# Patient Record
Sex: MALE | Race: WHITE | NOT HISPANIC OR LATINO | Employment: FULL TIME | URBAN - NONMETROPOLITAN AREA
[De-identification: names, ages, dates, MRNs, and addresses within clinical notes are randomized per-mention and may not be internally consistent; named-entity substitution may affect disease eponyms.]

---

## 2017-01-25 ENCOUNTER — TRANSCRIBE ORDERS (OUTPATIENT)
Dept: ADMINISTRATIVE | Facility: HOSPITAL | Age: 52
End: 2017-01-25

## 2017-01-25 DIAGNOSIS — K42.9 UMBILICAL HERNIA WITHOUT OBSTRUCTION OR GANGRENE: Primary | ICD-10-CM

## 2017-02-07 ENCOUNTER — APPOINTMENT (OUTPATIENT)
Dept: CT IMAGING | Facility: HOSPITAL | Age: 52
End: 2017-02-07
Attending: INTERNAL MEDICINE

## 2017-02-09 ENCOUNTER — HOSPITAL ENCOUNTER (OUTPATIENT)
Dept: CT IMAGING | Facility: HOSPITAL | Age: 52
Discharge: HOME | End: 2017-02-09
Attending: INTERNAL MEDICINE | Admitting: INTERNAL MEDICINE

## 2017-02-09 DIAGNOSIS — K42.9 UMBILICAL HERNIA WITHOUT OBSTRUCTION OR GANGRENE: ICD-10-CM

## 2017-02-09 PROCEDURE — 74150 CT ABDOMEN W/O CONTRAST: CPT | Performed by: RADIOLOGY

## 2017-02-09 PROCEDURE — 74150 CT ABDOMEN W/O CONTRAST: CPT

## 2019-07-09 ENCOUNTER — APPOINTMENT (OUTPATIENT)
Dept: GENERAL RADIOLOGY | Facility: HOSPITAL | Age: 54
End: 2019-07-09

## 2019-07-09 ENCOUNTER — APPOINTMENT (OUTPATIENT)
Dept: CT IMAGING | Facility: HOSPITAL | Age: 54
End: 2019-07-09

## 2019-07-09 ENCOUNTER — HOSPITAL ENCOUNTER (INPATIENT)
Facility: HOSPITAL | Age: 54
LOS: 2 days | Discharge: HOME | End: 2019-07-11
Attending: FAMILY MEDICINE | Admitting: INTERNAL MEDICINE

## 2019-07-09 ENCOUNTER — APPOINTMENT (OUTPATIENT)
Dept: ULTRASOUND IMAGING | Facility: HOSPITAL | Age: 54
End: 2019-07-09

## 2019-07-09 DIAGNOSIS — R50.9 FEVER AND CHILLS: ICD-10-CM

## 2019-07-09 DIAGNOSIS — R09.02 HYPOXIA: ICD-10-CM

## 2019-07-09 DIAGNOSIS — R10.9 ABDOMINAL PAIN, UNSPECIFIED ABDOMINAL LOCATION: ICD-10-CM

## 2019-07-09 DIAGNOSIS — K85.90 ACUTE PANCREATITIS, UNSPECIFIED COMPLICATION STATUS, UNSPECIFIED PANCREATITIS TYPE: Primary | ICD-10-CM

## 2019-07-09 LAB
6-ACETYL MORPHINE: NEGATIVE
A-A DO2: 46.4 MMHG (ref 0–300)
ALBUMIN SERPL-MCNC: 3.8 G/DL (ref 3.5–5.2)
ALBUMIN/GLOB SERPL: 1 G/DL
ALP SERPL-CCNC: 82 U/L (ref 39–117)
ALT SERPL W P-5'-P-CCNC: 19 U/L (ref 1–41)
AMPHET+METHAMPHET UR QL: NEGATIVE
AMYLASE SERPL-CCNC: 143 U/L (ref 28–100)
ANION GAP SERPL CALCULATED.3IONS-SCNC: 14.1 MMOL/L (ref 5–15)
APTT PPP: 31.4 SECONDS (ref 23.8–36.1)
ARTERIAL PATENCY WRIST A: ABNORMAL
AST SERPL-CCNC: 15 U/L (ref 1–40)
ATMOSPHERIC PRESS: 728 MMHG
BACTERIA UR QL AUTO: ABNORMAL /HPF
BARBITURATES UR QL SCN: NEGATIVE
BASE EXCESS BLDA CALC-SCNC: -4.4 MMOL/L
BASOPHILS # BLD AUTO: 0.03 10*3/MM3 (ref 0–0.2)
BASOPHILS NFR BLD AUTO: 0.2 % (ref 0–1.5)
BDY SITE: ABNORMAL
BENZODIAZ UR QL SCN: NEGATIVE
BILIRUB SERPL-MCNC: 0.4 MG/DL (ref 0.2–1.2)
BILIRUB UR QL STRIP: NEGATIVE
BODY TEMPERATURE: 98.6 C
BUN BLD-MCNC: 15 MG/DL (ref 6–20)
BUN/CREAT SERPL: 14.9 (ref 7–25)
BUPRENORPHINE SERPL-MCNC: NEGATIVE NG/ML
CALCIUM SPEC-SCNC: 9.4 MG/DL (ref 8.6–10.5)
CANNABINOIDS SERPL QL: NEGATIVE
CHLORIDE SERPL-SCNC: 101 MMOL/L (ref 98–107)
CLARITY UR: CLEAR
CO2 SERPL-SCNC: 22.9 MMOL/L (ref 22–29)
COCAINE UR QL: NEGATIVE
COHGB MFR BLD: 1.8 % (ref 0–5)
COLOR UR: YELLOW
CREAT BLD-MCNC: 1.01 MG/DL (ref 0.76–1.27)
CRP SERPL-MCNC: 10.95 MG/DL (ref 0–0.5)
D-LACTATE SERPL-SCNC: 1 MMOL/L (ref 0.5–2)
DEPRECATED RDW RBC AUTO: 45.4 FL (ref 37–54)
EOSINOPHIL # BLD AUTO: 0.08 10*3/MM3 (ref 0–0.4)
EOSINOPHIL NFR BLD AUTO: 0.6 % (ref 0.3–6.2)
ERYTHROCYTE [DISTWIDTH] IN BLOOD BY AUTOMATED COUNT: 14.6 % (ref 12.3–15.4)
GFR SERPL CREATININE-BSD FRML MDRD: 76 ML/MIN/1.73
GLOBULIN UR ELPH-MCNC: 3.7 GM/DL
GLUCOSE BLD-MCNC: 144 MG/DL (ref 65–99)
GLUCOSE BLDC GLUCOMTR-MCNC: 102 MG/DL (ref 70–130)
GLUCOSE BLDC GLUCOMTR-MCNC: 109 MG/DL (ref 70–130)
GLUCOSE UR STRIP-MCNC: ABNORMAL MG/DL
HCO3 BLDA-SCNC: 19.8 MMOL/L (ref 22–26)
HCT VFR BLD AUTO: 43.1 % (ref 37.5–51)
HCT VFR BLD CALC: 40 % (ref 42–52)
HGB BLD-MCNC: 13.7 G/DL (ref 13–17.7)
HGB BLDA-MCNC: 13.7 G/DL (ref 12–16)
HGB UR QL STRIP.AUTO: ABNORMAL
HOLD SPECIMEN: NORMAL
HOLD SPECIMEN: NORMAL
HOROWITZ INDEX BLD+IHG-RTO: 21 %
HYALINE CASTS UR QL AUTO: ABNORMAL /LPF
IMM GRANULOCYTES # BLD AUTO: 0.03 10*3/MM3 (ref 0–0.05)
IMM GRANULOCYTES NFR BLD AUTO: 0.2 % (ref 0–0.5)
INR PPP: 0.97 (ref 0.9–1.1)
KETONES UR QL STRIP: ABNORMAL
LEUKOCYTE ESTERASE UR QL STRIP.AUTO: NEGATIVE
LIPASE SERPL-CCNC: 279 U/L (ref 13–60)
LYMPHOCYTES # BLD AUTO: 1.48 10*3/MM3 (ref 0.7–3.1)
LYMPHOCYTES NFR BLD AUTO: 10.5 % (ref 19.6–45.3)
MAGNESIUM SERPL-MCNC: 1.9 MG/DL (ref 1.6–2.6)
MCH RBC QN AUTO: 27.2 PG (ref 26.6–33)
MCHC RBC AUTO-ENTMCNC: 31.8 G/DL (ref 31.5–35.7)
MCV RBC AUTO: 85.7 FL (ref 79–97)
METHADONE UR QL SCN: NEGATIVE
METHGB BLD QL: 0 % (ref 0–3)
MODALITY: ABNORMAL
MONOCYTES # BLD AUTO: 1.23 10*3/MM3 (ref 0.1–0.9)
MONOCYTES NFR BLD AUTO: 8.7 % (ref 5–12)
NEUTROPHILS # BLD AUTO: 11.29 10*3/MM3 (ref 1.7–7)
NEUTROPHILS NFR BLD AUTO: 79.8 % (ref 42.7–76)
NITRITE UR QL STRIP: NEGATIVE
NOTE: ABNORMAL
OPIATES UR QL: POSITIVE
OXYCODONE UR QL SCN: NEGATIVE
OXYHGB MFR BLDV: 87.9 % (ref 85–100)
PCO2 BLDA: 34 MM HG (ref 35–45)
PCO2 TEMP ADJ BLD: ABNORMAL MM HG (ref 35–48)
PCP UR QL SCN: NEGATIVE
PH BLDA: 7.38 PH UNITS (ref 7.35–7.45)
PH UR STRIP.AUTO: 7 [PH] (ref 5–8)
PH, TEMP CORRECTED: ABNORMAL PH UNITS (ref 7.35–7.45)
PLATELET # BLD AUTO: 249 10*3/MM3 (ref 140–450)
PMV BLD AUTO: 10 FL (ref 6–12)
PO2 BLDA: 55.9 MM HG (ref 80–100)
PO2 TEMP ADJ BLD: ABNORMAL MM HG (ref 83–108)
POTASSIUM BLD-SCNC: 4.1 MMOL/L (ref 3.5–5.2)
PROT SERPL-MCNC: 7.5 G/DL (ref 6–8.5)
PROT UR QL STRIP: NEGATIVE
PROTHROMBIN TIME: 13.4 SECONDS (ref 11–15.4)
RBC # BLD AUTO: 5.03 10*6/MM3 (ref 4.14–5.8)
RBC # UR: ABNORMAL /HPF
REF LAB TEST METHOD: ABNORMAL
SAO2 % BLDCOA: 89.5 % (ref 90–100)
SODIUM BLD-SCNC: 138 MMOL/L (ref 136–145)
SP GR UR STRIP: >1.03 (ref 1–1.03)
SQUAMOUS #/AREA URNS HPF: ABNORMAL /HPF
TROPONIN T SERPL-MCNC: <0.01 NG/ML (ref 0–0.03)
UROBILINOGEN UR QL STRIP: ABNORMAL
WBC NRBC COR # BLD: 14.14 10*3/MM3 (ref 3.4–10.8)
WBC UR QL AUTO: ABNORMAL /HPF
WHOLE BLOOD HOLD SPECIMEN: NORMAL
WHOLE BLOOD HOLD SPECIMEN: NORMAL

## 2019-07-09 PROCEDURE — 84484 ASSAY OF TROPONIN QUANT: CPT | Performed by: PHYSICIAN ASSISTANT

## 2019-07-09 PROCEDURE — 74176 CT ABD & PELVIS W/O CONTRAST: CPT

## 2019-07-09 PROCEDURE — 93005 ELECTROCARDIOGRAM TRACING: CPT | Performed by: PHYSICIAN ASSISTANT

## 2019-07-09 PROCEDURE — 93005 ELECTROCARDIOGRAM TRACING: CPT | Performed by: FAMILY MEDICINE

## 2019-07-09 PROCEDURE — 80307 DRUG TEST PRSMV CHEM ANLYZR: CPT | Performed by: FAMILY MEDICINE

## 2019-07-09 PROCEDURE — 71045 X-RAY EXAM CHEST 1 VIEW: CPT

## 2019-07-09 PROCEDURE — 82150 ASSAY OF AMYLASE: CPT | Performed by: PHYSICIAN ASSISTANT

## 2019-07-09 PROCEDURE — 83050 HGB METHEMOGLOBIN QUAN: CPT | Performed by: PHYSICIAN ASSISTANT

## 2019-07-09 PROCEDURE — 83036 HEMOGLOBIN GLYCOSYLATED A1C: CPT | Performed by: HOSPITALIST

## 2019-07-09 PROCEDURE — 36600 WITHDRAWAL OF ARTERIAL BLOOD: CPT | Performed by: PHYSICIAN ASSISTANT

## 2019-07-09 PROCEDURE — 71045 X-RAY EXAM CHEST 1 VIEW: CPT | Performed by: RADIOLOGY

## 2019-07-09 PROCEDURE — 71275 CT ANGIOGRAPHY CHEST: CPT

## 2019-07-09 PROCEDURE — 99285 EMERGENCY DEPT VISIT HI MDM: CPT

## 2019-07-09 PROCEDURE — 85610 PROTHROMBIN TIME: CPT | Performed by: PHYSICIAN ASSISTANT

## 2019-07-09 PROCEDURE — 82805 BLOOD GASES W/O2 SATURATION: CPT | Performed by: PHYSICIAN ASSISTANT

## 2019-07-09 PROCEDURE — 93010 ELECTROCARDIOGRAM REPORT: CPT | Performed by: INTERNAL MEDICINE

## 2019-07-09 PROCEDURE — 85025 COMPLETE CBC W/AUTO DIFF WBC: CPT | Performed by: FAMILY MEDICINE

## 2019-07-09 PROCEDURE — 86140 C-REACTIVE PROTEIN: CPT | Performed by: PHYSICIAN ASSISTANT

## 2019-07-09 PROCEDURE — 80053 COMPREHEN METABOLIC PANEL: CPT | Performed by: FAMILY MEDICINE

## 2019-07-09 PROCEDURE — 87040 BLOOD CULTURE FOR BACTERIA: CPT | Performed by: FAMILY MEDICINE

## 2019-07-09 PROCEDURE — 83605 ASSAY OF LACTIC ACID: CPT | Performed by: FAMILY MEDICINE

## 2019-07-09 PROCEDURE — 83735 ASSAY OF MAGNESIUM: CPT | Performed by: PHYSICIAN ASSISTANT

## 2019-07-09 PROCEDURE — 76705 ECHO EXAM OF ABDOMEN: CPT | Performed by: RADIOLOGY

## 2019-07-09 PROCEDURE — 81001 URINALYSIS AUTO W/SCOPE: CPT | Performed by: FAMILY MEDICINE

## 2019-07-09 PROCEDURE — 85730 THROMBOPLASTIN TIME PARTIAL: CPT | Performed by: PHYSICIAN ASSISTANT

## 2019-07-09 PROCEDURE — 0 IOVERSOL 74 % SOLUTION: Performed by: FAMILY MEDICINE

## 2019-07-09 PROCEDURE — 82962 GLUCOSE BLOOD TEST: CPT

## 2019-07-09 PROCEDURE — 25010000002 PIPERACILLIN-TAZOBACTAM: Performed by: PHYSICIAN ASSISTANT

## 2019-07-09 PROCEDURE — 25010000002 CEFTRIAXONE: Performed by: HOSPITALIST

## 2019-07-09 PROCEDURE — 74176 CT ABD & PELVIS W/O CONTRAST: CPT | Performed by: RADIOLOGY

## 2019-07-09 PROCEDURE — 71275 CT ANGIOGRAPHY CHEST: CPT | Performed by: RADIOLOGY

## 2019-07-09 PROCEDURE — 83690 ASSAY OF LIPASE: CPT | Performed by: PHYSICIAN ASSISTANT

## 2019-07-09 PROCEDURE — 76705 ECHO EXAM OF ABDOMEN: CPT

## 2019-07-09 PROCEDURE — 82375 ASSAY CARBOXYHB QUANT: CPT | Performed by: PHYSICIAN ASSISTANT

## 2019-07-09 RX ORDER — AMITRIPTYLINE HYDROCHLORIDE 25 MG/1
25 TABLET, FILM COATED ORAL NIGHTLY PRN
COMMUNITY
End: 2019-07-11 | Stop reason: HOSPADM

## 2019-07-09 RX ORDER — NAPROXEN 500 MG/1
500 TABLET ORAL 2 TIMES DAILY WITH MEALS
COMMUNITY
End: 2019-07-11 | Stop reason: HOSPADM

## 2019-07-09 RX ORDER — SODIUM CHLORIDE 9 MG/ML
100 INJECTION, SOLUTION INTRAVENOUS CONTINUOUS
Status: DISCONTINUED | OUTPATIENT
Start: 2019-07-09 | End: 2019-07-11 | Stop reason: HOSPADM

## 2019-07-09 RX ORDER — SODIUM CHLORIDE 0.9 % (FLUSH) 0.9 %
10 SYRINGE (ML) INJECTION AS NEEDED
Status: DISCONTINUED | OUTPATIENT
Start: 2019-07-09 | End: 2019-07-11 | Stop reason: HOSPADM

## 2019-07-09 RX ORDER — CYCLOBENZAPRINE HCL 5 MG
5 TABLET ORAL 2 TIMES DAILY PRN
COMMUNITY
End: 2019-07-11 | Stop reason: HOSPADM

## 2019-07-09 RX ORDER — ACETAMINOPHEN 325 MG/1
650 TABLET ORAL ONCE
Status: COMPLETED | OUTPATIENT
Start: 2019-07-09 | End: 2019-07-09

## 2019-07-09 RX ORDER — SODIUM CHLORIDE 0.9 % (FLUSH) 0.9 %
3-10 SYRINGE (ML) INJECTION AS NEEDED
Status: DISCONTINUED | OUTPATIENT
Start: 2019-07-09 | End: 2019-07-11 | Stop reason: HOSPADM

## 2019-07-09 RX ORDER — HYDROCODONE BITARTRATE AND ACETAMINOPHEN 7.5; 325 MG/1; MG/1
1 TABLET ORAL 2 TIMES DAILY PRN
Status: ON HOLD | COMMUNITY
End: 2019-07-11 | Stop reason: SDUPTHER

## 2019-07-09 RX ORDER — GABAPENTIN 300 MG/1
300 CAPSULE ORAL 3 TIMES DAILY PRN
COMMUNITY
End: 2019-07-11 | Stop reason: HOSPADM

## 2019-07-09 RX ORDER — NITROGLYCERIN 0.4 MG/1
0.4 TABLET SUBLINGUAL
Status: DISCONTINUED | OUTPATIENT
Start: 2019-07-09 | End: 2019-07-11 | Stop reason: HOSPADM

## 2019-07-09 RX ORDER — SODIUM CHLORIDE 0.9 % (FLUSH) 0.9 %
3 SYRINGE (ML) INJECTION EVERY 12 HOURS SCHEDULED
Status: DISCONTINUED | OUTPATIENT
Start: 2019-07-10 | End: 2019-07-11 | Stop reason: HOSPADM

## 2019-07-09 RX ADMIN — IOVERSOL 100 ML: 741 INJECTION INTRA-ARTERIAL; INTRAVENOUS at 20:08

## 2019-07-09 RX ADMIN — PIPERACILLIN SODIUM,TAZOBACTAM SODIUM 3.38 G: 3; .375 INJECTION, POWDER, FOR SOLUTION INTRAVENOUS at 18:10

## 2019-07-09 RX ADMIN — SODIUM CHLORIDE 1983 ML: 9 INJECTION, SOLUTION INTRAVENOUS at 17:21

## 2019-07-09 RX ADMIN — CEFTRIAXONE 1 G: 1 INJECTION, POWDER, FOR SOLUTION INTRAMUSCULAR; INTRAVENOUS at 22:47

## 2019-07-09 RX ADMIN — ACETAMINOPHEN 650 MG: 325 TABLET ORAL at 18:08

## 2019-07-09 RX ADMIN — SODIUM CHLORIDE 125 ML/HR: 9 INJECTION, SOLUTION INTRAVENOUS at 22:35

## 2019-07-09 NOTE — ED NOTES
Patient presents to the ER with complaints of abdominal pain. Patient states that the pain is generalized to the whole abdominal area. Patient states that he has not had any vomiting or diarrhea. Patient states that the pain has been present for about a month now. Patient states that the pain radiates to his back. Patient states that he has not had any relief from pain since it started.      Nicho Vásquez, CARRIE  07/09/19 7215

## 2019-07-09 NOTE — ED PROVIDER NOTES
Subjective   54-year-old male presents the ED today for generalized abdominal pain for the last 2 weeks.  He states the pain has been constant and progressively getting worse.  He states nothing seems to make it worse or better.  He denies any nausea, vomiting or diarrhea.  He states he had a normal bowel movement yesterday.  He denies any blood in his stool or black tarry stool.  He states his appetite has been normal.  He denies any urinary symptoms.  [facility] and [facility] recommended that he come here for an evaluation.  The patient was noted to have a fever of 101 here in the ER but he denies any fever at home.  His wife states that he she gave him a Norco 10 mg about 90 minutes ago with no relief of his symptoms.  He does have a history of diabetes, hypertension, hyperlipidemia.  He denies any drug or alcohol use.        History provided by:  Patient  Abdominal Pain   Pain location:  Generalized  Pain quality: sharp    Pain radiates to:  Does not radiate  Pain severity:  Severe  Onset quality:  Gradual  Duration:  2 weeks  Timing:  Constant  Progression:  Worsening  Chronicity:  New  Context: not alcohol use, not recent travel, not sick contacts, not suspicious food intake and not trauma    Relieved by:  Nothing  Worsened by:  Nothing  Associated symptoms: fever    Associated symptoms: no anorexia, no chest pain, no chills, no constipation, no cough, no diarrhea, no dysuria, no fatigue, no hematemesis, no hematochezia, no hematuria, no melena, no nausea, no shortness of breath and no vomiting    Risk factors: obesity    Risk factors: no alcohol abuse        Review of Systems   Constitutional: Positive for fever. Negative for appetite change, chills and fatigue.   HENT: Negative.    Eyes: Negative.    Respiratory: Negative for cough and shortness of breath.    Cardiovascular: Negative for chest pain.   Gastrointestinal: Positive for abdominal pain. Negative for anorexia,  blood in stool, constipation, diarrhea, hematemesis, hematochezia, melena, nausea and vomiting.   Genitourinary: Negative for dysuria and hematuria.   Musculoskeletal: Negative.    Skin: Negative.    Neurological: Negative.    Psychiatric/Behavioral: Negative.    All other systems reviewed and are negative.      Past Medical History:   Diagnosis Date   • Diabetes mellitus (CMS/HCC)    • GERD (gastroesophageal reflux disease)    • Hyperlipidemia    • Hypertension    • Injury of back    • Kidney stone    • Pancreatitis        No Known Allergies    Past Surgical History:   Procedure Laterality Date   • ADENOIDECTOMY     • KIDNEY STONE SURGERY     • TONSILLECTOMY         History reviewed. No pertinent family history.    Social History     Socioeconomic History   • Marital status:      Spouse name: Not on file   • Number of children: Not on file   • Years of education: Not on file   • Highest education level: Not on file   Tobacco Use   • Smoking status: Never Smoker   • Smokeless tobacco: Current User   Substance and Sexual Activity   • Alcohol use: No   • Drug use: No           Objective   Physical Exam   Constitutional: He is oriented to person, place, and time. He appears well-developed and well-nourished. No distress.   HENT:   Head: Normocephalic and atraumatic.   Mouth/Throat: Oropharynx is clear and moist.   Eyes: EOM are normal. Pupils are equal, round, and reactive to light.   Cardiovascular: Regular rhythm, normal heart sounds and intact distal pulses. Tachycardia present.   Pulmonary/Chest: Effort normal and breath sounds normal.   Abdominal: Soft. Normal appearance and bowel sounds are normal. There is generalized tenderness. There is no rigidity, no rebound, no guarding and no CVA tenderness.   Neurological: He is alert and oriented to person, place, and time.   Skin: Skin is warm and dry. Capillary refill takes less than 2 seconds.   Psychiatric: He has a normal mood and affect. His behavior is  normal.   Nursing note and vitals reviewed.      Procedures           ED Course  ED Course as of Jul 10 1537   Tue Jul 09, 2019   1648 Patient examined.  Agree with Debbie Rivera's workup.  Will continue to follow  [EG]   1747 17:22 - sinus tachycardia, rate of 124, incomplete RBBB, no acute ischemia, reviewed by Dr. Arias ECG 12 Lead []   1831 No acute findings on CXR per Dr. Damon  []   1845 Patient has begun to have episodes where his oxygen levels have dropped to 87-88% on room air.  The patient denies any shortness of breath or chest pain.  States he has not been coughing.  Denies any recent travel.  States he mostly just works and comes home.  He works at [employer].  []   1847 Patient continues to be tachycardic in the 130's.  []   1857 ABG reviewed and patient started on 2L O2.  CT PE protocol ordered as well.  []   1940 CT abd/pelvis shows cholelithiasis appears to be present with tiny density in nondependent gallbladder and along the neck.  Questionable very slight hazy density surrounding pancreas suspicious for infectious or inflammatory process, possible pancreatitis.  No definite ductal dilatation.  Diverticulosis coli without pericolonic fat stranding.  Thickened appearance of the distal colon may be attributed to under distention or inflammation.  No dilated bowel obstruction.  No free intraperitoneal air or free fluid collections.  Bilateral renal cysts with nonobstructing intrarenal calculi.  No hydronephrosis.  []   2003 Endorsed to Jaime Bass  [AH]   2140 CT PE protocol does show no pulmonary embolus.  No aortic dissection or aneurysm.  Stranding dependent interstitial prominence, subsegmental atelectasis or infiltrates in both lungs.  6 mm nodule in right lung base and left hilar calcifications may be old granulomatous changes.  Ultrasound shows probable small nonshadowing stones or sludge in gallbladder.   []   2142 Call placed to Dr. Childs.   []      ED Course User Index  []  Debbie Jha PA  [] Car Bass PA-C  [EG] Nieves Arias, DO                  MDM  Number of Diagnoses or Management Options  Abdominal pain, unspecified abdominal location:   Acute pancreatitis, unspecified complication status, unspecified pancreatitis type:   Fever and chills:   Hypoxia:      Amount and/or Complexity of Data Reviewed  Clinical lab tests: reviewed  Tests in the radiology section of CPT®: reviewed  Tests in the medicine section of CPT®: reviewed  Discuss the patient with other providers: yes    Patient Progress  Patient progress: stable        Final diagnoses:   Acute pancreatitis, unspecified complication status, unspecified pancreatitis type   Abdominal pain, unspecified abdominal location   Fever and chills   Hypoxia            Debbie Jha PA  07/10/19 1539

## 2019-07-10 LAB
ALBUMIN SERPL-MCNC: 3.28 G/DL (ref 3.5–5.2)
ALBUMIN/GLOB SERPL: 1 G/DL
ALP SERPL-CCNC: 73 U/L (ref 39–117)
ALT SERPL W P-5'-P-CCNC: 16 U/L (ref 1–41)
AMYLASE SERPL-CCNC: 85 U/L (ref 28–100)
ANION GAP SERPL CALCULATED.3IONS-SCNC: 12.9 MMOL/L (ref 5–15)
AST SERPL-CCNC: 13 U/L (ref 1–40)
BASOPHILS # BLD AUTO: 0.04 10*3/MM3 (ref 0–0.2)
BASOPHILS NFR BLD AUTO: 0.3 % (ref 0–1.5)
BILIRUB SERPL-MCNC: 0.4 MG/DL (ref 0.2–1.2)
BUN BLD-MCNC: 13 MG/DL (ref 6–20)
BUN/CREAT SERPL: 13.5 (ref 7–25)
CALCIUM SPEC-SCNC: 8.7 MG/DL (ref 8.6–10.5)
CHLORIDE SERPL-SCNC: 106 MMOL/L (ref 98–107)
CHOLEST SERPL-MCNC: 168 MG/DL (ref 0–200)
CO2 SERPL-SCNC: 20.1 MMOL/L (ref 22–29)
CREAT BLD-MCNC: 0.96 MG/DL (ref 0.76–1.27)
DEPRECATED RDW RBC AUTO: 47.3 FL (ref 37–54)
EOSINOPHIL # BLD AUTO: 0.2 10*3/MM3 (ref 0–0.4)
EOSINOPHIL NFR BLD AUTO: 1.3 % (ref 0.3–6.2)
ERYTHROCYTE [DISTWIDTH] IN BLOOD BY AUTOMATED COUNT: 14.8 % (ref 12.3–15.4)
GFR SERPL CREATININE-BSD FRML MDRD: 81 ML/MIN/1.73
GLOBULIN UR ELPH-MCNC: 3.4 GM/DL
GLUCOSE BLD-MCNC: 157 MG/DL (ref 65–99)
GLUCOSE BLDC GLUCOMTR-MCNC: 108 MG/DL (ref 70–130)
GLUCOSE BLDC GLUCOMTR-MCNC: 117 MG/DL (ref 70–130)
GLUCOSE BLDC GLUCOMTR-MCNC: 135 MG/DL (ref 70–130)
GLUCOSE BLDC GLUCOMTR-MCNC: 205 MG/DL (ref 70–130)
HBA1C MFR BLD: 9.4 % (ref 4.8–5.6)
HCT VFR BLD AUTO: 42.5 % (ref 37.5–51)
HDLC SERPL-MCNC: 36 MG/DL (ref 40–60)
HGB BLD-MCNC: 13.4 G/DL (ref 13–17.7)
IMM GRANULOCYTES # BLD AUTO: 0.04 10*3/MM3 (ref 0–0.05)
IMM GRANULOCYTES NFR BLD AUTO: 0.3 % (ref 0–0.5)
LDLC SERPL CALC-MCNC: 104 MG/DL (ref 0–100)
LDLC/HDLC SERPL: 2.88 {RATIO}
LIPASE SERPL-CCNC: 121 U/L (ref 13–60)
LYMPHOCYTES # BLD AUTO: 1.7 10*3/MM3 (ref 0.7–3.1)
LYMPHOCYTES NFR BLD AUTO: 10.8 % (ref 19.6–45.3)
MCH RBC QN AUTO: 27.8 PG (ref 26.6–33)
MCHC RBC AUTO-ENTMCNC: 31.5 G/DL (ref 31.5–35.7)
MCV RBC AUTO: 88.2 FL (ref 79–97)
MONOCYTES # BLD AUTO: 1.39 10*3/MM3 (ref 0.1–0.9)
MONOCYTES NFR BLD AUTO: 8.8 % (ref 5–12)
NEUTROPHILS # BLD AUTO: 12.35 10*3/MM3 (ref 1.7–7)
NEUTROPHILS NFR BLD AUTO: 78.5 % (ref 42.7–76)
PLATELET # BLD AUTO: 227 10*3/MM3 (ref 140–450)
PMV BLD AUTO: 10.4 FL (ref 6–12)
POTASSIUM BLD-SCNC: 4.4 MMOL/L (ref 3.5–5.2)
PROT SERPL-MCNC: 6.7 G/DL (ref 6–8.5)
RBC # BLD AUTO: 4.82 10*6/MM3 (ref 4.14–5.8)
SODIUM BLD-SCNC: 139 MMOL/L (ref 136–145)
TRIGL SERPL-MCNC: 142 MG/DL (ref 0–150)
VLDLC SERPL-MCNC: 28.4 MG/DL
WBC NRBC COR # BLD: 15.72 10*3/MM3 (ref 3.4–10.8)

## 2019-07-10 PROCEDURE — 82962 GLUCOSE BLOOD TEST: CPT

## 2019-07-10 PROCEDURE — 99223 1ST HOSP IP/OBS HIGH 75: CPT | Performed by: HOSPITALIST

## 2019-07-10 PROCEDURE — 80061 LIPID PANEL: CPT | Performed by: HOSPITALIST

## 2019-07-10 PROCEDURE — 80053 COMPREHEN METABOLIC PANEL: CPT | Performed by: HOSPITALIST

## 2019-07-10 PROCEDURE — 94799 UNLISTED PULMONARY SVC/PX: CPT

## 2019-07-10 PROCEDURE — 25010000002 MORPHINE PER 10 MG: Performed by: HOSPITALIST

## 2019-07-10 PROCEDURE — 83690 ASSAY OF LIPASE: CPT | Performed by: HOSPITALIST

## 2019-07-10 PROCEDURE — 85025 COMPLETE CBC W/AUTO DIFF WBC: CPT | Performed by: HOSPITALIST

## 2019-07-10 PROCEDURE — 82150 ASSAY OF AMYLASE: CPT | Performed by: HOSPITALIST

## 2019-07-10 PROCEDURE — 63710000001 INSULIN ASPART PER 5 UNITS: Performed by: HOSPITALIST

## 2019-07-10 RX ORDER — NICOTINE POLACRILEX 4 MG
15 LOZENGE BUCCAL
Status: DISCONTINUED | OUTPATIENT
Start: 2019-07-10 | End: 2019-07-11 | Stop reason: HOSPADM

## 2019-07-10 RX ORDER — INSULIN ASPART 100 [IU]/ML
110 INJECTION, SUSPENSION SUBCUTANEOUS EVERY MORNING
Status: CANCELLED | OUTPATIENT
Start: 2019-07-10

## 2019-07-10 RX ORDER — AMITRIPTYLINE HYDROCHLORIDE 25 MG/1
25 TABLET, FILM COATED ORAL NIGHTLY PRN
Status: CANCELLED | OUTPATIENT
Start: 2019-07-10

## 2019-07-10 RX ORDER — FOLIC ACID 1 MG/1
1 TABLET ORAL DAILY
Status: DISCONTINUED | OUTPATIENT
Start: 2019-07-10 | End: 2019-07-11 | Stop reason: HOSPADM

## 2019-07-10 RX ORDER — LISINOPRIL 10 MG/1
10 TABLET ORAL DAILY
Status: CANCELLED | OUTPATIENT
Start: 2019-07-10

## 2019-07-10 RX ORDER — INSULIN ASPART 100 [IU]/ML
55 INJECTION, SUSPENSION SUBCUTANEOUS EVERY EVENING
Status: CANCELLED | OUTPATIENT
Start: 2019-07-10

## 2019-07-10 RX ORDER — NAPROXEN 250 MG/1
500 TABLET ORAL 2 TIMES DAILY WITH MEALS
Status: CANCELLED | OUTPATIENT
Start: 2019-07-10

## 2019-07-10 RX ORDER — ROPINIROLE 2 MG/1
2 TABLET, FILM COATED ORAL NIGHTLY
COMMUNITY
End: 2019-07-11 | Stop reason: HOSPADM

## 2019-07-10 RX ORDER — GABAPENTIN 300 MG/1
300 CAPSULE ORAL 3 TIMES DAILY PRN
Status: CANCELLED | OUTPATIENT
Start: 2019-07-10

## 2019-07-10 RX ORDER — ROPINIROLE 1 MG/1
2 TABLET, FILM COATED ORAL NIGHTLY
Status: CANCELLED | OUTPATIENT
Start: 2019-07-10

## 2019-07-10 RX ORDER — HYDROCODONE BITARTRATE AND ACETAMINOPHEN 7.5; 325 MG/1; MG/1
1 TABLET ORAL 2 TIMES DAILY PRN
Status: CANCELLED | OUTPATIENT
Start: 2019-07-10

## 2019-07-10 RX ORDER — CYCLOBENZAPRINE HCL 10 MG
5 TABLET ORAL 2 TIMES DAILY PRN
Status: CANCELLED | OUTPATIENT
Start: 2019-07-10

## 2019-07-10 RX ORDER — LISINOPRIL 10 MG/1
10 TABLET ORAL DAILY
COMMUNITY
End: 2019-07-11 | Stop reason: HOSPADM

## 2019-07-10 RX ORDER — MORPHINE SULFATE 2 MG/ML
1 INJECTION, SOLUTION INTRAMUSCULAR; INTRAVENOUS EVERY 4 HOURS PRN
Status: DISCONTINUED | OUTPATIENT
Start: 2019-07-10 | End: 2019-07-11 | Stop reason: HOSPADM

## 2019-07-10 RX ORDER — PANTOPRAZOLE SODIUM 40 MG/1
40 TABLET, DELAYED RELEASE ORAL EVERY MORNING
Status: CANCELLED | OUTPATIENT
Start: 2019-07-10

## 2019-07-10 RX ORDER — THIAMINE MONONITRATE (VIT B1) 100 MG
100 TABLET ORAL DAILY
Status: DISCONTINUED | OUTPATIENT
Start: 2019-07-10 | End: 2019-07-11 | Stop reason: HOSPADM

## 2019-07-10 RX ORDER — DEXTROSE MONOHYDRATE 25 G/50ML
25 INJECTION, SOLUTION INTRAVENOUS
Status: DISCONTINUED | OUTPATIENT
Start: 2019-07-10 | End: 2019-07-11 | Stop reason: HOSPADM

## 2019-07-10 RX ADMIN — METRONIDAZOLE 500 MG: 500 INJECTION, SOLUTION INTRAVENOUS at 09:14

## 2019-07-10 RX ADMIN — INSULIN ASPART 3 UNITS: 100 INJECTION, SOLUTION INTRAVENOUS; SUBCUTANEOUS at 17:40

## 2019-07-10 RX ADMIN — SODIUM CHLORIDE 100 ML/HR: 9 INJECTION, SOLUTION INTRAVENOUS at 18:14

## 2019-07-10 RX ADMIN — MORPHINE SULFATE 1 MG: 2 INJECTION, SOLUTION INTRAMUSCULAR; INTRAVENOUS at 12:59

## 2019-07-10 RX ADMIN — MORPHINE SULFATE 1 MG: 2 INJECTION, SOLUTION INTRAMUSCULAR; INTRAVENOUS at 19:35

## 2019-07-10 RX ADMIN — METOPROLOL TARTRATE 25 MG: 25 TABLET, FILM COATED ORAL at 14:31

## 2019-07-10 RX ADMIN — METOPROLOL TARTRATE 25 MG: 25 TABLET, FILM COATED ORAL at 20:57

## 2019-07-10 RX ADMIN — SODIUM CHLORIDE, PRESERVATIVE FREE 3 ML: 5 INJECTION INTRAVENOUS at 20:57

## 2019-07-10 RX ADMIN — Medication 100 MG: at 14:31

## 2019-07-10 RX ADMIN — FOLIC ACID 1 MG: 1 TABLET ORAL at 14:31

## 2019-07-10 RX ADMIN — MORPHINE SULFATE 1 MG: 2 INJECTION, SOLUTION INTRAMUSCULAR; INTRAVENOUS at 03:33

## 2019-07-10 RX ADMIN — SODIUM CHLORIDE 125 ML/HR: 9 INJECTION, SOLUTION INTRAVENOUS at 09:15

## 2019-07-10 RX ADMIN — METRONIDAZOLE 500 MG: 500 INJECTION, SOLUTION INTRAVENOUS at 02:06

## 2019-07-10 NOTE — ED PROVIDER NOTES
Subjective     History provided by:  Patient   used: No    Abdominal Pain   Pain location:  Epigastric  Pain quality: aching and gnawing    Pain radiates to:  Does not radiate  Pain severity:  Moderate  Onset quality:  Sudden  Duration:  2 weeks  Timing:  Intermittent  Progression:  Worsening  Chronicity:  New  Context: not alcohol use, not awakening from sleep, not diet changes, not medication withdrawal, not previous surgeries, not recent illness, not retching and not sick contacts    Relieved by:  Nothing  Worsened by:  Nothing  Ineffective treatments:  None tried  Associated symptoms: nausea    Associated symptoms: no anorexia and no belching    Risk factors: no alcohol abuse, no aspirin use, has not had multiple surgeries and no NSAID use        Review of Systems   Constitutional: Negative.    Eyes: Negative.    Gastrointestinal: Positive for abdominal distention, abdominal pain and nausea. Negative for anorexia.   All other systems reviewed and are negative.      Past Medical History:   Diagnosis Date   • Diabetes mellitus (CMS/HCC)    • GERD (gastroesophageal reflux disease)    • Hyperlipidemia    • Hypertension    • Injury of back    • Kidney stone    • Pancreatitis        No Known Allergies    Past Surgical History:   Procedure Laterality Date   • ADENOIDECTOMY     • KIDNEY STONE SURGERY     • TONSILLECTOMY         History reviewed. No pertinent family history.    Social History     Socioeconomic History   • Marital status:      Spouse name: Not on file   • Number of children: Not on file   • Years of education: Not on file   • Highest education level: Not on file   Tobacco Use   • Smoking status: Never Smoker   • Smokeless tobacco: Current User   Substance and Sexual Activity   • Alcohol use: No   • Drug use: No           Objective   Physical Exam   Constitutional: He is oriented to person, place, and time. He appears well-developed and well-nourished.  Non-toxic appearance. He  does not appear ill. No distress.   HENT:   Head: Normocephalic and atraumatic.   Nose: Nose normal.   Mouth/Throat: Oropharynx is clear and moist. No oropharyngeal exudate.   Eyes: Conjunctivae and EOM are normal. Pupils are equal, round, and reactive to light. No scleral icterus.   Neck: Normal range of motion. Neck supple.   Cardiovascular: Normal rate, regular rhythm, normal heart sounds and intact distal pulses. Exam reveals no gallop and no friction rub.   No murmur heard.  Pulmonary/Chest: Effort normal and breath sounds normal. No stridor. No respiratory distress. He has no wheezes. He has no rales.   Abdominal: Soft. Normal appearance and bowel sounds are normal. There is no hepatosplenomegaly. There is tenderness in the epigastric area. There is no rigidity, no rebound, no guarding, no CVA tenderness, no tenderness at McBurney's point and negative Moreland's sign. No hernia.   Genitourinary: Prostate normal. Prostate is not enlarged and not tender.   Musculoskeletal: Normal range of motion.   Neurological: He is alert and oriented to person, place, and time.   Skin: Skin is warm and dry. Capillary refill takes less than 2 seconds. No rash noted. He is not diaphoretic. No cyanosis or erythema. No pallor.   Psychiatric: He has a normal mood and affect. His behavior is normal. Judgment and thought content normal.   Nursing note and vitals reviewed.      Procedures           ED Course  ED Course as of Jul 11 0721   Tue Jul 09, 2019   1648 Patient examined.  Agree with Debbie Rivera's workup.  Will continue to follow  [EG]   1747 17:22 - sinus tachycardia, rate of 124, incomplete RBBB, no acute ischemia, reviewed by Dr. Arias ECG 12 Lead []   1831 No acute findings on CXR per Dr. Damon  []   1845 Patient has begun to have episodes where his oxygen levels have dropped to 87-88% on room air.  The patient denies any shortness of breath or chest pain.  States he has not been coughing.  Denies any recent travel.   States he mostly just works and comes home.  He works at [employer].  []   1847 Patient continues to be tachycardic in the 130's.  []   1857 ABG reviewed and patient started on 2L O2.  CT PE protocol ordered as well.  []   1940 CT abd/pelvis shows cholelithiasis appears to be present with tiny density in nondependent gallbladder and along the neck.  Questionable very slight hazy density surrounding pancreas suspicious for infectious or inflammatory process, possible pancreatitis.  No definite ductal dilatation.  Diverticulosis coli without pericolonic fat stranding.  Thickened appearance of the distal colon may be attributed to under distention or inflammation.  No dilated bowel obstruction.  No free intraperitoneal air or free fluid collections.  Bilateral renal cysts with nonobstructing intrarenal calculi.  No hydronephrosis.  []   2003 Endorsed to Jaime Bass  []   2140 CT PE protocol does show no pulmonary embolus.  No aortic dissection or aneurysm.  Stranding dependent interstitial prominence, subsegmental atelectasis or infiltrates in both lungs.  6 mm nodule in right lung base and left hilar calcifications may be old granulomatous changes.  Ultrasound shows probable small nonshadowing stones or sludge in gallbladder.   []   2142 Call placed to Dr. Childs.   []      ED Course User Index  [AH] Debbie Jha, PA  [] Car Bass PA-C  [] Nieves Arias, DO [facility]      Final diagnoses:   Acute pancreatitis, unspecified complication status, unspecified pancreatitis type   Abdominal pain, unspecified abdominal location   Fever and chills   Hypoxia            Car Bass PA-C  07/11/19 0721       Car Bass PA-C  07/11/19 0721

## 2019-07-10 NOTE — PLAN OF CARE
Problem: Pancreatitis, Acute/Chronic (Adult)  Goal: Signs and Symptoms of Listed Potential Problems Will be Absent, Minimized or Managed (Pancreatitis, Acute/Chronic)  Outcome: Ongoing (interventions implemented as appropriate)   07/10/19 0948   Goal/Outcome Evaluation   Problems Assessed (Pancreatitis) all   Problems Present (Pancreatitis) pain

## 2019-07-10 NOTE — PLAN OF CARE
Problem: Patient Care Overview  Goal: Plan of Care Review  Outcome: Ongoing (interventions implemented as appropriate)   07/10/19 0917 07/10/19 0922   Plan of Care Review   Progress --  no change   Coping/Psychosocial   Plan of Care Reviewed With spouse;patient --      Goal: Individualization and Mutuality  Outcome: Ongoing (interventions implemented as appropriate)      Problem: Pain, Acute (Adult)  Goal: Acceptable Pain Control/Comfort Level  Outcome: Ongoing (interventions implemented as appropriate)   07/10/19 0922   Pain, Acute (Adult)   Acceptable Pain Control/Comfort Level making progress toward outcome       Problem: Fall Risk (Adult)  Goal: Absence of Fall  Outcome: Ongoing (interventions implemented as appropriate)   07/10/19 0922   Fall Risk (Adult)   Absence of Fall making progress toward outcome

## 2019-07-10 NOTE — PLAN OF CARE
Problem: Pain, Acute (Adult)  Goal: Acceptable Pain Control/Comfort Level  Outcome: Ongoing (interventions implemented as appropriate)   07/09/19 2339   Pain, Acute (Adult)   Acceptable Pain Control/Comfort Level making progress toward outcome       Problem: Fall Risk (Adult)  Goal: Absence of Fall  Outcome: Ongoing (interventions implemented as appropriate)   07/09/19 2339   Fall Risk (Adult)   Absence of Fall making progress toward outcome

## 2019-07-10 NOTE — PROGRESS NOTES
*pt seen and examined, abd pain is better, non N/V/D  *pt drinks 4 days a week  *exam showed mild abd distension  *labs showed AP, CT    --reduce  cc/hr  --Clear liquid  --thiamine / FA / MV  --dc ABx

## 2019-07-10 NOTE — PROGRESS NOTES
Discharge Planning Assessment   Michele     Patient Name: Valentin Vásquez  MRN: [id number]  Today's Date: 7/10/2019    Admit Date: 7/9/2019    Discharge Needs Assessment     Row Name 07/10/19 4767       Living Environment    Lives With  spouse    Name(s) of Who Lives With Patient  Sharri Vásquez     Current Living Arrangements  home/apartment/condo    Primary Care Provided by  self    Provides Primary Care For  no one    Family Caregiver if Needed  none    Quality of Family Relationships  helpful;involved;supportive    Able to Return to Prior Arrangements  yes       Resource/Environmental Concerns    Resource/Environmental Concerns  none    Transportation Concerns  car, none       Transition Planning    Patient/Family Anticipates Transition to  home with family    Patient/Family Anticipated Services at Transition  none    Transportation Anticipated  car, drives self       Discharge Needs Assessment    Readmission Within the Last 30 Days  no previous admission in last 30 days    Concerns to be Addressed  no discharge needs identified    Equipment Currently Used at Home  none    Anticipated Changes Related to Illness  none    Equipment Needed After Discharge  none        Discharge Plan     Row Name 07/10/19 2191       Plan    Plan  Patient is an independent employed worker at [employer] who lives at home with his wife, where he plans to return at discharge.  Patient's PCP is Dr. Grossman and he uses [facility] for his prescription needs.  Patient denies any insurance or financial issues at this time.  Patient does not have any DME or utilize Home Health at this time and denies the need.  Patient's wife will provide transportation at discharge via private vehicle.  No other issues or concerns are noted at this time.  CM will continue to follow and assist with any discharge needs.      Patient/Family in Agreement with Plan  yes        Destination      No service coordination in this encounter.      Durable Medical Equipment       No service coordination in this encounter.      Dialysis/Infusion      No service coordination in this encounter.      Home Medical Care      No service coordination in this encounter.      Therapy      No service coordination in this encounter.      Community Resources      No service coordination in this encounter.          Demographic Summary     Row Name 07/10/19 1317       General Information    Admission Type  inpatient    Arrived From  home;other (see comments);emergency department [address] [place] [address] Clinic    Referral Source  admission list    Reason for Consult  discharge planning    Preferred Language  English     Used During This Interaction  no        Functional Status     Row Name 07/10/19 1330       Functional Status    Usual Activity Tolerance  good    Current Activity Tolerance  moderate       Functional Status, IADL    IADL Comments  Independent        Mental Status    General Appearance WDL  WDL       Mental Status Summary    Recent Changes in Mental Status/Cognitive Functioning  no changes       Employment/    Employment Status  employed full time    Shift Worked  first shift    Current or Previous Occupation  other (see comments)    Employment/ Comments  [employer]         Psychosocial    No documentation.       Abuse/Neglect    No documentation.       Legal    No documentation.       Substance Abuse    No documentation.       Patient Forms    No documentation.           Maine Redd RN

## 2019-07-10 NOTE — H&P
Hospitalist History and Physical        Patient Identification  Name: Valentin Vásquez  Age/Sex: 54 y.o. male  :  1965        MRN: [id number]  Visit Number: [id number]  PCP: Leon Grossman MD        Chief complaint abdominal pain    History of Present Illness:  Patient is a 54 y.o. male who presents with complaints of worsening abdominal pain over the last 3-4 days. He reports pain is diffuse but most pronounced in epigastric region. Pain is not particularly worsened by eating or drinking. He reports associated subjective fever and chills. He denies cough or dyspnea. He denies nausea. His only other complaint is headache. He denies history of pancreatitis. He reports consuming a couple beers per week. He reports history of HTN, HLD and insulin dependent diabetes.     Review of Systems  Review of Systems   Constitutional: Positive for appetite change, chills and fever. Negative for activity change, fatigue and unexpected weight change.   HENT: Negative for congestion, postnasal drip, rhinorrhea, sinus pressure, sinus pain and sore throat.    Eyes: Negative for photophobia, pain, discharge, redness, itching and visual disturbance.   Respiratory: Negative for cough, shortness of breath and wheezing.    Cardiovascular: Negative for chest pain, palpitations and leg swelling.   Gastrointestinal: Positive for abdominal pain. Negative for abdominal distention, constipation, diarrhea, nausea and vomiting.   Endocrine: Negative for cold intolerance, heat intolerance, polydipsia, polyphagia and polyuria.   Genitourinary: Negative for difficulty urinating, dysuria, flank pain and hematuria.   Musculoskeletal: Negative for arthralgias, back pain, joint swelling, myalgias, neck pain and neck stiffness.   Skin: Negative for color change, pallor, rash and wound.   Allergic/Immunologic: Negative for environmental allergies, food allergies and immunocompromised state.   Neurological: Positive for headaches. Negative for  dizziness, tremors, seizures, syncope, weakness and numbness.   Hematological: Negative for adenopathy. Does not bruise/bleed easily.   Psychiatric/Behavioral: Negative for agitation, behavioral problems and confusion.       History  Past Medical History:   Diagnosis Date   • Diabetes mellitus (CMS/HCC)    • GERD (gastroesophageal reflux disease)    • Hyperlipidemia    • Hypertension    • Injury of back    • Kidney stone    • Pancreatitis      Past Surgical History:   Procedure Laterality Date   • ADENOIDECTOMY     • KIDNEY STONE SURGERY     • TONSILLECTOMY       History reviewed. No pertinent family history.  Social History     Tobacco Use   • Smoking status: Never Smoker   • Smokeless tobacco: Current User   Substance Use Topics   • Alcohol use: No   • Drug use: No     Medications Prior to Admission   Medication Sig Dispense Refill Last Dose   • amitriptyline (ELAVIL) 25 MG tablet Take 25 mg by mouth At Night As Needed for Sleep.   7/8/2019 at Unknown time   • cyclobenzaprine (FLEXERIL) 5 MG tablet Take 5 mg by mouth 2 (Two) Times a Day As Needed for Muscle Spasms.   7/9/2019 at 0900   • Empagliflozin (JARDIANCE) 10 MG tablet Take 10 mg by mouth Daily.   7/9/2019 at 0900   • gabapentin (NEURONTIN) 300 MG capsule Take 300 mg by mouth 3 (Three) Times a Day As Needed (nerve pain).   7/9/2019 at 0900   • HYDROcodone-acetaminophen (NORCO) 7.5-325 MG per tablet Take 1 tablet by mouth 2 (Two) Times a Day As Needed for Moderate Pain .   7/9/2019 at 0900   • insulin lispro protamine-insulin lispro (humaLOG 75-25) (75-25) 100 UNIT/ML suspension injection Inject 110 Units under the skin into the appropriate area as directed Every Morning.   7/9/2019 at 0900   • insulin lispro protamine-insulin lispro (humaLOG 75-25) (75-25) 100 UNIT/ML suspension injection Inject 55 Units under the skin into the appropriate area as directed Every Evening.   7/8/2019 at Unknown time   • Liraglutide (VICTOZA SC) Inject 1.8 Units under the skin  Daily.   7/9/2019 at 0900   • lisinopril (PRINIVIL,ZESTRIL) 10 MG tablet Take 10 mg by mouth Daily.   7/9/2019 at 0900   • metFORMIN (GLUCOPHAGE) 1000 MG tablet Take 500 mg by mouth 2 (Two) Times a Day With Meals.   7/9/2019 at 0900   • naproxen (NAPROSYN) 500 MG tablet Take 500 mg by mouth 2 (Two) Times a Day With Meals.   7/9/2019 at 0900   • omeprazole (priLOSEC) 40 MG capsule Take 40 mg by mouth Daily.   7/9/2019 at 0900   • rOPINIRole (REQUIP) 2 MG tablet Take 2 mg by mouth Every Night.   7/8/2019 at Unknown time   • sertraline (ZOLOFT) 50 MG tablet Take 50 mg by mouth Daily.   7/9/2019 at 0900     Allergies:  Patient has no known allergies.    Objective     Vital Signs  Temp:  [98.5 °F (36.9 °C)-101 °F (38.3 °C)] 98.5 °F (36.9 °C)  Heart Rate:  [120-137] 132  Resp:  [20] 20  BP: (112-140)/(68-95) 125/86  Body mass index is 30.17 kg/m².    Physical Exam:  Physical Exam   Constitutional: He is oriented to person, place, and time. He appears well-developed and well-nourished. No distress.   Appears uncomfortable due to abdominal pain   HENT:   Head: Normocephalic and atraumatic.   Mouth/Throat: Oropharynx is clear and moist.   Eyes: Conjunctivae and EOM are normal. Pupils are equal, round, and reactive to light.   Neck: Normal range of motion. Neck supple.   Cardiovascular: Regular rhythm, normal heart sounds and intact distal pulses.   No murmur heard.  Tachycardia   Pulmonary/Chest: Effort normal and breath sounds normal. No respiratory distress. He has no wheezes.   Abdominal: Soft. Bowel sounds are normal. He exhibits no distension and no mass. There is tenderness (epigastric region). There is no rebound and no guarding.   Musculoskeletal: Normal range of motion. He exhibits no edema, tenderness or deformity.   Neurological: He is alert and oriented to person, place, and time.   No gross focal deficits appreciated   Skin: Skin is warm and dry. Capillary refill takes less than 2 seconds. He is not diaphoretic.    Psychiatric: He has a normal mood and affect. His behavior is normal. Judgment and thought content normal.         Results Review:       Lab Results:  Results from last 7 days   Lab Units 07/09/19  1714   WBC 10*3/mm3 14.14*   HEMOGLOBIN g/dL 13.7   PLATELETS 10*3/mm3 249     Results from last 7 days   Lab Units 07/09/19  1714   CRP mg/dL 10.95*     Results from last 7 days   Lab Units 07/09/19  1714   SODIUM mmol/L 138   POTASSIUM mmol/L 4.1   CHLORIDE mmol/L 101   CO2 mmol/L 22.9   BUN mg/dL 15   CREATININE mg/dL 1.01   CALCIUM mg/dL 9.4   GLUCOSE mg/dL 144*     Results from last 7 days   Lab Units 07/09/19  1714   MAGNESIUM mg/dL 1.9     Hemoglobin A1C   Date Value Ref Range Status   07/09/2019 9.40 (H) 4.80 - 5.60 % Final     Results from last 7 days   Lab Units 07/09/19  1714   BILIRUBIN mg/dL 0.4   ALK PHOS U/L 82   AST (SGOT) U/L 15   ALT (SGPT) U/L 19     Results from last 7 days   Lab Units 07/09/19  1714   TROPONIN T ng/mL <0.010         Results from last 7 days   Lab Units 07/09/19  1714   INR  0.97     Results from last 7 days   Lab Units 07/09/19  1846   PH, ARTERIAL pH units 7.383   PO2 ART mm Hg 55.9*   PCO2, ARTERIAL mm Hg 34.0*   HCO3 ART mmol/L 19.8*       I have reviewed the patient's laboratory results.    Imaging:  Imaging Results (last 72 hours)     Procedure Component Value Units Date/Time    CT Chest Pulmonary Embolism With Contrast [[id number]] Updated:  07/09/19 2006    US Abdomen Limited [[id number]] Updated:  07/09/19 1951    XR Chest 1 View [[id number]] Updated:  07/09/19 1818    CT Abdomen Pelvis Without Contrast [[id number]] Updated:  07/09/19 1756      CT chest PE protocol: negative for PE. Stranding dependent interstitial prominence, subsegmental atelectasis or infiltrates in both lungs. 6mm nodule right lung base, left hilar calcifications.    CT abdomen/pelvis: cholelithiasis appears to be present with tiny density in nondependent gallbladder and along the neck. Questionable very  slight hazy density surrounding pancreas suspicious for infectious or inflammatory process, possible pancreatitis.  No definite ductal dilatation.  Diverticulosis coli without pericolonic fat stranding.  Thickened appearance of the distal colon may be attributed to under distention or inflammation.  No dilated bowel obstruction.  No free intraperitoneal air or free fluid collections.  Bilateral renal cysts with nonobstructing intrarenal calculi.  No hydronephrosis.    US abdomen: Ultrasound shows probable small nonshadowing stones or sludge in gallbladder.   [BH]    I have personally reviewed the patient's radiologic imaging.        EKG: Sinus tachycardia, , QTc 468 (marginally prolonged), no overt ST changes appreciated.    I have personally reviewed the patient's EKG.        Assessment/Plan     - Acute pancreatitis: bowel rest/NPO status. Hydrate with IV fluids. IV analgesics and antiemetics available PRN. Patient denies history of significant alcohol use. Check lipid panel in the morning to rule out hypertriglyceridemia. Potential sources of medication-induced pancreatitis include victoza, lisinopril, and omeprazole. These will be held for now. Cholelithiasis on CT and US abdomen raises possibility of gallstone pancreatitis. No gallbladder wall thickening or CBD dilatation noted. Continue to trend lipase, amylase.  - SIRS with tachycardia, fever, CRP elevation, leukocytosis: most likely secondary to acute pancreatitis above. However, intermittent hypoxia and CT chest findings of possible bibasilar infiltrates raises concern for underlying pneumonia. No respiratory complaints at this time and hypoxia could be more related to pain medication. Furthermore, CT findings could alternatively represent simply atelectasis. For now, treat empirically with IV rocephin and flagyl, monitor on pulse oximetry and follow up on in-house radiology report.   - Elevated anion gap, low bicarb on ABG, elevated glucose and  ketones on UA but serum glucose normal: suspect jardiance could be contributing to these findings. Holding all diabetic meds other than insulin at this time. PH compensated on ABG.   - Type II DM, insulin dependent: A1c 9.4% indicating sub-optimal control. Glucose has been relatively normal range since arrival. Monitor accuchecks, cover with SSI, adjust as needed.    DVT Prophylaxis: SCDs    Estimated Length of Stay >2 midnights    I discussed the patient's findings, assessment and plan with the patient, his wife at bedside, and his RN Camille who was present during my interview and exam on [facility].    * Patient is high risk due to acute pancreatitis, SIRS/possible sepsis secondary to possible bibasilar pneumonia    Dale Childs MD  07/10/19  3:37 AM

## 2019-07-10 NOTE — PAYOR COMM NOTE
"Contact: Mallory Baetty RN @ [facility]  Phone: [phone]  Fax: [phone]      Ref# [id number]  Inpatient status  Clinical    Valentin Vásquez (54 y.o. Male)     Date of Birth Social Security Number Address Home Phone MRN    1965  [address]  [place] [address] [phone] [id number]    Yarsanism Marital Status          None        Admission Date Admission Type Admitting Provider Attending Provider Department, Room/Bed    19 Emergency Dale Childs MD Baibars, Mhd Motaz, MD [address], [address]327/    Discharge Date Discharge Disposition Discharge Destination                       Attending Provider:  Luis Bhat MD    Allergies:  No Known Allergies    Isolation:  None   Infection:  None   Code Status:  CPR    Ht:  175.3 cm (69\")   Wt:  92.7 kg (204 lb 4.8 oz)    Admission Cmt:  None   Principal Problem:  None                Active Insurance as of 2019     Primary Coverage     Payor Plan Insurance Group Employer/Plan Group    [employer] PPO [id number]     Payor Plan Address Payor Plan Phone Number Payor Plan Fax Number Effective Dates    [id number] [phone]  2016 - None Entered    [place] [address]       Subscriber Name Subscriber Birth Date Member ID       VALENTIN VÁSQUEZ 1965 [id number]                 Emergency Contacts      (Rel.) Home Phone Work Phone Mobile Phone    Sharri Vásquez (Spouse) [phone] -- --               History & Physical      Dale Childs MD at 7/10/2019  3:37 AM          Hospitalist History and Physical        Patient Identification  Name: Valentin Vásquez  Age/Sex: 54 y.o. male  :  1965        MRN: [id number]  Visit Number: [id number]  PCP: Leon Grossman MD        Chief complaint abdominal pain    History of Present Illness:  Patient is a 54 y.o. male who presents with complaints of worsening abdominal pain over the last 3-4 days. He " reports pain is diffuse but most pronounced in epigastric region. Pain is not particularly worsened by eating or drinking. He reports associated subjective fever and chills. He denies cough or dyspnea. He denies nausea. His only other complaint is headache. He denies history of pancreatitis. He reports consuming a couple beers per week. He reports history of HTN, HLD and insulin dependent diabetes.     Review of Systems  Review of Systems   Constitutional: Positive for appetite change, chills and fever. Negative for activity change, fatigue and unexpected weight change.   HENT: Negative for congestion, postnasal drip, rhinorrhea, sinus pressure, sinus pain and sore throat.    Eyes: Negative for photophobia, pain, discharge, redness, itching and visual disturbance.   Respiratory: Negative for cough, shortness of breath and wheezing.    Cardiovascular: Negative for chest pain, palpitations and leg swelling.   Gastrointestinal: Positive for abdominal pain. Negative for abdominal distention, constipation, diarrhea, nausea and vomiting.   Endocrine: Negative for cold intolerance, heat intolerance, polydipsia, polyphagia and polyuria.   Genitourinary: Negative for difficulty urinating, dysuria, flank pain and hematuria.   Musculoskeletal: Negative for arthralgias, back pain, joint swelling, myalgias, neck pain and neck stiffness.   Skin: Negative for color change, pallor, rash and wound.   Allergic/Immunologic: Negative for environmental allergies, food allergies and immunocompromised state.   Neurological: Positive for headaches. Negative for dizziness, tremors, seizures, syncope, weakness and numbness.   Hematological: Negative for adenopathy. Does not bruise/bleed easily.   Psychiatric/Behavioral: Negative for agitation, behavioral problems and confusion.       History  Past Medical History:   Diagnosis Date   • Diabetes mellitus (CMS/[facility])    • GERD (gastroesophageal reflux disease)    • Hyperlipidemia    •  Hypertension    • Injury of back    • Kidney stone    • Pancreatitis      Past Surgical History:   Procedure Laterality Date   • ADENOIDECTOMY     • KIDNEY STONE SURGERY     • TONSILLECTOMY       History reviewed. No pertinent family history.  Social History     Tobacco Use   • Smoking status: Never Smoker   • Smokeless tobacco: Current User   Substance Use Topics   • Alcohol use: No   • Drug use: No     Medications Prior to Admission   Medication Sig Dispense Refill Last Dose   • amitriptyline (ELAVIL) 25 MG tablet Take 25 mg by mouth At Night As Needed for Sleep.   7/8/2019 at Unknown time   • cyclobenzaprine (FLEXERIL) 5 MG tablet Take 5 mg by mouth 2 (Two) Times a Day As Needed for Muscle Spasms.   7/9/2019 at 0900   • Empagliflozin (JARDIANCE) 10 MG tablet Take 10 mg by mouth Daily.   7/9/2019 at 0900   • gabapentin (NEURONTIN) 300 MG capsule Take 300 mg by mouth 3 (Three) Times a Day As Needed (nerve pain).   7/9/2019 at 0900   • HYDROcodone-acetaminophen (NORCO) 7.5-325 MG per tablet Take 1 tablet by mouth 2 (Two) Times a Day As Needed for Moderate Pain .   7/9/2019 at 0900   • insulin lispro protamine-insulin lispro (humaLOG 75-25) (75-25) 100 UNIT/ML suspension injection Inject 110 Units under the skin into the appropriate area as directed Every Morning.   7/9/2019 at 0900   • insulin lispro protamine-insulin lispro (humaLOG 75-25) (75-25) 100 UNIT/ML suspension injection Inject 55 Units under the skin into the appropriate area as directed Every Evening.   7/8/2019 at Unknown time   • Liraglutide (VICTOZA SC) Inject 1.8 Units under the skin Daily.   7/9/2019 at 0900   • lisinopril (PRINIVIL,ZESTRIL) 10 MG tablet Take 10 mg by mouth Daily.   7/9/2019 at 0900   • metFORMIN (GLUCOPHAGE) 1000 MG tablet Take 500 mg by mouth 2 (Two) Times a Day With Meals.   7/9/2019 at 0900   • naproxen (NAPROSYN) 500 MG tablet Take 500 mg by mouth 2 (Two) Times a Day With Meals.   7/9/2019 at 0900   • omeprazole (priLOSEC) 40  MG capsule Take 40 mg by mouth Daily.   7/9/2019 at 0900   • rOPINIRole (REQUIP) 2 MG tablet Take 2 mg by mouth Every Night.   7/8/2019 at Unknown time   • sertraline (ZOLOFT) 50 MG tablet Take 50 mg by mouth Daily.   7/9/2019 at 0900     Allergies:  Patient has no known allergies.    Objective     Vital Signs  Temp:  [98.5 °F (36.9 °C)-101 °F (38.3 °C)] 98.5 °F (36.9 °C)  Heart Rate:  [120-137] 132  Resp:  [20] 20  BP: (112-140)/(68-95) 125/86  Body mass index is 30.17 kg/m².    Physical Exam:  Physical Exam   Constitutional: He is oriented to person, place, and time. He appears well-developed and well-nourished. No distress.   Appears uncomfortable due to abdominal pain   HENT:   Head: Normocephalic and atraumatic.   Mouth/Throat: Oropharynx is clear and moist.   Eyes: Conjunctivae and EOM are normal. Pupils are equal, round, and reactive to light.   Neck: Normal range of motion. Neck supple.   Cardiovascular: Regular rhythm, normal heart sounds and intact distal pulses.   No murmur heard.  Tachycardia   Pulmonary/Chest: Effort normal and breath sounds normal. No respiratory distress. He has no wheezes.   Abdominal: Soft. Bowel sounds are normal. He exhibits no distension and no mass. There is tenderness (epigastric region). There is no rebound and no guarding.   Musculoskeletal: Normal range of motion. He exhibits no edema, tenderness or deformity.   Neurological: He is alert and oriented to person, place, and time.   No gross focal deficits appreciated   Skin: Skin is warm and dry. Capillary refill takes less than 2 seconds. He is not diaphoretic.   Psychiatric: He has a normal mood and affect. His behavior is normal. Judgment and thought content normal.         Results Review:       Lab Results:  Results from last 7 days   Lab Units 07/09/19  1714   WBC 10*3/mm3 14.14*   HEMOGLOBIN g/dL 13.7   PLATELETS 10*3/mm3 249     Results from last 7 days   Lab Units 07/09/19  1714   CRP mg/dL 10.95*     Results from last  7 days   Lab Units 07/09/19  1714   SODIUM mmol/L 138   POTASSIUM mmol/L 4.1   CHLORIDE mmol/L 101   CO2 mmol/L 22.9   BUN mg/dL 15   CREATININE mg/dL 1.01   CALCIUM mg/dL 9.4   GLUCOSE mg/dL 144*     Results from last 7 days   Lab Units 07/09/19  1714   MAGNESIUM mg/dL 1.9     Hemoglobin A1C   Date Value Ref Range Status   07/09/2019 9.40 (H) 4.80 - 5.60 % Final     Results from last 7 days   Lab Units 07/09/19  1714   BILIRUBIN mg/dL 0.4   ALK PHOS U/L 82   AST (SGOT) U/L 15   ALT (SGPT) U/L 19     Results from last 7 days   Lab Units 07/09/19  1714   TROPONIN T ng/mL <0.010         Results from last 7 days   Lab Units 07/09/19  1714   INR  0.97     Results from last 7 days   Lab Units 07/09/19  1846   PH, ARTERIAL pH units 7.383   PO2 ART mm Hg 55.9*   PCO2, ARTERIAL mm Hg 34.0*   HCO3 ART mmol/L 19.8*       I have reviewed the patient's laboratory results.    Imaging:  Imaging Results (last 72 hours)     Procedure Component Value Units Date/Time    CT Chest Pulmonary Embolism With Contrast [[id number]] Updated:  07/09/19 2006    US Abdomen Limited [[id number]] Updated:  07/09/19 1951    XR Chest 1 View [[id number]] Updated:  07/09/19 1818    CT Abdomen Pelvis Without Contrast [[id number]] Updated:  07/09/19 1756      CT chest PE protocol: negative for PE. Stranding dependent interstitial prominence, subsegmental atelectasis or infiltrates in both lungs. 6mm nodule right lung base, left hilar calcifications.    CT abdomen/pelvis: cholelithiasis appears to be present with tiny density in nondependent gallbladder and along the neck. Questionable very slight hazy density surrounding pancreas suspicious for infectious or inflammatory process, possible pancreatitis.  No definite ductal dilatation.  Diverticulosis coli without pericolonic fat stranding.  Thickened appearance of the distal colon may be attributed to under distention or inflammation.  No dilated bowel obstruction.  No free intraperitoneal air or free fluid  collections.  Bilateral renal cysts with nonobstructing intrarenal calculi.  No hydronephrosis.    US abdomen: Ultrasound shows probable small nonshadowing stones or sludge in gallbladder.   [BH]    I have personally reviewed the patient's radiologic imaging.        EKG: Sinus tachycardia, , QTc 468 (marginally prolonged), no overt ST changes appreciated.    I have personally reviewed the patient's EKG.        Assessment/Plan     - Acute pancreatitis: bowel rest/NPO status. Hydrate with IV fluids. IV analgesics and antiemetics available PRN. Patient denies history of significant alcohol use. Check lipid panel in the morning to rule out hypertriglyceridemia. Potential sources of medication-induced pancreatitis include victoza, lisinopril, and omeprazole. These will be held for now. Cholelithiasis on CT and US abdomen raises possibility of gallstone pancreatitis. No gallbladder wall thickening or CBD dilatation noted. Continue to trend lipase, amylase.  - SIRS with tachycardia, fever, CRP elevation, leukocytosis: most likely secondary to acute pancreatitis above. However, intermittent hypoxia and CT chest findings of possible bibasilar infiltrates raises concern for underlying pneumonia. No respiratory complaints at this time and hypoxia could be more related to pain medication. Furthermore, CT findings could alternatively represent simply atelectasis. For now, treat empirically with IV rocephin and flagyl, monitor on pulse oximetry and follow up on in-house radiology report.   - Elevated anion gap, low bicarb on ABG, elevated glucose and ketones on UA but serum glucose normal: suspect jardiance could be contributing to these findings. Holding all diabetic meds other than insulin at this time. PH compensated on ABG.   - Type II DM, insulin dependent: A1c 9.4% indicating sub-optimal control. Glucose has been relatively normal range since arrival. Monitor accuchecks, cover with SSI, adjust as needed.    DVT  Prophylaxis: SCDs    Estimated Length of Stay >2 midnights    I discussed the patient's findings, assessment and plan with the patient, his wife at bedside, and his RN Camille who was present during my interview and exam on [facility].    * Patient is high risk due to acute pancreatitis, SIRS/possible sepsis secondary to possible bibasilar pneumonia    Dale Childs MD  07/10/19  3:37 AM      Electronically signed by Dale Childs MD at 7/10/2019  3:58 AM          Emergency Department Notes      Nicho Vásquez RN at 7/9/2019  5:15 PM        Patient presents to the ER with complaints of abdominal pain. Patient states that the pain is generalized to the whole abdominal area. Patient states that he has not had any vomiting or diarrhea. Patient states that the pain has been present for about a month now. Patient states that the pain radiates to his back. Patient states that he has not had any relief from pain since it started.      Nicho Vásquez RN  07/09/19 1724      Electronically signed by Nicho Vásquez RN at 7/9/2019  5:24 PM          Physician Progress Notes (last 48 hours) (Notes from 7/8/2019  1:51 PM through 7/10/2019  1:51 PM)      Luis Bhat MD at 7/10/2019  1:29 PM        *pt seen and examined, abd pain is better, non N/V/D  *pt drinks 4 days a week  *exam showed mild abd distension  *labs showed AP, CT    --reduce  cc/hr  --Clear liquid  --thiamine / FA / MV  --dc ABx    Electronically signed by Luis Bhat MD at 7/10/2019  1:34 PM       Consult Notes (last 48 hours) (Notes from 7/8/2019  1:51 PM through 7/10/2019  1:51 PM)     No notes of this type exist for this encounter.      Vital Signs (last 2 days)     Date/Time  Temp  Pulse  Resp  BP  Device (Oxygen Therapy)  SpO2   07/10/19 1100  98.5 (36.9)  123 Abnormal   --  134/63  --  --   07/10/19 0917  --  --  --  --  room air   --   Device (Oxygen Therapy): 92% at 07/10/19 0917   07/10/19 0600  98.3  (36.8)  124 Abnormal   18  137/80  --  94   07/10/19 0341  --  130 Abnormal   --  --  --  95   07/10/19 0340  98.9 (37.2)  150 Abnormal    --  --  --  90   Pulse: pt up walking, at 07/10/19 0340   07/10/19 0255  --  132 Abnormal    20  125/86  --  --   Pulse: emeka Obrien aware at 07/10/19 0255   07/10/19 0206  --  --  --  --  nasal cannula  --   07/09/19 2337  98.5 (36.9)  123 Abnormal    20  124/79  --  --   Pulse: emeka Obrien aware at 07/09/19 2337 07/09/19 2327  --  --  --  --  nasal cannula  --   07/09/19 2249  99 (37.2)  122 Abnormal   20  128/88  --  96   07/09/19 2231  --  --  --  130/90  --  95 07/09/19 2217  --  --  --  131/89  --  95 07/09/19 22:03:48  99.2 (37.3)  --  --  --  --  --   07/09/19 2201  --  --  --  127/95  --  95 07/09/19 21:59:50  --  120  --  --  --  --   07/09/19 2146  --  --  --  125/90  --  95 07/09/19 2131  --  --  --  116/90  --  95   07/09/19 2116  --  --  --  114/86  --  95   07/09/19 2101  --  --  --  126/94  --  97   07/09/19 2046  --  --  --  122/87  --  96   07/09/19 2031  --  --  --  120/90  --  94   07/09/19 2025  --  --  --  119/83  --  95   07/09/19 20:24:37  --  125 Abnormal   --  --  --  --   07/09/19 2022  --  --  --  --  --  95   07/09/19 19:20:25  --  130 Abnormal   --  --  --  --   07/09/19 1916  --  --  --  122/86  --  95   07/09/19 1740  --  137 Abnormal   20  112/68  --  94   07/09/19 1621  101 (38.3) Abnormal   --  --  --  --  --   07/09/19 1618  --  137 Abnormal   20  140/92  room air  94     All medication doses during the admission are shown, including meds that are no longer on order.   Scheduled Meds Sorted by Name   for Valentin Vásquez as of 07/10/19 1352     1 Day 3 Days 7 Days 10 Days < Today >    Legend:                           Inactive     Active     Other Encounter    Linked               Medications 07/01 07/02 07/03 07/04 07/05 07/06 07/07 07/08 07/09 07/10   acetaminophen (TYLENOL) tablet 650 mg   Dose: 650 mg  Freq: Once Route: PO  Start:  07/09/19 1749 End: 07/09/19 1808    Admin Instructions:   Do not exceed 4 grams of acetaminophen in a 24 hr period.    If given for pain, use the following pain scale:   Mild Pain = Pain Score of 1-3, CPOT 1-2  Moderate Pain = Pain Score of 4-6, CPOT 3-4  Severe Pain = Pain Score of 7-10, CPOT 5-8            1808         cefTRIAXone (ROCEPHIN) 1 g/100 mL 0.9% NS (MBP)   Dose: 1 g  Freq: Every 24 Hours Route: IV  Indications of Use: PNEUMONIA,SEPSIS  Start: 07/09/19 2300 End: 07/10/19 1327    Admin Instructions:   Caution: Look alike/sound alike drug alert. Activate vial before using.            2241     2240      1327-D/C'd      folic acid (FOLVITE) tablet 1 mg   Dose: 1 mg  Freq: Daily Route: PO  Start: 07/10/19 1430             1430        insulin aspart (novoLOG) injection 0-7 Units   Dose: 0-7 Units  Freq: Every 6 Hours - RT Route: SC  Start: 07/10/19 0430    Admin Instructions:   Correction - Low Dose.  Less than 40 units/day total insulin dose or lean, elderly, renal patients    Blood glucose 150-199 mg/dL - 2 units  Blood glucose 200-249 mg/dL - 3 units  Blood glucose 250-299 mg/dL - 4 units  Blood glucose 300-349 mg/dL - 5 units  Blood glucose 350-400 mg/dL - 6 units  Blood glucose greater than 400 mg/dL - 7 units and call provider             (2213) [C]     ([phone]) 9914        Ioversol (OPTIRAY 350) injection 100 mL   Dose: 100 mL  Freq: Once in Imaging Route: IV  Start: 07/09/19 2008 End: 07/09/19 2008 2008         metoprolol tartrate (LOPRESSOR) tablet 25 mg   Dose: 25 mg  Freq: Every 12 Hours Scheduled Route: PO  Start: 07/10/19 1415             1415     2100        metroNIDAZOLE (FLAGYL) 500 mg/100mL IVPB   Dose: 500 mg  Freq: Every 8 Hours Route: IV  Indications of Use: PNEUMONIA,SEPSIS  Start: 07/09/19 2211 End: 07/10/19 1327    Admin Instructions:   Do not refrigerate.            2249 [C]      0202     0962     1327-D/C'd      piperacillin-tazobactam (ZOSYN) 3.375 g/100 mL 0.9% NS IVPB  (mbp)   Dose: 3.375 g  Freq: Once Route: IV  Indications of Use: SEPSIS  Last Dose: Stopped (07/09/19 1853)  Start: 07/09/19 1750 End: 07/09/19 1853            1810 1853         sodium chloride 0.9 % flush 3 mL   Dose: 3 mL  Freq: Every 12 Hours Scheduled Route: IV  Start: 07/10/19 5858 (4114) 0172 [C]     2100        sodium chloride 0.9% - IBW for BMI > 30 bolus 1,983 mL   Dose: 30 mL/kg  Weight Dosing Info: 66.1 kg (Ideal)  Freq: Once Route: IV  Last Dose: Stopped (07/09/19 2039)  Start: 07/09/19 1650 End: 07/09/19 2039    Admin Instructions:   You may need to adjust the volume of this order to account for fluids already given. The total of the bolus(es) given should be as close to 30 mL/kg without going under.            1721 2039         thiamine (VITAMIN B-1) tablet 100 mg   Dose: 100 mg  Freq: Daily Route: PO  Start: 07/10/19 1430             1430        Medications 07/01 07/02 07/03 07/04 07/05 07/06 07/07 07/08 07/09 07/10       Continuous Meds Sorted by Name   for Valentin Vásquez as of 07/10/19 1352    Legend:                           Inactive     Active     Other Encounter    Linked               Medications 07/01 07/02 07/03 07/04 07/05 07/06 07/07 07/08 07/09 07/10   sodium chloride 0.9 % infusion   Rate: 100 mL/hr Dose: 100 mL/hr  Freq: Continuous Route: IV  Last Dose: 100 mL/hr (07/10/19 1343)  Start: 07/09/19 2211            2236     2249      0915     1343            PRN Meds Sorted by Name   for Valentin Vásquez as of 07/10/19 1352    Legend:                           Inactive     Active     Other Encounter    Linked               Medications 07/01 07/02 07/03 07/04 07/05 07/06 07/07 07/08 07/09 07/10   dextrose (D50W) 25 g/ 50mL Intravenous Solution 25 g   Dose: 25 g  Freq: Every 15 Minutes PRN Route: IV  PRN Reason: Low Blood Sugar  PRN Comment: Blood Sugar Less Than 70  Start: 07/10/19 0339    Admin Instructions:   Blood sugar less than 70; patient has IV access -  Unresponsive, NPO or Unable To Safely Swallow                dextrose (GLUTOSE) oral gel 15 g   Dose: 15 g  Freq: Every 15 Minutes PRN Route: PO  PRN Reason: Low Blood Sugar  PRN Comment: Blood sugar less than 70  Start: 07/10/19 0339    Admin Instructions:   BS<70, Patient Alert, Is not NPO, Can safely swallow.                glucagon (human recombinant) (GLUCAGEN DIAGNOSTIC) injection 1 mg   Dose: 1 mg  Freq: As Needed Route: SC  PRN Reason: Low Blood Sugar  PRN Comment: Blood Glucose Less Than 70  Start: 07/10/19 0339    Admin Instructions:   Blood Glucose Less Than 70 - Patient Without IV Access - Unresponsive, NPO or Unable To Safely Swallow                morphine injection 1 mg   Dose: 1 mg  Freq: Every 4 Hours PRN Route: IV  PRN Reason: Severe Pain   PRN Comment: hold for SBP<100, oversedation  Start: 07/10/19 0315 End: 07/20/19 0314    Admin Instructions:   If given for pain, use the following pain scale:  Mild Pain = Pain Score of 1-3, CPOT 1-2  Moderate Pain = Pain Score of 4-6, CPOT 3-4  Severe Pain = Pain Score of 7-10, CPOT 5-8             0333     1259        nitroglycerin (NITROSTAT) SL tablet 0.4 mg   Dose: 0.4 mg  Freq: Every 5 Minutes PRN Route: SL  PRN Reason: Chest Pain  PRN Comment: Chest Pain With Systolic Blood Pressure Greater Than 100  Start: 07/09/19 7166    Admin Instructions:   If Pain Unrelieved After 3 Doses, Notify Provider                promethazine (PHENERGAN) 6.25 mg in sodium chloride 0.9 % 50 mL   Dose: 6.25 mg  Freq: Every 6 Hours PRN Route: IV  PRN Reasons: Nausea,Vomiting  PRN Comment: hold for oversedation  Start: 07/10/19 0316    Admin Instructions:   If both ondansetron (ZOFRAN) and promethazine (PHENERGAN) are ordered use ondansetron first and THEN promethazine if ondansetron is ineffective.                sodium chloride 0.9 % flush 10 mL   Dose: 10 mL  Freq: As Needed Route: IV  PRN Reason: Line Care  Start: 07/09/19 1620                sodium chloride 0.9 % flush 3-10  mL   Dose: 3-10 mL  Freq: As Needed Route: IV  PRN Reason: Line Care  Start: 07/09/19 2326                Medications 07/01 07/02 07/03 07/04 07/05 07/06 07/07 07/08 07/09 07/10

## 2019-07-11 ENCOUNTER — DOCUMENTATION (OUTPATIENT)
Dept: DIABETES SERVICES | Facility: HOSPITAL | Age: 54
End: 2019-07-11

## 2019-07-11 VITALS
BODY MASS INDEX: 30.26 KG/M2 | DIASTOLIC BLOOD PRESSURE: 80 MMHG | RESPIRATION RATE: 18 BRPM | HEART RATE: 100 BPM | OXYGEN SATURATION: 97 % | WEIGHT: 204.3 LBS | TEMPERATURE: 97.8 F | SYSTOLIC BLOOD PRESSURE: 126 MMHG | HEIGHT: 69 IN

## 2019-07-11 LAB
DEPRECATED RDW RBC AUTO: 46.9 FL (ref 37–54)
ERYTHROCYTE [DISTWIDTH] IN BLOOD BY AUTOMATED COUNT: 14.7 % (ref 12.3–15.4)
GLUCOSE BLDC GLUCOMTR-MCNC: 140 MG/DL (ref 70–130)
GLUCOSE BLDC GLUCOMTR-MCNC: 228 MG/DL (ref 70–130)
GLUCOSE BLDC GLUCOMTR-MCNC: 233 MG/DL (ref 70–130)
HCT VFR BLD AUTO: 45.9 % (ref 37.5–51)
HGB BLD-MCNC: 14.1 G/DL (ref 13–17.7)
MCH RBC QN AUTO: 26.7 PG (ref 26.6–33)
MCHC RBC AUTO-ENTMCNC: 30.7 G/DL (ref 31.5–35.7)
MCV RBC AUTO: 86.9 FL (ref 79–97)
PLATELET # BLD AUTO: 269 10*3/MM3 (ref 140–450)
PMV BLD AUTO: 10.4 FL (ref 6–12)
RBC # BLD AUTO: 5.28 10*6/MM3 (ref 4.14–5.8)
WBC NRBC COR # BLD: 10.57 10*3/MM3 (ref 3.4–10.8)

## 2019-07-11 PROCEDURE — 63710000001 INSULIN ASPART PER 5 UNITS: Performed by: HOSPITALIST

## 2019-07-11 PROCEDURE — 82962 GLUCOSE BLOOD TEST: CPT

## 2019-07-11 PROCEDURE — 99239 HOSP IP/OBS DSCHRG MGMT >30: CPT | Performed by: INTERNAL MEDICINE

## 2019-07-11 PROCEDURE — 25010000002 MORPHINE PER 10 MG: Performed by: HOSPITALIST

## 2019-07-11 PROCEDURE — 85027 COMPLETE CBC AUTOMATED: CPT | Performed by: INTERNAL MEDICINE

## 2019-07-11 PROCEDURE — G0108 DIAB MANAGE TRN  PER INDIV: HCPCS

## 2019-07-11 RX ORDER — METOPROLOL SUCCINATE 25 MG/1
25 TABLET, EXTENDED RELEASE ORAL DAILY
Qty: 30 TABLET | Refills: 0 | Status: SHIPPED | OUTPATIENT
Start: 2019-07-11

## 2019-07-11 RX ORDER — HYDROCODONE BITARTRATE AND ACETAMINOPHEN 7.5; 325 MG/1; MG/1
1 TABLET ORAL EVERY 8 HOURS PRN
Qty: 10 TABLET | Refills: 0 | Status: ON HOLD | OUTPATIENT
Start: 2019-07-11 | End: 2022-07-19

## 2019-07-11 RX ADMIN — FOLIC ACID 1 MG: 1 TABLET ORAL at 09:17

## 2019-07-11 RX ADMIN — SODIUM CHLORIDE, PRESERVATIVE FREE 3 ML: 5 INJECTION INTRAVENOUS at 09:18

## 2019-07-11 RX ADMIN — METOPROLOL TARTRATE 25 MG: 25 TABLET, FILM COATED ORAL at 09:17

## 2019-07-11 RX ADMIN — Medication 100 MG: at 09:17

## 2019-07-11 RX ADMIN — INSULIN ASPART 3 UNITS: 100 INJECTION, SOLUTION INTRAVENOUS; SUBCUTANEOUS at 12:11

## 2019-07-11 RX ADMIN — MORPHINE SULFATE 1 MG: 2 INJECTION, SOLUTION INTRAMUSCULAR; INTRAVENOUS at 00:25

## 2019-07-11 RX ADMIN — INSULIN ASPART 3 UNITS: 100 INJECTION, SOLUTION INTRAVENOUS; SUBCUTANEOUS at 00:26

## 2019-07-11 RX ADMIN — SODIUM CHLORIDE 100 ML/HR: 9 INJECTION, SOLUTION INTRAVENOUS at 05:36

## 2019-07-11 NOTE — CONSULTS
Patient resting comfortably in chair.  States he has been a diabetic for 8 years, wife assisting with answering questions.  Made patient aware of current HgB A1C of 9.4.  Counseled patient on effects of alcohol on blood glucose, especially when drinking without eating food.  Counseled patient on hazards of not taking diabetes medications. Counseled patient on diabetes basic handout which discusses exactly what diabetes is and how it affects the body.  Counseled patient on complications of hyperglycemia and ways to prevent it.  Counseled patient on hypoglycemia and treatment by using the rule of 15.  Counseled patient on the importance of checking blood glucose levels frequently and keeping a log to take to all MD appointments.  Counseled patient on how to care for themselves during sick day.  Stressed the importance of healthy eating with a limit of carbohydrates to 180 per day.  Counseled on importance of complying with medications as ordered by provider.  Counseled patient to seek medical attention if blood glucose above 300.  Counseled on importance of daily exercise. Patient verbalizes understanding of all information given. Encouraged patient to attend an outpatient diabetes smart class or attend diabetes support  group.  Left time and dates of classes with patient along with my name and contact information, will continue to monitor patient as needed.

## 2019-07-11 NOTE — PLAN OF CARE
Problem: Patient Care Overview  Goal: Plan of Care Review   07/11/19 0105   Plan of Care Review   Progress no change   Coping/Psychosocial   Plan of Care Reviewed With patient       Problem: Pain, Acute (Adult)  Goal: Identify Related Risk Factors and Signs and Symptoms  Outcome: Outcome(s) achieved Date Met: 07/11/19 07/11/19 0105   Pain, Acute (Adult)   Related Risk Factors (Acute Pain) patient perception   Signs and Symptoms (Acute Pain) facial mask of pain/grimace;questions meaning of pain       Problem: Fall Risk (Adult)  Goal: Identify Related Risk Factors and Signs and Symptoms  Outcome: Outcome(s) achieved Date Met: 07/11/19 07/11/19 0105   Fall Risk (Adult)   Related Risk Factors (Fall Risk) slippery/uneven surfaces;environment unfamiliar   Signs and Symptoms (Fall Risk) presence of risk factors     Goal: Absence of Fall   07/11/19 0105   Fall Risk (Adult)   Absence of Fall making progress toward outcome       Problem: Pancreatitis, Acute/Chronic (Adult)  Goal: Signs and Symptoms of Listed Potential Problems Will be Absent, Minimized or Managed (Pancreatitis, Acute/Chronic)   07/11/19 0105   Goal/Outcome Evaluation   Problems Assessed (Pancreatitis) all   Problems Present (Pancreatitis) pain

## 2019-07-11 NOTE — PLAN OF CARE
Problem: Patient Care Overview  Goal: Plan of Care Review  Outcome: Ongoing (interventions implemented as appropriate)   07/11/19 1101   Plan of Care Review   Progress improving   Coping/Psychosocial   Plan of Care Reviewed With patient;spouse       Problem: Pain, Acute (Adult)  Goal: Acceptable Pain Control/Comfort Level  Outcome: Ongoing (interventions implemented as appropriate)      Problem: Fall Risk (Adult)  Goal: Absence of Fall  Outcome: Ongoing (interventions implemented as appropriate)      Problem: Pancreatitis, Acute/Chronic (Adult)  Goal: Signs and Symptoms of Listed Potential Problems Will be Absent, Minimized or Managed (Pancreatitis, Acute/Chronic)  Outcome: Ongoing (interventions implemented as appropriate)

## 2019-07-11 NOTE — PROGRESS NOTES
Discharge Planning Assessment   Michele     Patient Name: Valentin Vásquez  MRN: [id number]  Today's Date: 7/11/2019    Admit Date: 7/9/2019    Discharge Needs Assessment    No documentation.       Discharge Plan     Row Name 07/11/19 1546       Plan    Final Discharge Disposition Code  01 - home or self-care    Final Note  Patient discharged home 7-11-19.  No needs identified.          Destination      No service coordination in this encounter.      Durable Medical Equipment      No service coordination in this encounter.      Dialysis/Infusion      No service coordination in this encounter.      Home Medical Care      No service coordination in this encounter.      Therapy      No service coordination in this encounter.      Community Resources      No service coordination in this encounter.        Expected Discharge Date and Time     Expected Discharge Date Expected Discharge Time    Jul 11, 2019         Demographic Summary    No documentation.       Functional Status    No documentation.       Psychosocial    No documentation.       Abuse/Neglect    No documentation.       Legal    No documentation.       Substance Abuse    No documentation.       Patient Forms    No documentation.           Maine Redd RN

## 2019-07-11 NOTE — DISCHARGE SUMMARY
[facility] DISCHARGE SUMMARY    Patient Identification:  Name:  Valentin Vásquez  Age:  54 y.o.  Sex:  male  :  1965  MRN:  [id number]  Visit Number:  [id number]    Date of Admission: 2019  Date of Discharge:  2019     PCP: Leon Grossman MD    DISCHARGE DIAGNOSIS  -Acute pancreatitis, predisposed by alcohol, patient is not alcoholic however has a high sensitivity to it, drinks moderately 4 times a week.  Be off few of diabetic medications that put him at high risk of having flareups  -Diabetes mellitus type 2  -Essential hypertension  -Cholelithiasis, symptomatic  -SIRS due to above resolved        HOSPITAL COURSE  Patient is a 54 y.o. male presented to [facility] complaining of abdominal pain.  Please see the admitting history and physical for further details.  The patient was treated with IV fluids kept n.p.o. and PRN pain medication due to acute pancreatitis, suspected to be a combined due to alcohol as well as medication induced specifically diabetic medicines please see the attachment directs for adjusted therapies.  Patient was counseled to stay away from alcohol although he does not drink heavily however with a chronic run as well as the medications he is on his risk of having a flareup is higher.  Patient seen and examined today and he will be advanced on the diet to regular if he is tolerating it he will be discharged home today.  Patient was found to have cholelithiasis however there is no any obstruction signs, normal LFTs including alkaline phosphatase and bilirubin.        VITAL SIGNS:  Temp:  [97.8 °F (36.6 °C)-99.9 °F (37.7 °C)] 97.8 °F (36.6 °C)  Heart Rate:  [100-126] 100  Resp:  [18] 18  BP: (122-148)/(67-90) 126/80  SpO2:  [94 %-98 %] 97 %  on   ;   Device (Oxygen Therapy): room air    Body mass index is 30.17 kg/m².  Wt Readings from Last 3 Encounters:   19 92.7 kg (204 lb 4.8 oz)   16 86.2 kg (190 lb)       PHYSICAL  EXAM:  Constitutional:  Well-developed and well-nourished.  No respiratory distress.      HENT:  Head: Normocephalic and atraumatic.  Mouth:  Moist mucous membranes.    Eyes:  Conjunctivae and EOM are normal.  Pupils are equal, round, and reactive to light.  No scleral icterus.  Neck:  Neck supple.  No JVD present.    Cardiovascular:  Normal rate, regular rhythm and normal heart sounds with no murmur.  Pulmonary/Chest:  No respiratory distress, no wheezes, no crackles, with normal breath sounds and good air movement.  Abdominal:  Soft.  Bowel sounds are normal.  No distension and no tenderness.   Musculoskeletal:  No edema, no tenderness, and no deformity.  No red or swollen joints anywhere.    Neurological:  Alert and oriented to person, place, and time.  No cranial nerve deficit.  No tongue deviation.  No facial droop.  No slurred speech.   Skin:  Skin is warm and dry.  No rash noted.  No pallor.   Peripheral vascular:  No edema and strong pulses on all 4 extremities.      DISCHARGE DISPOSITION   Stable    DISCHARGE MEDICATIONS:     Discharge Medications      New Medications      Instructions Start Date   metoprolol succinate XL 25 MG 24 hr tablet  Commonly known as:  TOPROL XL   25 mg, Oral, Daily         Changes to Medications      Instructions Start Date   HYDROcodone-acetaminophen 7.5-325 MG per tablet  Commonly known as:  NORCO  What changed:    · when to take this  · reasons to take this   1 tablet, Oral, Every 8 Hours PRN      insulin lispro protamine-insulin lispro (75-25) 100 UNIT/ML suspension injection  Commonly known as:  humaLOG 75-25  What changed:    · when to take this  · Another medication with the same name was removed. Continue taking this medication, and follow the directions you see here.   55 Units, Subcutaneous, 2 Times Daily With Meals         Continue These Medications      Instructions Start Date   metFORMIN 1000 MG tablet  Commonly known as:  GLUCOPHAGE   500 mg, Oral, 2 Times Daily With  Meals      omeprazole 40 MG capsule  Commonly known as:  priLOSEC   40 mg, Oral, Daily      sertraline 50 MG tablet  Commonly known as:  ZOLOFT   50 mg, Oral, Daily         Stop These Medications    amitriptyline 25 MG tablet  Commonly known as:  ELAVIL     cyclobenzaprine 5 MG tablet  Commonly known as:  FLEXERIL     gabapentin 300 MG capsule  Commonly known as:  NEURONTIN     JARDIANCE 10 MG tablet  Generic drug:  Empagliflozin     lisinopril 10 MG tablet  Commonly known as:  PRINIVIL,ZESTRIL     naproxen 500 MG tablet  Commonly known as:  NAPROSYN     rOPINIRole 2 MG tablet  Commonly known as:  REQUIP     VICTOZA SC            Diet Instructions     Diet: Consistent Carbohydrate      Discharge Diet:  Consistent Carbohydrate    2gm Na        Activity Instructions     Activity as Tolerated          No future appointments.    Additional Instructions for the Follow-ups that You Need to Schedule     Discharge Follow-up with PCP   As directed       Currently Documented PCP:    Leon Grossman MD    PCP Phone Number:    [phone]     Follow Up Details:  PCP in  [place]           Follow-up Information     Leon Grossman MD .    Specialty:  Internal Medicine  Why:  PCP in  aweek  Contact information:  [address] [place] [address]  [phone]                    TEST  RESULTS PENDING AT DISCHARGE   Order Current Status    Blood Culture - Blood, Arm, Right Preliminary result    Blood Culture - Blood, Arm, Right Preliminary result           CODE STATUS  Code Status and Medical Interventions:   Ordered at: 07/09/19 2058     Level Of Support Discussed With:    Patient     Code Status:    CPR     Medical Interventions (Level of Support Prior to Arrest):    Full       Mhd Ana Paula Bhat MD  07/11/19  1:25 PM    Please note that this discharge summary required more than 30 minutes to complete.    Please send a copy of this dictation to the following providers:  Leon Grossman MD

## 2019-07-12 ENCOUNTER — TELEPHONE (OUTPATIENT)
Dept: MEDSURG UNIT | Facility: HOSPITAL | Age: 54
End: 2019-07-12

## 2019-07-12 NOTE — PAYOR COMM NOTE
"[facility]  NPI:[id number]    Utilization Review  Contact: Nickie Byrd RN  Phone: [phone]  Fax:[phone]    DISCHARGE NOTIFICATION  DISCHARGE TO HOME ON 2019          STILL AWAITING APPROVAL          PENDING REF# [id number]      Valentin Vásquez (54 y.o. Male)     Date of Birth Social Security Number Address Home Phone MRN    1965   [place] [address] [phone] [id number]    [employer] Marital Status          None        Admission Date Admission Type Admitting Provider Attending Provider Department, Room/Bed    19 Emergency Dale Childs MD  [address]    Discharge Date Discharge Disposition Discharge Destination        2019 Home or Self Care              Attending Provider:  (none)   Allergies:  No Known Allergies    Isolation:  None   Infection:  None   Code Status:  Prior    Ht:  175.3 cm (69\")   Wt:  92.7 kg (204 lb 4.8 oz)    Admission Cmt:  None   Principal Problem:  None                Active Insurance as of 2019     Primary Coverage     Payor Plan Insurance Group Employer/Plan Group    [employer]HEM BLUE CROSS AN[employer]M BLUE CROSS BLUE SHIELD PPO [id number]     Payor Plan Address Payor Plan Phone Number Payor Plan Fax Number Effective Dates    [address] [phone]  2016 - None Entered    [place] [address]       Subscriber Name Subscriber Birth Date Member ID       VALENTIN VÁSQUEZ 1965 [id number]                 Emergency Contacts      (Rel.) Home Phone Work Phone Mobile Phone    Sharri Vásquez (Spouse) [phone] -- [phone]               Discharge Summary      Luis Bhat MD at 2019  1:25 PM              [facility]ITAL[facility] DISCHARGE SUMMARY    Patient Identification:  Name:  Valentin Vásquez  Age:  54 y.o.  Sex:  male  :  1965  MRN:  [id number]  Visit Number:  [id number]    Date of Admission: 2019  Date of Discharge:  2019 "     PCP: Leon Grossman MD    DISCHARGE DIAGNOSIS  -Acute pancreatitis, predisposed by alcohol, patient is not alcoholic however has a high sensitivity to it, drinks moderately 4 times a week.  Be off few of diabetic medications that put him at high risk of having flareups  -Diabetes mellitus type 2  -Essential hypertension  -Cholelithiasis, symptomatic  -SIRS due to above resolved        HOSPITAL COURSE  Patient is a 54 y.o. male presented to [facility] complaining of abdominal pain.  Please see the admitting history and physical for further details.  The patient was treated with IV fluids kept n.p.o. and PRN pain medication due to acute pancreatitis, suspected to be a combined due to alcohol as well as medication induced specifically diabetic medicines please see the attachment directs for adjusted therapies.  Patient was counseled to stay away from alcohol although he does not drink heavily however with a chronic run as well as the medications he is on his risk of having a flareup is higher.  Patient seen and examined today and he will be advanced on the diet to regular if he is tolerating it he will be discharged home today.  Patient was found to have cholelithiasis however there is no any obstruction signs, normal LFTs including alkaline phosphatase and bilirubin.        VITAL SIGNS:  Temp:  [97.8 °F (36.6 °C)-99.9 °F (37.7 °C)] 97.8 °F (36.6 °C)  Heart Rate:  [100-126] 100  Resp:  [18] 18  BP: (122-148)/(67-90) 126/80  SpO2:  [94 %-98 %] 97 %  on   ;   Device (Oxygen Therapy): room air    Body mass index is 30.17 kg/m².  Wt Readings from Last 3 Encounters:   07/09/19 92.7 kg (204 lb 4.8 oz)   11/12/16 86.2 kg (190 lb)       PHYSICAL EXAM:  Constitutional:  Well-developed and well-nourished.  No respiratory distress.      HENT:  Head: Normocephalic and atraumatic.  Mouth:  Moist mucous membranes.    Eyes:  Conjunctivae and EOM are normal.  Pupils are equal, round, and reactive to light.  No scleral  icterus.  Neck:  Neck supple.  No JVD present.    Cardiovascular:  Normal rate, regular rhythm and normal heart sounds with no murmur.  Pulmonary/Chest:  No respiratory distress, no wheezes, no crackles, with normal breath sounds and good air movement.  Abdominal:  Soft.  Bowel sounds are normal.  No distension and no tenderness.   Musculoskeletal:  No edema, no tenderness, and no deformity.  No red or swollen joints anywhere.    Neurological:  Alert and oriented to person, place, and time.  No cranial nerve deficit.  No tongue deviation.  No facial droop.  No slurred speech.   Skin:  Skin is warm and dry.  No rash noted.  No pallor.   Peripheral vascular:  No edema and strong pulses on all 4 extremities.      DISCHARGE DISPOSITION   Stable    DISCHARGE MEDICATIONS:     Discharge Medications      New Medications      Instructions Start Date   metoprolol succinate XL 25 MG 24 hr tablet  Commonly known as:  TOPROL XL   25 mg, Oral, Daily         Changes to Medications      Instructions Start Date   HYDROcodone-acetaminophen 7.5-325 MG per tablet  Commonly known as:  NORCO  What changed:    · when to take this  · reasons to take this   1 tablet, Oral, Every 8 Hours PRN      insulin lispro protamine-insulin lispro (75-25) 100 UNIT/ML suspension injection  Commonly known as:  humaLOG 75-25  What changed:    · when to take this  · Another medication with the same name was removed. Continue taking this medication, and follow the directions you see here.   55 Units, Subcutaneous, 2 Times Daily With Meals         Continue These Medications      Instructions Start Date   metFORMIN 1000 MG tablet  Commonly known as:  GLUCOPHAGE   500 mg, Oral, 2 Times Daily With Meals      omeprazole 40 MG capsule  Commonly known as:  priLOSEC   40 mg, Oral, Daily      sertraline 50 MG tablet  Commonly known as:  ZOLOFT   50 mg, Oral, Daily         Stop These Medications    amitriptyline 25 MG tablet  Commonly known as:  ELAVIL      cyclobenzaprine 5 MG tablet  Commonly known as:  FLEXERIL     gabapentin 300 MG capsule  Commonly known as:  NEURONTIN     JARDIANCE 10 MG tablet  Generic drug:  Empagliflozin     lisinopril 10 MG tablet  Commonly known as:  PRINIVIL,ZESTRIL     naproxen 500 MG tablet  Commonly known as:  NAPROSYN     rOPINIRole 2 MG tablet  Commonly known as:  REQUIP     VICTOZA SC            Diet Instructions     Diet: Consistent Carbohydrate      Discharge Diet:  Consistent Carbohydrate    2gm Na        Activity Instructions     Activity as Tolerated          No future appointments.    Additional Instructions for the Follow-ups that You Need to Schedule     Discharge Follow-up with PCP   As directed       Currently Documented PCP:    Leon Grossman MD    PCP Phone Number:    [phone]     Follow Up Details:  PCP in  aweek           Follow-up Information     Leon Grossman MD .    Specialty:  Internal Medicine  Why:  PCP in  aweek  Contact information:  [address] [place], [place] [address]ROS[place] [address]  [phone]                    TEST  RESULTS PENDING AT DISCHARGE   Order Current Status    Blood Culture - Blood, Arm, Right Preliminary result    Blood Culture - Blood, Arm, Right Preliminary result           CODE STATUS  Code Status and Medical Interventions:   Ordered at: 07/09/19 2213     Level Of Support Discussed With:    Patient     Code Status:    CPR     Medical Interventions (Level of Support Prior to Arrest):    Full       Luis Bhat MD  07/11/19  1:25 PM    Please note that this discharge summary required more than 30 minutes to complete.    Please send a copy of this dictation to the following providers:  Leon Grossman MD      Electronically signed by Luis Bhat MD at 7/11/2019  1:28 PM

## 2019-07-14 LAB
BACTERIA SPEC AEROBE CULT: NORMAL
BACTERIA SPEC AEROBE CULT: NORMAL

## 2021-11-17 ENCOUNTER — TRANSCRIBE ORDERS (OUTPATIENT)
Dept: ADMINISTRATIVE | Facility: HOSPITAL | Age: 56
End: 2021-11-17

## 2021-11-17 DIAGNOSIS — M54.50 LOW BACK PAIN, UNSPECIFIED BACK PAIN LATERALITY, UNSPECIFIED CHRONICITY, UNSPECIFIED WHETHER SCIATICA PRESENT: Primary | ICD-10-CM

## 2021-12-15 ENCOUNTER — TRANSCRIBE ORDERS (OUTPATIENT)
Dept: ADMINISTRATIVE | Facility: HOSPITAL | Age: 56
End: 2021-12-15

## 2021-12-15 DIAGNOSIS — R91.8 LUNG MASS: Primary | ICD-10-CM

## 2021-12-16 ENCOUNTER — TRANSCRIBE ORDERS (OUTPATIENT)
Dept: ADMINISTRATIVE | Facility: HOSPITAL | Age: 56
End: 2021-12-16

## 2021-12-16 DIAGNOSIS — M25.511 CHRONIC RIGHT SHOULDER PAIN: Primary | ICD-10-CM

## 2021-12-16 DIAGNOSIS — G89.29 CHRONIC RIGHT SHOULDER PAIN: Primary | ICD-10-CM

## 2021-12-17 ENCOUNTER — HOSPITAL ENCOUNTER (OUTPATIENT)
Dept: MRI IMAGING | Facility: HOSPITAL | Age: 56
Discharge: HOME | End: 2021-12-17
Admitting: FAMILY MEDICINE

## 2021-12-17 DIAGNOSIS — M54.50 LOW BACK PAIN, UNSPECIFIED BACK PAIN LATERALITY, UNSPECIFIED CHRONICITY, UNSPECIFIED WHETHER SCIATICA PRESENT: ICD-10-CM

## 2021-12-17 PROCEDURE — 72148 MRI LUMBAR SPINE W/O DYE: CPT

## 2021-12-17 PROCEDURE — 72148 MRI LUMBAR SPINE W/O DYE: CPT | Performed by: RADIOLOGY

## 2021-12-21 ENCOUNTER — APPOINTMENT (OUTPATIENT)
Dept: MRI IMAGING | Facility: HOSPITAL | Age: 56
End: 2021-12-21

## 2022-01-19 ENCOUNTER — TRANSCRIBE ORDERS (OUTPATIENT)
Dept: ADMINISTRATIVE | Facility: HOSPITAL | Age: 57
End: 2022-01-19

## 2022-02-04 ENCOUNTER — HOSPITAL ENCOUNTER (OUTPATIENT)
Dept: CT IMAGING | Facility: HOSPITAL | Age: 57
Discharge: HOME | End: 2022-02-04

## 2022-02-04 DIAGNOSIS — R91.8 LUNG MASS: ICD-10-CM

## 2022-02-07 ENCOUNTER — HOSPITAL ENCOUNTER (OUTPATIENT)
Dept: CT IMAGING | Facility: HOSPITAL | Age: 57
Discharge: HOME | End: 2022-02-07
Admitting: FAMILY MEDICINE

## 2022-02-07 PROCEDURE — 71270 CT THORAX DX C-/C+: CPT

## 2022-02-07 PROCEDURE — 25010000002 IOPAMIDOL 61 % SOLUTION: Performed by: FAMILY MEDICINE

## 2022-02-07 PROCEDURE — 71270 CT THORAX DX C-/C+: CPT | Performed by: RADIOLOGY

## 2022-02-07 RX ADMIN — IOPAMIDOL 80 ML: 612 INJECTION, SOLUTION INTRAVENOUS at 09:02

## 2022-03-25 ENCOUNTER — TRANSCRIBE ORDERS (OUTPATIENT)
Dept: ADMINISTRATIVE | Facility: HOSPITAL | Age: 57
End: 2022-03-25

## 2022-03-25 DIAGNOSIS — G89.29 CHRONIC RIGHT SHOULDER PAIN: Primary | ICD-10-CM

## 2022-03-25 DIAGNOSIS — M25.511 CHRONIC RIGHT SHOULDER PAIN: Primary | ICD-10-CM

## 2022-06-09 DIAGNOSIS — M25.511 RIGHT SHOULDER PAIN, UNSPECIFIED CHRONICITY: Primary | ICD-10-CM

## 2022-06-15 ENCOUNTER — HOSPITAL ENCOUNTER (OUTPATIENT)
Dept: GENERAL RADIOLOGY | Facility: HOSPITAL | Age: 57
Discharge: HOME | End: 2022-06-15
Admitting: ORTHOPAEDIC SURGERY

## 2022-06-15 ENCOUNTER — OFFICE VISIT (OUTPATIENT)
Dept: ORTHOPEDIC SURGERY | Facility: CLINIC | Age: 57
End: 2022-06-15

## 2022-06-15 VITALS
SYSTOLIC BLOOD PRESSURE: 130 MMHG | BODY MASS INDEX: 29.07 KG/M2 | DIASTOLIC BLOOD PRESSURE: 88 MMHG | HEIGHT: 68 IN | HEART RATE: 105 BPM | WEIGHT: 191.8 LBS

## 2022-06-15 DIAGNOSIS — M19.011 ARTHRITIS OF RIGHT ACROMIOCLAVICULAR JOINT: ICD-10-CM

## 2022-06-15 DIAGNOSIS — M25.511 RIGHT SHOULDER PAIN, UNSPECIFIED CHRONICITY: ICD-10-CM

## 2022-06-15 DIAGNOSIS — M75.41 IMPINGEMENT SYNDROME OF RIGHT SHOULDER: Primary | ICD-10-CM

## 2022-06-15 PROCEDURE — 73030 X-RAY EXAM OF SHOULDER: CPT | Performed by: RADIOLOGY

## 2022-06-15 PROCEDURE — 73030 X-RAY EXAM OF SHOULDER: CPT

## 2022-06-15 PROCEDURE — 99203 OFFICE O/P NEW LOW 30 MIN: CPT | Performed by: ORTHOPAEDIC SURGERY

## 2022-06-15 RX ORDER — INSULIN DEGLUDEC 200 U/ML
80 INJECTION, SOLUTION SUBCUTANEOUS DAILY
COMMUNITY
Start: 2022-06-09

## 2022-06-15 RX ORDER — ROSUVASTATIN CALCIUM 10 MG/1
10 TABLET, COATED ORAL NIGHTLY
COMMUNITY
Start: 2022-06-12

## 2022-06-15 RX ORDER — EMPAGLIFLOZIN 25 MG/1
25 TABLET, FILM COATED ORAL DAILY
COMMUNITY
Start: 2022-06-12

## 2022-06-15 RX ORDER — ASPIRIN 81 MG/1
81 TABLET, COATED ORAL DAILY
COMMUNITY
Start: 2022-06-09

## 2022-06-15 RX ORDER — LISINOPRIL 10 MG/1
10 TABLET ORAL DAILY
COMMUNITY
Start: 2022-06-12

## 2022-06-15 RX ORDER — GLIMEPIRIDE 4 MG/1
4 TABLET ORAL DAILY
COMMUNITY
Start: 2022-06-01

## 2022-06-15 RX ORDER — FENOFIBRATE 145 MG/1
145 TABLET, COATED ORAL DAILY
COMMUNITY
Start: 2022-06-09

## 2022-06-15 NOTE — PROGRESS NOTES
New Patient Visit      Patient: Valentin Vásquez  YOB: 1965  Date of Encounter: 06/15/2022        Chief Complaint:   Chief Complaint   Patient presents with   • Right Shoulder - Initial Evaluation, Pain           HPI:   Valentin Vásquez, 57 y.o. male, referred by Tristen Stuart DO presents for evaluation of right shoulder pain of about 8 months duration with no injury reported.  He localizes pain to the lateral aspect of his right arm.  At times he has posterior neck pain on the right with pain radiating distally to his elbow.  He does not experience numbness to his right arm.  He is employed as a .  Has continued working.  Has no contralateral shoulder pain.  His medical history is remarkable for gout in the past he has had no recent flareups and does not take medication for gout.  He does have a history of diabetes.  Has also had pancreatitis in the past.        Active Problem List:  Patient Active Problem List   Diagnosis   • Acute pancreatitis           Past Medical History:  Past Medical History:   Diagnosis Date   • Diabetes mellitus (HCC)    • GERD (gastroesophageal reflux disease)    • Gout    • Hyperlipidemia    • Hypertension    • Injury of back    • Kidney stone    • Pancreatitis            Past Surgical History:  Past Surgical History:   Procedure Laterality Date   • ADENOIDECTOMY     • KIDNEY STONE SURGERY     • TONSILLECTOMY             Family History:  Family History   Problem Relation Age of Onset   • Diabetes Father    • Diabetes Sister          Social History:  Social History     Socioeconomic History   • Marital status:    Tobacco Use   • Smoking status: Never Smoker   • Smokeless tobacco: Current User     Types: Snuff   Vaping Use   • Vaping Use: Never used   Substance and Sexual Activity   • Alcohol use: No   • Drug use: No   • Sexual activity: Defer     Body mass index is 29.16 kg/m².      Medications:  Current Outpatient Medications   Medication  "Sig Dispense Refill   • Aspirin Low Dose 81 MG EC tablet      • fenofibrate (TRICOR) 145 MG tablet      • glimepiride (AMARYL) 4 MG tablet Take 4 mg by mouth Daily.     • HYDROcodone-acetaminophen (NORCO) 7.5-325 MG per tablet Take 1 tablet by mouth Every 8 (Eight) Hours As Needed for Severe Pain . 10 tablet 0   • Jardiance 25 MG tablet tablet      • lisinopril (PRINIVIL,ZESTRIL) 10 MG tablet      • metFORMIN (GLUCOPHAGE) 1000 MG tablet Take 500 mg by mouth 2 (Two) Times a Day With Meals.     • metoprolol succinate XL (TOPROL XL) 25 MG 24 hr tablet Take 1 tablet by mouth Daily. 30 tablet 0   • omeprazole (priLOSEC) 40 MG capsule Take 40 mg by mouth Daily.     • rosuvastatin (CRESTOR) 10 MG tablet      • Tresiba FlexTouch 200 UNIT/ML solution pen-injector pen injection        No current facility-administered medications for this visit.         Allergies:  No Known Allergies      Review of Systems:   Review of Systems   Constitutional: Negative.    HENT: Negative.    Eyes: Negative.    Respiratory: Negative.    Cardiovascular: Negative.    Gastrointestinal: Negative.    Endocrine: Negative.    Genitourinary: Negative.    Musculoskeletal: Positive for arthralgias.   Skin: Negative.    Allergic/Immunologic: Negative.    Neurological: Negative.    Hematological: Negative.    Psychiatric/Behavioral: Negative.          Physical Exam:     GENERAL: 57 y.o. male, alert and oriented X 3 in no acute distress.   Visit Vitals  /88   Pulse 105   Ht 172.7 cm (68\")   Wt 87 kg (191 lb 12.8 oz)   BMI 29.16 kg/m²         Musculoskeletal:   Examination right shoulder compared to the left there is full motion with mild discomfort at full flexion with no findings of impingement.  He has mild to moderate pain with crossarm adduction no localized tenderness to the AC joint or bicipital groove speeds test is negative.  He has slightly limited internal rotation right compared to the left.  Neurovascular examination is grossly " intact.        Radiology/Labs:     XR Shoulder 2+ View Right    Result Date: 6/15/2022    No acute findings in the right shoulder.  This report was finalized on 6/15/2022 8:27 AM by Dr. Yohan Mendenhall MD.        Radiographs right shoulder by my review show mild osteoarthritis of the right AC joint with minimal inferior mass-effect.      Assessment & Plan:   57 y.o. male presents with 8-month history of right shoulder pain findings of osteoarthritis on radiographs right AC joint mild impingement.  Based on clinical findings suspect rotator cuff allergy.  Before providing steroid injections of the right AC joint or considering other treatment we will request MRI as he is a  we will see him back once MRI is completed.        ICD-10-CM ICD-9-CM   1. Impingement syndrome of right shoulder  M75.41 726.2   2. Arthritis of right acromioclavicular joint  M19.011 716.91         Cc:   Tristen Stuart DO                This document has been electronically signed by Edmundo Huitron MD   Neris 15, 2022 09:47 EDT

## 2022-06-29 ENCOUNTER — HOSPITAL ENCOUNTER (OUTPATIENT)
Dept: MRI IMAGING | Facility: HOSPITAL | Age: 57
Discharge: HOME | End: 2022-06-29
Admitting: ORTHOPAEDIC SURGERY

## 2022-06-29 DIAGNOSIS — M19.011 ARTHRITIS OF RIGHT ACROMIOCLAVICULAR JOINT: ICD-10-CM

## 2022-06-29 DIAGNOSIS — M75.41 IMPINGEMENT SYNDROME OF RIGHT SHOULDER: ICD-10-CM

## 2022-06-29 PROCEDURE — 73221 MRI JOINT UPR EXTREM W/O DYE: CPT

## 2022-06-29 PROCEDURE — 73221 MRI JOINT UPR EXTREM W/O DYE: CPT | Performed by: RADIOLOGY

## 2022-07-13 ENCOUNTER — OFFICE VISIT (OUTPATIENT)
Dept: ORTHOPEDIC SURGERY | Facility: CLINIC | Age: 57
End: 2022-07-13

## 2022-07-13 VITALS
WEIGHT: 191.8 LBS | DIASTOLIC BLOOD PRESSURE: 78 MMHG | HEIGHT: 68 IN | SYSTOLIC BLOOD PRESSURE: 119 MMHG | BODY MASS INDEX: 29.07 KG/M2 | HEART RATE: 86 BPM

## 2022-07-13 DIAGNOSIS — Z01.818 PREOPERATIVE TESTING: ICD-10-CM

## 2022-07-13 DIAGNOSIS — M75.112 NONTRAUMATIC INCOMPLETE TEAR OF LEFT ROTATOR CUFF: Primary | ICD-10-CM

## 2022-07-13 PROCEDURE — 99214 OFFICE O/P EST MOD 30 MIN: CPT | Performed by: ORTHOPAEDIC SURGERY

## 2022-07-13 NOTE — PROGRESS NOTES
History and Physical      Patient: Valentin Vásquez  YOB: 1965  Date of Encounter: 07/13/2022      Chief Complaint:   Chief Complaint   Patient presents with   • Right Shoulder - Pain, Follow-up     MRI review right shoulder           HPI:   Valentin Vásquez, 57 y.o. male, presents in follow-up right shoulder complaints.  He has now experienced pain right shoulder about 9 months localizes pain right shoulder the lateral aspect of his right arm.  He has not experienced weakness or numbness to his right arm.  He is employed as a  heavy equipment.  He continues to work.  He has now had MRI completed right shoulder.  Symptoms remain unchanged.  Medical history includes diabetes, pancreatitis and gout.  He also has chronic low back pain and requires narcotics for pain control.        Active Problem List:  Patient Active Problem List   Diagnosis   • Acute pancreatitis   • Nontraumatic incomplete tear of left rotator cuff           Past Medical History:  Past Medical History:   Diagnosis Date   • Diabetes mellitus (HCC)    • GERD (gastroesophageal reflux disease)    • Gout    • Hyperlipidemia    • Hypertension    • Injury of back    • Kidney stone    • Pancreatitis            Past Surgical History:  Past Surgical History:   Procedure Laterality Date   • ADENOIDECTOMY     • KIDNEY STONE SURGERY     • TONSILLECTOMY             Family History:  Family History   Problem Relation Age of Onset   • Diabetes Father    • Diabetes Sister            Social History:  Social History     Socioeconomic History   • Marital status:    Tobacco Use   • Smoking status: Never Smoker   • Smokeless tobacco: Current User     Types: Snuff   Vaping Use   • Vaping Use: Never used   Substance and Sexual Activity   • Alcohol use: No   • Drug use: No   • Sexual activity: Defer     Body mass index is 29.17 kg/m².    Medications:  Current Outpatient Medications   Medication Sig Dispense Refill   • Aspirin Low Dose 81 MG EC tablet       • fenofibrate (TRICOR) 145 MG tablet      • glimepiride (AMARYL) 4 MG tablet Take 4 mg by mouth Daily.     • HYDROcodone-acetaminophen (NORCO) 7.5-325 MG per tablet Take 1 tablet by mouth Every 8 (Eight) Hours As Needed for Severe Pain . 10 tablet 0   • Jardiance 25 MG tablet tablet      • lisinopril (PRINIVIL,ZESTRIL) 10 MG tablet      • metFORMIN (GLUCOPHAGE) 1000 MG tablet Take 500 mg by mouth 2 (Two) Times a Day With Meals.     • metoprolol succinate XL (TOPROL XL) 25 MG 24 hr tablet Take 1 tablet by mouth Daily. 30 tablet 0   • omeprazole (priLOSEC) 40 MG capsule Take 40 mg by mouth Daily.     • rosuvastatin (CRESTOR) 10 MG tablet      • Tresiba FlexTouch 200 UNIT/ML solution pen-injector pen injection        No current facility-administered medications for this visit.       Allergies:  No Known Allergies      Review of Systems:   Review of Systems   Constitutional: Negative.    HENT: Negative.    Eyes: Negative.    Respiratory: Negative.    Cardiovascular: Negative.    Gastrointestinal: Negative.    Endocrine: Negative.    Genitourinary: Negative.    Musculoskeletal: Positive for arthralgias.   Skin: Negative.    Allergic/Immunologic: Negative.    Neurological: Negative.    Hematological: Negative.    Psychiatric/Behavioral: Negative.            Physical Exam:   Physical Exam  Constitutional:       General: He is not in acute distress.     Appearance: Normal appearance.   HENT:      Head: Normocephalic.      Right Ear: External ear normal.      Left Ear: External ear normal.      Nose: Nose normal.      Mouth/Throat:      Mouth: Mucous membranes are moist.      Pharynx: Oropharynx is clear.   Eyes:      Pupils: Pupils are equal, round, and reactive to light.   Cardiovascular:      Rate and Rhythm: Normal rate and regular rhythm.      Heart sounds: Normal heart sounds.   Pulmonary:      Effort: No respiratory distress.      Breath sounds: Normal breath sounds. No wheezing or rhonchi.   Abdominal:       "General: There is no distension.      Palpations: Abdomen is soft.   Skin:     General: Skin is warm and dry.   Neurological:      Mental Status: He is alert and oriented to person, place, and time.   Psychiatric:         Mood and Affect: Mood normal.         Behavior: Behavior normal.       GENERAL: 57 y.o. male, alert and oriented X 3 in no acute distress.   Visit Vitals  /78   Pulse 86   Ht 172.7 cm (67.99\")   Wt 87 kg (191 lb 12.8 oz)   BMI 29.17 kg/m²         Musculoskeletal:   Examination right shoulder reveals normal contour.  He demonstrates full flexion with mild signs of impingement with Jobes maneuver he has good strength with moderate pain.  He has mild tenderness crossarm adduction no localized tenderness to the AC joint, bicipital groove is non-tender, speeds test is negative, pushoff test is negative.  Neurovascular exam grossly intact.        Radiology/Labs:    XR Shoulder 2+ View Right    Result Date: 6/15/2022    No acute findings in the right shoulder.  This report was finalized on 6/15/2022 8:27 AM by Dr. Yohan eMndenhall MD.      MRI Shoulder Right Without Contrast    Result Date: 6/29/2022  Small bone island in the humeral head. There is a fairly large tear in the rotator cuff tendon supraspinatus portion with fluid in the subacromial and subdeltoid bursa.  This report was finalized on 6/29/2022 1:30 PM by Dr. Anton Ellsworth II, MD.          Radiographs:   MRI right shoulder by my review shows full-thickness rotator cuff tear spinatus portion with mild retraction.  Moderate impingement noted.  Moderate arthropathy of the right AC joint.      Assessment & Plan:   57 y.o. male presents with right shoulder complaints of 9 months duration with clinical findings supported by MRI's finding full-thickness rotator cuff tear with mild retraction, no other significant pathology noted other than mild arthritis of the right AC joint.  We discussed his options he is eager to proceed with proposed rotator " cuff repair right shoulder.  Will include acromioplasty and possibly excision of the lateral clavicle right shoulder depending on intraoperative findings.      ICD-10-CM ICD-9-CM   1. Nontraumatic incomplete tear of left rotator cuff  M75.112 726.13   2. Preoperative testing  Z01.818 V72.84           Cc:   Tristen Stuart DO              This document has been electronically signed by Edmundo Huitron MD   July 14, 2022 13:35 EDT

## 2022-07-14 PROBLEM — M75.112 NONTRAUMATIC INCOMPLETE TEAR OF LEFT ROTATOR CUFF: Status: ACTIVE | Noted: 2022-07-14

## 2022-07-15 ENCOUNTER — LAB (OUTPATIENT)
Dept: LAB | Facility: HOSPITAL | Age: 57
End: 2022-07-15

## 2022-07-15 ENCOUNTER — PRE-ADMISSION TESTING (OUTPATIENT)
Dept: PREADMISSION TESTING | Facility: HOSPITAL | Age: 57
End: 2022-07-15

## 2022-07-15 DIAGNOSIS — M75.112 NONTRAUMATIC INCOMPLETE TEAR OF LEFT ROTATOR CUFF: ICD-10-CM

## 2022-07-15 DIAGNOSIS — Z01.818 PREOPERATIVE TESTING: ICD-10-CM

## 2022-07-15 LAB
ANION GAP SERPL CALCULATED.3IONS-SCNC: 13.6 MMOL/L (ref 5–15)
BUN SERPL-MCNC: 19 MG/DL (ref 6–20)
BUN/CREAT SERPL: 31.7 (ref 7–25)
CALCIUM SPEC-SCNC: 9.6 MG/DL (ref 8.6–10.5)
CHLORIDE SERPL-SCNC: 103 MMOL/L (ref 98–107)
CO2 SERPL-SCNC: 20.4 MMOL/L (ref 22–29)
CREAT SERPL-MCNC: 0.6 MG/DL (ref 0.76–1.27)
DEPRECATED RDW RBC AUTO: 44 FL (ref 37–54)
EGFRCR SERPLBLD CKD-EPI 2021: 111.2 ML/MIN/1.73
ERYTHROCYTE [DISTWIDTH] IN BLOOD BY AUTOMATED COUNT: 13.8 % (ref 12.3–15.4)
GLUCOSE SERPL-MCNC: 275 MG/DL (ref 65–99)
HCT VFR BLD AUTO: 44.3 % (ref 37.5–51)
HGB BLD-MCNC: 14.4 G/DL (ref 13–17.7)
MCH RBC QN AUTO: 28.4 PG (ref 26.6–33)
MCHC RBC AUTO-ENTMCNC: 32.5 G/DL (ref 31.5–35.7)
MCV RBC AUTO: 87.4 FL (ref 79–97)
PLATELET # BLD AUTO: 275 10*3/MM3 (ref 140–450)
PMV BLD AUTO: 10.2 FL (ref 6–12)
POTASSIUM SERPL-SCNC: 3.9 MMOL/L (ref 3.5–5.2)
RBC # BLD AUTO: 5.07 10*6/MM3 (ref 4.14–5.8)
SARS-COV-2 RNA PNL SPEC NAA+PROBE: NOT DETECTED
SODIUM SERPL-SCNC: 137 MMOL/L (ref 136–145)
WBC NRBC COR # BLD: 8.8 10*3/MM3 (ref 3.4–10.8)

## 2022-07-15 PROCEDURE — 85027 COMPLETE CBC AUTOMATED: CPT

## 2022-07-15 PROCEDURE — 36415 COLL VENOUS BLD VENIPUNCTURE: CPT

## 2022-07-15 PROCEDURE — U0004 COV-19 TEST NON-CDC HGH THRU: HCPCS | Performed by: ORTHOPAEDIC SURGERY

## 2022-07-15 PROCEDURE — 80048 BASIC METABOLIC PNL TOTAL CA: CPT

## 2022-07-15 NOTE — DISCHARGE INSTRUCTIONS

## 2022-07-19 ENCOUNTER — HOSPITAL ENCOUNTER (OUTPATIENT)
Facility: HOSPITAL | Age: 57
Discharge: HOME | End: 2022-07-20
Attending: ORTHOPAEDIC SURGERY | Admitting: ORTHOPAEDIC SURGERY

## 2022-07-19 ENCOUNTER — APPOINTMENT (OUTPATIENT)
Dept: GENERAL RADIOLOGY | Facility: HOSPITAL | Age: 57
End: 2022-07-19

## 2022-07-19 ENCOUNTER — ANESTHESIA (OUTPATIENT)
Dept: PERIOP | Facility: HOSPITAL | Age: 57
End: 2022-07-19

## 2022-07-19 ENCOUNTER — ANESTHESIA EVENT (OUTPATIENT)
Dept: PERIOP | Facility: HOSPITAL | Age: 57
End: 2022-07-19

## 2022-07-19 DIAGNOSIS — M75.112 NONTRAUMATIC INCOMPLETE TEAR OF LEFT ROTATOR CUFF: ICD-10-CM

## 2022-07-19 LAB — GLUCOSE BLDC GLUCOMTR-MCNC: 102 MG/DL (ref 70–130)

## 2022-07-19 PROCEDURE — G0378 HOSPITAL OBSERVATION PER HR: HCPCS

## 2022-07-19 PROCEDURE — C1713 ANCHOR/SCREW BN/BN,TIS/BN: HCPCS | Performed by: ORTHOPAEDIC SURGERY

## 2022-07-19 PROCEDURE — 23120 CLAVICULECTOMY PARTIAL: CPT | Performed by: ORTHOPAEDIC SURGERY

## 2022-07-19 PROCEDURE — 25010000002 NEOSTIGMINE 10 MG/10ML SOLUTION: Performed by: NURSE ANESTHETIST, CERTIFIED REGISTERED

## 2022-07-19 PROCEDURE — 25010000002 ONDANSETRON PER 1 MG: Performed by: NURSE ANESTHETIST, CERTIFIED REGISTERED

## 2022-07-19 PROCEDURE — 25010000002 PROPOFOL 10 MG/ML EMULSION: Performed by: NURSE ANESTHETIST, CERTIFIED REGISTERED

## 2022-07-19 PROCEDURE — 63710000001 ONDANSETRON PER 8 MG: Performed by: ORTHOPAEDIC SURGERY

## 2022-07-19 PROCEDURE — 25010000002 CEFAZOLIN PER 500 MG: Performed by: ORTHOPAEDIC SURGERY

## 2022-07-19 PROCEDURE — 82962 GLUCOSE BLOOD TEST: CPT

## 2022-07-19 PROCEDURE — 25010000002 ROPIVACAINE PER 1 MG: Performed by: ANESTHESIOLOGY

## 2022-07-19 PROCEDURE — 25010000002 MIDAZOLAM PER 1 MG: Performed by: ANESTHESIOLOGY

## 2022-07-19 PROCEDURE — 23412 REPAIR ROTATOR CUFF CHRONIC: CPT | Performed by: ORTHOPAEDIC SURGERY

## 2022-07-19 PROCEDURE — 25010000002 FENTANYL CITRATE (PF) 50 MCG/ML SOLUTION: Performed by: ANESTHESIOLOGY

## 2022-07-19 DEVICE — SPEEDBRG IMPL SYS W/BIO-COMPR SWVLK SP
Type: IMPLANTABLE DEVICE | Site: SHOULDER | Status: FUNCTIONAL
Brand: ARTHREX®

## 2022-07-19 DEVICE — KNOTLESS TISSUE CONTROL DEVICE, UNDYED UNIDIRECTIONAL (ANTIBACTERIAL) SYNTHETIC ABSORBABLE DEVICE
Type: IMPLANTABLE DEVICE | Site: SHOULDER | Status: FUNCTIONAL
Brand: STRATAFIX

## 2022-07-19 RX ORDER — IPRATROPIUM BROMIDE AND ALBUTEROL SULFATE 2.5; .5 MG/3ML; MG/3ML
3 SOLUTION RESPIRATORY (INHALATION) ONCE AS NEEDED
Status: DISCONTINUED | OUTPATIENT
Start: 2022-07-19 | End: 2022-07-19 | Stop reason: HOSPADM

## 2022-07-19 RX ORDER — SODIUM CHLORIDE 0.9 % (FLUSH) 0.9 %
10 SYRINGE (ML) INJECTION AS NEEDED
Status: DISCONTINUED | OUTPATIENT
Start: 2022-07-19 | End: 2022-07-19 | Stop reason: HOSPADM

## 2022-07-19 RX ORDER — ONDANSETRON 2 MG/ML
4 INJECTION INTRAMUSCULAR; INTRAVENOUS EVERY 6 HOURS PRN
Status: DISCONTINUED | OUTPATIENT
Start: 2022-07-19 | End: 2022-07-20 | Stop reason: HOSPADM

## 2022-07-19 RX ORDER — OXYCODONE HYDROCHLORIDE AND ACETAMINOPHEN 5; 325 MG/1; MG/1
1 TABLET ORAL EVERY 4 HOURS PRN
Status: DISCONTINUED | OUTPATIENT
Start: 2022-07-19 | End: 2022-07-20 | Stop reason: HOSPADM

## 2022-07-19 RX ORDER — SODIUM CHLORIDE 0.9 % (FLUSH) 0.9 %
10 SYRINGE (ML) INJECTION AS NEEDED
Status: DISCONTINUED | OUTPATIENT
Start: 2022-07-19 | End: 2022-07-20 | Stop reason: HOSPADM

## 2022-07-19 RX ORDER — PROPOFOL 10 MG/ML
VIAL (ML) INTRAVENOUS AS NEEDED
Status: DISCONTINUED | OUTPATIENT
Start: 2022-07-19 | End: 2022-07-19 | Stop reason: SURG

## 2022-07-19 RX ORDER — SODIUM CHLORIDE, SODIUM LACTATE, POTASSIUM CHLORIDE, CALCIUM CHLORIDE 600; 310; 30; 20 MG/100ML; MG/100ML; MG/100ML; MG/100ML
100 INJECTION, SOLUTION INTRAVENOUS ONCE AS NEEDED
Status: DISCONTINUED | OUTPATIENT
Start: 2022-07-19 | End: 2022-07-19 | Stop reason: HOSPADM

## 2022-07-19 RX ORDER — OXYCODONE HYDROCHLORIDE AND ACETAMINOPHEN 5; 325 MG/1; MG/1
1 TABLET ORAL ONCE AS NEEDED
Status: COMPLETED | OUTPATIENT
Start: 2022-07-19 | End: 2022-07-19

## 2022-07-19 RX ORDER — HYDROCODONE BITARTRATE AND ACETAMINOPHEN 7.5; 325 MG/1; MG/1
1 TABLET ORAL EVERY 8 HOURS PRN
Status: CANCELLED | OUTPATIENT
Start: 2022-07-19

## 2022-07-19 RX ORDER — PANTOPRAZOLE SODIUM 40 MG/1
40 TABLET, DELAYED RELEASE ORAL EVERY MORNING
Status: DISCONTINUED | OUTPATIENT
Start: 2022-07-20 | End: 2022-07-20 | Stop reason: HOSPADM

## 2022-07-19 RX ORDER — ONDANSETRON 4 MG/1
4 TABLET, FILM COATED ORAL EVERY 6 HOURS PRN
Status: DISCONTINUED | OUTPATIENT
Start: 2022-07-19 | End: 2022-07-20 | Stop reason: HOSPADM

## 2022-07-19 RX ORDER — ROSUVASTATIN CALCIUM 10 MG/1
10 TABLET, COATED ORAL NIGHTLY
Status: DISCONTINUED | OUTPATIENT
Start: 2022-07-19 | End: 2022-07-20 | Stop reason: HOSPADM

## 2022-07-19 RX ORDER — MEPERIDINE HYDROCHLORIDE 25 MG/ML
12.5 INJECTION INTRAMUSCULAR; INTRAVENOUS; SUBCUTANEOUS
Status: DISCONTINUED | OUTPATIENT
Start: 2022-07-19 | End: 2022-07-19 | Stop reason: HOSPADM

## 2022-07-19 RX ORDER — FENTANYL CITRATE 50 UG/ML
50 INJECTION, SOLUTION INTRAMUSCULAR; INTRAVENOUS
Status: DISCONTINUED | OUTPATIENT
Start: 2022-07-19 | End: 2022-07-19 | Stop reason: HOSPADM

## 2022-07-19 RX ORDER — SODIUM CHLORIDE, SODIUM LACTATE, POTASSIUM CHLORIDE, CALCIUM CHLORIDE 600; 310; 30; 20 MG/100ML; MG/100ML; MG/100ML; MG/100ML
125 INJECTION, SOLUTION INTRAVENOUS ONCE
Status: COMPLETED | OUTPATIENT
Start: 2022-07-19 | End: 2022-07-19

## 2022-07-19 RX ORDER — FENTANYL CITRATE 50 UG/ML
INJECTION, SOLUTION INTRAMUSCULAR; INTRAVENOUS AS NEEDED
Status: DISCONTINUED | OUTPATIENT
Start: 2022-07-19 | End: 2022-07-19 | Stop reason: SURG

## 2022-07-19 RX ORDER — NALOXONE HCL 0.4 MG/ML
0.4 VIAL (ML) INJECTION
Status: DISCONTINUED | OUTPATIENT
Start: 2022-07-19 | End: 2022-07-20 | Stop reason: HOSPADM

## 2022-07-19 RX ORDER — FAMOTIDINE 10 MG/ML
INJECTION, SOLUTION INTRAVENOUS AS NEEDED
Status: DISCONTINUED | OUTPATIENT
Start: 2022-07-19 | End: 2022-07-19 | Stop reason: SURG

## 2022-07-19 RX ORDER — GLYCOPYRROLATE 0.2 MG/ML
INJECTION INTRAMUSCULAR; INTRAVENOUS AS NEEDED
Status: DISCONTINUED | OUTPATIENT
Start: 2022-07-19 | End: 2022-07-19 | Stop reason: SURG

## 2022-07-19 RX ORDER — SODIUM CHLORIDE 0.9 % (FLUSH) 0.9 %
10 SYRINGE (ML) INJECTION EVERY 12 HOURS SCHEDULED
Status: DISCONTINUED | OUTPATIENT
Start: 2022-07-19 | End: 2022-07-19 | Stop reason: HOSPADM

## 2022-07-19 RX ORDER — HYDROCODONE BITARTRATE AND ACETAMINOPHEN 7.5; 325 MG/1; MG/1
1 TABLET ORAL EVERY 12 HOURS PRN
COMMUNITY
End: 2022-07-20 | Stop reason: HOSPADM

## 2022-07-19 RX ORDER — MAGNESIUM HYDROXIDE 1200 MG/15ML
LIQUID ORAL AS NEEDED
Status: DISCONTINUED | OUTPATIENT
Start: 2022-07-19 | End: 2022-07-19 | Stop reason: HOSPADM

## 2022-07-19 RX ORDER — GLIPIZIDE 5 MG/1
10 TABLET ORAL
Status: DISCONTINUED | OUTPATIENT
Start: 2022-07-20 | End: 2022-07-20 | Stop reason: HOSPADM

## 2022-07-19 RX ORDER — ONDANSETRON 2 MG/ML
4 INJECTION INTRAMUSCULAR; INTRAVENOUS AS NEEDED
Status: DISCONTINUED | OUTPATIENT
Start: 2022-07-19 | End: 2022-07-19 | Stop reason: HOSPADM

## 2022-07-19 RX ORDER — GLIPIZIDE 5 MG/1
10 TABLET ORAL
Status: CANCELLED | OUTPATIENT
Start: 2022-07-20

## 2022-07-19 RX ORDER — MIDAZOLAM HYDROCHLORIDE 1 MG/ML
1 INJECTION INTRAMUSCULAR; INTRAVENOUS
Status: DISCONTINUED | OUTPATIENT
Start: 2022-07-19 | End: 2022-07-19 | Stop reason: HOSPADM

## 2022-07-19 RX ORDER — ONDANSETRON 2 MG/ML
INJECTION INTRAMUSCULAR; INTRAVENOUS AS NEEDED
Status: DISCONTINUED | OUTPATIENT
Start: 2022-07-19 | End: 2022-07-19 | Stop reason: SURG

## 2022-07-19 RX ORDER — ASPIRIN 81 MG/1
81 TABLET ORAL DAILY
Status: CANCELLED | OUTPATIENT
Start: 2022-07-19

## 2022-07-19 RX ORDER — ROCURONIUM BROMIDE 10 MG/ML
INJECTION, SOLUTION INTRAVENOUS AS NEEDED
Status: DISCONTINUED | OUTPATIENT
Start: 2022-07-19 | End: 2022-07-19 | Stop reason: SURG

## 2022-07-19 RX ORDER — SODIUM CHLORIDE, SODIUM LACTATE, POTASSIUM CHLORIDE, CALCIUM CHLORIDE 600; 310; 30; 20 MG/100ML; MG/100ML; MG/100ML; MG/100ML
100 INJECTION, SOLUTION INTRAVENOUS CONTINUOUS
Status: DISCONTINUED | OUTPATIENT
Start: 2022-07-19 | End: 2022-07-20 | Stop reason: HOSPADM

## 2022-07-19 RX ORDER — LISINOPRIL 10 MG/1
10 TABLET ORAL DAILY
Status: DISCONTINUED | OUTPATIENT
Start: 2022-07-19 | End: 2022-07-20 | Stop reason: HOSPADM

## 2022-07-19 RX ORDER — ROPIVACAINE HYDROCHLORIDE 5 MG/ML
INJECTION, SOLUTION EPIDURAL; INFILTRATION; PERINEURAL
Status: COMPLETED | OUTPATIENT
Start: 2022-07-19 | End: 2022-07-19

## 2022-07-19 RX ORDER — METOPROLOL SUCCINATE 25 MG/1
25 TABLET, EXTENDED RELEASE ORAL DAILY
Status: DISCONTINUED | OUTPATIENT
Start: 2022-07-20 | End: 2022-07-20 | Stop reason: HOSPADM

## 2022-07-19 RX ORDER — SODIUM CHLORIDE 0.9 % (FLUSH) 0.9 %
10 SYRINGE (ML) INJECTION EVERY 12 HOURS SCHEDULED
Status: DISCONTINUED | OUTPATIENT
Start: 2022-07-19 | End: 2022-07-20 | Stop reason: HOSPADM

## 2022-07-19 RX ORDER — NEOSTIGMINE METHYLSULFATE 1 MG/ML
INJECTION, SOLUTION INTRAVENOUS AS NEEDED
Status: DISCONTINUED | OUTPATIENT
Start: 2022-07-19 | End: 2022-07-19 | Stop reason: SURG

## 2022-07-19 RX ADMIN — ONDANSETRON 4 MG: 2 INJECTION INTRAMUSCULAR; INTRAVENOUS at 11:02

## 2022-07-19 RX ADMIN — EMPAGLIFLOZIN 25 MG: 25 TABLET, FILM COATED ORAL at 20:39

## 2022-07-19 RX ADMIN — Medication 10 ML: at 20:38

## 2022-07-19 RX ADMIN — ROPIVACAINE HYDROCHLORIDE 20 MG: 5 INJECTION, SOLUTION EPIDURAL; INFILTRATION; PERINEURAL at 10:53

## 2022-07-19 RX ADMIN — MIDAZOLAM 2 MG: 1 INJECTION INTRAMUSCULAR; INTRAVENOUS at 09:57

## 2022-07-19 RX ADMIN — FAMOTIDINE 20 MG: 10 INJECTION INTRAVENOUS at 11:02

## 2022-07-19 RX ADMIN — FENTANYL CITRATE 100 MCG: 50 INJECTION INTRAMUSCULAR; INTRAVENOUS at 09:58

## 2022-07-19 RX ADMIN — PROPOFOL 180 MG: 10 INJECTION, EMULSION INTRAVENOUS at 11:07

## 2022-07-19 RX ADMIN — SODIUM CHLORIDE, POTASSIUM CHLORIDE, SODIUM LACTATE AND CALCIUM CHLORIDE 100 ML/HR: 600; 310; 30; 20 INJECTION, SOLUTION INTRAVENOUS at 17:21

## 2022-07-19 RX ADMIN — CEFAZOLIN 2 G: 2 INJECTION, POWDER, FOR SOLUTION INTRAMUSCULAR; INTRAVENOUS at 11:02

## 2022-07-19 RX ADMIN — GLYCOPYRROLATE 0.4 MG: 0.2 INJECTION, SOLUTION INTRAMUSCULAR; INTRAVENOUS at 13:20

## 2022-07-19 RX ADMIN — ROSUVASTATIN CALCIUM 10 MG: 10 TABLET, FILM COATED ORAL at 20:39

## 2022-07-19 RX ADMIN — OXYCODONE HYDROCHLORIDE AND ACETAMINOPHEN 1 TABLET: 5; 325 TABLET ORAL at 23:05

## 2022-07-19 RX ADMIN — OXYCODONE HYDROCHLORIDE AND ACETAMINOPHEN 1 TABLET: 5; 325 TABLET ORAL at 14:06

## 2022-07-19 RX ADMIN — ONDANSETRON 4 MG: 4 TABLET ORAL at 17:38

## 2022-07-19 RX ADMIN — SODIUM CHLORIDE, POTASSIUM CHLORIDE, SODIUM LACTATE AND CALCIUM CHLORIDE: 600; 310; 30; 20 INJECTION, SOLUTION INTRAVENOUS at 12:33

## 2022-07-19 RX ADMIN — LISINOPRIL 10 MG: 10 TABLET ORAL at 20:38

## 2022-07-19 RX ADMIN — OXYCODONE HYDROCHLORIDE AND ACETAMINOPHEN 1 TABLET: 5; 325 TABLET ORAL at 17:24

## 2022-07-19 RX ADMIN — NEOSTIGMINE 3 MG: 1 INJECTION INTRAVENOUS at 13:20

## 2022-07-19 RX ADMIN — SODIUM CHLORIDE, POTASSIUM CHLORIDE, SODIUM LACTATE AND CALCIUM CHLORIDE: 600; 310; 30; 20 INJECTION, SOLUTION INTRAVENOUS at 10:42

## 2022-07-19 RX ADMIN — ROCURONIUM BROMIDE 30 MG: 10 SOLUTION INTRAVENOUS at 11:07

## 2022-07-19 NOTE — ANESTHESIA PROCEDURE NOTES
Airway  Urgency: elective    Date/Time: 7/19/2022 11:08 AM  End Time:7/19/2022 11:08 AM  Airway not difficult    General Information and Staff    Patient location during procedure: OR  CRNA/CAA: Mo Burciaga CRNA    Indications and Patient Condition  Indications for airway management: airway protection    Preoxygenated: yes  MILS maintained throughout  Mask difficulty assessment: 0 - not attempted    Final Airway Details  Final airway type: endotracheal airway      Successful airway: ETT  Cuffed: yes   Successful intubation technique: direct laryngoscopy  Endotracheal tube insertion site: oral  Blade: Judy  Blade size: 4  ETT size (mm): 7.5  Cormack-Lehane Classification: grade IIa - partial view of glottis  Placement verified by: chest auscultation, capnometry and palpation of cuff   Cuff volume (mL): 8  Measured from: lips  ETT/EBT  to lips (cm): 23  Number of attempts at approach: 1  Assessment: lips, teeth, and gum same as pre-op and atraumatic intubation

## 2022-07-19 NOTE — ANESTHESIA POSTPROCEDURE EVALUATION
Patient: Valentin Vásquez    Procedure Summary     Date: 07/19/22 Room / Location: [facility] OR  /  [facility] OR    Anesthesia Start: 1102 Anesthesia Stop: 1335    Procedure: RIGHT SHOULDER ARTHROSCOPY WITH OPEN ACROMIOPLASTY,  ROTATOR CUFF REPAIR, EXCISION LATERAL CLAVICLE (Right Shoulder) Diagnosis:       Nontraumatic incomplete tear of left rotator cuff      (Nontraumatic incomplete tear of left rotator cuff [M75.112])    Surgeons: Edmundo Huitron MD Provider: Hill Fowler MD    Anesthesia Type: general with block ASA Status: 2          Anesthesia Type: general with block    Vitals  Vitals Value Taken Time   /88 07/19/22 1406   Temp 97.4 °F (36.3 °C) 07/19/22 1406   Pulse 87 07/19/22 1406   Resp 16 07/19/22 1406   SpO2 96 % 07/19/22 1406           Post Anesthesia Care and Evaluation    Patient location during evaluation: PHASE II  Patient participation: complete - patient participated  Level of consciousness: awake and alert  Pain score: 1  Pain management: adequate    Airway patency: patent  Anesthetic complications: No anesthetic complications  PONV Status: controlled  Cardiovascular status: acceptable  Respiratory status: acceptable  Hydration status: acceptable

## 2022-07-19 NOTE — ANESTHESIA PROCEDURE NOTES
Peripheral Block    Pre-sedation assessment completed: 7/19/2022 9:50 AM    Patient reassessed immediately prior to procedure    Start time: 7/19/2022 9:50 AM  Stop time: 7/19/2022 9:55 AM  Reason for block: at surgeon's request and post-op pain management  Performed by  Anesthesiologist: Hill Fowler MD  Preanesthetic Checklist  Completed: patient identified, IV checked, site marked, risks and benefits discussed, surgical consent, monitors and equipment checked, pre-op evaluation and timeout performed  Prep:  Pt Position: supine  Sterile barriers:cap, gloves and sterile barriers  Prep: ChloraPrep  Patient monitoring: blood pressure monitoring, continuous pulse oximetry and EKG  Procedure    Sedation: yes  Performed under: MAC  Guidance:nerve stimulator and landmark technique  Images:still images not obtained  Loss of twitch: 0.5 mA  Laterality:right  Block Type:supraclavicular  Injection Technique:single-shotNeedle Gauge:20 G  Resistance on Injection: less than 15 psi  Catheter Size:20 G    Medications Used: ropivacaine (NAROPIN) injection 0.5 %, 20 mg      Post Assessment  Injection Assessment: negative aspiration for heme, no paresthesia on injection and incremental injection  Patient Tolerance:comfortable throughout block  Complications:no

## 2022-07-19 NOTE — ANESTHESIA PREPROCEDURE EVALUATION
Anesthesia Evaluation     no history of anesthetic complications:  NPO Solid Status: > 8 hours  NPO Liquid Status: > 8 hours           Airway   Mallampati: II  TM distance: >3 FB  Neck ROM: full  No difficulty expected  Dental    (+) poor dentition        Pulmonary - normal exam   Cardiovascular - normal exam    (+) hypertension, hyperlipidemia,       Neuro/Psych  GI/Hepatic/Renal/Endo    (+)  GERD,  renal disease stones, diabetes mellitus,     Musculoskeletal     Abdominal  - normal exam    Bowel sounds: normal.   Substance History      OB/GYN          Other                      Anesthesia Plan    ASA 2     general with block     intravenous induction     Anesthetic plan, risks, benefits, and alternatives have been provided, discussed and informed consent has been obtained with: patient.        CODE STATUS:

## 2022-07-20 VITALS
OXYGEN SATURATION: 94 % | HEIGHT: 67 IN | SYSTOLIC BLOOD PRESSURE: 115 MMHG | DIASTOLIC BLOOD PRESSURE: 70 MMHG | TEMPERATURE: 98.5 F | WEIGHT: 184.2 LBS | HEART RATE: 104 BPM | BODY MASS INDEX: 28.91 KG/M2 | RESPIRATION RATE: 20 BRPM

## 2022-07-20 PROBLEM — M75.112 NONTRAUMATIC INCOMPLETE TEAR OF LEFT ROTATOR CUFF: Status: RESOLVED | Noted: 2022-07-14 | Resolved: 2022-07-20

## 2022-07-20 LAB
GLUCOSE BLDC GLUCOMTR-MCNC: 149 MG/DL (ref 70–130)
GLUCOSE BLDC GLUCOMTR-MCNC: 77 MG/DL (ref 70–130)

## 2022-07-20 PROCEDURE — 99024 POSTOP FOLLOW-UP VISIT: CPT | Performed by: ORTHOPAEDIC SURGERY

## 2022-07-20 PROCEDURE — G0378 HOSPITAL OBSERVATION PER HR: HCPCS

## 2022-07-20 PROCEDURE — 82962 GLUCOSE BLOOD TEST: CPT

## 2022-07-20 PROCEDURE — 25010000002 HYDROMORPHONE 1 MG/ML SOLUTION: Performed by: ORTHOPAEDIC SURGERY

## 2022-07-20 RX ORDER — ASPIRIN 81 MG/1
81 TABLET ORAL DAILY
Status: DISCONTINUED | OUTPATIENT
Start: 2022-07-20 | End: 2022-07-20 | Stop reason: HOSPADM

## 2022-07-20 RX ORDER — NICOTINE POLACRILEX 4 MG
15 LOZENGE BUCCAL
Status: DISCONTINUED | OUTPATIENT
Start: 2022-07-20 | End: 2022-07-20 | Stop reason: HOSPADM

## 2022-07-20 RX ORDER — OXYCODONE HYDROCHLORIDE AND ACETAMINOPHEN 5; 325 MG/1; MG/1
1 TABLET ORAL EVERY 4 HOURS PRN
Qty: 24 TABLET | Refills: 0 | Status: SHIPPED | OUTPATIENT
Start: 2022-07-20 | End: 2022-07-26

## 2022-07-20 RX ORDER — DEXTROSE MONOHYDRATE 25 G/50ML
25 INJECTION, SOLUTION INTRAVENOUS
Status: DISCONTINUED | OUTPATIENT
Start: 2022-07-20 | End: 2022-07-20 | Stop reason: HOSPADM

## 2022-07-20 RX ADMIN — METFORMIN HYDROCHLORIDE 500 MG: 500 TABLET ORAL at 08:05

## 2022-07-20 RX ADMIN — METOPROLOL SUCCINATE 25 MG: 25 TABLET, EXTENDED RELEASE ORAL at 08:05

## 2022-07-20 RX ADMIN — HYDROMORPHONE HYDROCHLORIDE 1 MG: 1 INJECTION, SOLUTION INTRAMUSCULAR; INTRAVENOUS; SUBCUTANEOUS at 10:22

## 2022-07-20 RX ADMIN — HYDROMORPHONE HYDROCHLORIDE 1 MG: 1 INJECTION, SOLUTION INTRAMUSCULAR; INTRAVENOUS; SUBCUTANEOUS at 04:58

## 2022-07-20 RX ADMIN — EMPAGLIFLOZIN 25 MG: 25 TABLET, FILM COATED ORAL at 08:05

## 2022-07-20 RX ADMIN — Medication 10 ML: at 08:05

## 2022-07-20 RX ADMIN — GLIPIZIDE 10 MG: 5 TABLET ORAL at 08:05

## 2022-07-20 RX ADMIN — SODIUM CHLORIDE, POTASSIUM CHLORIDE, SODIUM LACTATE AND CALCIUM CHLORIDE 100 ML/HR: 600; 310; 30; 20 INJECTION, SOLUTION INTRAVENOUS at 03:37

## 2022-07-20 RX ADMIN — OXYCODONE HYDROCHLORIDE AND ACETAMINOPHEN 1 TABLET: 5; 325 TABLET ORAL at 08:11

## 2022-07-20 RX ADMIN — LISINOPRIL 10 MG: 10 TABLET ORAL at 08:05

## 2022-07-20 RX ADMIN — PANTOPRAZOLE SODIUM 40 MG: 40 TABLET, DELAYED RELEASE ORAL at 06:12

## 2022-07-20 RX ADMIN — OXYCODONE HYDROCHLORIDE AND ACETAMINOPHEN 1 TABLET: 5; 325 TABLET ORAL at 03:37

## 2022-07-20 RX ADMIN — OXYCODONE HYDROCHLORIDE AND ACETAMINOPHEN 1 TABLET: 5; 325 TABLET ORAL at 13:54

## 2022-07-25 ENCOUNTER — OFFICE VISIT (OUTPATIENT)
Dept: ORTHOPEDIC SURGERY | Facility: CLINIC | Age: 57
End: 2022-07-25

## 2022-07-25 VITALS — HEIGHT: 67 IN | WEIGHT: 184.3 LBS | BODY MASS INDEX: 28.93 KG/M2

## 2022-07-25 DIAGNOSIS — Z98.890 S/P RIGHT ROTATOR CUFF REPAIR: ICD-10-CM

## 2022-07-25 DIAGNOSIS — Z09 POSTOP CHECK: Primary | ICD-10-CM

## 2022-07-25 DIAGNOSIS — M75.112 NONTRAUMATIC INCOMPLETE TEAR OF LEFT ROTATOR CUFF: ICD-10-CM

## 2022-07-25 PROCEDURE — 99024 POSTOP FOLLOW-UP VISIT: CPT | Performed by: ORTHOPAEDIC SURGERY

## 2022-07-25 RX ORDER — HYDROCODONE BITARTRATE AND ACETAMINOPHEN 7.5; 325 MG/1; MG/1
1 TABLET ORAL EVERY 6 HOURS PRN
Qty: 30 TABLET | Refills: 0 | Status: SHIPPED | OUTPATIENT
Start: 2022-07-25

## 2022-07-25 RX ORDER — HYDROCODONE BITARTRATE AND ACETAMINOPHEN 7.5; 325 MG/1; MG/1
1 TABLET ORAL EVERY 6 HOURS PRN
Qty: 30 TABLET | Refills: 0 | Status: SHIPPED | OUTPATIENT
Start: 2022-07-25 | End: 2022-07-25

## 2022-07-25 NOTE — PROGRESS NOTES
Patient: Valentin Vásquez  YOB: 1965  Date of Encounter: 07/25/2022      Chief Complaint:   Chief Complaint   Patient presents with   • Right Shoulder - Post-op     07/19/22 (6d) Edmundo Huitron MD  Right Shoulder Arthroscopy With Open Acromioplasty,  Rotator Cuff Repair, Excision Lateral Clavicle - Right                HPI:  Valentin Vásquez, 57 y.o. male returns in postoperative follow-up right shoulder arthroscopy with open Tatar cuff pair acromioplasty excision lateral clavicle he is now 6 days postop.  Pain is moderate but improving.      Medical History:  Patient Active Problem List   Diagnosis   • Acute pancreatitis     Past Medical History:   Diagnosis Date   • Arthritis    • Diabetes mellitus (HCC)    • Elevated cholesterol    • GERD (gastroesophageal reflux disease)    • Gout    • Hyperlipidemia    • Hypertension    • Injury of back    • Kidney stone    • Pancreatitis    • Right shoulder pain    • Tear of right rotator cuff          Surgical History:  Past Surgical History:   Procedure Laterality Date   • ADENOIDECTOMY     • KIDNEY STONE SURGERY     • SHOULDER ARTHROSCOPY W/ ROTATOR CUFF REPAIR Right 7/19/2022    Procedure: RIGHT SHOULDER ARTHROSCOPY WITH OPEN ACROMIOPLASTY,  ROTATOR CUFF REPAIR, EXCISION LATERAL CLAVICLE;  Surgeon: Edmundo Huitron MD;  Location: [facility];  Service: Orthopedics;  Laterality: Right;   • TONSILLECTOMY           Examination:  Examination right shoulder reveals intact incision anteriorly and arthroscopy portal posteriorly intact.  No surrounding erythema.        Assessment & Plan:  57 y.o. male presents in follow-up open rotator cuff repair acromioplasty excision lateral clavicle right shoulder.  He is referred to physical therapy per protocol with scheduled follow-up in 8 weeks.  He is provided Norco 7.5 mg #30.       Diagnosis Plan   1. Postop check  Ambulatory Referral to Physical Therapy Evaluate and treat (Right shoulder rotator cuff repain,  excision lateral clavicle 07/19/2022), POST OP; ROM (with active), Strengthening; 2; 2; 4   2. Nontraumatic incomplete tear of left rotator cuff  Ambulatory Referral to Physical Therapy Evaluate and treat (Right shoulder rotator cuff repain, excision lateral clavicle 07/19/2022), POST OP; ROM (with active), Strengthening; 2; 2; 4   3. S/P right rotator cuff repair  Ambulatory Referral to Physical Therapy Evaluate and treat (Right shoulder rotator cuff repain, excision lateral clavicle 07/19/2022), POST OP; ROM (with active), Strengthening; 2; 2; 4    HYDROcodone-acetaminophen (NORCO) 7.5-325 MG per tablet       Procedures      Cc:  Tristen Stuart, DO              This document has been electronically signed by Edmundo Huitron MD   July 29, 2022 10:11 EDT

## 2022-08-01 ENCOUNTER — TREATMENT (OUTPATIENT)
Dept: PHYSICAL THERAPY | Facility: CLINIC | Age: 57
End: 2022-08-01

## 2022-08-01 DIAGNOSIS — M25.619 LIMITED RANGE OF MOTION (ROM) OF SHOULDER: ICD-10-CM

## 2022-08-01 DIAGNOSIS — M75.111 NONTRAUMATIC INCOMPLETE TEAR OF ROTATOR CUFF, RIGHT: ICD-10-CM

## 2022-08-01 DIAGNOSIS — Z98.890 S/P RIGHT ROTATOR CUFF REPAIR: Primary | ICD-10-CM

## 2022-08-01 DIAGNOSIS — R53.1 DECREASED STRENGTH: ICD-10-CM

## 2022-08-01 PROCEDURE — 97014 ELECTRIC STIMULATION THERAPY: CPT | Performed by: PHYSICAL THERAPIST

## 2022-08-01 PROCEDURE — 97110 THERAPEUTIC EXERCISES: CPT | Performed by: PHYSICAL THERAPIST

## 2022-08-01 PROCEDURE — 97162 PT EVAL MOD COMPLEX 30 MIN: CPT | Performed by: PHYSICAL THERAPIST

## 2022-08-01 NOTE — PROGRESS NOTES
Physical Therapy Initial Evaluation and Plan of Care    Patient: Valentin Vásquez   : 1965  Diagnosis/ICD-10 Code:  S/P right rotator cuff repair [Z98.890]  Referring practitioner: Edmundo Huitron, *  Date of Initial Visit: 2022  Today's Date: 2022  Patient seen for 1 session         Visit Diagnoses:    ICD-10-CM ICD-9-CM   1. S/P right rotator cuff repair  Z98.890 V45.89   2. Nontraumatic incomplete tear of rotator cuff, right  M75.111 726.13   3. Limited range of motion (ROM) of shoulder  M25.619 719.51   4. Decreased strength  R53.1 780.79         Subjective Evaluation    History of Present Illness  Date of surgery: 2022  Mechanism of injury: Patient arrives to therapy with his wife.  Patient reports that he underwent a right rotator cuff repair on 2022.  Patient continues to wear a sling on the right shoulder.  He was instructed to not move his arm currently and do PROM only at this time.  Patient states that his arm has been hurting some and he finds it difficult to get comfortable at night.      Patient Occupation: Component Rebuilder (currently off work) Pain  Current pain rating: 3  At best pain rating: 3  At worst pain ratin  Location: R) shoulder  Quality: dull ache  Relieving factors: change in position, ice, medications and rest  Aggravating factors: movement and sleeping    Hand dominance: right    Patient Goals  Patient goals for therapy: decreased pain, increased motion, increased strength and independence with ADLs/IADLs             Objective          Palpation     Right   Muscle spasm in the levator scapulae and upper trapezius. Tenderness of the biceps and supraspinatus.     Tenderness     Right Shoulder  Tenderness in the AC joint.     Active Range of Motion   Left Shoulder   Flexion: 165 degrees   Abduction: 160 degrees     Passive Range of Motion     Right Shoulder   Flexion: 40 degrees with pain  Abduction: 36 degrees with pain    Strength/Myotome Testing      Left Shoulder     Planes of Motion   Flexion: 4+   Abduction: 4+   External rotation at 0°: 4+   Internal rotation at 0°: 4+     Left Elbow   Flexion: 4+  Extension: 4+    Left Wrist/Hand      (2nd hand position)     Trial 1: 68 lbs    Trial 2: 70 lbs    Trial 3: 68 lbs    Average: 68.67 lbs    Right Wrist/Hand      (2nd hand position)     Trial 1: 40 lbs    Trial 2: 45 lbs    Trial 3: 43 lbs    Average: 42.67 lbs    Additional Strength Details  R) UE strength not assessed due to recent surgery/diagnosis.          Assessment & Plan     Assessment  Impairments: abnormal or restricted ROM, activity intolerance, impaired physical strength, lacks appropriate home exercise program and pain with function  Functional Limitations: lifting, sleeping, pulling, pushing, uncomfortable because of pain, moving in bed, reaching behind back, reaching overhead and unable to perform repetitive tasks  Assessment details: Patient is a 57 year old male who comes to physical therapy for rehabilitation s/p a right rotator cuff repair. The patient presents with increased pain, decreased right shoulder ROM, and decreased right UE strength. Patient will benefit from skilled PT, so that patient can achieve maximum level of function.     Prognosis: good    Goals  Plan Goals: SHORT TERM GOALS:     4 weeks  1. Patient will be independent/compliant with HEP.  2. Patient to demonstrate PROM of the right shoulder to at least 150 degrees flexion and 150 degrees abduction to improve ability to perform ADL's.  3. Patient will report pain no greater than 5/10 when performing self-care activities with minimal modifications.  4. Right  strength will improve to at least 60# to allow for greater ease with opening jars.    LONG TERM GOALS:   12 weeks  1. Patient to demonstrate AROM of the right shoulder to at least 150 degrees flexion/abduction and 60 degrees ER/IR to allow ability to perform all necessary functional activities.  2. Patient  to report no more than 50% impairment on the Quick Dash for improved functional independence.  3. right UE strength will improve to at least 4/5 to prevent reinjury.  4. Patient will report worst pain no greater than 3/10 for improved quality of life.      Plan  Therapy options: will be seen for skilled therapy services  Planned modality interventions: cryotherapy, electrical stimulation/Russian stimulation, TENS, thermotherapy (hydrocollator packs) and ultrasound  Planned therapy interventions: manual therapy, ADL retraining, neuromuscular re-education, postural training, body mechanics training, soft tissue mobilization, flexibility, fine motor coordination training, functional ROM exercises, strengthening, spinal/joint mobilization, stretching, home exercise program, therapeutic activities, transfer training, IADL retraining and joint mobilization  Frequency: 3x week  Duration in weeks: 12  Treatment plan discussed with: patient and family  Plan details: Moderate Evaluation  95847  Re-evaluation   78652    Therapeutic exercise  04080  Therapeutic activity    94509  Neuromuscular re-education   96925  Manual therapy   78219    Unattended e-stim (Private)  49856  Moist heat/cryotherapy 21325   Ultrasound   97610            History # of Personal Factors and/or Comorbidities: MODERATE (1-2)  Examination of Body System(s): # of elements: MODERATE (3)  Clinical Presentation: STABLE   Clinical Decision Making: MODERATE      Timed:         Manual Therapy:         mins  24308;     Therapeutic Exercise:    12     mins  81088;     Neuromuscular Amanda:        mins  67084;    Therapeutic Activity:          mins  64074;     Gait Training:           mins  21486;     Ultrasound:          mins  94680;    Ionto                                   mins   80386  Self Care                            mins   19588  Canalith Repos         mins 04527      Un-Timed:  Electrical Stimulation:   10      mins  94607 ( );  Dry Needling           mins self-pay  Traction          mins 23722  Low Eval          Mins  93046  Mod Eval     35     Mins  71225  High Eval                            Mins  61478        Timed Treatment:   12   mins   Total Treatment:     57   mins          PT: Mariya Blum, PT     License Number: [id number]  Electronically signed by Mariya Blum, PT, 08/01/22, 1:05 PM EDT    Certification Period: 8/1/2022 thru 10/29/2022  I certify that the therapy services are furnished while this patient is under my care.  The services outlined above are required by this patient, and will be reviewed every 90 days.         Physician Signature:__________________________________________________    PHYSICIAN: Edmundo Huitron MD  NPI: [id number]                                      DATE:      Please sign and return via fax to [email or web address] . Thank you, [facility].

## 2022-08-04 ENCOUNTER — TREATMENT (OUTPATIENT)
Dept: PHYSICAL THERAPY | Facility: CLINIC | Age: 57
End: 2022-08-04

## 2022-08-04 DIAGNOSIS — R53.1 DECREASED STRENGTH: ICD-10-CM

## 2022-08-04 DIAGNOSIS — M75.111 NONTRAUMATIC INCOMPLETE TEAR OF ROTATOR CUFF, RIGHT: ICD-10-CM

## 2022-08-04 DIAGNOSIS — M25.619 LIMITED RANGE OF MOTION (ROM) OF SHOULDER: ICD-10-CM

## 2022-08-04 DIAGNOSIS — Z98.890 S/P RIGHT ROTATOR CUFF REPAIR: Primary | ICD-10-CM

## 2022-08-04 PROCEDURE — 97014 ELECTRIC STIMULATION THERAPY: CPT | Performed by: PHYSICAL THERAPIST

## 2022-08-04 PROCEDURE — 97110 THERAPEUTIC EXERCISES: CPT | Performed by: PHYSICAL THERAPIST

## 2022-08-04 NOTE — PROGRESS NOTES
Physical Therapy Daily Treatment Note      Patient: Valentin Vásquez   : 1965  Referring practitioner: Edmundo Huitron, *  Date of Initial Visit: Type: THERAPY  Noted: 2022  Today's Date: 2022  Patient seen for 2 sessions       Visit Diagnoses:    ICD-10-CM ICD-9-CM   1. S/P right rotator cuff repair  Z98.890 V45.89   2. Nontraumatic incomplete tear of rotator cuff, right  M75.111 726.13   3. Limited range of motion (ROM) of shoulder  M25.619 719.51   4. Decreased strength  R53.1 780.79       Subjective Evaluation    History of Present Illness    Subjective comment: Pt reports having 2/10 pain today.       Objective   See Exercise, Manual, and Modality Logs for complete treatment.       Assessment & Plan     Assessment    Assessment details: Tx today consisted of conservative exercises for improved postural and elbow function per protocol; followed by prom to shoulder and ended with ice and estim.  Pt responded well to stretching with improved shld ROM.  Pt reported 2/10 post pain today.    Plan  Plan details: Will follow progressing per protocol.          Timed:         Manual Therapy:         mins  18922;     Therapeutic Exercise:    27     mins  28006;     Neuromuscular Amanda:        mins  92400;    Therapeutic Activity:          mins  36708;     Gait Training:           mins  15380;     Ultrasound:          mins  29260;    Ionto                                   mins   75370  Self Care                            mins   49142  Canalith Repos         mins 70680      Un-Timed:  Electrical Stimulation:    10     mins  98739 ( );  Dry Needling          mins self-pay  Traction          mins 54966      Timed Treatment:   27   mins   Total Treatment:     37   mins    Gurmeet Landeros PT  [place] License: [id number]      Electronically signed by Gurmeet Landeros PT, 22, 2:00 PM EDT

## 2022-08-05 ENCOUNTER — TREATMENT (OUTPATIENT)
Dept: PHYSICAL THERAPY | Facility: CLINIC | Age: 57
End: 2022-08-05

## 2022-08-05 DIAGNOSIS — M75.111 NONTRAUMATIC INCOMPLETE TEAR OF ROTATOR CUFF, RIGHT: ICD-10-CM

## 2022-08-05 DIAGNOSIS — R53.1 DECREASED STRENGTH: ICD-10-CM

## 2022-08-05 DIAGNOSIS — M25.619 LIMITED RANGE OF MOTION (ROM) OF SHOULDER: ICD-10-CM

## 2022-08-05 DIAGNOSIS — Z98.890 S/P RIGHT ROTATOR CUFF REPAIR: Primary | ICD-10-CM

## 2022-08-05 PROCEDURE — 97014 ELECTRIC STIMULATION THERAPY: CPT | Performed by: PHYSICAL THERAPIST

## 2022-08-05 PROCEDURE — 97110 THERAPEUTIC EXERCISES: CPT | Performed by: PHYSICAL THERAPIST

## 2022-08-05 NOTE — PROGRESS NOTES
Physical Therapy Daily Treatment Note      Patient: Valentin Vásquez   : 1965  Referring practitioner: Edmundo Huitron, *  Date of Initial Visit: Type: THERAPY  Noted: 2022  Today's Date: 2022  Patient seen for 3 sessions       Visit Diagnoses:    ICD-10-CM ICD-9-CM   1. S/P right rotator cuff repair  Z98.890 V45.89   2. Nontraumatic incomplete tear of rotator cuff, right  M75.111 726.13   3. Limited range of motion (ROM) of shoulder  M25.619 719.51   4. Decreased strength  R53.1 780.79       Subjective:  Patient arrives to therapy today w/ spouse.  Pt reports 3/10 right shoulder pain prior to tx.  Pt states he tolerated last session well w/ good tolerance.      Objective   See Exercise, Manual, and Modality Logs for complete treatment.       Assessment/Plan:  Patient completed today's session w/ no complaints of pain increase noted following, 2-3/10.  Treatment initiated w/ therex as listed for improved right wrist/ elbow range of motion, improved postural awareness, and  per protocol f/b shoulder PROM and modalities following.  Pt provided w/ cues and demonstration during activities to maintain form, and for max benefit.  Pt required frequent cues during shoulder PROM for improved relaxation.  Cryotherapy w/ Estim applied at conclusion.  Pt educated on home program w/ written materials issued for shoulder pendulums.  Pt demonstrated good mechanics, and verbalized understanding.  No adverse reactions observed during, and/ or following tx.  Pt continues to  benefit from therapy services, and will be progressed as tolerated to address goals, reduce pain, and improve mobility. Continue w/ PT's POC.       Timed:         Manual Therapy:         mins  82374;     Therapeutic Exercise:    33     mins  30607;     Neuromuscular Amanda:        mins  47366;    Therapeutic Activity:          mins  24273;     Gait Training:           mins  32103;     Ultrasound:          mins  55727;    Ionto                                    mins   97455  Self Care                            mins   69844  Canalith Repos         mins 93086      Un-Timed:  Electrical Stimulation:    10     mins  47245 ( );  Dry Needling          mins self-pay  Traction          mins 55947      Timed Treatment:  33    mins   Total Treatment:    43    mins    Elvira Sewell. DELMY Julio  [place] License: [id number]

## 2022-08-08 ENCOUNTER — TREATMENT (OUTPATIENT)
Dept: PHYSICAL THERAPY | Facility: CLINIC | Age: 57
End: 2022-08-08

## 2022-08-08 DIAGNOSIS — R53.1 DECREASED STRENGTH: ICD-10-CM

## 2022-08-08 DIAGNOSIS — Z98.890 S/P RIGHT ROTATOR CUFF REPAIR: Primary | ICD-10-CM

## 2022-08-08 DIAGNOSIS — M25.619 LIMITED RANGE OF MOTION (ROM) OF SHOULDER: ICD-10-CM

## 2022-08-08 DIAGNOSIS — M75.111 NONTRAUMATIC INCOMPLETE TEAR OF ROTATOR CUFF, RIGHT: ICD-10-CM

## 2022-08-08 PROCEDURE — 97014 ELECTRIC STIMULATION THERAPY: CPT | Performed by: PHYSICAL THERAPIST

## 2022-08-08 PROCEDURE — 97110 THERAPEUTIC EXERCISES: CPT | Performed by: PHYSICAL THERAPIST

## 2022-08-08 NOTE — PROGRESS NOTES
Physical Therapy Daily Treatment Note      Patient: Valentin Vásquez   : 1965  Referring practitioner: Edmundo Huitron, *  Date of Initial Visit: Type: THERAPY  Noted: 2022  Today's Date: 2022  Patient seen for 4 sessions       Visit Diagnoses:    ICD-10-CM ICD-9-CM   1. S/P right rotator cuff repair  Z98.890 V45.89   2. Nontraumatic incomplete tear of rotator cuff, right  M75.111 726.13   3. Limited range of motion (ROM) of shoulder  M25.619 719.51   4. Decreased strength  R53.1 780.79       Subjective Evaluation    History of Present Illness    Subjective comment: Pt has 5/10 pain today.       Objective   See Exercise, Manual, and Modality Logs for complete treatment.       Assessment & Plan     Assessment    Assessment details: Tx today consisted of exercises for improved postural stability and conservative mobility per protocol; followed by PROM stretching and ended with ice and estim for decreased pain.  Pt demonstrated good effort today with noted cont tightness with shld flexion and scaption stretching. Pt reported 3/10 post pain.    Plan  Plan details: Will follow progressing per protocol.          Timed:         Manual Therapy:         mins  71982;     Therapeutic Exercise:    26     mins  11007;     Neuromuscular Amanda:        mins  29019;    Therapeutic Activity:          mins  47056;     Gait Training:           mins  02770;     Ultrasound:          mins  31338;    Ionto                                   mins   43000  Self Care                            mins   85651  Canalith Repos         mins 35626      Un-Timed:  Electrical Stimulation:    10     mins  56205 (MC );  Dry Needling          mins self-pay  Traction          mins 49795      Timed Treatment:   26   mins   Total Treatment:     36   mins    Gurmeet Landeros PT  [place] License: [id number]      Electronically signed by Gurmeet Landeros PT, 22, 10:11 AM EDT

## 2022-08-10 ENCOUNTER — TREATMENT (OUTPATIENT)
Dept: PHYSICAL THERAPY | Facility: CLINIC | Age: 57
End: 2022-08-10

## 2022-08-10 DIAGNOSIS — M75.111 NONTRAUMATIC INCOMPLETE TEAR OF ROTATOR CUFF, RIGHT: ICD-10-CM

## 2022-08-10 DIAGNOSIS — R53.1 DECREASED STRENGTH: ICD-10-CM

## 2022-08-10 DIAGNOSIS — M25.619 LIMITED RANGE OF MOTION (ROM) OF SHOULDER: ICD-10-CM

## 2022-08-10 DIAGNOSIS — Z98.890 S/P RIGHT ROTATOR CUFF REPAIR: Primary | ICD-10-CM

## 2022-08-10 PROCEDURE — 97110 THERAPEUTIC EXERCISES: CPT | Performed by: PHYSICAL THERAPIST

## 2022-08-10 PROCEDURE — 97014 ELECTRIC STIMULATION THERAPY: CPT | Performed by: PHYSICAL THERAPIST

## 2022-08-10 NOTE — PROGRESS NOTES
Physical Therapy Daily Treatment Note      Patient: Valentin Vásquez   : 1965  Referring practitioner: Edmundo Huitron, *  Date of Initial Visit: Type: THERAPY  Noted: 2022  Today's Date: 8/10/2022  Patient seen for 5 sessions       Visit Diagnoses:    ICD-10-CM ICD-9-CM   1. S/P right rotator cuff repair  Z98.890 V45.89   2. Nontraumatic incomplete tear of rotator cuff, right  M75.111 726.13   3. Limited range of motion (ROM) of shoulder  M25.619 719.51   4. Decreased strength  R53.1 780.79       Subjective:  Patient arrives to therapy w/ reports of 2/10 right shoulder pain.  Pt states he has been trying to do some of the exercises at home.  Pt reports he has been icing more, and it seems to be helping.     Objective   See Exercise, Manual, and Modality Logs for complete treatment.       Assessment/Plan:  Patient completed today's session w/ no changes in pain noted following, 2/10.  Treatment initiated w/ therex as listed for improved elbow/ wrist ROM, improved scapular awareness, and  f/b shoulder PROM and modalities following.  Pt was provided w/ cues and demonstration during exercise for form, and for max benefit.  Pt was able to relax more, and noted with improved tolerance to shoulder PROM today.  No signs of distress or adverse reactions observed during, and/ or following tx.  Pt continues to benefit from therapy services, and will be progressed as tolerated to address goals, reduce pain, and improve shoulder mobility; per protocol.  Continue w/ PT's POC.        Timed:         Manual Therapy:         mins  47914;     Therapeutic Exercise:   36      mins  56921;     Neuromuscular Amanda:        mins  67533;    Therapeutic Activity:          mins  04492;     Gait Training:           mins  89303;     Ultrasound:          mins  83313;    Ionto                                   mins   07834  Self Care                            mins   45073  Canalith Repos         mins  52540      Un-Timed:  Electrical Stimulation:    10     mins  38305 ( );  Dry Needling          mins self-pay  Traction          mins 90040      Timed Treatment:  36    mins   Total Treatment:   46    mins    Elvira Sewell. DELMY Julio  [place] License: [id number]

## 2022-08-12 ENCOUNTER — TREATMENT (OUTPATIENT)
Dept: PHYSICAL THERAPY | Facility: CLINIC | Age: 57
End: 2022-08-12

## 2022-08-12 DIAGNOSIS — M75.111 NONTRAUMATIC INCOMPLETE TEAR OF ROTATOR CUFF, RIGHT: ICD-10-CM

## 2022-08-12 DIAGNOSIS — M25.619 LIMITED RANGE OF MOTION (ROM) OF SHOULDER: ICD-10-CM

## 2022-08-12 DIAGNOSIS — R53.1 DECREASED STRENGTH: ICD-10-CM

## 2022-08-12 DIAGNOSIS — Z98.890 S/P RIGHT ROTATOR CUFF REPAIR: Primary | ICD-10-CM

## 2022-08-12 PROCEDURE — 97110 THERAPEUTIC EXERCISES: CPT | Performed by: PHYSICAL THERAPIST

## 2022-08-12 PROCEDURE — 97014 ELECTRIC STIMULATION THERAPY: CPT | Performed by: PHYSICAL THERAPIST

## 2022-08-12 NOTE — PROGRESS NOTES
Physical Therapy Daily Treatment Note      Patient: Valentin Vásquez   : 1965  Referring practitioner: Edmundo Huitron, *  Date of Initial Visit: Type: THERAPY  Noted: 2022  Today's Date: 2022  Patient seen for 6 sessions       Visit Diagnoses:    ICD-10-CM ICD-9-CM   1. S/P right rotator cuff repair  Z98.890 V45.89   2. Nontraumatic incomplete tear of rotator cuff, right  M75.111 726.13   3. Limited range of motion (ROM) of shoulder  M25.619 719.51   4. Decreased strength  R53.1 780.79       Subjective Evaluation    History of Present Illness    Subjective comment: Patient notes 3/10 pain today.  He reports no new complaints.Pain  Current pain rating: 3           Objective   See Exercise, Manual, and Modality Logs for complete treatment.       Assessment & Plan     Assessment    Assessment details: Patient tolerated today's session well, with patient displaying increased right shoulder PROM and reports of decreased pain post-tx.  Patient reported 3/10 pain pre-tx and 2/10 pain post-tx.  There ex performed per flow sheet, with exercises focusing on elbow/wrist ROM, scapular stabilization, stretching, and  strengthening.  PROM was performed in flexion and scaption, per patient's tolerance and MD protocol.  Patient displayed 87 degrees PROM flexion and 74 degrees PROM scaption.  He continues to display increased guarding during PROM, despite frequent cues.  Session was concluded with ice/ESTIM, with no adverse reactions following.  He will continue to be progressed per his tolerance and POC.          Timed:         Manual Therapy:         mins  12892;     Therapeutic Exercise:    32     mins  53065;     Neuromuscular Amanda:        mins  90060;    Therapeutic Activity:          mins  45138;     Gait Training:           mins  15185;     Ultrasound:          mins  13193;    Ionto                                   mins   14988  Self Care                            mins   80830  [facility]          mins 90613      Un-Timed:  Electrical Stimulation:    10     mins  11650 ( );  Dry Needling          mins self-pay  Traction          mins 72903      Timed Treatment:   32   mins   Total Treatment:     42   mins    Mariya Blum, JOSE  [place] License: [id number]  Electronically signed by Mariya Blum, JOSE, 08/12/22, 1:55 PM EDT.

## 2022-08-15 ENCOUNTER — TREATMENT (OUTPATIENT)
Dept: PHYSICAL THERAPY | Facility: CLINIC | Age: 57
End: 2022-08-15

## 2022-08-15 DIAGNOSIS — Z98.890 S/P RIGHT ROTATOR CUFF REPAIR: Primary | ICD-10-CM

## 2022-08-15 DIAGNOSIS — M75.111 NONTRAUMATIC INCOMPLETE TEAR OF ROTATOR CUFF, RIGHT: ICD-10-CM

## 2022-08-15 DIAGNOSIS — M25.619 LIMITED RANGE OF MOTION (ROM) OF SHOULDER: ICD-10-CM

## 2022-08-15 DIAGNOSIS — R53.1 DECREASED STRENGTH: ICD-10-CM

## 2022-08-15 PROCEDURE — 97110 THERAPEUTIC EXERCISES: CPT | Performed by: PHYSICAL THERAPIST

## 2022-08-15 PROCEDURE — 97014 ELECTRIC STIMULATION THERAPY: CPT | Performed by: PHYSICAL THERAPIST

## 2022-08-15 NOTE — PROGRESS NOTES
"Physical Therapy Daily Treatment Note      Patient: Valentin Vásquez   : 1965  Referring practitioner: Edmundo Huitron, *  Date of Initial Visit: Type: THERAPY  Noted: 2022  Today's Date: 8/15/2022  Patient seen for 7 sessions       Visit Diagnoses:    ICD-10-CM ICD-9-CM   1. S/P right rotator cuff repair  Z98.890 V45.89   2. Nontraumatic incomplete tear of rotator cuff, right  M75.111 726.13   3. Limited range of motion (ROM) of shoulder  M25.619 719.51   4. Decreased strength  R53.1 780.79       Subjective Evaluation    History of Present Illness    Subjective comment: Pt reports 5/10 right shoulder pain prior to today's session. He states it has been sore since he woke up two days ago. The patient reports he has been sleeping in a recliner, stating he \"isn't sure if he jerked in his sleep or what\".Pain  Current pain ratin           Objective   See Exercise, Manual, and Modality Logs for complete treatment.       Assessment & Plan     Assessment    Assessment details: Therapeutic exercises were performed for improved right shoulder ROM as well as scapular stability, wrist ROM, and elbow ROM. Tactile and verbal cues were provided with pendulums to ensure passive mobility. PROM of the right shoulder was performed with patient in the supine position. Muscle guarding occurred when PROM reached approximately 80 degrees of flexion. With cues, patient was able to relax the right shoulder and approximately 95 degrees of passive right shoulder flexion was achieved. Rest breaks were provided as needed during passive range of motion. The session concluded with cryotherapy combined with IFC to the right shoulder to address pain and inflammation. No skin irritation was observed following modalities. The patient reported 2.5/10 right shoulder pain following today's session, stating it \"feels a lot better\".    Plan  Plan details: Progress per protocol.          Timed:         Manual Therapy:         mins  " 14620;     Therapeutic Exercise:    30     mins  26091;     Neuromuscular Amanda:        mins  32976;    Therapeutic Activity:          mins  52623;     Gait Training:           mins  43121;     Ultrasound:          mins  14499;    Ionto                                   mins   21724  Self Care                            mins   88448  Canalith Repos         mins 82335      Un-Timed:  Electrical Stimulation:    10     mins  60150 ( );  Dry Needling          mins self-pay  Traction          mins 63253      Timed Treatment:   30   mins   Total Treatment:     40   mins    Ashley Claudene Dalton, PT  [place] License: [id number]

## 2022-08-16 ENCOUNTER — TELEPHONE (OUTPATIENT)
Dept: ORTHOPEDIC SURGERY | Facility: CLINIC | Age: 57
End: 2022-08-16

## 2022-08-16 NOTE — TELEPHONE ENCOUNTER
Notified patient [employer] form has been faxed.     Fax confirmation received.

## 2022-08-17 ENCOUNTER — TREATMENT (OUTPATIENT)
Dept: PHYSICAL THERAPY | Facility: CLINIC | Age: 57
End: 2022-08-17

## 2022-08-17 DIAGNOSIS — R53.1 DECREASED STRENGTH: ICD-10-CM

## 2022-08-17 DIAGNOSIS — M75.111 NONTRAUMATIC INCOMPLETE TEAR OF ROTATOR CUFF, RIGHT: ICD-10-CM

## 2022-08-17 DIAGNOSIS — Z98.890 S/P RIGHT ROTATOR CUFF REPAIR: Primary | ICD-10-CM

## 2022-08-17 DIAGNOSIS — M25.619 LIMITED RANGE OF MOTION (ROM) OF SHOULDER: ICD-10-CM

## 2022-08-17 PROCEDURE — 97110 THERAPEUTIC EXERCISES: CPT | Performed by: PHYSICAL THERAPIST

## 2022-08-17 PROCEDURE — 97014 ELECTRIC STIMULATION THERAPY: CPT | Performed by: PHYSICAL THERAPIST

## 2022-08-17 NOTE — PROGRESS NOTES
Physical Therapy Daily Treatment Note      Patient: Valentin Vásquez   : 1965  Referring practitioner: Edmundo Huitron, *  Date of Initial Visit: Type: THERAPY  Noted: 2022  Today's Date: 2022  Patient seen for 8 sessions       Visit Diagnoses:    ICD-10-CM ICD-9-CM   1. S/P right rotator cuff repair  Z98.890 V45.89   2. Nontraumatic incomplete tear of rotator cuff, right  M75.111 726.13   3. Limited range of motion (ROM) of shoulder  M25.619 719.51   4. Decreased strength  R53.1 780.79       Subjective   Patient reports that he is having 3/10 pain in his right shoulder. Patient states that he done good following last treatment session. Patient reports that he has a follow up appt on 2022.    Objective   See Exercise, Manual, and Modality Logs for complete treatment.       Assessment/Plan  Patient tolerated treatment session well with rest breaks taken as needed by the patient. Educated patient to perform therex per his tolerance, patient verbalized understanding. No adverse reactions with modalities or treatment session. Treatment session consisted of exercises to improve strength and ROM to the right shoulder. New therex added per the patient's tolerance and per protocol, patient demonstrated and understood new therex with no increase in pain noted. Decrease in pain noted following treatment session. Continue per PT's POC, progress exercises per the patient's tolerance.     Timed:         Manual Therapy:         mins  05388;     Therapeutic Exercise:    35     mins  34231;     Neuromuscular Amanda:        mins  50882;    Therapeutic Activity:          mins  01891;     Gait Training:           mins  73808;     Ultrasound:          mins  63732;    Ionto                                   mins   35178  Self Care                            mins   63893  Canalith Repos         mins 97238      Un-Timed:  Electrical Stimulation:    10     mins  55597 ( );  Dry Needling          mins  self-pay  Traction          mins 16670      Timed Treatment:   35   mins   Total Treatment:     45   mins    Rebecca Smith, PTA  [place] License: [id number]

## 2022-08-19 ENCOUNTER — TREATMENT (OUTPATIENT)
Dept: PHYSICAL THERAPY | Facility: CLINIC | Age: 57
End: 2022-08-19

## 2022-08-19 DIAGNOSIS — R53.1 DECREASED STRENGTH: ICD-10-CM

## 2022-08-19 DIAGNOSIS — M25.619 LIMITED RANGE OF MOTION (ROM) OF SHOULDER: ICD-10-CM

## 2022-08-19 DIAGNOSIS — Z98.890 S/P RIGHT ROTATOR CUFF REPAIR: Primary | ICD-10-CM

## 2022-08-19 DIAGNOSIS — M75.111 NONTRAUMATIC INCOMPLETE TEAR OF ROTATOR CUFF, RIGHT: ICD-10-CM

## 2022-08-19 PROCEDURE — 97014 ELECTRIC STIMULATION THERAPY: CPT | Performed by: PHYSICAL THERAPIST

## 2022-08-19 PROCEDURE — 97110 THERAPEUTIC EXERCISES: CPT | Performed by: PHYSICAL THERAPIST

## 2022-08-19 NOTE — PROGRESS NOTES
Physical Therapy Daily Treatment Note      Patient: Valentin Vásquez   : 1965  Referring practitioner: Edmundo Huitron, *  Date of Initial Visit: Type: THERAPY  Noted: 2022  Today's Date: 2022  Patient seen for 9 sessions       Visit Diagnoses:    ICD-10-CM ICD-9-CM   1. S/P right rotator cuff repair  Z98.890 V45.89   2. Nontraumatic incomplete tear of rotator cuff, right  M75.111 726.13   3. Limited range of motion (ROM) of shoulder  M25.619 719.51   4. Decreased strength  R53.1 780.79       Subjective:  Patient arrives to therapy w/ reports of 2-3/10 right shoulder pain.  Pt states he was sore following last session, however he notes tolerable.  Pt verbalizes compliance of continuation of home program.      Objective   See Exercise, Manual, and Modality Logs for complete treatment.       Assessment/Plan:  Patient tolerated treatment well today, w/ reports of slight decrease in pain noted following, 1/10.  Session initiated w/ therex as listed w/ continued focus on improved right shoulder ROM, improved postural awareness, and  f/b shoulder PROM and modalities following.  Pt provided w/ cues and demonstration while performing exercise to maintain form, and to decrease shoulder hiking.  Pt observed w/ good tolerance.  Pt educated on importance of progression of closed chain activities to home program, w/ explanation and demonstration provided for form.  Pt verbalized understanding.  Pt noted w/ continued improved tolerance to shoulder PROM w/ cues given for relaxation as needed .  Cryotherapy w/ Estim applied to right shoulder at conclusion.  Pt continues to benefit from therapy services, and will be progressed as tolerated to address goals, and reduce pain, per protocol.  No adverse reactions observed during, and/ or following tx.  Continue w/ PT's POC.       Timed:         Manual Therapy:         mins  89605;     Therapeutic Exercise:    36     mins  54722;     Neuromuscular Amanda:         mins  15953;    Therapeutic Activity:          mins  34551;     Gait Training:           mins  91657;     Ultrasound:          mins  11812;    Ionto                                   mins   86689  Self Care                            mins   33504  Canalith Repos         mins 03979      Un-Timed:  Electrical Stimulation:     10    mins  14458 ( );  Dry Needling          mins self-pay  Traction          mins 00311      Timed Treatment:   36   mins   Total Treatment:    46    mins    Elvira Sewell. DELMY Julio  [place] License: [id number]

## 2022-08-22 ENCOUNTER — TREATMENT (OUTPATIENT)
Dept: PHYSICAL THERAPY | Facility: CLINIC | Age: 57
End: 2022-08-22

## 2022-08-22 DIAGNOSIS — M75.111 NONTRAUMATIC INCOMPLETE TEAR OF ROTATOR CUFF, RIGHT: ICD-10-CM

## 2022-08-22 DIAGNOSIS — Z98.890 S/P RIGHT ROTATOR CUFF REPAIR: Primary | ICD-10-CM

## 2022-08-22 DIAGNOSIS — R53.1 DECREASED STRENGTH: ICD-10-CM

## 2022-08-22 DIAGNOSIS — M25.619 LIMITED RANGE OF MOTION (ROM) OF SHOULDER: ICD-10-CM

## 2022-08-22 PROCEDURE — 97530 THERAPEUTIC ACTIVITIES: CPT | Performed by: PHYSICAL THERAPIST

## 2022-08-22 PROCEDURE — 97110 THERAPEUTIC EXERCISES: CPT | Performed by: PHYSICAL THERAPIST

## 2022-08-22 PROCEDURE — 97014 ELECTRIC STIMULATION THERAPY: CPT | Performed by: PHYSICAL THERAPIST

## 2022-08-22 NOTE — PROGRESS NOTES
Physical Therapy Daily Treatment Note      Patient: Valentin Vásquez   : 1965  Referring practitioner: Edmundo Huitron, *  Date of Initial Visit: Type: THERAPY  Noted: 2022  Today's Date: 2022  Patient seen for 10 sessions       Visit Diagnoses:    ICD-10-CM ICD-9-CM   1. S/P right rotator cuff repair  Z98.890 V45.89   2. Nontraumatic incomplete tear of rotator cuff, right  M75.111 726.13   3. Limited range of motion (ROM) of shoulder  M25.619 719.51   4. Decreased strength  R53.1 780.79       Subjective:  Patient reports doing well today w/ no complaints of shoulder pain prior to tx.  Pt states he has been doing his exercises at home.  Pt is scheduled to f/u with referring ortho on 2022.    Objective   See Exercise, Manual, and Modality Logs for complete treatment.       Assessment/Plan:  Patient completed today's session w/ no reports of pain noted following, 0/10.  Treatment initiated w/ therex as listed w/ continued focus on improved right shoulder ROM, improved scapular stability and postural awareness f/b shoulder PROM and modalities at conclusion.  Exercise progressed w/ closed chain active assistive range of motion repetitions increased from 20 to 25, and additional scapular strengthening activities added including lawnmowers, and sternal lifts.  Pt observed w/ good tolerance, and was provided w/ cues and demonstration to maintain form, and for max benefit.  Pt required cues during exercise to decrease shoulder hiking and for improved relaxation.  Cryotherapy w/ Estim applied at conclusion.  Pt continues to benefit from therapy services, and will be progressed as tolerated to address goals, restore ROM, and decrease pain.  Continue w/ PT's POC.       Timed:         Manual Therapy:         mins  71096;     Therapeutic Exercise:    34    mins  95933;     Neuromuscular Amadna:        mins  79606;    Therapeutic Activity:     10     mins  74370;     Gait Training:           mins  46615;      Ultrasound:          mins  55014;    Ionto                                   mins   70912  Self Care                            mins   06746  Canalith Repos         mins 49196      Un-Timed:  Electrical Stimulation:   10      mins  90281 ( );  Dry Needling          mins self-pay  Traction          mins 66415      Timed Treatment:  44    mins   Total Treatment:   54    mins    Elvira Sewell. DELMY Julio  [place] License:  [id number]

## 2022-08-24 ENCOUNTER — TREATMENT (OUTPATIENT)
Dept: PHYSICAL THERAPY | Facility: CLINIC | Age: 57
End: 2022-08-24

## 2022-08-24 DIAGNOSIS — R53.1 DECREASED STRENGTH: ICD-10-CM

## 2022-08-24 DIAGNOSIS — M25.619 LIMITED RANGE OF MOTION (ROM) OF SHOULDER: ICD-10-CM

## 2022-08-24 DIAGNOSIS — M75.111 NONTRAUMATIC INCOMPLETE TEAR OF ROTATOR CUFF, RIGHT: ICD-10-CM

## 2022-08-24 DIAGNOSIS — Z98.890 S/P RIGHT ROTATOR CUFF REPAIR: Primary | ICD-10-CM

## 2022-08-24 PROCEDURE — 97014 ELECTRIC STIMULATION THERAPY: CPT | Performed by: PHYSICAL THERAPIST

## 2022-08-24 PROCEDURE — 97110 THERAPEUTIC EXERCISES: CPT | Performed by: PHYSICAL THERAPIST

## 2022-08-24 NOTE — PROGRESS NOTES
Physical Therapy Daily Treatment Note      Patient: Valentin Vásquez   : 1965  Referring practitioner: Edmundo Huitron, *  Date of Initial Visit: Type: THERAPY  Noted: 2022  Today's Date: 2022  Patient seen for 11 sessions       Visit Diagnoses:    ICD-10-CM ICD-9-CM   1. S/P right rotator cuff repair  Z98.890 V45.89   2. Nontraumatic incomplete tear of rotator cuff, right  M75.111 726.13   3. Limited range of motion (ROM) of shoulder  M25.619 719.51   4. Decreased strength  R53.1 780.79       Subjective Evaluation    History of Present Illness    Subjective comment: Pt has 2/10 pain today.       Objective   See Exercise, Manual, and Modality Logs for complete treatment.       Assessment & Plan     Assessment    Assessment details: Tx today consisted of exercises for improved shoulder mobility and scap stability; followed by PROM for improved shoulder mobility and ended with ice and estim for decreased pain. Pt demonstrated good effort today and demonstrated 117 degrees of flexion today.  Pt reported having 1/10 pain following session.    Plan  Plan details: Will follow progressing per protocol.          Timed:         Manual Therapy:         mins  92385;     Therapeutic Exercise:    31     mins  02329;     Neuromuscular Amanda:        mins  23199;    Therapeutic Activity:          mins  14397;     Gait Training:           mins  89368;     Ultrasound:          mins  59855;    Ionto                                   mins   90826  Self Care                            mins   52130  Canalith Repos         mins 40686      Un-Timed:  Electrical Stimulation:    10     mins  64091 ( );  Dry Needling          mins self-pay  Traction          mins 31420      Timed Treatment:   31   mins   Total Treatment:     41   mins    Gurmeet Landeros PT  [place] License: [id number]      Electronically signed by Gurmeet Landeros PT, 22, 1:11 PM EDT

## 2022-08-26 ENCOUNTER — TREATMENT (OUTPATIENT)
Dept: PHYSICAL THERAPY | Facility: CLINIC | Age: 57
End: 2022-08-26

## 2022-08-26 DIAGNOSIS — M25.619 LIMITED RANGE OF MOTION (ROM) OF SHOULDER: ICD-10-CM

## 2022-08-26 DIAGNOSIS — Z98.890 S/P RIGHT ROTATOR CUFF REPAIR: Primary | ICD-10-CM

## 2022-08-26 DIAGNOSIS — R53.1 DECREASED STRENGTH: ICD-10-CM

## 2022-08-26 DIAGNOSIS — M75.111 NONTRAUMATIC INCOMPLETE TEAR OF ROTATOR CUFF, RIGHT: ICD-10-CM

## 2022-08-26 PROCEDURE — 97014 ELECTRIC STIMULATION THERAPY: CPT | Performed by: PHYSICAL THERAPIST

## 2022-08-26 PROCEDURE — 97530 THERAPEUTIC ACTIVITIES: CPT | Performed by: PHYSICAL THERAPIST

## 2022-08-26 PROCEDURE — 97110 THERAPEUTIC EXERCISES: CPT | Performed by: PHYSICAL THERAPIST

## 2022-08-26 NOTE — PROGRESS NOTES
Physical Therapy Daily Treatment Note      Patient: Valentin Vásquez   : 1965  Referring practitioner: Edmundo Huitron, *  Date of Initial Visit: Type: THERAPY  Noted: 2022  Today's Date: 2022  Patient seen for 12 sessions       Visit Diagnoses:    ICD-10-CM ICD-9-CM   1. S/P right rotator cuff repair  Z98.890 V45.89   2. Nontraumatic incomplete tear of rotator cuff, right  M75.111 726.13   3. Limited range of motion (ROM) of shoulder  M25.619 719.51   4. Decreased strength  R53.1 780.79       Subjective   Patient reports that he is having 3/10 pain in his right shoulder. Patient states that he done good following last treatment session.     Objective   See Exercise, Manual, and Modality Logs for complete treatment.       Assessment/Plan  Patient tolerated treatment session good with rest breaks taken as needed by the patient. Educated patient to perform therex per his tolerance, patient verbalized understanding. No adverse reactions with modalities or treatment session. Treatment session consisted of exercises to improve strength and ROM to the right shoulder. New therex added per protocol, pulleys added to session; patient educated to left arm do the work and allow the right shoulder to go where it tolerates, patient verbalized understanding. Patient requires cues to relax right shoulder during PROM due to resisting motion. Soreness noted following treatment session. Continue per PT's POC, progress exercises per the patient's tolerance.     Timed:         Manual Therapy:         mins  41956;     Therapeutic Exercise:    35     mins  04710;     Neuromuscular Amanda:        mins  37397;    Therapeutic Activity:     9     mins  35530;     Gait Training:           mins  33113;     Ultrasound:          mins  93461;    Ionto                                   mins   70612  Self Care                            mins   66950  Canalith Repos         mins 58062      Un-Timed:  Electrical Stimulation:    10      mins  68435 ( );  Dry Needling          mins self-pay  Traction          mins 47926      Timed Treatment:   44   mins   Total Treatment:     54   mins    Rebecca Smith, PTA  [place] License: [id number]

## 2022-08-29 ENCOUNTER — TREATMENT (OUTPATIENT)
Dept: PHYSICAL THERAPY | Facility: CLINIC | Age: 57
End: 2022-08-29

## 2022-08-29 DIAGNOSIS — M25.619 LIMITED RANGE OF MOTION (ROM) OF SHOULDER: ICD-10-CM

## 2022-08-29 DIAGNOSIS — R53.1 DECREASED STRENGTH: ICD-10-CM

## 2022-08-29 DIAGNOSIS — M75.111 NONTRAUMATIC INCOMPLETE TEAR OF ROTATOR CUFF, RIGHT: ICD-10-CM

## 2022-08-29 DIAGNOSIS — Z98.890 S/P RIGHT ROTATOR CUFF REPAIR: Primary | ICD-10-CM

## 2022-08-29 PROCEDURE — 97110 THERAPEUTIC EXERCISES: CPT | Performed by: PHYSICAL THERAPIST

## 2022-08-29 PROCEDURE — 97014 ELECTRIC STIMULATION THERAPY: CPT | Performed by: PHYSICAL THERAPIST

## 2022-08-29 NOTE — PROGRESS NOTES
Physical Therapy Daily Treatment Note      Patient: Valentin Vásquez   : 1965  Referring practitioner: Edmundo Huitron, *  Date of Initial Visit: Type: THERAPY  Noted: 2022  Today's Date: 2022  Patient seen for 13 sessions       Visit Diagnoses:    ICD-10-CM ICD-9-CM   1. S/P right rotator cuff repair  Z98.890 V45.89   2. Nontraumatic incomplete tear of rotator cuff, right  M75.111 726.13   3. Limited range of motion (ROM) of shoulder  M25.619 719.51   4. Decreased strength  R53.1 780.79       Subjective Evaluation    History of Present Illness    Subjective comment: Pt has 3/10 pain today.  Pt has been instructed to discontinue sling at this time.       Objective   See Exercise, Manual, and Modality Logs for complete treatment.       Assessment & Plan     Assessment    Assessment details: Tx today consisted of exercises for improved scap stability; shoulder PROM and conservative shoulder stability.  Pt responded well to added exercises of finger ladder, supine punches and wand flexion well today and patient was noted to have improved shoulder ROM post tx.  Pt reported having 2/10 post pain.    Plan  Plan details: Will follow progressing per protocol.          Timed:         Manual Therapy:         mins  39758;     Therapeutic Exercise:    41     mins  12049;     Neuromuscular Amanda:        mins  30486;    Therapeutic Activity:          mins  03438;     Gait Training:           mins  63488;     Ultrasound:          mins  30953;    Ionto                                   mins   05829  Self Care                            mins   85244  Canalith Repos         mins 29591      Un-Timed:  Electrical Stimulation:    10     mins  16240 ( );  Dry Needling          mins self-pay  Traction          mins 07867      Timed Treatment:   41   mins   Total Treatment:     51   mins    Gurmeet Landeros PT  [place] License: [id number]      Electronically signed by Gurmeet Landeros PT, 22, 12:57 PM EDT   Bed

## 2022-08-31 ENCOUNTER — TREATMENT (OUTPATIENT)
Dept: PHYSICAL THERAPY | Facility: CLINIC | Age: 57
End: 2022-08-31

## 2022-08-31 DIAGNOSIS — M25.619 LIMITED RANGE OF MOTION (ROM) OF SHOULDER: ICD-10-CM

## 2022-08-31 DIAGNOSIS — R53.1 DECREASED STRENGTH: ICD-10-CM

## 2022-08-31 DIAGNOSIS — Z98.890 S/P RIGHT ROTATOR CUFF REPAIR: Primary | ICD-10-CM

## 2022-08-31 DIAGNOSIS — M75.111 NONTRAUMATIC INCOMPLETE TEAR OF ROTATOR CUFF, RIGHT: ICD-10-CM

## 2022-08-31 PROCEDURE — 97110 THERAPEUTIC EXERCISES: CPT | Performed by: PHYSICAL THERAPIST

## 2022-08-31 PROCEDURE — 97014 ELECTRIC STIMULATION THERAPY: CPT | Performed by: PHYSICAL THERAPIST

## 2022-08-31 NOTE — PROGRESS NOTES
Physical Therapy Re Certification Of Plan of Care  Patient: Valentin Vásquez   : 1965  Diagnosis/ICD-10 Code:  S/P right rotator cuff repair [Z98.890]  Referring practitioner: Edmundo Huitron, *  Date of Initial Visit: Type: THERAPY  Noted: 2022  Today's Date: 2022  Patient seen for 14 sessions         Visit Diagnoses:    ICD-10-CM ICD-9-CM   1. S/P right rotator cuff repair  Z98.890 V45.89   2. Nontraumatic incomplete tear of rotator cuff, right  M75.111 726.13   3. Limited range of motion (ROM) of shoulder  M25.619 719.51   4. Decreased strength  R53.1 780.79         Valentin Vásquez reports:   Subjective Questionnaire: QuickDASH: 57%  Clinical Progress: improved  Home Program Compliance: Yes  Treatment has included: therapeutic exercise, neuromuscular re-education, manual therapy, therapeutic activity, electrical stimulation, ultrasound, moist heat and cryotherapy      Subjective Evaluation    History of Present Illness    Subjective comment: Pt reports having incrased soreness.Pain  Current pain ratin  At best pain ratin  At worst pain ratin  Location: anteraior right shoulder             Objective          Passive Range of Motion     Right Shoulder   Flexion: 120 degrees   Abduction: 115 (scaption) degrees   External rotation 0°: 5 degrees   Internal rotation 0°: 45 degrees           Assessment & Plan     Assessment  Impairments: abnormal or restricted ROM, activity intolerance, impaired physical strength, lacks appropriate home exercise program and pain with function  Functional Limitations: lifting, sleeping, pulling, pushing, uncomfortable because of pain, moving in bed, reaching behind back, reaching overhead and unable to perform repetitive tasks  Assessment details: Patient is a 57 year old male who comes to physical therapy for rehabilitation s/p a right rotator cuff repair. Pt has demonstrated good attendance with noted gains with shoulder ROM, strength and decreased pain.   Pt cont to demonstrate shoulder guarding and requires cues to increase exercises at home. Pt scored a 57% on his Quick Dash score.  Pt reported 1/10 post pain.  Prognosis: good    Goals  Plan Goals: SHORT TERM GOALS:     4 weeks  1. Patient will be independent/compliant with HEP.met  2. Patient to demonstrate PROM of the right shoulder to at least 150 degrees flexion and 150 degrees abduction to improve ability to perform ADL's.not met  3. Patient will report pain no greater than 5/10 when performing self-care activities with minimal modifications.met  4. Right  strength will improve to at least 60# to allow for greater ease with opening jars.NT    LONG TERM GOALS:   12 weeks  1. Patient to demonstrate AROM of the right shoulder to at least 150 degrees flexion/abduction and 60 degrees ER/IR to allow ability to perform all necessary functional activities.not met  2. Patient to report no more than 50% impairment on the Quick Dash for improved functional independence.not met  3. right UE strength will improve to at least 4/5 to prevent reinjury.not met  4. Patient will report worst pain no greater than 3/10 for improved quality of life.not met      Plan  Therapy options: will be seen for skilled therapy services  Planned modality interventions: cryotherapy, electrical stimulation/Russian stimulation, TENS, thermotherapy (hydrocollator packs) and ultrasound  Planned therapy interventions: manual therapy, ADL retraining, neuromuscular re-education, postural training, body mechanics training, soft tissue mobilization, flexibility, fine motor coordination training, functional ROM exercises, strengthening, spinal/joint mobilization, stretching, home exercise program, therapeutic activities, transfer training, IADL retraining and joint mobilization  Frequency: 3x week  Duration in weeks: 4  Treatment plan discussed with: patient and family  Plan details: Moderate Evaluation  03002  Re-evaluation   80699    Therapeutic  exercise  73813  Therapeutic activity    52086  Neuromuscular re-education   21137  Manual therapy   54976    Unattended e-stim (Private)  39923  Moist heat/cryotherapy 55016   Ultrasound   00332               Recommendations: Continue as planned  Timeframe: 1 month  Prognosis to achieve goals: good      Timed:         Manual Therapy:         mins  46674;     Therapeutic Exercise:    41     mins  44382;     Neuromuscular Amanda:        mins  42575;    Therapeutic Activity:          mins  51649;     Gait Training:           mins  79165;     Ultrasound:          mins  98857;    Ionto                                   mins   66517  Self Care                            mins   90733  Canalith Repos         mins 35853      Un-Timed:  Electrical Stimulation:    10     mins  74280 ( );  Dry Needling          mins self-pay  Traction          mins 82709  Re-Eval                               mins  49709      Timed Treatment:   41   mins   Total Treatment:     51   mins          PT: Gurmeet Landeros PT     [place] License:  [id number]    Electronically signed by Gurmeet Landeros PT, 08/31/22, 1:26 PM EDT    Certification Period: 8/31/2022 thru 11/28/2022  I certify that the therapy services are furnished while this patient is under my care.  The services outlined above are required by this patient, and will be reviewed every 90 days.         Physician Signature:__________________________________________________    PHYSICIAN: Edmundo Huitron MD  NPI: [id number]                                      DATE:  :     Please sign and return via fax to [email or web address] . Thank you, [facility]

## 2022-09-02 ENCOUNTER — TREATMENT (OUTPATIENT)
Dept: PHYSICAL THERAPY | Facility: CLINIC | Age: 57
End: 2022-09-02

## 2022-09-02 DIAGNOSIS — M75.111 NONTRAUMATIC INCOMPLETE TEAR OF ROTATOR CUFF, RIGHT: ICD-10-CM

## 2022-09-02 DIAGNOSIS — R53.1 DECREASED STRENGTH: ICD-10-CM

## 2022-09-02 DIAGNOSIS — Z98.890 S/P RIGHT ROTATOR CUFF REPAIR: Primary | ICD-10-CM

## 2022-09-02 DIAGNOSIS — M25.619 LIMITED RANGE OF MOTION (ROM) OF SHOULDER: ICD-10-CM

## 2022-09-02 PROCEDURE — 97110 THERAPEUTIC EXERCISES: CPT | Performed by: PHYSICAL THERAPIST

## 2022-09-02 PROCEDURE — 97014 ELECTRIC STIMULATION THERAPY: CPT | Performed by: PHYSICAL THERAPIST

## 2022-09-02 NOTE — PROGRESS NOTES
Physical Therapy Daily Treatment Note      Patient: Valentin Vásquez   : 1965  Referring practitioner: Edmundo Huitron, *  Date of Initial Visit: Type: THERAPY  Noted: 2022  Today's Date: 2022  Patient seen for 15 sessions       Visit Diagnoses:    ICD-10-CM ICD-9-CM   1. S/P right rotator cuff repair  Z98.890 V45.89   2. Nontraumatic incomplete tear of rotator cuff, right  M75.111 726.13   3. Limited range of motion (ROM) of shoulder  M25.619 719.51   4. Decreased strength  R53.1 780.79       Subjective Evaluation    History of Present Illness    Subjective comment: Pt reports having 3/10 pain today.       Objective   See Exercise, Manual, and Modality Logs for complete treatment.       Assessment & Plan     Assessment    Assessment details: Tx today consisted of exercises per protocol for improved shoulder mobility and postural stability; followed by PROM and ended with ice and estim.  Pt demonstrated good effort and responded well to added reps today.  Pt reported decreased pain following session to 3/10 pain.    Plan  Plan details: Will follow progressing shoulder mobility per protocol.          Timed:         Manual Therapy:         mins  01875;     Therapeutic Exercise:    33     mins  96867;     Neuromuscular Amanda:        mins  91284;    Therapeutic Activity:          mins  06401;     Gait Training:           mins  57855;     Ultrasound:          mins  70141;    Ionto                                   mins   36001  Self Care                            mins   61500  Canalith Repos         mins 44437      Un-Timed:  Electrical Stimulation:    10     mins  89877 ( );  Dry Needling          mins self-pay  Traction          mins 13970      Timed Treatment:   33   mins   Total Treatment:     43   mins    Gurmeet Landeros PT  [place] License: [id number]      Electronically signed by Gurmeet Landeros PT, 22, 12:58 PM EDT

## 2022-09-07 ENCOUNTER — TREATMENT (OUTPATIENT)
Dept: PHYSICAL THERAPY | Facility: CLINIC | Age: 57
End: 2022-09-07

## 2022-09-07 DIAGNOSIS — R53.1 DECREASED STRENGTH: ICD-10-CM

## 2022-09-07 DIAGNOSIS — M25.619 LIMITED RANGE OF MOTION (ROM) OF SHOULDER: ICD-10-CM

## 2022-09-07 DIAGNOSIS — M75.111 NONTRAUMATIC INCOMPLETE TEAR OF ROTATOR CUFF, RIGHT: ICD-10-CM

## 2022-09-07 DIAGNOSIS — Z98.890 S/P RIGHT ROTATOR CUFF REPAIR: Primary | ICD-10-CM

## 2022-09-07 PROCEDURE — 97014 ELECTRIC STIMULATION THERAPY: CPT | Performed by: PHYSICAL THERAPIST

## 2022-09-07 PROCEDURE — 97110 THERAPEUTIC EXERCISES: CPT | Performed by: PHYSICAL THERAPIST

## 2022-09-07 NOTE — PROGRESS NOTES
"Physical Therapy Daily Treatment Note      Patient: Valentin Vásquez   : 1965  Referring practitioner: Edmundo Huitron, *  Date of Initial Visit: Type: THERAPY  Noted: 2022  Today's Date: 2022  Patient seen for 16 sessions       Visit Diagnoses:    ICD-10-CM ICD-9-CM   1. S/P right rotator cuff repair  Z98.890 V45.89   2. Nontraumatic incomplete tear of rotator cuff, right  M75.111 726.13   3. Limited range of motion (ROM) of shoulder  M25.619 719.51   4. Decreased strength  R53.1 780.79       Subjective Evaluation    History of Present Illness    Subjective comment: Pt reports 4/10 right shoulder pain prior to today's session. He states he tried getting ready quickly today and \"flared it up a little\".Pain  Current pain ratin           Objective   See Exercise, Manual, and Modality Logs for complete treatment.       Assessment & Plan     Assessment    Assessment details: Today's session was initiated with PROM of the right shoulder into flexion and scaption, in the pain-free range, for improved mobility and function. Therapeutic exercises addressed right shoulder ROM, scapular stability, and strength. Tactile and verbal cues were provided for proper form. Rest breaks were provided as needed secondary to fatigue. Cryotherapy combined with pre-mod was applied to the right shoulder to address pain and inflammation. No skin irritation was observed following modalities. The patient reported 2/10 right shoulder pain following today's session.    Plan  Plan details: Progress as indicated for improved functional mobility and independence.          Timed:         Manual Therapy:         mins  60048;     Therapeutic Exercise:    40     mins  50073;     Neuromuscular Amanda:        mins  64958;    Therapeutic Activity:          mins  54401;     Gait Training:           mins  96066;     Ultrasound:          mins  94984;    Ionto                                   mins   15153  Self Care                       "      mins   32275  [facility]         mins 14677      Un-Timed:  Electrical Stimulation:    10     mins  83917 ( );  Dry Needling          mins self-pay  Traction          mins 08730      Timed Treatment:   40   mins   Total Treatment:     50   mins    Ashley Claudene Dalton, PT  [place] License: [id number]

## 2022-09-09 ENCOUNTER — TREATMENT (OUTPATIENT)
Dept: PHYSICAL THERAPY | Facility: CLINIC | Age: 57
End: 2022-09-09

## 2022-09-09 DIAGNOSIS — M25.619 LIMITED RANGE OF MOTION (ROM) OF SHOULDER: ICD-10-CM

## 2022-09-09 DIAGNOSIS — Z98.890 S/P RIGHT ROTATOR CUFF REPAIR: Primary | ICD-10-CM

## 2022-09-09 DIAGNOSIS — R53.1 DECREASED STRENGTH: ICD-10-CM

## 2022-09-09 DIAGNOSIS — M75.111 NONTRAUMATIC INCOMPLETE TEAR OF ROTATOR CUFF, RIGHT: ICD-10-CM

## 2022-09-09 PROCEDURE — 97530 THERAPEUTIC ACTIVITIES: CPT | Performed by: PHYSICAL THERAPIST

## 2022-09-09 PROCEDURE — 97014 ELECTRIC STIMULATION THERAPY: CPT | Performed by: PHYSICAL THERAPIST

## 2022-09-09 PROCEDURE — 97110 THERAPEUTIC EXERCISES: CPT | Performed by: PHYSICAL THERAPIST

## 2022-09-09 NOTE — PROGRESS NOTES
Physical Therapy Daily Treatment Note      Patient: Valentin Vásquez   : 1965  Referring practitioner: Edmundo Huitron, *  Date of Initial Visit: Type: THERAPY  Noted: 2022  Today's Date: 2022  Patient seen for 17 sessions       Visit Diagnoses:    ICD-10-CM ICD-9-CM   1. S/P right rotator cuff repair  Z98.890 V45.89   2. Nontraumatic incomplete tear of rotator cuff, right  M75.111 726.13   3. Limited range of motion (ROM) of shoulder  M25.619 719.51   4. Decreased strength  R53.1 780.79       Subjective:  Patient arrives to therapy w/ reports of 3/10 right shoulder pain.  Pt states he feels his shoulder is gradually improving w/ therapy. Pt is scheduled to f/u with referring ortho on 22.     Objective   See Exercise, Manual, and Modality Logs for complete treatment.       Assessment/Plan:  Patient demonstrated good effort during 's and reported slight decrease in pain following, 2/10.  Treatment initiated w/ MH/ Estim to right shoulder for improved tissue mobility f/b therex and therapeutic as listed, shoulder PROM and modalities following.  Exercise performed w/ continued focus on improved right shoulder ROM, improved scapular stability, and UE strength, as tolerated, per protocol.  Pt demonstrated tightness and limited mobility w/ shoulder ER during shoulder PROM.  Pt continues to requires cues and demonstration while completing exercise to maintain form, and for max benefit.  Cryotherapy applied at conclusion.  No signs of distress or adverse reactions observed during, and/ or following tx.  Pt continues to benefit from therapy services, and will be progressed as tolerated.  Continue w/ PT's POC.       Timed:         Manual Therapy:         mins  63212;     Therapeutic Exercise:   34      mins  86796;     Neuromuscular Amanda:        mins  86438;    Therapeutic Activity:     10     mins  38913;     Gait Training:           mins  63753;     Ultrasound:          mins  72086;    Ionto                                    mins   48338  Self Care                            mins   81624  Canalith Repos         mins 35772      Un-Timed:  Electrical Stimulation:    10     mins  98586 ( );  Dry Needling          mins self-pay  Traction          mins 85040      Timed Treatment:  44    mins   Total Treatment:     54   mins    Elvira Sewell. DELMY Julio  [place] License: [id number]

## 2022-09-12 ENCOUNTER — TREATMENT (OUTPATIENT)
Dept: PHYSICAL THERAPY | Facility: CLINIC | Age: 57
End: 2022-09-12

## 2022-09-12 DIAGNOSIS — M75.111 NONTRAUMATIC INCOMPLETE TEAR OF ROTATOR CUFF, RIGHT: ICD-10-CM

## 2022-09-12 DIAGNOSIS — M25.619 LIMITED RANGE OF MOTION (ROM) OF SHOULDER: ICD-10-CM

## 2022-09-12 DIAGNOSIS — Z98.890 S/P RIGHT ROTATOR CUFF REPAIR: Primary | ICD-10-CM

## 2022-09-12 DIAGNOSIS — R53.1 DECREASED STRENGTH: ICD-10-CM

## 2022-09-12 PROCEDURE — 97110 THERAPEUTIC EXERCISES: CPT | Performed by: PHYSICAL THERAPIST

## 2022-09-12 PROCEDURE — 97014 ELECTRIC STIMULATION THERAPY: CPT | Performed by: PHYSICAL THERAPIST

## 2022-09-12 PROCEDURE — 97530 THERAPEUTIC ACTIVITIES: CPT | Performed by: PHYSICAL THERAPIST

## 2022-09-12 NOTE — PROGRESS NOTES
Physical Therapy Daily Treatment Note      Patient: Valentin Vásquez   : 1965  Referring practitioner: Edmundo Huitron, *  Date of Initial Visit: Type: THERAPY  Noted: 2022  Today's Date: 2022  Patient seen for 18 sessions       Visit Diagnoses:    ICD-10-CM ICD-9-CM   1. S/P right rotator cuff repair  Z98.890 V45.89   2. Nontraumatic incomplete tear of rotator cuff, right  M75.111 726.13   3. Limited range of motion (ROM) of shoulder  M25.619 719.51   4. Decreased strength  R53.1 780.79       Subjective:  Patient arrives to therapy w/ reports of 3/10 right shoulder pain.  Pt states he was sore following last session, however he notes tolerable.      Objective          Passive Range of Motion     Right Shoulder   External rotation 0°: Right shoulder passive external rotation at 0 degrees: 26 degrees; obtained by Mariya Blum, PT, DPT.       See Exercise, Manual, and Modality Logs for complete treatment.       Assessment/Plan:  Patient completed today's session w/ no changes in pain noted following, 3/10.  MH applied to right shoulder f/b therex/ and therapeutic activities as listed w/ continued focus on improved shoulder ROM, improved scapular stability and to assist w/ return of function performing ADL's.  Exercise progressed w/ external rotation w/ wand initiated to assist w/ range of motion deficits.  Pt was provided w/ cues and demonstration for form, and for max benefit.  Shoulder PROM performed as to pt's tolerance, w/ good tolerance observed.  Cryotherapy w/ Estim applied at conclusion.  No signs of distress or adverse reactions observed during, and/ or following tx.  Pt will be progressed w/ therapy as tolerated, per protocol, to address goals, reduce pain, and restore function.  Continue w/ PT's POC.     Timed:         Manual Therapy:         mins  66929;     Therapeutic Exercise:    36     mins  34219;     Neuromuscular Amanda:        mins  16903;    Therapeutic Activity:     11      mins  51198;     Gait Training:           mins  68377;     Ultrasound:          mins  14229;    Ionto                                   mins   53829  Self Care                            mins   29656  Canalith Repos         mins 85551      Un-Timed:  Electrical Stimulation:   10      mins  12237 ( );  Dry Needling          mins self-pay  Traction          mins 36427      Timed Treatment:   47   mins   Total Treatment:    62    mins (MH:  X 5 min)     Elvira Sewell. DLEMY Julio  [place] License: [id number]

## 2022-09-14 ENCOUNTER — TREATMENT (OUTPATIENT)
Dept: PHYSICAL THERAPY | Facility: CLINIC | Age: 57
End: 2022-09-14

## 2022-09-14 DIAGNOSIS — M75.111 NONTRAUMATIC INCOMPLETE TEAR OF ROTATOR CUFF, RIGHT: ICD-10-CM

## 2022-09-14 DIAGNOSIS — M25.619 LIMITED RANGE OF MOTION (ROM) OF SHOULDER: ICD-10-CM

## 2022-09-14 DIAGNOSIS — Z98.890 S/P RIGHT ROTATOR CUFF REPAIR: Primary | ICD-10-CM

## 2022-09-14 DIAGNOSIS — R53.1 DECREASED STRENGTH: ICD-10-CM

## 2022-09-14 PROCEDURE — 97110 THERAPEUTIC EXERCISES: CPT | Performed by: PHYSICAL THERAPIST

## 2022-09-14 PROCEDURE — 97014 ELECTRIC STIMULATION THERAPY: CPT | Performed by: PHYSICAL THERAPIST

## 2022-09-14 PROCEDURE — 97530 THERAPEUTIC ACTIVITIES: CPT | Performed by: PHYSICAL THERAPIST

## 2022-09-14 NOTE — PROGRESS NOTES
Physical Therapy Daily Treatment Note      Patient: Valentin Vásquez   : 1965  Referring practitioner: Edmundo Huitron, *  Date of Initial Visit: Type: THERAPY  Noted: 2022  Today's Date: 2022  Patient seen for 19 sessions       Visit Diagnoses:    ICD-10-CM ICD-9-CM   1. S/P right rotator cuff repair  Z98.890 V45.89   2. Nontraumatic incomplete tear of rotator cuff, right  M75.111 726.13   3. Limited range of motion (ROM) of shoulder  M25.619 719.51   4. Decreased strength  R53.1 780.79       Subjective:  Patient reports 3/10 right shoulder pain upon arrival to therapy.  Otherwise pt states of no new complaint/ changes.      Objective          Passive Range of Motion     Right Shoulder   Flexion: 145 degrees   Abduction: 130 (scaption) degrees   External rotation 45°: 28 degrees   Internal rotation 45°: 55 degrees       See Exercise, Manual, and Modality Logs for complete treatment.       Assessment/Plan:  Patient responded well to today's session w/ no reports of pain noted following, 0/10.  Treatment initiated w/ MH to R) shoulder f/b therex/ therapeutic activities as listed, shoulder PROM and cryotherapy w/ Estim following.  Exercise performed w/ continued focus on improved right shoulder ROM, improved scapular stability and UE strength, as tolerated, per protocol.  Shoulder PROM measurements obtained today, and findings are listed above.  Pt continues to demonstrate limited range of motion w/ shoulder ER and was provided w/ wand activity for home to assist.  Pt demonstrated good mechanics, and was provided w/ verbal explanation, and written materials.  No signs of distress or adverse reactions observed during, and/ or following tx.  Pt continues to benefit from therapy services, and will be progressed as tolerated to address goals, reduce pain, and restore mobility.  Continue w/ PT's POC.          Timed:         Manual Therapy:         mins  08376;     Therapeutic Exercise:    34     mins   18312;     Neuromuscular Amanda:        mins  97823;    Therapeutic Activity:   11       mins  03669;     Gait Training:           mins  13314;     Ultrasound:          mins  98773;    Ionto                                   mins   88080  Self Care                            mins   70949  Canalith Repos         mins 59497      Un-Timed:  Electrical Stimulation:   10      mins  15325 ( );  Dry Needling          mins self-pay  Traction          mins 50773      Timed Treatment:  45    mins   Total Treatment:    60    mins (MH: x 5 min)     Elvira Sewell. DELMY Julio  [place] License: [id number]

## 2022-09-16 ENCOUNTER — TREATMENT (OUTPATIENT)
Dept: PHYSICAL THERAPY | Facility: CLINIC | Age: 57
End: 2022-09-16

## 2022-09-16 DIAGNOSIS — R53.1 DECREASED STRENGTH: ICD-10-CM

## 2022-09-16 DIAGNOSIS — M25.619 LIMITED RANGE OF MOTION (ROM) OF SHOULDER: ICD-10-CM

## 2022-09-16 DIAGNOSIS — Z98.890 S/P RIGHT ROTATOR CUFF REPAIR: Primary | ICD-10-CM

## 2022-09-16 DIAGNOSIS — M75.111 NONTRAUMATIC INCOMPLETE TEAR OF ROTATOR CUFF, RIGHT: ICD-10-CM

## 2022-09-16 PROCEDURE — 97110 THERAPEUTIC EXERCISES: CPT | Performed by: PHYSICAL THERAPIST

## 2022-09-16 PROCEDURE — 97014 ELECTRIC STIMULATION THERAPY: CPT | Performed by: PHYSICAL THERAPIST

## 2022-09-16 PROCEDURE — 97530 THERAPEUTIC ACTIVITIES: CPT | Performed by: PHYSICAL THERAPIST

## 2022-09-16 NOTE — PROGRESS NOTES
Physical Therapy Daily Treatment Note      Patient: Valentin Vásquez   : 1965  Referring practitioner: Edmundo Huitron, *  Date of Initial Visit: Type: THERAPY  Noted: 2022  Today's Date: 2022  Patient seen for 20 sessions       Visit Diagnoses:    ICD-10-CM ICD-9-CM   1. S/P right rotator cuff repair  Z98.890 V45.89   2. Nontraumatic incomplete tear of rotator cuff, right  M75.111 726.13   3. Limited range of motion (ROM) of shoulder  M25.619 719.51   4. Decreased strength  R53.1 780.79       Subjective Evaluation    History of Present Illness    Subjective comment: Pt reports having 2/10 pain today.       Objective   See Exercise, Manual, and Modality Logs for complete treatment.       Assessment & Plan     Assessment    Assessment details: Tx today consisted to exercises for improved shoulder mobility and postural awareness and scap stability; followed by manual stretching of shoulder and ended with ice and estim.  Pt demonstrated good effort today with cues required only to hold stretches for longer durations.  Pt reports having 1/10 post pain.      Plan  Plan details: Will follow progressing shoulder mobility and function per protocol.  Pt will follow up with MD next week and therapy will attempt to get patient additional visits.          Timed:         Manual Therapy:         mins  19983;     Therapeutic Exercise:    33     mins  59343;     Neuromuscular Amanda:        mins  88411;    Therapeutic Activity:     11     mins  81532;     Gait Training:           mins  99882;     Ultrasound:          mins  83518;    Ionto                                   mins   07337  Self Care                            mins   75460  Canalith Repos         mins 49592      Un-Timed:  Electrical Stimulation:     10    mins  98831 ( );  Dry Needling          mins self-pay  Traction          mins 91744      Timed Treatment:   44   mins   Total Treatment:     54   mins    Gurmeet Landeros PT  [place] License:  [id number]      Electronically signed by Gurmeet Landeros, PT, 09/16/22, 11:02 AM EDT

## 2022-09-19 ENCOUNTER — OFFICE VISIT (OUTPATIENT)
Dept: ORTHOPEDIC SURGERY | Facility: CLINIC | Age: 57
End: 2022-09-19

## 2022-09-19 VITALS — HEIGHT: 67 IN | BODY MASS INDEX: 28.93 KG/M2 | WEIGHT: 184.3 LBS

## 2022-09-19 DIAGNOSIS — Z98.890 S/P RIGHT ROTATOR CUFF REPAIR: ICD-10-CM

## 2022-09-19 DIAGNOSIS — Z09 POSTOP CHECK: Primary | ICD-10-CM

## 2022-09-19 PROCEDURE — 99024 POSTOP FOLLOW-UP VISIT: CPT | Performed by: ORTHOPAEDIC SURGERY

## 2022-09-19 NOTE — PROGRESS NOTES
Follow-up Visit         Patient: Valetnin Vásquez  YOB: 1965  Date of Encounter: 09/19/2022      Chief  Complaint:   Chief Complaint   Patient presents with   • Right Shoulder - Pain, Follow-up         HPI:  Valentin Vásquez, 57 y.o. male presents in follow-up rotator cuff repair right shoulder July 19, 2022 he is doing reasonably well he is attending physical therapy.  He has reduced pain and reports improvement in motion and strength.  He is encouraged to continue with aggressive physical therapy he will also do exercises at home.  We will place new order for continued physical therapy right shoulder we will see him back in 6 weeks..        Medical History:  Patient Active Problem List   Diagnosis   • Acute pancreatitis     Past Medical History:   Diagnosis Date   • Arthritis    • Diabetes mellitus (HCC)    • Elevated cholesterol    • GERD (gastroesophageal reflux disease)    • Gout    • Hyperlipidemia    • Hypertension    • Injury of back    • Kidney stone    • Pancreatitis    • Right shoulder pain    • Tear of right rotator cuff          Social History:  Social History     Socioeconomic History   • Marital status:    Tobacco Use   • Smoking status: Never Smoker   • Smokeless tobacco: Current User     Types: Snuff   Vaping Use   • Vaping Use: Never used   Substance and Sexual Activity   • Alcohol use: Yes     Comment: occasionally   • Drug use: No   • Sexual activity: Defer     Partners: Female         Current Medications:    Current Outpatient Medications:   •  Aspirin Low Dose 81 MG EC tablet, Take 81 mg by mouth Daily., Disp: , Rfl:   •  fenofibrate (TRICOR) 145 MG tablet, Take 145 mg by mouth Daily., Disp: , Rfl:   •  glimepiride (AMARYL) 4 MG tablet, Take 4 mg by mouth Daily., Disp: , Rfl:   •  HYDROcodone-acetaminophen (NORCO) 7.5-325 MG per tablet, Take 1 tablet by mouth Every 6 (Six) Hours As Needed for Moderate Pain ., Disp: 30 tablet, Rfl: 0  •  Jardiance 25 MG tablet tablet, Take 25  mg by mouth Daily., Disp: , Rfl:   •  lisinopril (PRINIVIL,ZESTRIL) 10 MG tablet, Take 10 mg by mouth Daily., Disp: , Rfl:   •  metFORMIN (GLUCOPHAGE) 1000 MG tablet, Take 500 mg by mouth 2 (Two) Times a Day With Meals., Disp: , Rfl:   •  metoprolol succinate XL (TOPROL XL) 25 MG 24 hr tablet, Take 1 tablet by mouth Daily., Disp: 30 tablet, Rfl: 0  •  omeprazole (priLOSEC) 40 MG capsule, Take 40 mg by mouth Daily., Disp: , Rfl:   •  rosuvastatin (CRESTOR) 10 MG tablet, Take 10 mg by mouth Every Night., Disp: , Rfl:   •  Tresiba FlexTouch 200 UNIT/ML solution pen-injector pen injection, Inject 80 Units under the skin into the appropriate area as directed Daily., Disp: , Rfl:       Allergies:  No Known Allergies      Family History:  Family History   Problem Relation Age of Onset   • Diabetes Father    • Diabetes Sister          Surgical History:  Past Surgical History:   Procedure Laterality Date   • ADENOIDECTOMY     • KIDNEY STONE SURGERY     • SHOULDER ARTHROSCOPY W/ ROTATOR CUFF REPAIR Right 7/19/2022    Procedure: RIGHT SHOULDER ARTHROSCOPY WITH OPEN ACROMIOPLASTY,  ROTATOR CUFF REPAIR, EXCISION LATERAL CLAVICLE;  Surgeon: Edmundo Huitron MD;  Location: [facility];  Service: Orthopedics;  Laterality: Right;   • TONSILLECTOMY             Examination:   Examination right shoulder reveals forward elevation to 120 degrees with good strength with Jobes maneuver.  Incision is intact with normal sensation.        Assessment & Plan:   57 y.o. male presents open rotator cuff repair acromioplasty excision lateral clavicle. He is slowly improving.  We will continue aggressive strengthening            Diagnosis Plan   1. Postop check               Cc:  Tristen Stuart,               This document has been electronically signed by Edmundo Huitron MD   September 20, 2022 16:45 EDT

## 2022-09-20 ENCOUNTER — TRANSCRIBE ORDERS (OUTPATIENT)
Dept: PHYSICAL THERAPY | Facility: CLINIC | Age: 57
End: 2022-09-20

## 2022-09-21 ENCOUNTER — TELEPHONE (OUTPATIENT)
Dept: PHYSICAL THERAPY | Facility: CLINIC | Age: 57
End: 2022-09-21

## 2022-09-21 ENCOUNTER — HOSPITAL ENCOUNTER (OUTPATIENT)
Dept: GENERAL RADIOLOGY | Facility: HOSPITAL | Age: 57
Discharge: HOME | End: 2022-09-21

## 2022-09-21 ENCOUNTER — OFFICE VISIT (OUTPATIENT)
Dept: UROLOGY | Facility: CLINIC | Age: 57
End: 2022-09-21

## 2022-09-21 VITALS — WEIGHT: 184 LBS | BODY MASS INDEX: 28.88 KG/M2 | HEIGHT: 67 IN

## 2022-09-21 DIAGNOSIS — R30.0 DYSURIA: ICD-10-CM

## 2022-09-21 DIAGNOSIS — R35.0 FREQUENCY OF URINATION: Primary | ICD-10-CM

## 2022-09-21 DIAGNOSIS — Z12.5 SCREENING PSA (PROSTATE SPECIFIC ANTIGEN): ICD-10-CM

## 2022-09-21 PROCEDURE — 84153 ASSAY OF PSA TOTAL: CPT

## 2022-09-21 PROCEDURE — 99203 OFFICE O/P NEW LOW 30 MIN: CPT

## 2022-09-21 PROCEDURE — 74018 RADEX ABDOMEN 1 VIEW: CPT

## 2022-09-21 PROCEDURE — 74018 RADEX ABDOMEN 1 VIEW: CPT | Performed by: RADIOLOGY

## 2022-09-21 PROCEDURE — 87086 URINE CULTURE/COLONY COUNT: CPT

## 2022-09-21 RX ORDER — PHENAZOPYRIDINE HYDROCHLORIDE 200 MG/1
200 TABLET, FILM COATED ORAL 3 TIMES DAILY PRN
Qty: 20 TABLET | Refills: 0 | Status: SHIPPED | OUTPATIENT
Start: 2022-09-21 | End: 2022-10-31

## 2022-09-21 RX ORDER — TAMSULOSIN HYDROCHLORIDE 0.4 MG/1
1 CAPSULE ORAL NIGHTLY
Qty: 30 CAPSULE | Refills: 5 | Status: SHIPPED | OUTPATIENT
Start: 2022-09-21

## 2022-09-21 NOTE — H&P (VIEW-ONLY)
"Chief Complaint:    Chief Complaint   Patient presents with   • Benign Prostatic Hypertrophy   • Urinary Tract Infection       Vital Signs:   Ht 170.2 cm (67.01\")   Wt 83.5 kg (184 lb)   BMI 28.81 kg/m²   Body mass index is 28.81 kg/m².      HPI:  Valentin Vásquez is a 57 y.o. male who presents today for initial evaluation     History of Present Illness  Mr. Vásquez presents to the clinic with his wife for possible BPH/UTI.  He denies any history of BPH or previous urinary tract infection.  Patient states that he has had kidney stones in the past.  He reports that his last stone was several years ago. Mr. Vásquez states for roughly 2 to 3 weeks now he has been having dysuria, urgency, and difficulty urinating.  Denies any flank pain, back pain, nausea, vomiting, fever, chills, urgency, or hematuria.  He states that he has been taking Azo over-the-counter with little benefit.  His wife reports that his primary care provider checked his urine and there were no signs of infection.  I will send his urine for culture today.      Past Medical History:  Past Medical History:   Diagnosis Date   • Arthritis    • Diabetes mellitus (HCC)    • Elevated cholesterol    • GERD (gastroesophageal reflux disease)    • Gout    • Hyperlipidemia    • Hypertension    • Injury of back    • Kidney stone    • Pancreatitis    • Right shoulder pain    • Tear of right rotator cuff        Current Meds:  Current Outpatient Medications   Medication Sig Dispense Refill   • Aspirin Low Dose 81 MG EC tablet Take 81 mg by mouth Daily.     • fenofibrate (TRICOR) 145 MG tablet Take 145 mg by mouth Daily.     • glimepiride (AMARYL) 4 MG tablet Take 4 mg by mouth Daily.     • HYDROcodone-acetaminophen (NORCO) 7.5-325 MG per tablet Take 1 tablet by mouth Every 6 (Six) Hours As Needed for Moderate Pain . 30 tablet 0   • Jardiance 25 MG tablet tablet Take 25 mg by mouth Daily.     • lisinopril (PRINIVIL,ZESTRIL) 10 MG tablet Take 10 mg by mouth Daily.     • " metFORMIN (GLUCOPHAGE) 1000 MG tablet Take 500 mg by mouth 2 (Two) Times a Day With Meals.     • metoprolol succinate XL (TOPROL XL) 25 MG 24 hr tablet Take 1 tablet by mouth Daily. 30 tablet 0   • omeprazole (priLOSEC) 40 MG capsule Take 40 mg by mouth Daily.     • rosuvastatin (CRESTOR) 10 MG tablet Take 10 mg by mouth Every Night.     • Tresiba FlexTouch 200 UNIT/ML solution pen-injector pen injection Inject 80 Units under the skin into the appropriate area as directed Daily.     • phenazopyridine (Pyridium) 200 MG tablet Take 1 tablet by mouth 3 (Three) Times a Day As Needed for Bladder Spasms. 20 tablet 0   • tamsulosin (Flomax) 0.4 MG capsule 24 hr capsule Take 1 capsule by mouth Every Night. 30 capsule 5     No current facility-administered medications for this visit.        Allergies:   No Known Allergies     Past Surgical History:  Past Surgical History:   Procedure Laterality Date   • ADENOIDECTOMY     • KIDNEY STONE SURGERY     • SHOULDER ARTHROSCOPY W/ ROTATOR CUFF REPAIR Right 7/19/2022    Procedure: RIGHT SHOULDER ARTHROSCOPY WITH OPEN ACROMIOPLASTY,  ROTATOR CUFF REPAIR, EXCISION LATERAL CLAVICLE;  Surgeon: Edmundo Huitron MD;  Location: [facility];  Service: Orthopedics;  Laterality: Right;   • TONSILLECTOMY         Social History:  Social History     Socioeconomic History   • Marital status:    Tobacco Use   • Smoking status: Never Smoker   • Smokeless tobacco: Current User     Types: Snuff   Vaping Use   • Vaping Use: Never used   Substance and Sexual Activity   • Alcohol use: Yes     Comment: occasionally   • Drug use: No   • Sexual activity: Defer     Partners: Female       Family History:  Family History   Problem Relation Age of Onset   • Diabetes Father    • Diabetes Sister        Review of Systems:  Review of Systems   Constitutional: Negative for fatigue, fever and unexpected weight change.   Respiratory: Negative for chest tightness and shortness of breath.    Cardiovascular:  Negative for chest pain.   Gastrointestinal: Negative for abdominal pain, constipation, diarrhea, nausea and vomiting.   Genitourinary: Positive for difficulty urinating, dysuria and frequency. Negative for flank pain, hematuria and urgency.   Musculoskeletal: Negative for back pain.   Skin: Negative for rash.   Psychiatric/Behavioral: Negative for confusion and suicidal ideas.       Physical Exam:  Physical Exam  Constitutional:       General: He is not in acute distress.     Appearance: Normal appearance.   HENT:      Head: Normocephalic and atraumatic.      Nose: Nose normal.      Mouth/Throat:      Mouth: Mucous membranes are moist.   Eyes:      Conjunctiva/sclera: Conjunctivae normal.   Cardiovascular:      Rate and Rhythm: Normal rate and regular rhythm.      Pulses: Normal pulses.      Heart sounds: Normal heart sounds.   Pulmonary:      Effort: Pulmonary effort is normal.      Breath sounds: Normal breath sounds.   Abdominal:      General: Bowel sounds are normal.      Palpations: Abdomen is soft.   Genitourinary:     Comments: Slightly enlarged prostate  Musculoskeletal:         General: Normal range of motion.      Cervical back: Normal range of motion.   Skin:     General: Skin is warm.   Neurological:      General: No focal deficit present.      Mental Status: He is alert and oriented to person, place, and time.   Psychiatric:         Mood and Affect: Mood normal.         Behavior: Behavior normal.         Thought Content: Thought content normal.         Judgment: Judgment normal.         IPSS Questionnaire (AUA-7):  IPSS Questionnaire (AUA-7):                  IPSS Questionnaire (AUA-7):  Over the past month…    1)  Incomplete Emptying  How often have you had a sensation of not emptying your bladder?  2 - Less than half the time   2)  Frequency  How often have you had to urinate less than every two hours? 4 - More than half the time   3)  Intermittency  How often have you found you stopped and started  again several times when you urinated?  5 - Almost always   4) Urgency  How often have you found it difficult to postpone urination?  0 - Not at all   5) Weak Stream  How often have you had a weak urinary stream?  5 - Almost always   6) Straining  How often have you had to push or strain to begin urination?  5 - Almost always   7) Nocturia  How many times did you typically get up at night to urinate?  4 - 4 times   Total Score:  25   The International Prostate Symptom Score (IPSS) is used to screen, diagnose, track symptoms of benign prostatic hyperplasia (BPH).    0-7 pts (Mild Symptoms)  / 8-19 pts (Moderate) / 20-35 (Severe)    Quality of life due to urinary symptoms:  If you were to spend the rest of your life with your urinary condition the way it is now, how would you feel about that? 6-Terrible   Urine Leakage (Incontinence) 0-No Leakage       Recent Image (CT and/or KUB):   CT Abdomen and Pelvis: No results found for this or any previous visit.     CT Stone Protocol: No results found for this or any previous visit.     KUB: No results found for this or any previous visit.       Labs:  Brief Urine Lab Results     None        Office Visit on 09/21/2022   Component Date Value Ref Range Status   • PSA 09/21/2022 0.506  0.000 - 4.000 ng/mL Final   Admission on 07/19/2022, Discharged on 07/20/2022   Component Date Value Ref Range Status   • Glucose 07/19/2022 102  70 - 130 mg/dL Final    Meter: [id number] : [id number] roxana christianson   • Glucose 07/20/2022 77  70 - 130 mg/dL Final    Meter: [id number] : [id number] HIRALRAJNI ESTEVES   • Glucose 07/20/2022 149 (A) 70 - 130 mg/dL Final    Meter: [id number] : [id number] TODD PAINTER   Lab on 07/15/2022   Component Date Value Ref Range Status   • COVID19 07/15/2022 Not Detected  Not Detected - Ref. Range Final   Pre-Admission Testing on 07/15/2022   Component Date Value Ref Range Status   • Glucose 07/15/2022 275 (A) 65 - 99 mg/dL Final   • BUN 07/15/2022 19  6  - 20 mg/dL Final   • Creatinine 07/15/2022 0.60 (A) 0.76 - 1.27 mg/dL Final   • Sodium 07/15/2022 137  136 - 145 mmol/L Final   • Potassium 07/15/2022 3.9  3.5 - 5.2 mmol/L Final   • Chloride 07/15/2022 103  98 - 107 mmol/L Final   • CO2 07/15/2022 20.4 (A) 22.0 - 29.0 mmol/L Final   • Calcium 07/15/2022 9.6  8.6 - 10.5 mg/dL Final   • BUN/Creatinine Ratio 07/15/2022 31.7 (A) 7.0 - 25.0 Final   • Anion Gap 07/15/2022 13.6  5.0 - 15.0 mmol/L Final   • eGFR 07/15/2022 111.2  >60.0 mL/min/1.73 Final    National Kidney Foundation and American Society of Nephrology (ASN) Task Force recommended calculation based on the Chronic Kidney Disease Epidemiology Collaboration (CKD-EPI) equation refit without adjustment for race.   • WBC 07/15/2022 8.80  3.40 - 10.80 10*3/mm3 Final   • RBC 07/15/2022 5.07  4.14 - 5.80 10*6/mm3 Final   • Hemoglobin 07/15/2022 14.4  13.0 - 17.7 g/dL Final   • Hematocrit 07/15/2022 44.3  37.5 - 51.0 % Final   • MCV 07/15/2022 87.4  79.0 - 97.0 fL Final   • MCH 07/15/2022 28.4  26.6 - 33.0 pg Final   • MCHC 07/15/2022 32.5  31.5 - 35.7 g/dL Final   • RDW 07/15/2022 13.8  12.3 - 15.4 % Final   • RDW-SD 07/15/2022 44.0  37.0 - 54.0 fl Final   • MPV 07/15/2022 10.2  6.0 - 12.0 fL Final   • Platelets 07/15/2022 275  140 - 450 10*3/mm3 Final        Procedure: None  Procedures     I have reviewed and agree with the above PMH, PSH, FMH, social history, medications, allergies, and labs.     Assessment/Plan:   Problem List Items Addressed This Visit    None     Visit Diagnoses     Frequency of urination    -  Primary    Relevant Orders    Urine Culture - Urine, Urine, Clean Catch    Dysuria        Relevant Medications    tamsulosin (Flomax) 0.4 MG capsule 24 hr capsule    phenazopyridine (Pyridium) 200 MG tablet    Other Relevant Orders    PSA Diagnostic (Completed)    XR abdomen kub (Completed)    Screening PSA (prostate specific antigen)        Relevant Orders    PSA Diagnostic (Completed)          Health  Maintenance:   Health Maintenance Due   Topic Date Due   • URINE MICROALBUMIN  Never done   • COLORECTAL CANCER SCREENING  Never done   • COVID-19 Vaccine (1) Never done   • ANNUAL PHYSICAL  Never done   • Pneumococcal Vaccine 0-64 (1 - PCV) Never done   • ZOSTER VACCINE (1 of 2) Never done   • HEPATITIS C SCREENING  Never done   • DIABETIC FOOT EXAM  Never done   • DIABETIC EYE EXAM  Never done   • LIPID PANEL  07/10/2020        Smoking Counseling: Never smoked.  Current user of snuff daily counseling was given however patient is not ready to quit.    Urine Incontinence: Patient reports that he is not currently experiencing any symptoms of urinary incontinence.    Patient was given instructions and counseling regarding his condition or for health maintenance advice. Please see specific information pulled into the AVS if appropriate.    Patient Education:   Urinary tract infection -I discussed with the patient ways to help prevent urinary tract infections.  I informed the patient to increase water intake to 2 to 3 L/day.  I advised the patient to take over-the-counter laxatives as needed to prevent constipation.  I discussed with the patient the use of a daily probiotic to help with growth and control of normal deana of the urogenital tract.  I discussed with the patient uses of antibiotics as indicated to help treat urinary tract infections.  I discussed that is important to have good glucose control to help with urinary symptoms and prevent UTIs as well. I am recommending the use of urine culture to look for signs of bacteria and a KUB today to look for any evidence of stones.  I will call patient with results.  Benign Prostate Hypertrophy (BPH): Discussed the pathophysiology of BPH and obstruction.  We discussed the static and dynamic effects of BPH as well as using 5 alpha reductase inhibitors versus alpha blockade.  We discussed the indications for transurethral surgery as well and/ or other therapeutic options  available including all of the newer techniques.  PSA testing - I am recommending a prostate specific antigen blood test or PSA.  I discussed the pathophysiology of PSA testing indicating its use in the diagnosis and management of prostate cancer.  I discussed the normal range being 0 to 4, but more appropriately being much closer to 0 to 2 in a normal male.  I discussed the fact that after a certain age it is against recommendation to use PSA testing especially in view of numerous comorbidities.  I discussed many of the things that can artificially raise PSA including put not limited to a recent infection, urinary tract infection, recent sexual intercourse, or even the type of movement such as manipulation of the prostate from riding a bicycle.  It was discussed that the most important use of PSA is the velocity measurement.  This refers to the change of PSA with time. I discussed that we look for greater than 20% rise over a year to help us make the prediction of prostate cancer.  I also discussed that in the case of prostate cancer indicating a radical prostatectomy, the PSA should be 0 and any rise indicates an early biochemical recurrence.    Visit Diagnoses:    ICD-10-CM ICD-9-CM   1. Frequency of urination  R35.0 788.41   2. Dysuria  R30.0 788.1   3. Screening PSA (prostate specific antigen)  Z12.5 V76.44       Meds Ordered During Visit:  New Medications Ordered This Visit   Medications   • tamsulosin (Flomax) 0.4 MG capsule 24 hr capsule     Sig: Take 1 capsule by mouth Every Night.     Dispense:  30 capsule     Refill:  5   • phenazopyridine (Pyridium) 200 MG tablet     Sig: Take 1 tablet by mouth 3 (Three) Times a Day As Needed for Bladder Spasms.     Dispense:  20 tablet     Refill:  0       Follow Up Appointment: 1 month  No follow-ups on file.      This document has been electronically signed by Harish Yanez PA-C   September 22, 2022 08:08 EDT    Part of this note may be an electronic  transcription/translation of spoken language to printed text using the Dragon Dictation System.

## 2022-09-21 NOTE — PROGRESS NOTES
"Chief Complaint:    Chief Complaint   Patient presents with   • Benign Prostatic Hypertrophy   • Urinary Tract Infection       Vital Signs:   Ht 170.2 cm (67.01\")   Wt 83.5 kg (184 lb)   BMI 28.81 kg/m²   Body mass index is 28.81 kg/m².      HPI:  Valentin Vásquez is a 57 y.o. male who presents today for initial evaluation     History of Present Illness  Mr. Vásquez presents to the clinic with his wife for possible BPH/UTI.  He denies any history of BPH or previous urinary tract infection.  Patient states that he has had kidney stones in the past.  He reports that his last stone was several years ago. Mr. Vásquez states for roughly 2 to 3 weeks now he has been having dysuria, urgency, and difficulty urinating.  Denies any flank pain, back pain, nausea, vomiting, fever, chills, urgency, or hematuria.  He states that he has been taking Azo over-the-counter with little benefit.  His wife reports that his primary care provider checked his urine and there were no signs of infection.  I will send his urine for culture today.      Past Medical History:  Past Medical History:   Diagnosis Date   • Arthritis    • Diabetes mellitus (HCC)    • Elevated cholesterol    • GERD (gastroesophageal reflux disease)    • Gout    • Hyperlipidemia    • Hypertension    • Injury of back    • Kidney stone    • Pancreatitis    • Right shoulder pain    • Tear of right rotator cuff        Current Meds:  Current Outpatient Medications   Medication Sig Dispense Refill   • Aspirin Low Dose 81 MG EC tablet Take 81 mg by mouth Daily.     • fenofibrate (TRICOR) 145 MG tablet Take 145 mg by mouth Daily.     • glimepiride (AMARYL) 4 MG tablet Take 4 mg by mouth Daily.     • HYDROcodone-acetaminophen (NORCO) 7.5-325 MG per tablet Take 1 tablet by mouth Every 6 (Six) Hours As Needed for Moderate Pain . 30 tablet 0   • Jardiance 25 MG tablet tablet Take 25 mg by mouth Daily.     • lisinopril (PRINIVIL,ZESTRIL) 10 MG tablet Take 10 mg by mouth Daily.     • " metFORMIN (GLUCOPHAGE) 1000 MG tablet Take 500 mg by mouth 2 (Two) Times a Day With Meals.     • metoprolol succinate XL (TOPROL XL) 25 MG 24 hr tablet Take 1 tablet by mouth Daily. 30 tablet 0   • omeprazole (priLOSEC) 40 MG capsule Take 40 mg by mouth Daily.     • rosuvastatin (CRESTOR) 10 MG tablet Take 10 mg by mouth Every Night.     • Tresiba FlexTouch 200 UNIT/ML solution pen-injector pen injection Inject 80 Units under the skin into the appropriate area as directed Daily.     • phenazopyridine (Pyridium) 200 MG tablet Take 1 tablet by mouth 3 (Three) Times a Day As Needed for Bladder Spasms. 20 tablet 0   • tamsulosin (Flomax) 0.4 MG capsule 24 hr capsule Take 1 capsule by mouth Every Night. 30 capsule 5     No current facility-administered medications for this visit.        Allergies:   No Known Allergies     Past Surgical History:  Past Surgical History:   Procedure Laterality Date   • ADENOIDECTOMY     • KIDNEY STONE SURGERY     • SHOULDER ARTHROSCOPY W/ ROTATOR CUFF REPAIR Right 7/19/2022    Procedure: RIGHT SHOULDER ARTHROSCOPY WITH OPEN ACROMIOPLASTY,  ROTATOR CUFF REPAIR, EXCISION LATERAL CLAVICLE;  Surgeon: Edmundo Huitron MD;  Location: [facility];  Service: Orthopedics;  Laterality: Right;   • TONSILLECTOMY         Social History:  Social History     Socioeconomic History   • Marital status:    Tobacco Use   • Smoking status: Never Smoker   • Smokeless tobacco: Current User     Types: Snuff   Vaping Use   • Vaping Use: Never used   Substance and Sexual Activity   • Alcohol use: Yes     Comment: occasionally   • Drug use: No   • Sexual activity: Defer     Partners: Female       Family History:  Family History   Problem Relation Age of Onset   • Diabetes Father    • Diabetes Sister        Review of Systems:  Review of Systems   Constitutional: Negative for fatigue, fever and unexpected weight change.   Respiratory: Negative for chest tightness and shortness of breath.    Cardiovascular:  Negative for chest pain.   Gastrointestinal: Negative for abdominal pain, constipation, diarrhea, nausea and vomiting.   Genitourinary: Positive for difficulty urinating, dysuria and frequency. Negative for flank pain, hematuria and urgency.   Musculoskeletal: Negative for back pain.   Skin: Negative for rash.   Psychiatric/Behavioral: Negative for confusion and suicidal ideas.       Physical Exam:  Physical Exam  Constitutional:       General: He is not in acute distress.     Appearance: Normal appearance.   HENT:      Head: Normocephalic and atraumatic.      Nose: Nose normal.      Mouth/Throat:      Mouth: Mucous membranes are moist.   Eyes:      Conjunctiva/sclera: Conjunctivae normal.   Cardiovascular:      Rate and Rhythm: Normal rate and regular rhythm.      Pulses: Normal pulses.      Heart sounds: Normal heart sounds.   Pulmonary:      Effort: Pulmonary effort is normal.      Breath sounds: Normal breath sounds.   Abdominal:      General: Bowel sounds are normal.      Palpations: Abdomen is soft.   Genitourinary:     Comments: Slightly enlarged prostate  Musculoskeletal:         General: Normal range of motion.      Cervical back: Normal range of motion.   Skin:     General: Skin is warm.   Neurological:      General: No focal deficit present.      Mental Status: He is alert and oriented to person, place, and time.   Psychiatric:         Mood and Affect: Mood normal.         Behavior: Behavior normal.         Thought Content: Thought content normal.         Judgment: Judgment normal.         IPSS Questionnaire (AUA-7):  IPSS Questionnaire (AUA-7):                  IPSS Questionnaire (AUA-7):  Over the past month…    1)  Incomplete Emptying  How often have you had a sensation of not emptying your bladder?  2 - Less than half the time   2)  Frequency  How often have you had to urinate less than every two hours? 4 - More than half the time   3)  Intermittency  How often have you found you stopped and started  again several times when you urinated?  5 - Almost always   4) Urgency  How often have you found it difficult to postpone urination?  0 - Not at all   5) Weak Stream  How often have you had a weak urinary stream?  5 - Almost always   6) Straining  How often have you had to push or strain to begin urination?  5 - Almost always   7) Nocturia  How many times did you typically get up at night to urinate?  4 - 4 times   Total Score:  25   The International Prostate Symptom Score (IPSS) is used to screen, diagnose, track symptoms of benign prostatic hyperplasia (BPH).    0-7 pts (Mild Symptoms)  / 8-19 pts (Moderate) / 20-35 (Severe)    Quality of life due to urinary symptoms:  If you were to spend the rest of your life with your urinary condition the way it is now, how would you feel about that? 6-Terrible   Urine Leakage (Incontinence) 0-No Leakage       Recent Image (CT and/or KUB):   CT Abdomen and Pelvis: No results found for this or any previous visit.     CT Stone Protocol: No results found for this or any previous visit.     KUB: No results found for this or any previous visit.       Labs:  Brief Urine Lab Results     None        Office Visit on 09/21/2022   Component Date Value Ref Range Status   • PSA 09/21/2022 0.506  0.000 - 4.000 ng/mL Final   Admission on 07/19/2022, Discharged on 07/20/2022   Component Date Value Ref Range Status   • Glucose 07/19/2022 102  70 - 130 mg/dL Final    Meter: [id number] : [id number] roxana christianson   • Glucose 07/20/2022 77  70 - 130 mg/dL Final    Meter: [id number] : [id number] HIRALRAJNI ESTEVES   • Glucose 07/20/2022 149 (A) 70 - 130 mg/dL Final    Meter: [id number] : [id number] TODD PAINTER   Lab on 07/15/2022   Component Date Value Ref Range Status   • COVID19 07/15/2022 Not Detected  Not Detected - Ref. Range Final   Pre-Admission Testing on 07/15/2022   Component Date Value Ref Range Status   • Glucose 07/15/2022 275 (A) 65 - 99 mg/dL Final   • BUN 07/15/2022 19  6  - 20 mg/dL Final   • Creatinine 07/15/2022 0.60 (A) 0.76 - 1.27 mg/dL Final   • Sodium 07/15/2022 137  136 - 145 mmol/L Final   • Potassium 07/15/2022 3.9  3.5 - 5.2 mmol/L Final   • Chloride 07/15/2022 103  98 - 107 mmol/L Final   • CO2 07/15/2022 20.4 (A) 22.0 - 29.0 mmol/L Final   • Calcium 07/15/2022 9.6  8.6 - 10.5 mg/dL Final   • BUN/Creatinine Ratio 07/15/2022 31.7 (A) 7.0 - 25.0 Final   • Anion Gap 07/15/2022 13.6  5.0 - 15.0 mmol/L Final   • eGFR 07/15/2022 111.2  >60.0 mL/min/1.73 Final    National Kidney Foundation and American Society of Nephrology (ASN) Task Force recommended calculation based on the Chronic Kidney Disease Epidemiology Collaboration (CKD-EPI) equation refit without adjustment for race.   • WBC 07/15/2022 8.80  3.40 - 10.80 10*3/mm3 Final   • RBC 07/15/2022 5.07  4.14 - 5.80 10*6/mm3 Final   • Hemoglobin 07/15/2022 14.4  13.0 - 17.7 g/dL Final   • Hematocrit 07/15/2022 44.3  37.5 - 51.0 % Final   • MCV 07/15/2022 87.4  79.0 - 97.0 fL Final   • MCH 07/15/2022 28.4  26.6 - 33.0 pg Final   • MCHC 07/15/2022 32.5  31.5 - 35.7 g/dL Final   • RDW 07/15/2022 13.8  12.3 - 15.4 % Final   • RDW-SD 07/15/2022 44.0  37.0 - 54.0 fl Final   • MPV 07/15/2022 10.2  6.0 - 12.0 fL Final   • Platelets 07/15/2022 275  140 - 450 10*3/mm3 Final        Procedure: None  Procedures     I have reviewed and agree with the above PMH, PSH, FMH, social history, medications, allergies, and labs.     Assessment/Plan:   Problem List Items Addressed This Visit    None     Visit Diagnoses     Frequency of urination    -  Primary    Relevant Orders    Urine Culture - Urine, Urine, Clean Catch    Dysuria        Relevant Medications    tamsulosin (Flomax) 0.4 MG capsule 24 hr capsule    phenazopyridine (Pyridium) 200 MG tablet    Other Relevant Orders    PSA Diagnostic (Completed)    XR abdomen kub (Completed)    Screening PSA (prostate specific antigen)        Relevant Orders    PSA Diagnostic (Completed)          Health  Maintenance:   Health Maintenance Due   Topic Date Due   • URINE MICROALBUMIN  Never done   • COLORECTAL CANCER SCREENING  Never done   • COVID-19 Vaccine (1) Never done   • ANNUAL PHYSICAL  Never done   • Pneumococcal Vaccine 0-64 (1 - PCV) Never done   • ZOSTER VACCINE (1 of 2) Never done   • HEPATITIS C SCREENING  Never done   • DIABETIC FOOT EXAM  Never done   • DIABETIC EYE EXAM  Never done   • LIPID PANEL  07/10/2020        Smoking Counseling: Never smoked.  Current user of snuff daily counseling was given however patient is not ready to quit.    Urine Incontinence: Patient reports that he is not currently experiencing any symptoms of urinary incontinence.    Patient was given instructions and counseling regarding his condition or for health maintenance advice. Please see specific information pulled into the AVS if appropriate.    Patient Education:   Urinary tract infection -I discussed with the patient ways to help prevent urinary tract infections.  I informed the patient to increase water intake to 2 to 3 L/day.  I advised the patient to take over-the-counter laxatives as needed to prevent constipation.  I discussed with the patient the use of a daily probiotic to help with growth and control of normal deana of the urogenital tract.  I discussed with the patient uses of antibiotics as indicated to help treat urinary tract infections.  I discussed that is important to have good glucose control to help with urinary symptoms and prevent UTIs as well. I am recommending the use of urine culture to look for signs of bacteria and a KUB today to look for any evidence of stones.  I will call patient with results.  Benign Prostate Hypertrophy (BPH): Discussed the pathophysiology of BPH and obstruction.  We discussed the static and dynamic effects of BPH as well as using 5 alpha reductase inhibitors versus alpha blockade.  We discussed the indications for transurethral surgery as well and/ or other therapeutic options  available including all of the newer techniques.  PSA testing - I am recommending a prostate specific antigen blood test or PSA.  I discussed the pathophysiology of PSA testing indicating its use in the diagnosis and management of prostate cancer.  I discussed the normal range being 0 to 4, but more appropriately being much closer to 0 to 2 in a normal male.  I discussed the fact that after a certain age it is against recommendation to use PSA testing especially in view of numerous comorbidities.  I discussed many of the things that can artificially raise PSA including put not limited to a recent infection, urinary tract infection, recent sexual intercourse, or even the type of movement such as manipulation of the prostate from riding a bicycle.  It was discussed that the most important use of PSA is the velocity measurement.  This refers to the change of PSA with time. I discussed that we look for greater than 20% rise over a year to help us make the prediction of prostate cancer.  I also discussed that in the case of prostate cancer indicating a radical prostatectomy, the PSA should be 0 and any rise indicates an early biochemical recurrence.    Visit Diagnoses:    ICD-10-CM ICD-9-CM   1. Frequency of urination  R35.0 788.41   2. Dysuria  R30.0 788.1   3. Screening PSA (prostate specific antigen)  Z12.5 V76.44       Meds Ordered During Visit:  New Medications Ordered This Visit   Medications   • tamsulosin (Flomax) 0.4 MG capsule 24 hr capsule     Sig: Take 1 capsule by mouth Every Night.     Dispense:  30 capsule     Refill:  5   • phenazopyridine (Pyridium) 200 MG tablet     Sig: Take 1 tablet by mouth 3 (Three) Times a Day As Needed for Bladder Spasms.     Dispense:  20 tablet     Refill:  0       Follow Up Appointment: 1 month  No follow-ups on file.      This document has been electronically signed by Harish Yanez PA-C   September 22, 2022 08:08 EDT    Part of this note may be an electronic  transcription/translation of spoken language to printed text using the Dragon Dictation System.

## 2022-09-21 NOTE — TELEPHONE ENCOUNTER
Patient contacted regarding his hard visit limit met.  Patient instructed on the need for additional therapy needed due to limitations in shoulder ROM and the option for future cash pay visits.  The patient has requested to hold tx tomorrow and reports his MD is going to try and get approved additional visits.  Pt was instructed to contact therapy clinic early next week.

## 2022-09-22 LAB
BACTERIA SPEC AEROBE CULT: NO GROWTH
PSA SERPL-MCNC: 0.51 NG/ML (ref 0–4)

## 2022-09-23 ENCOUNTER — TELEPHONE (OUTPATIENT)
Dept: UROLOGY | Facility: CLINIC | Age: 57
End: 2022-09-23

## 2022-09-23 DIAGNOSIS — N41.0 ACUTE PROSTATITIS: Primary | ICD-10-CM

## 2022-09-23 RX ORDER — SULFAMETHOXAZOLE AND TRIMETHOPRIM 800; 160 MG/1; MG/1
1 TABLET ORAL 2 TIMES DAILY
Qty: 20 TABLET | Refills: 0 | Status: SHIPPED | OUTPATIENT
Start: 2022-09-23 | End: 2022-10-31

## 2022-09-23 NOTE — TELEPHONE ENCOUNTER
Patient called complaining of blood and difficulty urinating. He states that he feels like there is a stone in his urethra. I advised the patient to continue flomax and drink plenty of fluids. I advised patient to go to nearest ER if he is unable to urinate, pain increases, or if he experiences fever, chills, nausea, vomiting, or gross blood that doesn't stop.

## 2022-09-26 ENCOUNTER — OFFICE VISIT (OUTPATIENT)
Dept: UROLOGY | Facility: CLINIC | Age: 57
End: 2022-09-26

## 2022-09-26 VITALS — WEIGHT: 184 LBS | HEIGHT: 67 IN | BODY MASS INDEX: 28.88 KG/M2

## 2022-09-26 DIAGNOSIS — N20.0 NEPHROLITHIASIS: Primary | ICD-10-CM

## 2022-09-26 PROCEDURE — 99213 OFFICE O/P EST LOW 20 MIN: CPT

## 2022-09-26 RX ORDER — GENTAMICIN SULFATE 80 MG/100ML
80 INJECTION, SOLUTION INTRAVENOUS ONCE
Status: CANCELLED | OUTPATIENT
Start: 2022-09-28 | End: 2022-09-26

## 2022-09-26 NOTE — PROGRESS NOTES
"Chief Complaint:    Chief Complaint   Patient presents with   • Nephrolithiasis       Vital Signs:   Ht 170.2 cm (67.01\")   Wt 83.5 kg (184 lb)   BMI 28.81 kg/m²   Body mass index is 28.81 kg/m².      HPI:  Valentin Vásquez is a 57 y.o. male who presents today for follow up    History of Present Illness  Mr. Vásquez presents to the clinic with his wife for nephrolithiasis. I saw the patient last week for which he had pain with urination and dysuria. KUB completed that day was negative for stones and culture was negative for any growth. Patient called on Friday saying he was having severe pain with urination and gross hematuria. He felt that he had a stone stuck in his urethra. I advised patient to go the nearest ER or one that had a urologist on call such as  or [place]. Patient states he went to the [place] ER were a stone was  Identified in his urethra. Patient and wife state that the ER doctor tried to manual remove the stone and was unsuccessful. Complains of dysuria, difficulty urinating, and frequency. He reports hematuria has improved some. Case was discussed with Dr. Jeffers and patient will undergo cystoscopy with removal this Wednesday 09/28/22.      Past Medical History:  Past Medical History:   Diagnosis Date   • Arthritis    • Diabetes mellitus (HCC)    • Elevated cholesterol    • GERD (gastroesophageal reflux disease)    • Gout    • Hyperlipidemia    • Hypertension    • Injury of back    • Kidney stone    • Pancreatitis    • Right shoulder pain    • Tear of right rotator cuff        Current Meds:  Current Outpatient Medications   Medication Sig Dispense Refill   • Aspirin Low Dose 81 MG EC tablet Take 81 mg by mouth Daily.     • fenofibrate (TRICOR) 145 MG tablet Take 145 mg by mouth Daily.     • glimepiride (AMARYL) 4 MG tablet Take 4 mg by mouth Daily.     • HYDROcodone-acetaminophen (NORCO) 7.5-325 MG per tablet Take 1 tablet by mouth Every 6 (Six) Hours As Needed for Moderate Pain . 30 tablet 0 "   • Jardiance 25 MG tablet tablet Take 25 mg by mouth Daily.     • lisinopril (PRINIVIL,ZESTRIL) 10 MG tablet Take 10 mg by mouth Daily.     • metFORMIN (GLUCOPHAGE) 1000 MG tablet Take 500 mg by mouth 2 (Two) Times a Day With Meals.     • metoprolol succinate XL (TOPROL XL) 25 MG 24 hr tablet Take 1 tablet by mouth Daily. 30 tablet 0   • omeprazole (priLOSEC) 40 MG capsule Take 40 mg by mouth Daily.     • phenazopyridine (Pyridium) 200 MG tablet Take 1 tablet by mouth 3 (Three) Times a Day As Needed for Bladder Spasms. 20 tablet 0   • rosuvastatin (CRESTOR) 10 MG tablet Take 10 mg by mouth Every Night.     • sulfamethoxazole-trimethoprim (Bactrim DS) 800-160 MG per tablet Take 1 tablet by mouth 2 (Two) Times a Day. 20 tablet 0   • tamsulosin (Flomax) 0.4 MG capsule 24 hr capsule Take 1 capsule by mouth Every Night. 30 capsule 5   • Tresiba FlexTouch 200 UNIT/ML solution pen-injector pen injection Inject 80 Units under the skin into the appropriate area as directed Daily.       No current facility-administered medications for this visit.        Allergies:   No Known Allergies     Past Surgical History:  Past Surgical History:   Procedure Laterality Date   • ADENOIDECTOMY     • KIDNEY STONE SURGERY     • SHOULDER ARTHROSCOPY W/ ROTATOR CUFF REPAIR Right 7/19/2022    Procedure: RIGHT SHOULDER ARTHROSCOPY WITH OPEN ACROMIOPLASTY,  ROTATOR CUFF REPAIR, EXCISION LATERAL CLAVICLE;  Surgeon: Edmundo Huitron MD;  Location: [facility];  Service: Orthopedics;  Laterality: Right;   • TONSILLECTOMY         Social History:  Social History     Socioeconomic History   • Marital status:    Tobacco Use   • Smoking status: Never Smoker   • Smokeless tobacco: Current User     Types: Snuff   Vaping Use   • Vaping Use: Never used   Substance and Sexual Activity   • Alcohol use: Yes     Comment: occasionally   • Drug use: No   • Sexual activity: Defer     Partners: Female       Family History:  Family History   Problem  Relation Age of Onset   • Diabetes Father    • Diabetes Sister        Review of Systems:  Review of Systems   Constitutional: Negative for fatigue, fever and unexpected weight change.   Respiratory: Negative for chest tightness and shortness of breath.    Cardiovascular: Negative for chest pain.   Gastrointestinal: Negative for abdominal pain, constipation, diarrhea, nausea and vomiting.   Genitourinary: Positive for difficulty urinating, dysuria and frequency. Negative for flank pain, hematuria and urgency.   Musculoskeletal: Negative for back pain.   Skin: Negative for rash.   Psychiatric/Behavioral: Negative for confusion and suicidal ideas.       Physical Exam:  Physical Exam  Constitutional:       General: He is not in acute distress.     Appearance: Normal appearance.   HENT:      Head: Normocephalic and atraumatic.      Nose: Nose normal.      Mouth/Throat:      Mouth: Mucous membranes are moist.   Eyes:      Conjunctiva/sclera: Conjunctivae normal.   Cardiovascular:      Rate and Rhythm: Normal rate and regular rhythm.      Pulses: Normal pulses.      Heart sounds: Normal heart sounds.   Pulmonary:      Effort: Pulmonary effort is normal.      Breath sounds: Normal breath sounds.   Abdominal:      General: Bowel sounds are normal.      Palpations: Abdomen is soft.   Musculoskeletal:         General: Normal range of motion.      Cervical back: Normal range of motion.   Skin:     General: Skin is warm.   Neurological:      General: No focal deficit present.      Mental Status: He is alert and oriented to person, place, and time.   Psychiatric:         Mood and Affect: Mood normal.         Behavior: Behavior normal.         Thought Content: Thought content normal.         Judgment: Judgment normal.         Recent Image (CT and/or KUB):   CT Abdomen and Pelvis: No results found for this or any previous visit.     CT Stone Protocol: No results found for this or any previous visit.     KUB: Results for orders  placed in visit on 09/21/22    XR abdomen kub    Narrative  XR ABDOMEN KUB-    REASON FOR EXAM:  Flank pain; R30.0-Dysuria    Comparison:None      The bowel gas pattern in the abdomen is unremarkable. No soft tissue  masses or pathological calcifications are demonstrated. The bony  structures are appropriate for age.    Impression  Nonspecific supine view abdomen    This report was finalized on 9/21/2022 3:38 PM by Dr. Anton Ellsworth II, MD.       Labs:  Brief Urine Lab Results     None        Office Visit on 09/21/2022   Component Date Value Ref Range Status   • Urine Culture 09/21/2022 No growth   Final   • PSA 09/21/2022 0.506  0.000 - 4.000 ng/mL Final   Admission on 07/19/2022, Discharged on 07/20/2022   Component Date Value Ref Range Status   • Glucose 07/19/2022 102  70 - 130 mg/dL Final    Meter: [id number] : [id number] roxana christianson   • Glucose 07/20/2022 77  70 - 130 mg/dL Final    Meter: [id number] : [id number] HIRAL ESTEVES   • Glucose 07/20/2022 149 (A) 70 - 130 mg/dL Final    Meter: [id number] : [id number] [facility]   Lab on 07/15/2022   Component Date Value Ref Range Status   • COVID19 07/15/2022 Not Detected  Not Detected - Ref. Range Final   Pre-Admission Testing on 07/15/2022   Component Date Value Ref Range Status   • Glucose 07/15/2022 275 (A) 65 - 99 mg/dL Final   • BUN 07/15/2022 19  6 - 20 mg/dL Final   • Creatinine 07/15/2022 0.60 (A) 0.76 - 1.27 mg/dL Final   • Sodium 07/15/2022 137  136 - 145 mmol/L Final   • Potassium 07/15/2022 3.9  3.5 - 5.2 mmol/L Final   • Chloride 07/15/2022 103  98 - 107 mmol/L Final   • CO2 07/15/2022 20.4 (A) 22.0 - 29.0 mmol/L Final   • Calcium 07/15/2022 9.6  8.6 - 10.5 mg/dL Final   • BUN/Creatinine Ratio 07/15/2022 31.7 (A) 7.0 - 25.0 Final   • Anion Gap 07/15/2022 13.6  5.0 - 15.0 mmol/L Final   • eGFR 07/15/2022 111.2  >60.0 mL/min/1.73 Final    National Kidney Foundation and American Society of Nephrology (ASN) Task Force recommended  calculation based on the Chronic Kidney Disease Epidemiology Collaboration (CKD-EPI) equation refit without adjustment for race.   • WBC 07/15/2022 8.80  3.40 - 10.80 10*3/mm3 Final   • RBC 07/15/2022 5.07  4.14 - 5.80 10*6/mm3 Final   • Hemoglobin 07/15/2022 14.4  13.0 - 17.7 g/dL Final   • Hematocrit 07/15/2022 44.3  37.5 - 51.0 % Final   • MCV 07/15/2022 87.4  79.0 - 97.0 fL Final   • MCH 07/15/2022 28.4  26.6 - 33.0 pg Final   • MCHC 07/15/2022 32.5  31.5 - 35.7 g/dL Final   • RDW 07/15/2022 13.8  12.3 - 15.4 % Final   • RDW-SD 07/15/2022 44.0  37.0 - 54.0 fl Final   • MPV 07/15/2022 10.2  6.0 - 12.0 fL Final   • Platelets 07/15/2022 275  140 - 450 10*3/mm3 Final        Procedure: None  Procedures     I have reviewed and agree with the above PMH, PSH, FMH, social history, medications, allergies, and labs.     Assessment/Plan:   Problem List Items Addressed This Visit        Genitourinary and Reproductive     Nephrolithiasis - Primary    Overview     Added automatically from request for surgery [id number]         Relevant Orders    Case Request (Completed)    CBC (No Diff)    Basic Metabolic Panel          Health Maintenance:   Health Maintenance Due   Topic Date Due   • URINE MICROALBUMIN  Never done   • COLORECTAL CANCER SCREENING  Never done   • COVID-19 Vaccine (1) Never done   • ANNUAL PHYSICAL  Never done   • Pneumococcal Vaccine 0-64 (1 - PCV) Never done   • ZOSTER VACCINE (1 of 2) Never done   • HEPATITIS C SCREENING  Never done   • DIABETIC FOOT EXAM  Never done   • DIABETIC EYE EXAM  Never done   • LIPID PANEL  07/10/2020        Smoking Counseling: Patient has never smoked. He is a current user of snuff. Counseling was given however patient is not ready to quit.    Urine Incontinence: Patient reports that he is not currently experiencing any symptoms of urinary incontinence.    Patient was given instructions and counseling regarding his condition or for health maintenance advice. Please see specific  information pulled into the AVS if appropriate.    Patient Education:   Nephrolithiasis - I discussed the case with Dr. Jeffers and he will undergo a cystoscopy with stone removal this Wednesday 09/28/22. I advised patient to continue to drink plenty of water and continue medication. I discussed with the patient the risk and benefits of procedure. I advised patient to go to the nearest ER if symptoms worsen or if he experiences fever, chills, nausea, vomiting, or AMS. Patient verbalizes understanding and agrees with plan of care.    Visit Diagnoses:    ICD-10-CM ICD-9-CM   1. Nephrolithiasis  N20.0 592.0       Meds Ordered During Visit:  No orders of the defined types were placed in this encounter.      Follow Up Appointment: Cystoscopy 09/28/22  No follow-ups on file.      This document has been electronically signed by Harish Yanez PA-C   September 26, 2022 20:23 EDT    Part of this note may be an electronic transcription/translation of spoken language to printed text using the Dragon Dictation System.

## 2022-09-27 ENCOUNTER — PRE-ADMISSION TESTING (OUTPATIENT)
Dept: PREADMISSION TESTING | Facility: HOSPITAL | Age: 57
End: 2022-09-27

## 2022-09-27 DIAGNOSIS — N20.0 NEPHROLITHIASIS: ICD-10-CM

## 2022-09-28 ENCOUNTER — APPOINTMENT (OUTPATIENT)
Dept: GENERAL RADIOLOGY | Facility: HOSPITAL | Age: 57
End: 2022-09-28

## 2022-09-28 ENCOUNTER — ANESTHESIA EVENT (OUTPATIENT)
Dept: PERIOP | Facility: HOSPITAL | Age: 57
End: 2022-09-28

## 2022-09-28 ENCOUNTER — HOSPITAL ENCOUNTER (OUTPATIENT)
Facility: HOSPITAL | Age: 57
Setting detail: HOSPITAL OUTPATIENT SURGERY
Discharge: HOME | End: 2022-09-28
Attending: UROLOGY | Admitting: UROLOGY

## 2022-09-28 ENCOUNTER — ANESTHESIA (OUTPATIENT)
Dept: PERIOP | Facility: HOSPITAL | Age: 57
End: 2022-09-28

## 2022-09-28 VITALS
HEIGHT: 67 IN | HEART RATE: 104 BPM | TEMPERATURE: 98.1 F | SYSTOLIC BLOOD PRESSURE: 121 MMHG | RESPIRATION RATE: 18 BRPM | BODY MASS INDEX: 27.78 KG/M2 | DIASTOLIC BLOOD PRESSURE: 89 MMHG | WEIGHT: 177 LBS | OXYGEN SATURATION: 94 %

## 2022-09-28 DIAGNOSIS — N20.0 NEPHROLITHIASIS: ICD-10-CM

## 2022-09-28 LAB — GLUCOSE BLDC GLUCOMTR-MCNC: 254 MG/DL (ref 70–130)

## 2022-09-28 PROCEDURE — 25010000002 PROPOFOL 10 MG/ML EMULSION: Performed by: NURSE ANESTHETIST, CERTIFIED REGISTERED

## 2022-09-28 PROCEDURE — 25010000002 GENTAMICIN PER 80 MG

## 2022-09-28 PROCEDURE — 82365 CALCULUS SPECTROSCOPY: CPT | Performed by: UROLOGY

## 2022-09-28 PROCEDURE — 25010000002 FENTANYL CITRATE (PF) 50 MCG/ML SOLUTION: Performed by: NURSE ANESTHETIST, CERTIFIED REGISTERED

## 2022-09-28 PROCEDURE — 76000 FLUOROSCOPY <1 HR PHYS/QHP: CPT

## 2022-09-28 PROCEDURE — 25010000002 MIDAZOLAM PER 1MG: Performed by: NURSE ANESTHETIST, CERTIFIED REGISTERED

## 2022-09-28 PROCEDURE — 52317 REMOVE BLADDER STONE: CPT | Performed by: UROLOGY

## 2022-09-28 PROCEDURE — 82962 GLUCOSE BLOOD TEST: CPT

## 2022-09-28 PROCEDURE — 25010000002 ONDANSETRON PER 1 MG: Performed by: NURSE ANESTHETIST, CERTIFIED REGISTERED

## 2022-09-28 RX ORDER — KETOROLAC TROMETHAMINE 30 MG/ML
30 INJECTION, SOLUTION INTRAMUSCULAR; INTRAVENOUS EVERY 6 HOURS PRN
Status: DISCONTINUED | OUTPATIENT
Start: 2022-09-28 | End: 2022-09-28 | Stop reason: HOSPADM

## 2022-09-28 RX ORDER — FAMOTIDINE 10 MG/ML
INJECTION, SOLUTION INTRAVENOUS AS NEEDED
Status: DISCONTINUED | OUTPATIENT
Start: 2022-09-28 | End: 2022-09-28 | Stop reason: SURG

## 2022-09-28 RX ORDER — IPRATROPIUM BROMIDE AND ALBUTEROL SULFATE 2.5; .5 MG/3ML; MG/3ML
3 SOLUTION RESPIRATORY (INHALATION) ONCE AS NEEDED
Status: DISCONTINUED | OUTPATIENT
Start: 2022-09-28 | End: 2022-09-28 | Stop reason: HOSPADM

## 2022-09-28 RX ORDER — MIDAZOLAM HYDROCHLORIDE 2 MG/2ML
INJECTION, SOLUTION INTRAMUSCULAR; INTRAVENOUS AS NEEDED
Status: DISCONTINUED | OUTPATIENT
Start: 2022-09-28 | End: 2022-09-28 | Stop reason: SURG

## 2022-09-28 RX ORDER — SODIUM CHLORIDE, SODIUM LACTATE, POTASSIUM CHLORIDE, CALCIUM CHLORIDE 600; 310; 30; 20 MG/100ML; MG/100ML; MG/100ML; MG/100ML
125 INJECTION, SOLUTION INTRAVENOUS ONCE
Status: COMPLETED | OUTPATIENT
Start: 2022-09-28 | End: 2022-09-28

## 2022-09-28 RX ORDER — ONDANSETRON 2 MG/ML
4 INJECTION INTRAMUSCULAR; INTRAVENOUS AS NEEDED
Status: DISCONTINUED | OUTPATIENT
Start: 2022-09-28 | End: 2022-09-28 | Stop reason: HOSPADM

## 2022-09-28 RX ORDER — OXYCODONE HYDROCHLORIDE AND ACETAMINOPHEN 5; 325 MG/1; MG/1
1 TABLET ORAL ONCE AS NEEDED
Status: DISCONTINUED | OUTPATIENT
Start: 2022-09-28 | End: 2022-09-28 | Stop reason: HOSPADM

## 2022-09-28 RX ORDER — FENTANYL CITRATE 50 UG/ML
INJECTION, SOLUTION INTRAMUSCULAR; INTRAVENOUS AS NEEDED
Status: DISCONTINUED | OUTPATIENT
Start: 2022-09-28 | End: 2022-09-28 | Stop reason: SURG

## 2022-09-28 RX ORDER — SODIUM CHLORIDE 0.9 % (FLUSH) 0.9 %
10 SYRINGE (ML) INJECTION AS NEEDED
Status: DISCONTINUED | OUTPATIENT
Start: 2022-09-28 | End: 2022-09-28 | Stop reason: HOSPADM

## 2022-09-28 RX ORDER — MIDAZOLAM HYDROCHLORIDE 1 MG/ML
1 INJECTION INTRAMUSCULAR; INTRAVENOUS
Status: DISCONTINUED | OUTPATIENT
Start: 2022-09-28 | End: 2022-09-28 | Stop reason: HOSPADM

## 2022-09-28 RX ORDER — MAGNESIUM HYDROXIDE 1200 MG/15ML
LIQUID ORAL AS NEEDED
Status: DISCONTINUED | OUTPATIENT
Start: 2022-09-28 | End: 2022-09-28 | Stop reason: HOSPADM

## 2022-09-28 RX ORDER — SODIUM CHLORIDE 0.9 % (FLUSH) 0.9 %
10 SYRINGE (ML) INJECTION EVERY 12 HOURS SCHEDULED
Status: DISCONTINUED | OUTPATIENT
Start: 2022-09-28 | End: 2022-09-28 | Stop reason: HOSPADM

## 2022-09-28 RX ORDER — ESMOLOL HYDROCHLORIDE 10 MG/ML
INJECTION INTRAVENOUS AS NEEDED
Status: DISCONTINUED | OUTPATIENT
Start: 2022-09-28 | End: 2022-09-28 | Stop reason: SURG

## 2022-09-28 RX ORDER — ONDANSETRON 2 MG/ML
INJECTION INTRAMUSCULAR; INTRAVENOUS AS NEEDED
Status: DISCONTINUED | OUTPATIENT
Start: 2022-09-28 | End: 2022-09-28 | Stop reason: SURG

## 2022-09-28 RX ORDER — MEPERIDINE HYDROCHLORIDE 25 MG/ML
12.5 INJECTION INTRAMUSCULAR; INTRAVENOUS; SUBCUTANEOUS
Status: DISCONTINUED | OUTPATIENT
Start: 2022-09-28 | End: 2022-09-28 | Stop reason: HOSPADM

## 2022-09-28 RX ORDER — HYDROCODONE BITARTRATE AND ACETAMINOPHEN 10; 325 MG/1; MG/1
1 TABLET ORAL EVERY 4 HOURS PRN
Qty: 16 TABLET | Refills: 0 | Status: SHIPPED | OUTPATIENT
Start: 2022-09-28

## 2022-09-28 RX ORDER — GENTAMICIN SULFATE 80 MG/100ML
80 INJECTION, SOLUTION INTRAVENOUS ONCE
Status: COMPLETED | OUTPATIENT
Start: 2022-09-28 | End: 2022-09-28

## 2022-09-28 RX ORDER — PROPOFOL 10 MG/ML
VIAL (ML) INTRAVENOUS AS NEEDED
Status: DISCONTINUED | OUTPATIENT
Start: 2022-09-28 | End: 2022-09-28 | Stop reason: SURG

## 2022-09-28 RX ORDER — SULFAMETHOXAZOLE AND TRIMETHOPRIM 400; 80 MG/1; MG/1
1 TABLET ORAL 2 TIMES DAILY
Qty: 10 TABLET | Refills: 0 | Status: SHIPPED | OUTPATIENT
Start: 2022-09-28 | End: 2022-10-03

## 2022-09-28 RX ORDER — FENTANYL CITRATE 50 UG/ML
50 INJECTION, SOLUTION INTRAMUSCULAR; INTRAVENOUS
Status: DISCONTINUED | OUTPATIENT
Start: 2022-09-28 | End: 2022-09-28 | Stop reason: HOSPADM

## 2022-09-28 RX ORDER — SODIUM CHLORIDE, SODIUM LACTATE, POTASSIUM CHLORIDE, CALCIUM CHLORIDE 600; 310; 30; 20 MG/100ML; MG/100ML; MG/100ML; MG/100ML
100 INJECTION, SOLUTION INTRAVENOUS ONCE AS NEEDED
Status: DISCONTINUED | OUTPATIENT
Start: 2022-09-28 | End: 2022-09-28 | Stop reason: HOSPADM

## 2022-09-28 RX ADMIN — GENTAMICIN SULFATE 80 MG: 80 INJECTION, SOLUTION INTRAVENOUS at 14:05

## 2022-09-28 RX ADMIN — FENTANYL CITRATE 100 MCG: 50 INJECTION INTRAMUSCULAR; INTRAVENOUS at 13:59

## 2022-09-28 RX ADMIN — ONDANSETRON 4 MG: 2 INJECTION INTRAMUSCULAR; INTRAVENOUS at 13:59

## 2022-09-28 RX ADMIN — PROPOFOL 150 MG: 10 INJECTION, EMULSION INTRAVENOUS at 14:03

## 2022-09-28 RX ADMIN — FAMOTIDINE 20 MG: 10 INJECTION, SOLUTION INTRAVENOUS at 13:59

## 2022-09-28 RX ADMIN — MIDAZOLAM HYDROCHLORIDE 2 MG: 1 INJECTION, SOLUTION INTRAMUSCULAR; INTRAVENOUS at 13:59

## 2022-09-28 RX ADMIN — SODIUM CHLORIDE, POTASSIUM CHLORIDE, SODIUM LACTATE AND CALCIUM CHLORIDE: 600; 310; 30; 20 INJECTION, SOLUTION INTRAVENOUS at 13:59

## 2022-09-28 RX ADMIN — ESMOLOL HYDROCHLORIDE 10 MG: 100 INJECTION, SOLUTION INTRAVENOUS at 14:24

## 2022-09-28 NOTE — ANESTHESIA PREPROCEDURE EVALUATION
Anesthesia Evaluation     Patient summary reviewed and Nursing notes reviewed   no history of anesthetic complications:  NPO Solid Status: > 8 hours  NPO Liquid Status: > 8 hours           Airway   Mallampati: II  TM distance: >3 FB  Neck ROM: full  No difficulty expected  Dental - normal exam   (+) poor dentition    Pulmonary - negative pulmonary ROS and normal exam    breath sounds clear to auscultation  Cardiovascular - normal exam    ECG reviewed  Patient on routine beta blocker and Beta blocker given within 24 hours of surgery  Rhythm: regular  Rate: normal    (+) hypertension, hyperlipidemia,       Neuro/Psych- negative ROS  GI/Hepatic/Renal/Endo    (+)  GERD,  renal disease stones, diabetes mellitus,     Musculoskeletal     Abdominal  - normal exam   Substance History - negative use     OB/GYN negative ob/gyn ROS         Other   arthritis,                      Anesthesia Plan    ASA 2     general     intravenous induction     Anesthetic plan, risks, benefits, and alternatives have been provided, discussed and informed consent has been obtained with: patient.  Pre-procedure education provided  Plan discussed with CRNA.        CODE STATUS:

## 2022-09-28 NOTE — ANESTHESIA POSTPROCEDURE EVALUATION
Patient: Valentin Vásquez    Procedure Summary     Date: 09/28/22 Room / Location: [facility] OR 05 /  [facility]    Anesthesia Start: 1400 Anesthesia Stop: 1429    Procedure: CYSTOSCOPY LASER LITHOLAPAXY BLADDER STONE EXTRACTION (N/A ) Diagnosis:       Nephrolithiasis      (Nephrolithiasis [N20.0])    Surgeons: Mykel Jeffers MD Provider: Hill Rdz DO    Anesthesia Type: general ASA Status: 2          Anesthesia Type: general    Vitals  Vitals Value Taken Time   /90 09/28/22 1501   Temp 98.1 °F (36.7 °C) 09/28/22 1501   Pulse 101 09/28/22 1501   Resp 16 09/28/22 1501   SpO2 93 % 09/28/22 1501           Post Anesthesia Care and Evaluation    Patient location during evaluation: PHASE II  Patient participation: complete - patient participated  Level of consciousness: awake and alert  Pain score: 0  Pain management: adequate    Airway patency: patent  Anesthetic complications: No anesthetic complications  PONV Status: controlled  Cardiovascular status: acceptable  Respiratory status: acceptable and room air  Hydration status: euvolemic  No anesthesia care post op

## 2022-09-28 NOTE — ANESTHESIA PROCEDURE NOTES
Airway  Urgency: elective    Date/Time: 9/28/2022 2:03 PM  Airway not difficult    General Information and Staff    Patient location during procedure: OR  CRNA/CAA: Bina Acevedo CRNA    Indications and Patient Condition  Indications for airway management: airway protection    Preoxygenated: yes  Mask difficulty assessment: 0 - not attempted    Final Airway Details  Final airway type: supraglottic airway      Successful airway: unique  Size 4    Number of attempts at approach: 1  Assessment: lips, teeth, and gum same as pre-op

## 2022-09-28 NOTE — OP NOTE
CYSTOSCOPY LITHOLAPAXY BLADDER STONE EXTRACTION  Procedure Note    Valentin Vásquez  9/28/2022    Pre-op Diagnosis:   Nephrolithiasis [N20.0]    Post-op Diagnosis:     Post-Op Diagnosis Codes:     * Nephrolithiasis [N20.0]    Procedure/CPT® Codes:  57-year-old white male with a 10 mm stone stuck at the meatus.  Presents today for cystoscopy and removal following an informed consent brought the procedure suite prepped and draped in sterile fashion he had moderate meatal stenosis I gently dilated to 26 [place] with straight sounds he is a 21 [place] cystoscope identified a calcium oxalate monohydrate stone I was able to nurses back in the bladder use the 1000 µm laser to break it numerous small pieces extract the 4 sequential pieces without difficulty I left a [place] catheter to gravity drainage    Procedure(s):  CYSTOSCOPY LASER LITHOLAPAXY BLADDER STONE EXTRACTION    Surgeon(s):  Mykel Jeffers MD    Anesthesia: see anesthesia record    Staff:   Circulator: Leatha Honeycutt RN  Scrub Person: Lily Pichardo Tina  Assistant: Jayla Madison LPN    Estimated Blood Loss: none  Urine Voided: * No values recorded between 9/28/2022  1:59 PM and 9/28/2022  2:29 PM *    Specimens:                ID Type Source Tests Collected by Time   1 (Not marked as sent) :  [facility] Urinary Bladder STONE ANALYSIS Mykel Jeffers MD 9/28/2022 1425         Drains: [place]    Findings: Calcium oxalate monohydrate urethral calculus    Blood: N/A    Complications: None    Grafts and Implants: None    Mykel Jeffers MD     Date: 9/28/2022  Time: 14:35 EDT

## 2022-09-29 ENCOUNTER — TELEPHONE (OUTPATIENT)
Dept: UROLOGY | Facility: CLINIC | Age: 57
End: 2022-09-29

## 2022-09-29 ENCOUNTER — TELEPHONE (OUTPATIENT)
Dept: PHYSICAL THERAPY | Facility: CLINIC | Age: 57
End: 2022-09-29

## 2022-09-29 NOTE — TELEPHONE ENCOUNTER
Pt contacted and reports he has not been able to reach his MD to see if he has therapy visits approved due treating kidney stones.  Pt reports he will attempt to reach hs MD today or tomorrow.  He reports his shoulder cont to improve and he has been working on his exercises.  Pt instructed to contact therapy as needed.  Pt reports he will contact therapy next week to discuss continuation or discharge of therapy.

## 2022-10-03 ENCOUNTER — OFFICE VISIT (OUTPATIENT)
Dept: UROLOGY | Facility: CLINIC | Age: 57
End: 2022-10-03

## 2022-10-03 VITALS — WEIGHT: 177 LBS | BODY MASS INDEX: 27.78 KG/M2 | HEIGHT: 67 IN

## 2022-10-03 DIAGNOSIS — N20.0 NEPHROLITHIASIS: Primary | ICD-10-CM

## 2022-10-03 LAB
CALCIUM OXALATE DIHYDRATE MFR STONE IR: 20 %
COLOR STONE: NORMAL
COM MFR STONE: 80 %
COMPN STONE: NORMAL
LABORATORY COMMENT REPORT: NORMAL
LABORATORY COMMENT REPORT: NORMAL
Lab: NORMAL
Lab: NORMAL
PHOTO: NORMAL
SIZE STONE: NORMAL MM
SPEC SOURCE SUBJ: NORMAL
WT STONE: 545 MG

## 2022-10-03 PROCEDURE — 99212 OFFICE O/P EST SF 10 MIN: CPT

## 2022-10-03 NOTE — PROGRESS NOTES
"Chief Complaint:    Chief Complaint   Patient presents with   • Post-op       Vital Signs:   Ht 170.2 cm (67.01\")   Wt 80.3 kg (177 lb)   BMI 27.72 kg/m²   Body mass index is 27.72 kg/m².      HPI:  Valentin Vásquez is a 57 y.o. male who presents today for Post-op follow up    History of Present Illness  Mr. Vásquez presents to the clinic for a post-op visit. Patient underwent cystoscopy laser litholapaxy with bladder stone extraction on 9/28/2022.  Patient was discharged with the catheter at home and informed to remove it the next day.  He removed catheter with no complications.  He denies fever, chills, hematuria, dysuria, difficulty urinating, frequency, or urgency.  He reports that he is doing well and has no complaints at this time.      Past Medical History:  Past Medical History:   Diagnosis Date   • Arthritis    • Diabetes mellitus (HCC)    • Elevated cholesterol    • GERD (gastroesophageal reflux disease)    • Gout    • Hyperlipidemia    • Hypertension    • Injury of back    • Kidney stone    • Pancreatitis    • Right shoulder pain    • Tear of right rotator cuff        Current Meds:  Current Outpatient Medications   Medication Sig Dispense Refill   • Aspirin Low Dose 81 MG EC tablet Take 81 mg by mouth Daily.     • fenofibrate (TRICOR) 145 MG tablet Take 145 mg by mouth Daily.     • glimepiride (AMARYL) 4 MG tablet Take 4 mg by mouth Daily.     • HYDROcodone-acetaminophen (NORCO)  MG per tablet Take 1 tablet by mouth Every 4 (Four) Hours As Needed for Moderate Pain (Pain). 16 tablet 0   • HYDROcodone-acetaminophen (NORCO) 7.5-325 MG per tablet Take 1 tablet by mouth Every 6 (Six) Hours As Needed for Moderate Pain . 30 tablet 0   • Jardiance 25 MG tablet tablet Take 25 mg by mouth Daily.     • lisinopril (PRINIVIL,ZESTRIL) 10 MG tablet Take 10 mg by mouth Daily.     • metFORMIN (GLUCOPHAGE) 1000 MG tablet Take 500 mg by mouth 2 (Two) Times a Day With Meals.     • metoprolol succinate XL (TOPROL XL) 25 MG " 24 hr tablet Take 1 tablet by mouth Daily. 30 tablet 0   • omeprazole (priLOSEC) 40 MG capsule Take 40 mg by mouth Daily.     • phenazopyridine (Pyridium) 200 MG tablet Take 1 tablet by mouth 3 (Three) Times a Day As Needed for Bladder Spasms. 20 tablet 0   • rosuvastatin (CRESTOR) 10 MG tablet Take 10 mg by mouth Every Night.     • sulfamethoxazole-trimethoprim (Bactrim DS) 800-160 MG per tablet Take 1 tablet by mouth 2 (Two) Times a Day. 20 tablet 0   • tamsulosin (Flomax) 0.4 MG capsule 24 hr capsule Take 1 capsule by mouth Every Night. 30 capsule 5   • Tresiba FlexTouch 200 UNIT/ML solution pen-injector pen injection Inject 80 Units under the skin into the appropriate area as directed Daily.       No current facility-administered medications for this visit.        Allergies:   No Known Allergies     Past Surgical History:  Past Surgical History:   Procedure Laterality Date   • ADENOIDECTOMY     • CYSTOSCOPY LITHOLAPAXY BLADDER STONE EXTRACTION N/A 9/28/2022    Procedure: CYSTOSCOPY LASER LITHOLAPAXY BLADDER STONE EXTRACTION;  Surgeon: Mykel Jeffers MD;  Location: [facility];  Service: Urology;  Laterality: N/A;   • KIDNEY STONE SURGERY     • SHOULDER ARTHROSCOPY W/ ROTATOR CUFF REPAIR Right 7/19/2022    Procedure: RIGHT SHOULDER ARTHROSCOPY WITH OPEN ACROMIOPLASTY,  ROTATOR CUFF REPAIR, EXCISION LATERAL CLAVICLE;  Surgeon: Edmundo Huitron MD;  Location: [facility];  Service: Orthopedics;  Laterality: Right;   • TONSILLECTOMY         Social History:  Social History     Socioeconomic History   • Marital status:    Tobacco Use   • Smoking status: Never Smoker   • Smokeless tobacco: Current User     Types: Snuff   Vaping Use   • Vaping Use: Never used   Substance and Sexual Activity   • Alcohol use: Yes     Comment: occasionally   • Drug use: No   • Sexual activity: Defer     Partners: Female       Family History:  Family History   Problem Relation Age of Onset   • Diabetes Father    •  Diabetes Sister        Review of Systems:  Review of Systems   Constitutional: Negative for chills, fatigue, fever and unexpected weight change.   Respiratory: Negative for cough, chest tightness, shortness of breath and wheezing.    Cardiovascular: Negative for chest pain and leg swelling.   Gastrointestinal: Negative for abdominal pain, constipation, diarrhea, nausea and vomiting.   Genitourinary: Negative for difficulty urinating, dysuria, frequency and urgency.   Musculoskeletal: Negative for back pain and joint swelling.   Skin: Negative for rash.   Neurological: Negative for dizziness and headaches.   Psychiatric/Behavioral: Negative for confusion and suicidal ideas.       Physical Exam:  Physical Exam  Constitutional:       General: He is not in acute distress.     Appearance: Normal appearance.   HENT:      Head: Normocephalic and atraumatic.      Nose: Nose normal.      Mouth/Throat:      Mouth: Mucous membranes are moist.   Eyes:      Conjunctiva/sclera: Conjunctivae normal.   Cardiovascular:      Rate and Rhythm: Normal rate and regular rhythm.      Pulses: Normal pulses.      Heart sounds: Normal heart sounds.   Pulmonary:      Effort: Pulmonary effort is normal.      Breath sounds: Normal breath sounds.   Abdominal:      General: Bowel sounds are normal.      Palpations: Abdomen is soft.   Musculoskeletal:         General: Normal range of motion.      Cervical back: Normal range of motion.   Skin:     General: Skin is warm.   Neurological:      General: No focal deficit present.      Mental Status: He is alert and oriented to person, place, and time.   Psychiatric:         Mood and Affect: Mood normal.         Behavior: Behavior normal.         Thought Content: Thought content normal.         Judgment: Judgment normal.       Recent Image (CT and/or KUB):   CT Abdomen and Pelvis: No results found for this or any previous visit.     CT Stone Protocol: No results found for this or any previous visit.      KUB: Results for orders placed in visit on 09/21/22    XR abdomen kub    Narrative  XR ABDOMEN KUB-    REASON FOR EXAM:  Flank pain; R30.0-Dysuria    Comparison:None      The bowel gas pattern in the abdomen is unremarkable. No soft tissue  masses or pathological calcifications are demonstrated. The bony  structures are appropriate for age.    Impression  Nonspecific supine view abdomen    This report was finalized on 9/21/2022 3:38 PM by Dr. Anton Ellsworth II, MD.       Labs:  Brief Urine Lab Results     None        Admission on 09/28/2022, Discharged on 09/28/2022   Component Date Value Ref Range Status   • Glucose 09/28/2022 254 (A) 70 - 130 mg/dL Final    Meter: [id number] : [id number] roxana christianson   • Stone Source 09/28/2022 Comment   Final    Urinary Bladder   • Color 09/28/2022 Brown   Final   • Size 09/28/2022 10x6  mm Final    Multiple pieces received.  Dimensions of the largest piece  reported.   • Stone Weight 09/28/2022 545  mg Final   • Composition 09/28/2022 Comment   Final    Percentage (Represents the % composition)   • Ca Oxalate - Monohydrate, Stone 09/28/2022 80  % Final   • Ca Oxalate-Dihydrate, Stone 09/28/2022 20  % Final   • Comment 09/28/2022 Comment   Final    Calculus received in liquid. Wet calculi must be dried  before analysis, which delays reporting of results. Leaving  calculi in liquid (such as water, saline, blood, urine) may  lead to changes in composition.   • Photo 09/28/2022 Comment   Final    Photograph will follow under a separate cover   • Comments: 09/28/2022 Comment   Final    Physician questions regarding Calculi Analysis contact  [employer] at: [phone].   • Please note 09/28/2022 Comment   Final    Calculi report will follow via computer, mail or   delivery.   • Disclaimer: 09/28/2022 Comment   Final    This test was developed and its performance characteristics  determined by [employer].  It has not been cleared or approved  by the Food and Drug  Administration.   Office Visit on 09/21/2022   Component Date Value Ref Range Status   • Urine Culture 09/21/2022 No growth   Final   • PSA 09/21/2022 0.506  0.000 - 4.000 ng/mL Final   Admission on 07/19/2022, Discharged on 07/20/2022   Component Date Value Ref Range Status   • Glucose 07/19/2022 102  70 - 130 mg/dL Final    Meter: [id number] : [id number] schmidtandre christianson   • Glucose 07/20/2022 77  70 - 130 mg/dL Final    Meter: [id number] : [id number] HIRAL ESTEVES   • Glucose 07/20/2022 149 (A) 70 - 130 mg/dL Final    Meter: [id number] : [id number] TODD INOCENCIO   Lab on 07/15/2022   Component Date Value Ref Range Status   • COVID19 07/15/2022 Not Detected  Not Detected - Ref. Range Final   Pre-Admission Testing on 07/15/2022   Component Date Value Ref Range Status   • Glucose 07/15/2022 275 (A) 65 - 99 mg/dL Final   • BUN 07/15/2022 19  6 - 20 mg/dL Final   • Creatinine 07/15/2022 0.60 (A) 0.76 - 1.27 mg/dL Final   • Sodium 07/15/2022 137  136 - 145 mmol/L Final   • Potassium 07/15/2022 3.9  3.5 - 5.2 mmol/L Final   • Chloride 07/15/2022 103  98 - 107 mmol/L Final   • CO2 07/15/2022 20.4 (A) 22.0 - 29.0 mmol/L Final   • Calcium 07/15/2022 9.6  8.6 - 10.5 mg/dL Final   • BUN/Creatinine Ratio 07/15/2022 31.7 (A) 7.0 - 25.0 Final   • Anion Gap 07/15/2022 13.6  5.0 - 15.0 mmol/L Final   • eGFR 07/15/2022 111.2  >60.0 mL/min/1.73 Final    National Kidney Foundation and American Society of Nephrology (ASN) Task Force recommended calculation based on the Chronic Kidney Disease Epidemiology Collaboration (CKD-EPI) equation refit without adjustment for race.   • WBC 07/15/2022 8.80  3.40 - 10.80 10*3/mm3 Final   • RBC 07/15/2022 5.07  4.14 - 5.80 10*6/mm3 Final   • Hemoglobin 07/15/2022 14.4  13.0 - 17.7 g/dL Final   • Hematocrit 07/15/2022 44.3  37.5 - 51.0 % Final   • MCV 07/15/2022 87.4  79.0 - 97.0 fL Final   • MCH 07/15/2022 28.4  26.6 - 33.0 pg Final   • MCHC 07/15/2022 32.5  31.5 - 35.7 g/dL Final   •  RDW 07/15/2022 13.8  12.3 - 15.4 % Final   • RDW-SD 07/15/2022 44.0  37.0 - 54.0 fl Final   • MPV 07/15/2022 10.2  6.0 - 12.0 fL Final   • Platelets 07/15/2022 275  140 - 450 10*3/mm3 Final        Procedure: None  Procedures     I have reviewed and agree with the above PMH, PSH, FMH, social history, medications, allergies, and labs.     Assessment/Plan:   Problem List Items Addressed This Visit        Genitourinary and Reproductive     Nephrolithiasis - Primary    Overview     Added automatically from request for surgery [id number]               Health Maintenance:   Health Maintenance Due   Topic Date Due   • URINE MICROALBUMIN  Never done   • COLORECTAL CANCER SCREENING  Never done   • COVID-19 Vaccine (1) Never done   • ANNUAL PHYSICAL  Never done   • Pneumococcal Vaccine 0-64 (1 - PCV) Never done   • ZOSTER VACCINE (1 of 2) Never done   • HEPATITIS C SCREENING  Never done   • DIABETIC FOOT EXAM  Never done   • DIABETIC EYE EXAM  Never done   • LIPID PANEL  07/10/2020   • INFLUENZA VACCINE  Never done        Smoking Counseling: Patient has never smoked.    Urine Incontinence: Patient reports that he is not currently experiencing any symptoms of urinary incontinence.    Patient was given instructions and counseling regarding his condition or for health maintenance advice. Please see specific information pulled into the AVS if appropriate.    Patient Education:   Nephrolithiasis -I discussed with the patient causes of nephrolithiasis include but are not limited to genetics, food/fluid intake, metabolic syndromes.  Advised patient to discontinue the use of soda/tea and increase water intake to 2 to 3 L/day.  Patient is doing well and I will follow-up with him in 6 months or sooner if needed.  Advised patient to return to clinic if he experiences return of symptoms or feels that he is passing another kidney stone.  Patient verbalizes understanding and agrees to plan of care.    Visit Diagnoses:    ICD-10-CM ICD-9-CM    1. Nephrolithiasis  N20.0 592.0       Meds Ordered During Visit:  No orders of the defined types were placed in this encounter.      Follow Up Appointment: 6-month  No follow-ups on file.      This document has been electronically signed by Harish Yanez PA-C   October 4, 2022 10:57 EDT    Part of this note may be an electronic transcription/translation of spoken language to printed text using the Dragon Dictation System.

## 2022-10-31 ENCOUNTER — OFFICE VISIT (OUTPATIENT)
Dept: ORTHOPEDIC SURGERY | Facility: CLINIC | Age: 57
End: 2022-10-31

## 2022-10-31 VITALS — HEIGHT: 67 IN | WEIGHT: 177 LBS | BODY MASS INDEX: 27.78 KG/M2

## 2022-10-31 DIAGNOSIS — Z98.890 S/P RIGHT ROTATOR CUFF REPAIR: Primary | ICD-10-CM

## 2022-10-31 PROCEDURE — 99213 OFFICE O/P EST LOW 20 MIN: CPT | Performed by: ORTHOPAEDIC SURGERY

## 2022-10-31 RX ORDER — SEMAGLUTIDE 1.34 MG/ML
INJECTION, SOLUTION SUBCUTANEOUS
COMMUNITY
Start: 2022-10-18

## 2022-12-27 ENCOUNTER — APPOINTMENT (OUTPATIENT)
Dept: GENERAL RADIOLOGY | Facility: HOSPITAL | Age: 57
End: 2022-12-27
Payer: OTHER MISCELLANEOUS

## 2022-12-27 ENCOUNTER — APPOINTMENT (OUTPATIENT)
Dept: CT IMAGING | Facility: HOSPITAL | Age: 57
End: 2022-12-27
Payer: OTHER MISCELLANEOUS

## 2022-12-27 ENCOUNTER — HOSPITAL ENCOUNTER (EMERGENCY)
Facility: HOSPITAL | Age: 57
Discharge: HOME | End: 2022-12-27
Attending: FAMILY MEDICINE | Admitting: FAMILY MEDICINE
Payer: OTHER MISCELLANEOUS

## 2022-12-27 VITALS
BODY MASS INDEX: 29.82 KG/M2 | RESPIRATION RATE: 18 BRPM | HEART RATE: 92 BPM | DIASTOLIC BLOOD PRESSURE: 78 MMHG | OXYGEN SATURATION: 95 % | SYSTOLIC BLOOD PRESSURE: 122 MMHG | TEMPERATURE: 97.9 F | HEIGHT: 67 IN | WEIGHT: 190 LBS

## 2022-12-27 DIAGNOSIS — S09.90XA CLOSED HEAD INJURY, INITIAL ENCOUNTER: Primary | ICD-10-CM

## 2022-12-27 DIAGNOSIS — S20.211A CONTUSION OF RIGHT CHEST WALL, INITIAL ENCOUNTER: ICD-10-CM

## 2022-12-27 DIAGNOSIS — S40.021A ARM CONTUSION, RIGHT, INITIAL ENCOUNTER: ICD-10-CM

## 2022-12-27 PROCEDURE — 72125 CT NECK SPINE W/O DYE: CPT

## 2022-12-27 PROCEDURE — 70450 CT HEAD/BRAIN W/O DYE: CPT

## 2022-12-27 PROCEDURE — 71250 CT THORAX DX C-: CPT

## 2022-12-27 PROCEDURE — 70450 CT HEAD/BRAIN W/O DYE: CPT | Performed by: RADIOLOGY

## 2022-12-27 PROCEDURE — 73030 X-RAY EXAM OF SHOULDER: CPT

## 2022-12-27 PROCEDURE — 72125 CT NECK SPINE W/O DYE: CPT | Performed by: RADIOLOGY

## 2022-12-27 PROCEDURE — 73060 X-RAY EXAM OF HUMERUS: CPT | Performed by: RADIOLOGY

## 2022-12-27 PROCEDURE — 73030 X-RAY EXAM OF SHOULDER: CPT | Performed by: RADIOLOGY

## 2022-12-27 PROCEDURE — 99283 EMERGENCY DEPT VISIT LOW MDM: CPT

## 2022-12-27 PROCEDURE — 73060 X-RAY EXAM OF HUMERUS: CPT

## 2022-12-27 PROCEDURE — 71250 CT THORAX DX C-: CPT | Performed by: RADIOLOGY

## 2022-12-27 NOTE — ED PROVIDER NOTES
Subjective     Fall  Mechanism of injury: fall    Injury location:  Head/neck and shoulder/arm  Head/neck injury location:  Head  Shoulder/arm injury location:  R upper arm  Incident location:  Around machinery  Time since incident:  1 day  Arrived directly from scene: no    Fall:     Fall occurred:  Down stairs    Impact surface:  [place]    Entrapped after fall: no    Protective equipment: boots    Suspicion of alcohol use: no    Suspicion of drug use: no    Tetanus status:  Up to date  Associated symptoms: no abdominal pain, no back pain, no headaches, no loss of consciousness, no neck pain and no seizures    Risk factors: no anticoagulation therapy, no beta blocker therapy, no CAD, no hemophilia, not pregnant and no steroid use        Review of Systems   Constitutional: Negative.    HENT: Negative.    Eyes: Negative.    Respiratory: Negative.    Cardiovascular: Negative.    Gastrointestinal: Negative.  Negative for abdominal pain.   Endocrine: Negative.    Genitourinary: Negative.    Musculoskeletal: Negative.  Negative for back pain and neck pain.   Skin: Negative.    Allergic/Immunologic: Negative.    Neurological: Negative.  Negative for seizures, loss of consciousness and headaches.   Hematological: Negative.    Psychiatric/Behavioral: Negative.        Past Medical History:   Diagnosis Date   • Arthritis    • Diabetes mellitus (HCC)    • Elevated cholesterol    • GERD (gastroesophageal reflux disease)    • Gout    • Hyperlipidemia    • Hypertension    • Injury of back    • Kidney stone    • Pancreatitis    • Right shoulder pain    • Tear of right rotator cuff        No Known Allergies    Past Surgical History:   Procedure Laterality Date   • ADENOIDECTOMY     • CYSTOSCOPY LITHOLAPAXY BLADDER STONE EXTRACTION N/A 9/28/2022    Procedure: CYSTOSCOPY LASER LITHOLAPAXY BLADDER STONE EXTRACTION;  Surgeon: Mykel Jeffers MD;  Location: [facility];  Service: Urology;  Laterality: N/A;   • KIDNEY STONE  SURGERY     • SHOULDER ARTHROSCOPY W/ ROTATOR CUFF REPAIR Right 7/19/2022    Procedure: RIGHT SHOULDER ARTHROSCOPY WITH OPEN ACROMIOPLASTY,  ROTATOR CUFF REPAIR, EXCISION LATERAL CLAVICLE;  Surgeon: Edmundo Huitron MD;  Location: [facility];  Service: Orthopedics;  Laterality: Right;   • TONSILLECTOMY         Family History   Problem Relation Age of Onset   • Diabetes Father    • Diabetes Sister        Social History     Socioeconomic History   • Marital status:    Tobacco Use   • Smoking status: Never   • Smokeless tobacco: Current     Types: Snuff   Vaping Use   • Vaping Use: Never used   Substance and Sexual Activity   • Alcohol use: Yes     Comment: occasionally   • Drug use: No   • Sexual activity: Defer     Partners: Female           Objective   Physical Exam  Vitals and nursing note reviewed.   Constitutional:       Appearance: He is well-developed.   HENT:      Head: Normocephalic.      Right Ear: External ear normal.      Left Ear: External ear normal.   Eyes:      Conjunctiva/sclera: Conjunctivae normal.      Pupils: Pupils are equal, round, and reactive to light.   Cardiovascular:      Rate and Rhythm: Normal rate and regular rhythm.      Heart sounds: Normal heart sounds.   Pulmonary:      Effort: Pulmonary effort is normal.      Breath sounds: Normal breath sounds.   Abdominal:      General: Bowel sounds are normal.      Palpations: Abdomen is soft.   Musculoskeletal:         General: Normal range of motion.      Cervical back: Normal range of motion and neck supple.   Skin:     General: Skin is warm and dry.      Capillary Refill: Capillary refill takes less than 2 seconds.   Neurological:      Mental Status: He is alert and oriented to person, place, and time.   Psychiatric:         Behavior: Behavior normal.         Thought Content: Thought content normal.         Procedures           ED Course                                           MDM    Final diagnoses:   Closed head injury,  initial encounter   Arm contusion, right, initial encounter   Contusion of right chest wall, initial encounter       ED Disposition  ED Disposition     ED Disposition   Discharge    Condition   Stable    Comment   --             Tristen Stuart, DO  [address] [facility] [address]  [phone]    Schedule an appointment as soon as possible for a visit   For further evaluation    [facility] URGENT [address]  [facility] [address] [phone]  Schedule an appointment as soon as possible for a visit   For further evaluation         Medication List      No changes were made to your prescriptions during this visit.          Ayaz Julien, APRN  12/30/22 2014

## 2023-03-15 ENCOUNTER — OFFICE VISIT (OUTPATIENT)
Dept: ORTHOPEDIC SURGERY | Facility: CLINIC | Age: 58
End: 2023-03-15
Payer: OTHER MISCELLANEOUS

## 2023-03-15 VITALS — BODY MASS INDEX: 29.83 KG/M2 | WEIGHT: 190.04 LBS | HEIGHT: 67 IN

## 2023-03-15 DIAGNOSIS — S46.911A STRAIN OF RIGHT SHOULDER, INITIAL ENCOUNTER: Primary | ICD-10-CM

## 2023-03-15 PROCEDURE — 99213 OFFICE O/P EST LOW 20 MIN: CPT | Performed by: ORTHOPAEDIC SURGERY

## 2023-03-15 NOTE — PROGRESS NOTES
Established New Problem         Patient: Valentin Vásquez  YOB: 1965  Date of Encounter: 03/15/2023        Chief  Complaint:   Chief Complaint   Patient presents with   • Right Shoulder - Follow-up           HPI:  Valentin Vásquez, 57 y.o. male presents in follow-up right shoulder after sustaining an injury at work on December 27, 2022.  He fell injuring his right shoulder and also had some chest wall pain with questionable rib fractures.  He presents today doing well reports that his shoulder pain is minimal if any.  He underwent right shoulder open rotator cuff repair pair with acromioplasty and excision lateral clavicle on July 19, 2022.  He was last seen October 31, 2022 after completing physical therapy he did have some residual stiffness of his shoulder.  Pain was completely resolved.        Medical History:  Patient Active Problem List   Diagnosis   • Acute pancreatitis   • Nephrolithiasis     Past Medical History:   Diagnosis Date   • Arthritis    • Diabetes mellitus (HCC)    • Elevated cholesterol    • GERD (gastroesophageal reflux disease)    • Gout    • Hyperlipidemia    • Hypertension    • Injury of back    • Kidney stone    • Pancreatitis    • Right shoulder pain    • Tear of right rotator cuff          Social History:  Social History     Socioeconomic History   • Marital status:    Tobacco Use   • Smoking status: Never   • Smokeless tobacco: Current     Types: Snuff   Vaping Use   • Vaping Use: Never used   Substance and Sexual Activity   • Alcohol use: Yes     Comment: occasionally   • Drug use: No   • Sexual activity: Defer     Partners: Female         Current Medications:    Current Outpatient Medications:   •  Aspirin Low Dose 81 MG EC tablet, Take 1 tablet by mouth Daily., Disp: , Rfl:   •  fenofibrate (TRICOR) 145 MG tablet, Take 1 tablet by mouth Daily., Disp: , Rfl:   •  glimepiride (AMARYL) 4 MG tablet, Take 1 tablet by mouth Daily., Disp: , Rfl:   •  HYDROcodone-acetaminophen  (NORCO)  MG per tablet, Take 1 tablet by mouth Every 4 (Four) Hours As Needed for Moderate Pain (Pain)., Disp: 16 tablet, Rfl: 0  •  HYDROcodone-acetaminophen (NORCO) 7.5-325 MG per tablet, Take 1 tablet by mouth Every 6 (Six) Hours As Needed for Moderate Pain ., Disp: 30 tablet, Rfl: 0  •  Jardiance 25 MG tablet tablet, Take 1 tablet by mouth Daily., Disp: , Rfl:   •  lisinopril (PRINIVIL,ZESTRIL) 10 MG tablet, Take 1 tablet by mouth Daily., Disp: , Rfl:   •  metFORMIN (GLUCOPHAGE) 1000 MG tablet, Take 500 mg by mouth 2 (Two) Times a Day With Meals., Disp: , Rfl:   •  metoprolol succinate XL (TOPROL XL) 25 MG 24 hr tablet, Take 1 tablet by mouth Daily., Disp: 30 tablet, Rfl: 0  •  omeprazole (priLOSEC) 40 MG capsule, Take 1 capsule by mouth Daily., Disp: , Rfl:   •  Ozempic, 0.25 or 0.5 MG/DOSE, 2 MG/1.5ML solution pen-injector, , Disp: , Rfl:   •  rosuvastatin (CRESTOR) 10 MG tablet, Take 1 tablet by mouth Every Night., Disp: , Rfl:   •  tamsulosin (Flomax) 0.4 MG capsule 24 hr capsule, Take 1 capsule by mouth Every Night., Disp: 30 capsule, Rfl: 5  •  Tresiba FlexTouch 200 UNIT/ML solution pen-injector pen injection, Inject 80 Units under the skin into the appropriate area as directed Daily., Disp: , Rfl:       Allergies:  No Known Allergies      Family History:  Family History   Problem Relation Age of Onset   • Diabetes Father    • Diabetes Sister          Surgical History:  Past Surgical History:   Procedure Laterality Date   • ADENOIDECTOMY     • CYSTOSCOPY LITHOLAPAXY BLADDER STONE EXTRACTION N/A 9/28/2022    Procedure: CYSTOSCOPY LASER LITHOLAPAXY BLADDER STONE EXTRACTION;  Surgeon: Mkyel Jeffers MD;  Location: [facility];  Service: Urology;  Laterality: N/A;   • KIDNEY STONE SURGERY     • SHOULDER ARTHROSCOPY W/ ROTATOR CUFF REPAIR Right 7/19/2022    Procedure: RIGHT SHOULDER ARTHROSCOPY WITH OPEN ACROMIOPLASTY,  ROTATOR CUFF REPAIR, EXCISION LATERAL CLAVICLE;  Surgeon: Edmundo Huitron  "MD Huy;  Location: [facility];  Service: Orthopedics;  Laterality: Right;   • TONSILLECTOMY           Radiology:   Radiographs right shoulder reviewed from visit to the emergency room December 27, 2022 demonstrate previous excision of lateral clavicle and 2 metallic anchors within the humeral head from previous rotator cuff repair.  Acute findings noted.      Examination:   GENERAL: 57 y.o. male, alert and oriented X 3 in no acute distress.   Visit Vitals  Ht 170.2 cm (67.01\")   Wt 86.2 kg (190 lb 0.6 oz)   BMI 29.76 kg/m²         Orthopedic Examination: Shoulder reveals limited flexion to 130 degrees abduction to 130 degrees excellent strength with Jobes maneuver and no pain.  Full internal and external rotation with good strength biceps tendon nontender.  Neurovascular exam grossly intact.      Assessment & Plan:   57 y.o. male presents with right shoulder injury sustained with a fall 5 months after rotator cuff repair with today's visit he has no pain and demonstrates no weakness right shoulder and negative provocative signs.  As he is doing well we will simply observe he will return as needed.         Diagnosis Plan   1. Strain of right shoulder, initial encounter              Cc:  Tristen Stuart, DO          "

## 2023-04-19 DIAGNOSIS — R30.0 DYSURIA: ICD-10-CM

## 2023-04-19 RX ORDER — TAMSULOSIN HYDROCHLORIDE 0.4 MG/1
1 CAPSULE ORAL NIGHTLY
Qty: 30 CAPSULE | Refills: 5 | Status: SHIPPED | OUTPATIENT
Start: 2023-04-19

## 2023-10-16 DIAGNOSIS — R30.0 DYSURIA: ICD-10-CM

## 2023-10-16 RX ORDER — TAMSULOSIN HYDROCHLORIDE 0.4 MG/1
1 CAPSULE ORAL NIGHTLY
Qty: 30 CAPSULE | Refills: 5 | Status: SHIPPED | OUTPATIENT
Start: 2023-10-16

## 2024-04-23 DIAGNOSIS — Z12.11 ENCOUNTER FOR SCREENING FOR MALIGNANT NEOPLASM OF COLON: Primary | ICD-10-CM

## 2024-04-23 RX ORDER — BISACODYL 5 MG/1
20 TABLET, DELAYED RELEASE ORAL ONCE
Qty: 4 TABLET | Refills: 0 | Status: SHIPPED | OUTPATIENT
Start: 2024-04-23 | End: 2024-04-23

## 2024-04-23 RX ORDER — POLYETHYLENE GLYCOL 3350 17 G/17G
510 POWDER, FOR SOLUTION ORAL ONCE
Qty: 510 G | Refills: 0 | Status: SHIPPED | OUTPATIENT
Start: 2024-04-23 | End: 2024-04-23

## 2024-05-20 RX ORDER — GABAPENTIN 600 MG/1
600 TABLET ORAL 3 TIMES DAILY
COMMUNITY

## 2024-05-21 ENCOUNTER — ANESTHESIA EVENT (OUTPATIENT)
Dept: PERIOP | Facility: HOSPITAL | Age: 59
End: 2024-05-21
Payer: COMMERCIAL

## 2024-05-21 ENCOUNTER — HOSPITAL ENCOUNTER (OUTPATIENT)
Facility: HOSPITAL | Age: 59
Setting detail: HOSPITAL OUTPATIENT SURGERY
Discharge: HOME | End: 2024-05-21
Attending: INTERNAL MEDICINE | Admitting: INTERNAL MEDICINE
Payer: COMMERCIAL

## 2024-05-21 ENCOUNTER — ANESTHESIA (OUTPATIENT)
Dept: PERIOP | Facility: HOSPITAL | Age: 59
End: 2024-05-21
Payer: COMMERCIAL

## 2024-05-21 VITALS
HEART RATE: 89 BPM | BODY MASS INDEX: 32.14 KG/M2 | DIASTOLIC BLOOD PRESSURE: 73 MMHG | SYSTOLIC BLOOD PRESSURE: 103 MMHG | TEMPERATURE: 97.2 F | HEIGHT: 66 IN | OXYGEN SATURATION: 94 % | WEIGHT: 200 LBS | RESPIRATION RATE: 18 BRPM

## 2024-05-21 DIAGNOSIS — Z12.11 ENCOUNTER FOR SCREENING FOR MALIGNANT NEOPLASM OF COLON: ICD-10-CM

## 2024-05-21 LAB — GLUCOSE BLDC GLUCOMTR-MCNC: 130 MG/DL (ref 70–130)

## 2024-05-21 PROCEDURE — 45381 COLONOSCOPY SUBMUCOUS NJX: CPT | Performed by: INTERNAL MEDICINE

## 2024-05-21 PROCEDURE — 82948 REAGENT STRIP/BLOOD GLUCOSE: CPT

## 2024-05-21 PROCEDURE — 45385 COLONOSCOPY W/LESION REMOVAL: CPT | Performed by: INTERNAL MEDICINE

## 2024-05-21 PROCEDURE — 25010000002 PROPOFOL 10 MG/ML EMULSION: Performed by: NURSE ANESTHETIST, CERTIFIED REGISTERED

## 2024-05-21 PROCEDURE — 25810000003 LACTATED RINGERS PER 1000 ML: Performed by: ANESTHESIOLOGY

## 2024-05-21 RX ORDER — MIDAZOLAM HYDROCHLORIDE 1 MG/ML
1 INJECTION INTRAMUSCULAR; INTRAVENOUS
Status: DISCONTINUED | OUTPATIENT
Start: 2024-05-21 | End: 2024-05-21 | Stop reason: HOSPADM

## 2024-05-21 RX ORDER — IPRATROPIUM BROMIDE AND ALBUTEROL SULFATE 2.5; .5 MG/3ML; MG/3ML
3 SOLUTION RESPIRATORY (INHALATION) ONCE AS NEEDED
Status: DISCONTINUED | OUTPATIENT
Start: 2024-05-21 | End: 2024-05-21 | Stop reason: HOSPADM

## 2024-05-21 RX ORDER — GLIPIZIDE 10 MG/1
10 TABLET, FILM COATED, EXTENDED RELEASE ORAL
COMMUNITY
Start: 2024-03-31

## 2024-05-21 RX ORDER — SODIUM CHLORIDE 9 MG/ML
40 INJECTION, SOLUTION INTRAVENOUS AS NEEDED
Status: DISCONTINUED | OUTPATIENT
Start: 2024-05-21 | End: 2024-05-21 | Stop reason: HOSPADM

## 2024-05-21 RX ORDER — SODIUM CHLORIDE 0.9 % (FLUSH) 0.9 %
10 SYRINGE (ML) INJECTION AS NEEDED
Status: DISCONTINUED | OUTPATIENT
Start: 2024-05-21 | End: 2024-05-21 | Stop reason: HOSPADM

## 2024-05-21 RX ORDER — INSULIN LISPRO 100 [IU]/ML
5 INJECTION, SOLUTION INTRAVENOUS; SUBCUTANEOUS
COMMUNITY
Start: 2024-05-16

## 2024-05-21 RX ORDER — SODIUM CHLORIDE, SODIUM LACTATE, POTASSIUM CHLORIDE, CALCIUM CHLORIDE 600; 310; 30; 20 MG/100ML; MG/100ML; MG/100ML; MG/100ML
125 INJECTION, SOLUTION INTRAVENOUS ONCE
Status: COMPLETED | OUTPATIENT
Start: 2024-05-21 | End: 2024-05-21

## 2024-05-21 RX ORDER — ONDANSETRON 2 MG/ML
4 INJECTION INTRAMUSCULAR; INTRAVENOUS AS NEEDED
Status: DISCONTINUED | OUTPATIENT
Start: 2024-05-21 | End: 2024-05-21 | Stop reason: HOSPADM

## 2024-05-21 RX ORDER — FENTANYL CITRATE 50 UG/ML
50 INJECTION, SOLUTION INTRAMUSCULAR; INTRAVENOUS
Status: DISCONTINUED | OUTPATIENT
Start: 2024-05-21 | End: 2024-05-21 | Stop reason: HOSPADM

## 2024-05-21 RX ORDER — SODIUM CHLORIDE, SODIUM LACTATE, POTASSIUM CHLORIDE, CALCIUM CHLORIDE 600; 310; 30; 20 MG/100ML; MG/100ML; MG/100ML; MG/100ML
100 INJECTION, SOLUTION INTRAVENOUS ONCE AS NEEDED
Status: DISCONTINUED | OUTPATIENT
Start: 2024-05-21 | End: 2024-05-21 | Stop reason: HOSPADM

## 2024-05-21 RX ORDER — SODIUM CHLORIDE 0.9 % (FLUSH) 0.9 %
10 SYRINGE (ML) INJECTION EVERY 12 HOURS SCHEDULED
Status: DISCONTINUED | OUTPATIENT
Start: 2024-05-21 | End: 2024-05-21 | Stop reason: HOSPADM

## 2024-05-21 RX ADMIN — SODIUM CHLORIDE, POTASSIUM CHLORIDE, SODIUM LACTATE AND CALCIUM CHLORIDE: 600; 310; 30; 20 INJECTION, SOLUTION INTRAVENOUS at 09:14

## 2024-05-21 RX ADMIN — PROPOFOL 225 MCG/KG/MIN: 10 INJECTION, EMULSION INTRAVENOUS at 09:17

## 2024-05-21 NOTE — ANESTHESIA POSTPROCEDURE EVALUATION
Patient: Valentin Vásquez    Procedure Summary       Date: 05/21/24 Room / Location: [facility] OR  /  [facility] OR    Anesthesia Start: 0914 Anesthesia Stop: 0958    Procedure: COLONOSCOPY Diagnosis:       Encounter for screening for malignant neoplasm of colon      (Encounter for screening for malignant neoplasm of colon [Z12.11])    Surgeons: Rosa Patrick MD Provider: Sal De Leon MD    Anesthesia Type: general ASA Status: 2            Anesthesia Type: general    Vitals  Vitals Value Taken Time   /82 05/21/24 1035   Temp 97.2 °F (36.2 °C) 05/21/24 1000   Pulse 82 05/21/24 1038   Resp 18 05/21/24 1030   SpO2 92 % 05/21/24 1038   Vitals shown include unfiled device data.        Post Anesthesia Care and Evaluation    Patient location during evaluation: PACU  Patient participation: complete - patient participated  Level of consciousness: responsive to verbal stimuli  Pain score: 0  Pain management: satisfactory to patient    Airway patency: patent  Anesthetic complications: No anesthetic complications  PONV Status: none  Cardiovascular status: hemodynamically stable  Respiratory status: nasal cannula  Hydration status: acceptable

## 2024-05-21 NOTE — ANESTHESIA PREPROCEDURE EVALUATION
Anesthesia Evaluation     Patient summary reviewed and Nursing notes reviewed   no history of anesthetic complications:   NPO Solid Status: > 8 hours  NPO Liquid Status: > 8 hours           Airway   Mallampati: II  TM distance: >3 FB  Neck ROM: full  No difficulty expected  Dental - normal exam   (+) poor dentition and partials        Pulmonary - negative pulmonary ROS and normal exam    breath sounds clear to auscultation  Cardiovascular - normal exam  Exercise tolerance: good (4-7 METS)    ECG reviewed  Patient on routine beta blocker and Beta blocker given within 24 hours of surgery  Rhythm: regular  Rate: normal    (+) hypertension, hyperlipidemia      Neuro/Psych- negative ROS  GI/Hepatic/Renal/Endo    (+) GERD, renal disease- stones, diabetes mellitus    Musculoskeletal     Abdominal  - normal exam    Abdomen: soft.   Substance History - negative use     OB/GYN negative ob/gyn ROS         Other   arthritis,                   Anesthesia Plan    ASA 2     general     intravenous induction     Anesthetic plan, risks, benefits, and alternatives have been provided, discussed and informed consent has been obtained with: patient.  Pre-procedure education provided  Use of blood products discussed with  Consented to blood products.    Plan discussed with CRNA.      CODE STATUS:

## 2024-05-21 NOTE — H&P
[facility]    Patient Identification:  Name:  Valentin Vásquez  Age:  59 y.o.  Sex:  male  :  1965  MRN:  [id number]   Visit Number:  [id number]  Primary Care Physician:  Tristen Stuart DO     Chief complaint: Screening colonoscopy     History of presenting illness: Mr. Hanson is a 59 y.o. male who is here today for screening colonoscopy. This will be his first colonoscopy. At present, clinically he is doing well. He denies having diarrhea or constipation. Denies melena or BRBPR. Denies abdominal pain or unusual weight loss. He denies family history of colon cancer. He has no other complaints today.     Review of Systems   Constitutional: Negative.    HENT: Negative.     Eyes: Negative.    Respiratory: Negative.     Cardiovascular: Negative.    Gastrointestinal: Negative.    Endocrine: Negative.    Genitourinary: Negative.    Musculoskeletal: Negative.    Allergic/Immunologic: Negative.    Neurological: Negative.    Hematological: Negative.         Past Medical History:   Diagnosis Date    Arthritis     Diabetes mellitus     Elevated cholesterol     GERD (gastroesophageal reflux disease)     Gout     Hyperlipidemia     Hypertension     Injury of back     Kidney stone     Pancreatitis     Right shoulder pain     Tear of right rotator cuff      Past Surgical History:   Procedure Laterality Date    ADENOIDECTOMY      CYSTOSCOPY LITHOLAPAXY BLADDER STONE EXTRACTION N/A 2022    Procedure: CYSTOSCOPY LASER LITHOLAPAXY BLADDER STONE EXTRACTION;  Surgeon: Mykel Jeffers MD;  Location: [facility];  Service: Urology;  Laterality: N/A;    KIDNEY STONE SURGERY      SHOULDER ARTHROSCOPY W/ ROTATOR CUFF REPAIR Right 2022    Procedure: RIGHT SHOULDER ARTHROSCOPY WITH OPEN ACROMIOPLASTY,  ROTATOR CUFF REPAIR, EXCISION LATERAL CLAVICLE;  Surgeon: Edmundo Huitron MD;  Location: [facility];  Service: Orthopedics;  Laterality: Right;    TONSILLECTOMY        Family History   Problem Relation Age of Onset    Diabetes Father     Diabetes Sister      Social History     Socioeconomic History    Marital status:    Tobacco Use    Smoking status: Never    Smokeless tobacco: Current     Types: Snuff   Vaping Use    Vaping status: Never Used   Substance and Sexual Activity    Alcohol use: Yes     Comment: occasionally    Drug use: No    Sexual activity: Defer     Partners: Female       Allergies:  Patient has no known allergies.    Prior to Admission Medications       Prescriptions Last Dose Informant Patient Reported? Taking?    Aspirin Low Dose 81 MG EC tablet 5/15/2024  Yes No    Take 1 tablet by mouth Daily.    fenofibrate (TRICOR) 145 MG tablet   Yes No    Take 1 tablet by mouth Daily.    gabapentin (NEURONTIN) 600 MG tablet   Yes No    Take 1 tablet by mouth 3 (Three) Times a Day.    glimepiride (AMARYL) 4 MG tablet   Yes No    Take 1 tablet by mouth Daily.    HYDROcodone-acetaminophen (NORCO)  MG per tablet   No No    Take 1 tablet by mouth Every 4 (Four) Hours As Needed for Moderate Pain (Pain).    HYDROcodone-acetaminophen (NORCO) 7.5-325 MG per tablet   No No    Take 1 tablet by mouth Every 6 (Six) Hours As Needed for Moderate Pain .    Jardiance 25 MG tablet tablet   Yes No    Take 1 tablet by mouth Daily.    lisinopril (PRINIVIL,ZESTRIL) 10 MG tablet   Yes No    Take 1 tablet by mouth Daily.    metFORMIN (GLUCOPHAGE) 1000 MG tablet  Spouse/Significant Other Yes No    Take 500 mg by mouth 2 (Two) Times a Day With Meals.    metoprolol succinate XL (TOPROL XL) 25 MG 24 hr tablet   No No    Take 1 tablet by mouth Daily.    omeprazole (priLOSEC) 40 MG capsule  Spouse/Significant Other Yes No    Take 1 capsule by mouth Daily.    Ozempic, 0.25 or 0.5 MG/DOSE, 2 MG/1.5ML solution pen-injector   Yes No    rosuvastatin (CRESTOR) 10 MG tablet   Yes No    Take 1 tablet by mouth Every Night.    tamsulosin (FLOMAX) 0.4 MG capsule 24 hr capsule   No No    TAKE 1  "CAPSULE BY MOUTH EVERY NIGHT    Tresiba FlexTouch 200 UNIT/ML solution pen-injector pen injection   Yes No    Inject 80 Units under the skin into the appropriate area as directed Daily.          Vital Signs:     Vitals:    05/21/24 0847   BP: 108/85   BP Location: Left arm   Patient Position: Lying   Pulse: 105   Resp: 20   Temp: 98 °F (36.7 °C)   TempSrc: Oral   SpO2: 93%   Weight: 90.7 kg (200 lb)   Height: 167.6 cm (66\")        Physical Exam:  Constitutional:  Alert and oriented. Well developed and well nourished, in no acute distress.  HENT:  Head: Normocephalic and atraumatic.  Mouth:  Moist mucous membranes.  OP clear, mmm  Eyes:  Conjunctivae and EOM are normal.  Pupils are equal, round, and reactive to light.  No scleral icterus.  Neck:  Neck supple.  No JVD present.    Cardiovascular:  Regular tachycardia, no MRG.  Pulmonary/Chest:  CTAB, unlabored.   Abdominal:  Soft.  Bowel sounds are normal.  No distension and no tenderness.   Musculoskeletal:  No edema, no tenderness, and no deformity.   Neurological:  MS as above, grossly nonfocal exam     Assessment and Plan:  Proceed with screening colonoscopy.     Mariya Cesar, LAURA  05/21/24  08:26 EDT  "

## 2024-05-23 ENCOUNTER — LAB (OUTPATIENT)
Dept: LAB | Facility: HOSPITAL | Age: 59
End: 2024-05-23
Payer: COMMERCIAL

## 2024-05-23 ENCOUNTER — TELEPHONE (OUTPATIENT)
Dept: GASTROENTEROLOGY | Facility: CLINIC | Age: 59
End: 2024-05-23

## 2024-05-23 DIAGNOSIS — D36.9 TUBULAR ADENOMA: ICD-10-CM

## 2024-05-23 DIAGNOSIS — D36.9 TUBULAR ADENOMA: Primary | ICD-10-CM

## 2024-05-23 LAB — REF LAB TEST METHOD: NORMAL

## 2024-05-23 PROCEDURE — 85025 COMPLETE CBC W/AUTO DIFF WBC: CPT | Performed by: INTERNAL MEDICINE

## 2024-05-23 PROCEDURE — 80053 COMPREHEN METABOLIC PANEL: CPT

## 2024-05-23 PROCEDURE — 36415 COLL VENOUS BLD VENIPUNCTURE: CPT

## 2024-05-23 PROCEDURE — 82378 CARCINOEMBRYONIC ANTIGEN: CPT

## 2024-05-23 NOTE — TELEPHONE ENCOUNTER
Caller: Sharri Vásquez    Relationship to patient: Emergency Contact    Best call back number: [phone]    Patient is needing: PATIENT WIFE WANTS TO KNOW IF INFORMATION CAN BE PRINTED OUT NAD MAILED TO THEM. THEY CAN NOT PRINT IT OFF FROM MY CHART OR EMAIL TO PATIENT WITH A LINK

## 2024-05-24 LAB
ALBUMIN SERPL-MCNC: 4.2 G/DL (ref 3.5–5.2)
ALBUMIN/GLOB SERPL: 1.6 G/DL
ALP SERPL-CCNC: 58 U/L (ref 39–117)
ALT SERPL W P-5'-P-CCNC: 25 U/L (ref 1–41)
ANION GAP SERPL CALCULATED.3IONS-SCNC: 13.5 MMOL/L (ref 5–15)
AST SERPL-CCNC: 19 U/L (ref 1–40)
BASOPHILS # BLD AUTO: 0.03 10*3/MM3 (ref 0–0.2)
BASOPHILS NFR BLD AUTO: 0.4 % (ref 0–1.5)
BILIRUB SERPL-MCNC: 0.3 MG/DL (ref 0–1.2)
BUN SERPL-MCNC: 17 MG/DL (ref 8–23)
BUN/CREAT SERPL: 15.6 (ref 7–25)
CALCIUM SPEC-SCNC: 9.8 MG/DL (ref 8.6–10.5)
CEA SERPL-MCNC: 2.39 NG/ML
CHLORIDE SERPL-SCNC: 103 MMOL/L (ref 98–107)
CO2 SERPL-SCNC: 24.5 MMOL/L (ref 22–29)
CREAT SERPL-MCNC: 1.09 MG/DL (ref 0.76–1.27)
DEPRECATED RDW RBC AUTO: 40.1 FL (ref 37–54)
EGFRCR SERPLBLD CKD-EPI 2021: 77.2 ML/MIN/1.73
EOSINOPHIL # BLD AUTO: 0.07 10*3/MM3 (ref 0–0.4)
EOSINOPHIL NFR BLD AUTO: 1 % (ref 0.3–6.2)
ERYTHROCYTE [DISTWIDTH] IN BLOOD BY AUTOMATED COUNT: 13 % (ref 12.3–15.4)
GLOBULIN UR ELPH-MCNC: 2.6 GM/DL
GLUCOSE SERPL-MCNC: 256 MG/DL (ref 65–99)
HCT VFR BLD AUTO: 40.5 % (ref 37.5–51)
HGB BLD-MCNC: 13.3 G/DL (ref 13–17.7)
IMM GRANULOCYTES # BLD AUTO: 0.02 10*3/MM3 (ref 0–0.05)
IMM GRANULOCYTES NFR BLD AUTO: 0.3 % (ref 0–0.5)
LYMPHOCYTES # BLD AUTO: 2.17 10*3/MM3 (ref 0.7–3.1)
LYMPHOCYTES NFR BLD AUTO: 29.6 % (ref 19.6–45.3)
MCH RBC QN AUTO: 28.1 PG (ref 26.6–33)
MCHC RBC AUTO-ENTMCNC: 32.8 G/DL (ref 31.5–35.7)
MCV RBC AUTO: 85.4 FL (ref 79–97)
MONOCYTES # BLD AUTO: 0.56 10*3/MM3 (ref 0.1–0.9)
MONOCYTES NFR BLD AUTO: 7.6 % (ref 5–12)
NEUTROPHILS NFR BLD AUTO: 4.49 10*3/MM3 (ref 1.7–7)
NEUTROPHILS NFR BLD AUTO: 61.1 % (ref 42.7–76)
NRBC BLD AUTO-RTO: 0 /100 WBC (ref 0–0.2)
PLATELET # BLD AUTO: 256 10*3/MM3 (ref 140–450)
PMV BLD AUTO: 11.7 FL (ref 6–12)
POTASSIUM SERPL-SCNC: 4.2 MMOL/L (ref 3.5–5.2)
PROT SERPL-MCNC: 6.8 G/DL (ref 6–8.5)
RBC # BLD AUTO: 4.74 10*6/MM3 (ref 4.14–5.8)
SODIUM SERPL-SCNC: 141 MMOL/L (ref 136–145)
WBC NRBC COR # BLD AUTO: 7.34 10*3/MM3 (ref 3.4–10.8)

## 2024-05-28 ENCOUNTER — TELEPHONE (OUTPATIENT)
Dept: GASTROENTEROLOGY | Facility: CLINIC | Age: 59
End: 2024-05-28

## 2024-05-28 NOTE — TELEPHONE ENCOUNTER
Caller: HASMUKH FAGAN     Relationship: SELF    Best call back number: [phone]    What is the best time to reach you:     Who are you requesting to speak with (clinical staff, provider,  specific staff member): CLINICAL    Do you know the name of the person who called:     What was the call regarding: PT'S SPOUSE IS FOLLOWING UP ON AN APPT. DR. JAN DEMARCO SAID SHE WAS GOING TO REFER PT TO DR. RIVERA FOR MASS FOUND IN COLON.     Is it okay if the provider responds through MyChart:

## 2024-05-29 ENCOUNTER — TELEPHONE (OUTPATIENT)
Dept: GASTROENTEROLOGY | Facility: CLINIC | Age: 59
End: 2024-05-29

## 2024-05-29 NOTE — TELEPHONE ENCOUNTER
Patient's wife called in and has some questions about patients recent scope. She mentioned a referral to Dr. Raman in [place]. She has concerns about the dysphagia being related to cancer. Please call back and advise.

## 2024-05-29 NOTE — TELEPHONE ENCOUNTER
Spoke with patients wife. I let her know that I tried to reach the office but I can not get through. She said that the same thing is happening to her. I faxed referral again and also asked for office to call me with appointment date and time.

## 2024-06-18 DIAGNOSIS — R30.0 DYSURIA: ICD-10-CM

## 2024-06-18 RX ORDER — TAMSULOSIN HYDROCHLORIDE 0.4 MG/1
1 CAPSULE ORAL NIGHTLY
Qty: 90 CAPSULE | Refills: 3 | Status: SHIPPED | OUTPATIENT
Start: 2024-06-18

## 2024-08-21 ENCOUNTER — OFFICE VISIT (OUTPATIENT)
Dept: SURGERY | Facility: CLINIC | Age: 59
End: 2024-08-21
Payer: COMMERCIAL

## 2024-08-21 VITALS — BODY MASS INDEX: 31.02 KG/M2 | HEIGHT: 66 IN | WEIGHT: 193 LBS

## 2024-08-21 DIAGNOSIS — Z12.11 ENCOUNTER FOR SCREENING FOR MALIGNANT NEOPLASM OF COLON: Primary | ICD-10-CM

## 2024-08-21 PROCEDURE — 99203 OFFICE O/P NEW LOW 30 MIN: CPT | Performed by: SURGERY

## 2024-08-21 NOTE — PROGRESS NOTES
Subjective   Valentin Vásquez is a 59 y.o. male is being seen for consultation today at the request of Tristen Stuart DO    Valentin Vásquez is a 59 y.o. male who was noted to have a large polypoid lesion of the rectosigmoid region on screening colonoscopy.  The patient was referred for endoscopic mucosal resection which was performed but findings are significant for adenocarcinoma and the patient will require colonic resection.  The lesion is described as 11 cm from the anal verge so believe this would behave more as a rectal cancer.  CEA within normal limits.    History of Present Illness      Past Medical History:   Diagnosis Date    Arthritis     Colon cancer     Colon polyp     Diabetes mellitus     Diverticulitis of colon     Elevated cholesterol     GERD (gastroesophageal reflux disease)     Gout     Hyperlipidemia     Hypertension     Injury of back     Kidney stone     Pancreatitis     Right shoulder pain     Tear of right rotator cuff        Family History   Problem Relation Age of Onset    Diabetes Father     Diabetes Sister        Social History     Socioeconomic History    Marital status:    Tobacco Use    Smoking status: Never    Smokeless tobacco: Current     Types: Snuff   Vaping Use    Vaping status: Never Used   Substance and Sexual Activity    Alcohol use: Not Currently     Comment: occasionally    Drug use: No    Sexual activity: Not Currently     Partners: Female       Past Surgical History:   Procedure Laterality Date    ADENOIDECTOMY      COLONOSCOPY N/A 05/21/2024    Procedure: COLONOSCOPY;  Surgeon: Rosa Patrick MD;  Location: [facility];  Service: Gastroenterology;  Laterality: N/A;    CYSTOSCOPY LITHOLAPAXY BLADDER STONE EXTRACTION N/A 09/28/2022    Procedure: CYSTOSCOPY LASER LITHOLAPAXY BLADDER STONE EXTRACTION;  Surgeon: Mykel Jeffers MD;  Location: [facility];  Service: Urology;  Laterality: N/A;    KIDNEY STONE SURGERY      SHOULDER ARTHROSCOPY W/  "ROTATOR CUFF REPAIR Right 07/19/2022    Procedure: RIGHT SHOULDER ARTHROSCOPY WITH OPEN ACROMIOPLASTY,  ROTATOR CUFF REPAIR, EXCISION LATERAL CLAVICLE;  Surgeon: Edmundo Huitron MD;  Location: [facility];  Service: Orthopedics;  Laterality: Right;    TONSILLECTOMY         Review of Systems   Constitutional:  Negative for activity change, appetite change, chills and fever.   HENT:  Negative for sore throat and trouble swallowing.    Eyes:  Negative for visual disturbance.   Respiratory:  Negative for cough and shortness of breath.    Cardiovascular:  Negative for chest pain and palpitations.   Gastrointestinal:  Negative for abdominal distention, abdominal pain, blood in stool, constipation, diarrhea, nausea and vomiting.   Endocrine: Negative for cold intolerance and heat intolerance.   Genitourinary:  Negative for dysuria.   Musculoskeletal:  Negative for joint swelling.   Skin:  Negative for color change, rash and wound.   Allergic/Immunologic: Negative for immunocompromised state.   Neurological:  Negative for dizziness, seizures, weakness and headaches.   Hematological:  Negative for adenopathy. Does not bruise/bleed easily.   Psychiatric/Behavioral:  Negative for agitation and confusion.        Results        Ht 167.6 cm (66\")   Wt 87.5 kg (193 lb)   BMI 31.15 kg/m²   Objective   Physical Exam  Constitutional:       Appearance: He is well-developed.   HENT:      Head: Normocephalic and atraumatic.   Eyes:      Conjunctiva/sclera: Conjunctivae normal.      Pupils: Pupils are equal, round, and reactive to light.   Neck:      Thyroid: No thyromegaly.      Vascular: No JVD.      Trachea: No tracheal deviation.   Cardiovascular:      Rate and Rhythm: Normal rate and regular rhythm.      Heart sounds: No murmur heard.     No friction rub. No gallop.   Pulmonary:      Effort: Pulmonary effort is normal.      Breath sounds: Normal breath sounds.   Abdominal:      General: There is no distension.      " Palpations: Abdomen is soft. There is no hepatomegaly or splenomegaly.      Tenderness: There is no abdominal tenderness.      Hernia: No hernia is present.   Musculoskeletal:         General: No deformity. Normal range of motion.      Cervical back: Neck supple.   Skin:     General: Skin is warm and dry.   Neurological:      Mental Status: He is alert and oriented to person, place, and time.       Physical Exam      Assessment & Plan            Assessment   Diagnoses and all orders for this visit:    1. Encounter for screening for malignant neoplasm of colon (Primary)        Assessment & Plan      Valentin Vásquez is a 59 y.o. male with adenocarcinoma of the colon approximately 11 cm from the anal verge.  Given this location is likely a rectal cancer and I believe he would benefit from referral to a colorectal surgeon given the need for low anterior resection with mesorectal excision.  Appropriate referral will be made.    BMI is >= 30 and <35. (Class 1 Obesity). The following options were offered after discussion;: weight loss educational material (shared in after visit summary)            This document has been electronically signed by Joel Spears MD   August 21, 2024 15:38 EDT    Patient or patient representative verbalized consent for the use of Ambient Listening during the visit with  Joel Spears MD for chart documentation. 8/21/2024  15:39 EDT

## 2024-08-29 ENCOUNTER — PATIENT OUTREACH (OUTPATIENT)
Dept: ONCOLOGY | Facility: CLINIC | Age: 59
End: 2024-08-29
Payer: COMMERCIAL

## 2024-08-29 ENCOUNTER — TRANSCRIBE ORDERS (OUTPATIENT)
Dept: ADMINISTRATIVE | Facility: HOSPITAL | Age: 59
End: 2024-08-29
Payer: COMMERCIAL

## 2024-08-29 DIAGNOSIS — C20 RECTAL CANCER: Primary | ICD-10-CM

## 2024-08-29 NOTE — SIGNIFICANT NOTE
I also had to call the order team to ask if they had an order and they do and will put it in the computer so we can schedule pt hopefully on same day for scans.

## 2024-08-30 ENCOUNTER — PATIENT OUTREACH (OUTPATIENT)
Dept: ONCOLOGY | Facility: CLINIC | Age: 59
End: 2024-08-30
Payer: COMMERCIAL

## 2024-09-03 ENCOUNTER — HOSPITAL ENCOUNTER (OUTPATIENT)
Facility: HOSPITAL | Age: 59
Discharge: HOME | End: 2024-09-03
Payer: COMMERCIAL

## 2024-09-03 ENCOUNTER — PATIENT OUTREACH (OUTPATIENT)
Dept: ONCOLOGY | Facility: CLINIC | Age: 59
End: 2024-09-03
Payer: COMMERCIAL

## 2024-09-03 ENCOUNTER — APPOINTMENT (OUTPATIENT)
Facility: HOSPITAL | Age: 59
End: 2024-09-03
Payer: COMMERCIAL

## 2024-09-03 DIAGNOSIS — C20 RECTAL CANCER: ICD-10-CM

## 2024-09-03 LAB — CREAT BLDA-MCNC: 1.5 MG/DL (ref 0.6–1.3)

## 2024-09-03 PROCEDURE — 82565 ASSAY OF CREATININE: CPT

## 2024-09-03 PROCEDURE — 72195 MRI PELVIS W/O DYE: CPT

## 2024-09-03 PROCEDURE — 71260 CT THORAX DX C+: CPT

## 2024-09-03 PROCEDURE — 25510000001 IOPAMIDOL 61 % SOLUTION: Performed by: COLON & RECTAL SURGERY

## 2024-09-03 PROCEDURE — 74177 CT ABD & PELVIS W/CONTRAST: CPT

## 2024-09-03 RX ORDER — IOPAMIDOL 612 MG/ML
100 INJECTION, SOLUTION INTRAVASCULAR
Status: COMPLETED | OUTPATIENT
Start: 2024-09-03 | End: 2024-09-03

## 2024-09-03 RX ADMIN — IOPAMIDOL 85 ML: 612 INJECTION, SOLUTION INTRAVENOUS at 10:25

## 2024-09-04 ENCOUNTER — PATIENT OUTREACH (OUTPATIENT)
Dept: ONCOLOGY | Facility: CLINIC | Age: 59
End: 2024-09-04
Payer: COMMERCIAL

## 2024-09-05 ENCOUNTER — PATIENT OUTREACH (OUTPATIENT)
Dept: ONCOLOGY | Facility: CLINIC | Age: 59
End: 2024-09-05
Payer: COMMERCIAL

## 2024-09-09 ENCOUNTER — NURSE NAVIGATOR (OUTPATIENT)
Dept: ONCOLOGY | Facility: CLINIC | Age: 59
End: 2024-09-09
Payer: COMMERCIAL

## 2024-09-09 ENCOUNTER — PREP FOR SURGERY (OUTPATIENT)
Dept: OTHER | Facility: HOSPITAL | Age: 59
End: 2024-09-09
Payer: COMMERCIAL

## 2024-09-09 NOTE — PROGRESS NOTES
Patient's Initial TDS from 9/9/24 TB was sent to the presenting Physician, Dr. José Manuel Hutchins, per secured email on 9/9/24.

## 2024-09-09 NOTE — PROGRESS NOTES
[facility] Multidisciplinary Conference: Initial Treatment Plan       Treating Physician: Dr. José Manuel Hutchins, Colorectal Surgeon  Pathologist at Tumor Board: Dr. Estevan Pacheco  Radiologist at Tumor Board: Dr. Josse Camacho  Radiologist Reading MRI Pelvis with RP: Dr. Juan Hart    Initial TB Date: 24  Encounter Provider: Virginia Brown RN  Place of Service: [facility]  Patient Care Team:  Tristen Stuart DO as PCP - General (Family Medicine)  Virginia Brown RN as Nurse Navigator    Patient Name: Valentin Vásquez  Age/Sex: 59 y.o. male  : 1965  MRN: [id number]  Address: [address] [place], [place] [address]  Home Phone: [phone]     ENDOSCOPIC / IMAGING EVALUATION     Procedure Date: 2024         Performing Facility: [facility]    Tumor Distance from anal verge (cm): 10.3 CM    Tumor Description:        11 CM from  anus at the Rectosigmoid junction, invading into submucosa and arises within tubulovillous adenoma.    Tumor Location:        Upper rectum    Indication of Sphincter involvement: Distance to top of sphincter complex/anorectal junction: 5.3 CM    Peritoneal Reflection: Straddles     CEA Level:   CEA   Date Value Ref Range Status   2024 2.39 ng/mL Final   2024                  <2.0    Pretreatment circumferential resection margin status: Not threatened    IMAGING   CT (CAP) date: Ct C/A/P-9/3/2024   PET/CT date: NA MRI Pelvis w/ Rectal Protocol date: 9/3/2024       BIOPSY RESULTS   Histologic Type: Adenocarcinoma, Invasive  Differentiation: Moderately Differentiated  MSI: Intact  Specimen Site and Character: Upper rectum 11 CM from anus at the rectosigmoid junction invades submucosa  Lymphovascular Invasion: No EMVI  Lymph Nodes: Small mesorectal LN not meeting criteria for suspicious adenopathy.    CLINICAL STAGING        Stage I: T 1, N 0, and M 0, per Radiologist saying cautery aritfaction    GUIDELINE CONCORDANCE   Recommended treatment is in  accordance with National Comprehensive Cancer Network (NCCN): Yes    REFERRAL SUPPORT   Referral Support options reviewed and outcomes:       Surgery: Recommended  Sees Dr. José Manuel Hutchins       Medical Oncology: PRN surgical path       Radiation Oncology: PRN surgical path       Palliative Care Consultation: PRN        Rehab: PRN       Genetics: No       Psychosocial: No       Nutrition: Yes       Research: No      Virginia Brown RN - 09/09/24, 4:06 PM EDT

## 2024-10-04 ENCOUNTER — PRE-ADMISSION TESTING (OUTPATIENT)
Dept: PREADMISSION TESTING | Facility: HOSPITAL | Age: 59
End: 2024-10-04
Payer: COMMERCIAL

## 2024-10-04 VITALS — WEIGHT: 195.22 LBS | HEIGHT: 67 IN | BODY MASS INDEX: 30.64 KG/M2

## 2024-10-04 DIAGNOSIS — Z01.89 LABORATORY TEST: Primary | ICD-10-CM

## 2024-10-04 LAB
ABO GROUP BLD: NORMAL
DEPRECATED RDW RBC AUTO: 43.4 FL (ref 37–54)
ERYTHROCYTE [DISTWIDTH] IN BLOOD BY AUTOMATED COUNT: 13.6 % (ref 12.3–15.4)
HBA1C MFR BLD: 7.1 % (ref 4.8–5.6)
HCT VFR BLD AUTO: 50.6 % (ref 37.5–51)
HGB BLD-MCNC: 15.9 G/DL (ref 13–17.7)
MCH RBC QN AUTO: 27.1 PG (ref 26.6–33)
MCHC RBC AUTO-ENTMCNC: 31.4 G/DL (ref 31.5–35.7)
MCV RBC AUTO: 86.3 FL (ref 79–97)
PLATELET # BLD AUTO: 150 10*3/MM3 (ref 140–450)
PMV BLD AUTO: 10.9 FL (ref 6–12)
QT INTERVAL: 364 MS
QTC INTERVAL: 467 MS
RBC # BLD AUTO: 5.86 10*6/MM3 (ref 4.14–5.8)
RH BLD: POSITIVE
WBC NRBC COR # BLD AUTO: 8.03 10*3/MM3 (ref 3.4–10.8)

## 2024-10-04 PROCEDURE — 93005 ELECTROCARDIOGRAM TRACING: CPT

## 2024-10-04 PROCEDURE — 86900 BLOOD TYPING SEROLOGIC ABO: CPT

## 2024-10-04 PROCEDURE — 36415 COLL VENOUS BLD VENIPUNCTURE: CPT

## 2024-10-04 PROCEDURE — 85027 COMPLETE CBC AUTOMATED: CPT

## 2024-10-04 PROCEDURE — 86901 BLOOD TYPING SEROLOGIC RH(D): CPT

## 2024-10-04 PROCEDURE — 83036 HEMOGLOBIN GLYCOSYLATED A1C: CPT

## 2024-10-04 PROCEDURE — 93010 ELECTROCARDIOGRAM REPORT: CPT | Performed by: INTERNAL MEDICINE

## 2024-10-04 RX ORDER — DAPAGLIFLOZIN 10 MG/1
1 TABLET, FILM COATED ORAL DAILY
Status: ON HOLD | COMMUNITY

## 2024-10-04 NOTE — PAT
"Too early to draw type and screen in PAT.  Please obtain blood bank specimen in pre-op on the day of surgery.    Patient to apply Chlorhexadine wipes  to surgical area (as instructed) the night before procedure and the AM of procedure. Wipes provided.    Ten (8 ounce) Impact Advanced Recovery Nutritional Drinks distributed to patient from Pre Admission Testing Department.  Verbal and written instructions given that patient must consume two (8 ounce) Impact drinks a day for five days before surgery.  Flavoring tip sheet provided as well the brochure \"Go in Stronger. Get Home Sooner\" that explains benefits of nutritional drinks to enhance recovery. Patient/family verbalized understanding.     Patient instructed to drink 20 ounces of Gatorade or Gatorlyte (if diabetic) and it needs to be completed 1 hour (for Main OR patients) or 2 hours (scheduled  section & [facility] patients) before given arrival time for procedure (NO RED Gatorade and NO Gatorade Zero).    Patient verbalized understanding.    Stoma nurse saw pt before pat appointment.     Gi nurse notified of surgery.     Cmp and cea dos as hemolyzed in pat.    Per Anesthesia Request, patient instructed not to take their ACE/ARB medications on the AM of surgery.     "

## 2024-10-04 NOTE — NURSING NOTE
"Patient seen in pre-op for stoma marking    Patient placed in sitting and lying position with abdomen exposed.  Stoma marked at the RUQ.     Folds, creases/scars, ribs & underwear line avoided. Rectus muscle identified.    Patient able to visualize stoma marking(s) and point to \"X\" with their finger.    WOC Nurse will continue to follow daily if an ostomy is required.     Zachary French RN, BSN, CCRN, CWOCN  Wound, Ostomy and Continence (WOC) Department  [facility]\    "

## 2024-10-09 ENCOUNTER — ANESTHESIA EVENT (OUTPATIENT)
Dept: PERIOP | Facility: HOSPITAL | Age: 59
End: 2024-10-09
Payer: COMMERCIAL

## 2024-10-09 RX ORDER — SODIUM CHLORIDE 0.9 % (FLUSH) 0.9 %
10 SYRINGE (ML) INJECTION AS NEEDED
Status: CANCELLED | OUTPATIENT
Start: 2024-10-09

## 2024-10-09 RX ORDER — FAMOTIDINE 10 MG/ML
20 INJECTION, SOLUTION INTRAVENOUS ONCE
Status: CANCELLED | OUTPATIENT
Start: 2024-10-09 | End: 2024-10-09

## 2024-10-09 RX ORDER — SODIUM CHLORIDE 0.9 % (FLUSH) 0.9 %
10 SYRINGE (ML) INJECTION EVERY 12 HOURS SCHEDULED
Status: CANCELLED | OUTPATIENT
Start: 2024-10-09

## 2024-10-10 ENCOUNTER — ANESTHESIA EVENT CONVERTED (OUTPATIENT)
Dept: ANESTHESIOLOGY | Facility: HOSPITAL | Age: 59
End: 2024-10-10
Payer: COMMERCIAL

## 2024-10-10 ENCOUNTER — ANESTHESIA (OUTPATIENT)
Dept: PERIOP | Facility: HOSPITAL | Age: 59
End: 2024-10-10
Payer: COMMERCIAL

## 2024-10-10 ENCOUNTER — HOSPITAL ENCOUNTER (INPATIENT)
Facility: HOSPITAL | Age: 59
LOS: 7 days | Discharge: HOME | End: 2024-10-17
Attending: COLON & RECTAL SURGERY | Admitting: COLON & RECTAL SURGERY
Payer: COMMERCIAL

## 2024-10-10 DIAGNOSIS — C20 RECTAL CANCER: Primary | ICD-10-CM

## 2024-10-10 DIAGNOSIS — C18.9 MALIGNANT NEOPLASM OF COLON: ICD-10-CM

## 2024-10-10 PROBLEM — E78.5 HYPERLIPIDEMIA: Status: ACTIVE | Noted: 2024-10-10

## 2024-10-10 PROBLEM — E11.9 DM2 (DIABETES MELLITUS, TYPE 2): Status: ACTIVE | Noted: 2024-10-10

## 2024-10-10 PROBLEM — Z90.49 S/P COLECTOMY: Status: ACTIVE | Noted: 2024-10-10

## 2024-10-10 PROBLEM — I10 HTN (HYPERTENSION): Status: ACTIVE | Noted: 2024-10-10

## 2024-10-10 LAB
ABO GROUP BLD: NORMAL
ALBUMIN SERPL-MCNC: 4.6 G/DL (ref 3.5–5.2)
ALBUMIN/GLOB SERPL: 1.6 G/DL
ALP SERPL-CCNC: 45 U/L (ref 39–117)
ALT SERPL W P-5'-P-CCNC: 32 U/L (ref 1–41)
ANION GAP SERPL CALCULATED.3IONS-SCNC: 14 MMOL/L (ref 5–15)
AST SERPL-CCNC: 42 U/L (ref 1–40)
BILIRUB SERPL-MCNC: 0.4 MG/DL (ref 0–1.2)
BLD GP AB SCN SERPL QL: NEGATIVE
BUN SERPL-MCNC: 28 MG/DL (ref 8–23)
BUN/CREAT SERPL: 25.7 (ref 7–25)
CALCIUM SPEC-SCNC: 9.7 MG/DL (ref 8.6–10.5)
CHLORIDE SERPL-SCNC: 105 MMOL/L (ref 98–107)
CO2 SERPL-SCNC: 19 MMOL/L (ref 22–29)
CREAT SERPL-MCNC: 1.09 MG/DL (ref 0.76–1.27)
EGFRCR SERPLBLD CKD-EPI 2021: 77.2 ML/MIN/1.73
GLOBULIN UR ELPH-MCNC: 2.9 GM/DL
GLUCOSE BLDC GLUCOMTR-MCNC: 123 MG/DL (ref 70–130)
GLUCOSE BLDC GLUCOMTR-MCNC: 125 MG/DL (ref 70–130)
GLUCOSE BLDC GLUCOMTR-MCNC: 81 MG/DL (ref 70–130)
GLUCOSE BLDC GLUCOMTR-MCNC: 90 MG/DL (ref 70–130)
GLUCOSE SERPL-MCNC: 89 MG/DL (ref 65–99)
POTASSIUM SERPL-SCNC: 4.2 MMOL/L (ref 3.5–5.2)
PROT SERPL-MCNC: 7.5 G/DL (ref 6–8.5)
RH BLD: POSITIVE
SODIUM SERPL-SCNC: 138 MMOL/L (ref 136–145)
T&S EXPIRATION DATE: NORMAL

## 2024-10-10 PROCEDURE — 0DTJ4ZZ RESECTION OF APPENDIX, PERCUTANEOUS ENDOSCOPIC APPROACH: ICD-10-PCS | Performed by: COLON & RECTAL SURGERY

## 2024-10-10 PROCEDURE — 88307 TISSUE EXAM BY PATHOLOGIST: CPT | Performed by: COLON & RECTAL SURGERY

## 2024-10-10 PROCEDURE — 25010000002 DEXAMETHASONE SODIUM PHOSPHATE 10 MG/ML SOLUTION: Performed by: NURSE ANESTHETIST, CERTIFIED REGISTERED

## 2024-10-10 PROCEDURE — 0D1B0Z4 BYPASS ILEUM TO CUTANEOUS, OPEN APPROACH: ICD-10-PCS | Performed by: COLON & RECTAL SURGERY

## 2024-10-10 PROCEDURE — 25810000003 LACTATED RINGERS PER 1000 ML: Performed by: ANESTHESIOLOGY

## 2024-10-10 PROCEDURE — 0T7D7ZZ DILATION OF URETHRA, VIA NATURAL OR ARTIFICIAL OPENING: ICD-10-PCS | Performed by: UROLOGY

## 2024-10-10 PROCEDURE — 25010000002 SODIUM CHLORIDE 0.9 % WITH KCL 20 MEQ 20-0.9 MEQ/L-% SOLUTION: Performed by: COLON & RECTAL SURGERY

## 2024-10-10 PROCEDURE — 25810000003 SODIUM CHLORIDE PER 500 ML: Performed by: COLON & RECTAL SURGERY

## 2024-10-10 PROCEDURE — 86850 RBC ANTIBODY SCREEN: CPT | Performed by: COLON & RECTAL SURGERY

## 2024-10-10 PROCEDURE — 52332 CYSTOSCOPY AND TREATMENT: CPT | Performed by: UROLOGY

## 2024-10-10 PROCEDURE — 25010000002 HYDROMORPHONE 1 MG/ML SOLUTION

## 2024-10-10 PROCEDURE — 25010000002 PROPOFOL 10 MG/ML EMULSION: Performed by: NURSE ANESTHETIST, CERTIFIED REGISTERED

## 2024-10-10 PROCEDURE — 25010000002 HYDROMORPHONE 1 MG/ML SOLUTION: Performed by: COLON & RECTAL SURGERY

## 2024-10-10 PROCEDURE — P9041 ALBUMIN (HUMAN),5%, 50ML: HCPCS | Performed by: NURSE ANESTHETIST, CERTIFIED REGISTERED

## 2024-10-10 PROCEDURE — 0WQF0ZZ REPAIR ABDOMINAL WALL, OPEN APPROACH: ICD-10-PCS | Performed by: COLON & RECTAL SURGERY

## 2024-10-10 PROCEDURE — 25010000002 LIDOCAINE PF 1% 1 % SOLUTION: Performed by: NURSE ANESTHETIST, CERTIFIED REGISTERED

## 2024-10-10 PROCEDURE — C1758 CATHETER, URETERAL: HCPCS | Performed by: COLON & RECTAL SURGERY

## 2024-10-10 PROCEDURE — 88304 TISSUE EXAM BY PATHOLOGIST: CPT | Performed by: COLON & RECTAL SURGERY

## 2024-10-10 PROCEDURE — 0DBP0ZZ EXCISION OF RECTUM, OPEN APPROACH: ICD-10-PCS | Performed by: COLON & RECTAL SURGERY

## 2024-10-10 PROCEDURE — 0DBN0ZZ EXCISION OF SIGMOID COLON, OPEN APPROACH: ICD-10-PCS | Performed by: COLON & RECTAL SURGERY

## 2024-10-10 PROCEDURE — 25010000002 ONDANSETRON PER 1 MG: Performed by: NURSE ANESTHETIST, CERTIFIED REGISTERED

## 2024-10-10 PROCEDURE — 86901 BLOOD TYPING SEROLOGIC RH(D): CPT | Performed by: COLON & RECTAL SURGERY

## 2024-10-10 PROCEDURE — 25010000002 ALBUMIN HUMAN 5% PER 50 ML: Performed by: NURSE ANESTHETIST, CERTIFIED REGISTERED

## 2024-10-10 PROCEDURE — 80053 COMPREHEN METABOLIC PANEL: CPT | Performed by: COLON & RECTAL SURGERY

## 2024-10-10 PROCEDURE — 0DJD4ZZ INSPECTION OF LOWER INTESTINAL TRACT, PERCUTANEOUS ENDOSCOPIC APPROACH: ICD-10-PCS | Performed by: COLON & RECTAL SURGERY

## 2024-10-10 PROCEDURE — 25010000002 ESMOLOL 100 MG/10ML SOLUTION: Performed by: NURSE ANESTHETIST, CERTIFIED REGISTERED

## 2024-10-10 PROCEDURE — 25010000002 FENTANYL CITRATE (PF) 50 MCG/ML SOLUTION: Performed by: NURSE ANESTHETIST, CERTIFIED REGISTERED

## 2024-10-10 PROCEDURE — 25010000002 BUPIVACAINE (PF) 0.25 % SOLUTION: Performed by: NURSE ANESTHETIST, CERTIFIED REGISTERED

## 2024-10-10 PROCEDURE — 0DJD8ZZ INSPECTION OF LOWER INTESTINAL TRACT, VIA NATURAL OR ARTIFICIAL OPENING ENDOSCOPIC: ICD-10-PCS | Performed by: COLON & RECTAL SURGERY

## 2024-10-10 PROCEDURE — 0TJB8ZZ INSPECTION OF BLADDER, VIA NATURAL OR ARTIFICIAL OPENING ENDOSCOPIC: ICD-10-PCS | Performed by: UROLOGY

## 2024-10-10 PROCEDURE — 86900 BLOOD TYPING SEROLOGIC ABO: CPT | Performed by: COLON & RECTAL SURGERY

## 2024-10-10 PROCEDURE — 25010000002 FENTANYL CITRATE (PF) 50 MCG/ML SOLUTION

## 2024-10-10 PROCEDURE — 25010000002 LIDOCAINE PF 1% 1 % SOLUTION: Performed by: ANESTHESIOLOGY

## 2024-10-10 PROCEDURE — 8E0W4CZ ROBOTIC ASSISTED PROCEDURE OF TRUNK REGION, PERCUTANEOUS ENDOSCOPIC APPROACH: ICD-10-PCS | Performed by: COLON & RECTAL SURGERY

## 2024-10-10 PROCEDURE — 25010000002 DROPERIDOL PER 5 MG

## 2024-10-10 PROCEDURE — 82948 REAGENT STRIP/BLOOD GLUCOSE: CPT

## 2024-10-10 PROCEDURE — 25010000002 ERTAPENEM PER 500 MG: Performed by: COLON & RECTAL SURGERY

## 2024-10-10 PROCEDURE — 25010000002 HEPARIN (PORCINE) PER 1000 UNITS: Performed by: COLON & RECTAL SURGERY

## 2024-10-10 PROCEDURE — 25010000002 SUGAMMADEX 200 MG/2ML SOLUTION: Performed by: NURSE ANESTHETIST, CERTIFIED REGISTERED

## 2024-10-10 DEVICE — PROXIMATE LINEAR CUTTER RELOAD, BLUE, 75MM
Type: IMPLANTABLE DEVICE | Site: ABDOMEN | Status: FUNCTIONAL
Brand: PROXIMATE

## 2024-10-10 DEVICE — ECHELON CIRCULAR POWERED STAPLER
Type: IMPLANTABLE DEVICE | Site: ABDOMEN | Status: FUNCTIONAL
Brand: ECHELON CIRCULAR

## 2024-10-10 DEVICE — PROXIMATE RELOADABLE LINEAR CUTTER WITH SAFETY LOCK-OUT, 75MM
Type: IMPLANTABLE DEVICE | Site: ABDOMEN | Status: FUNCTIONAL
Brand: PROXIMATE

## 2024-10-10 DEVICE — ECHELON CONTOUR W/ GREEN RELOAD
Type: IMPLANTABLE DEVICE | Site: ABDOMEN | Status: FUNCTIONAL
Brand: ECHELON

## 2024-10-10 RX ORDER — BUPIVACAINE HYDROCHLORIDE 2.5 MG/ML
INJECTION, SOLUTION EPIDURAL; INFILTRATION; INTRACAUDAL
Status: COMPLETED | OUTPATIENT
Start: 2024-10-10 | End: 2024-10-10

## 2024-10-10 RX ORDER — METOPROLOL SUCCINATE 25 MG/1
25 TABLET, EXTENDED RELEASE ORAL DAILY
Status: DISCONTINUED | OUTPATIENT
Start: 2024-10-11 | End: 2024-10-17 | Stop reason: HOSPADM

## 2024-10-10 RX ORDER — IPRATROPIUM BROMIDE AND ALBUTEROL SULFATE 2.5; .5 MG/3ML; MG/3ML
3 SOLUTION RESPIRATORY (INHALATION) ONCE AS NEEDED
Status: DISCONTINUED | OUTPATIENT
Start: 2024-10-10 | End: 2024-10-10 | Stop reason: HOSPADM

## 2024-10-10 RX ORDER — MIDAZOLAM HYDROCHLORIDE 1 MG/ML
1 INJECTION, SOLUTION INTRAMUSCULAR; INTRAVENOUS
Status: DISCONTINUED | OUTPATIENT
Start: 2024-10-10 | End: 2024-10-10 | Stop reason: HOSPADM

## 2024-10-10 RX ORDER — DEXAMETHASONE SODIUM PHOSPHATE 10 MG/ML
INJECTION, SOLUTION INTRAMUSCULAR; INTRAVENOUS
Status: COMPLETED | OUTPATIENT
Start: 2024-10-10 | End: 2024-10-10

## 2024-10-10 RX ORDER — HYDROCODONE BITARTRATE AND ACETAMINOPHEN 7.5; 325 MG/1; MG/1
1 TABLET ORAL EVERY 4 HOURS PRN
Status: DISCONTINUED | OUTPATIENT
Start: 2024-10-10 | End: 2024-10-17 | Stop reason: HOSPADM

## 2024-10-10 RX ORDER — LIDOCAINE HYDROCHLORIDE 10 MG/ML
INJECTION, SOLUTION EPIDURAL; INFILTRATION; INTRACAUDAL; PERINEURAL AS NEEDED
Status: DISCONTINUED | OUTPATIENT
Start: 2024-10-10 | End: 2024-10-10 | Stop reason: SURG

## 2024-10-10 RX ORDER — DROPERIDOL 2.5 MG/ML
INJECTION, SOLUTION INTRAMUSCULAR; INTRAVENOUS
Status: COMPLETED
Start: 2024-10-10 | End: 2024-10-10

## 2024-10-10 RX ORDER — GABAPENTIN 100 MG/1
100 CAPSULE ORAL 3 TIMES DAILY
Status: DISCONTINUED | OUTPATIENT
Start: 2024-10-10 | End: 2024-10-10

## 2024-10-10 RX ORDER — ROCURONIUM BROMIDE 10 MG/ML
INJECTION, SOLUTION INTRAVENOUS AS NEEDED
Status: DISCONTINUED | OUTPATIENT
Start: 2024-10-10 | End: 2024-10-10 | Stop reason: SURG

## 2024-10-10 RX ORDER — PROPOFOL 10 MG/ML
VIAL (ML) INTRAVENOUS AS NEEDED
Status: DISCONTINUED | OUTPATIENT
Start: 2024-10-10 | End: 2024-10-10 | Stop reason: SURG

## 2024-10-10 RX ORDER — ACETAMINOPHEN 500 MG
1000 TABLET ORAL ONCE
Status: COMPLETED | OUTPATIENT
Start: 2024-10-10 | End: 2024-10-10

## 2024-10-10 RX ORDER — BUPIVACAINE HCL/0.9 % NACL/PF 0.125 %
PLASTIC BAG, INJECTION (ML) EPIDURAL AS NEEDED
Status: DISCONTINUED | OUTPATIENT
Start: 2024-10-10 | End: 2024-10-10 | Stop reason: SURG

## 2024-10-10 RX ORDER — IBUPROFEN 600 MG/1
1 TABLET ORAL
Status: DISCONTINUED | OUTPATIENT
Start: 2024-10-10 | End: 2024-10-17 | Stop reason: HOSPADM

## 2024-10-10 RX ORDER — NITROGLYCERIN 0.4 MG/1
0.4 TABLET SUBLINGUAL
Status: DISCONTINUED | OUTPATIENT
Start: 2024-10-10 | End: 2024-10-17 | Stop reason: HOSPADM

## 2024-10-10 RX ORDER — ACETAMINOPHEN 325 MG/1
650 TABLET ORAL EVERY 8 HOURS
Status: DISCONTINUED | OUTPATIENT
Start: 2024-10-10 | End: 2024-10-17 | Stop reason: HOSPADM

## 2024-10-10 RX ORDER — TAMSULOSIN HYDROCHLORIDE 0.4 MG/1
0.4 CAPSULE ORAL NIGHTLY
Status: DISCONTINUED | OUTPATIENT
Start: 2024-10-10 | End: 2024-10-17 | Stop reason: HOSPADM

## 2024-10-10 RX ORDER — LABETALOL HYDROCHLORIDE 5 MG/ML
5 INJECTION, SOLUTION INTRAVENOUS
Status: DISCONTINUED | OUTPATIENT
Start: 2024-10-10 | End: 2024-10-10 | Stop reason: HOSPADM

## 2024-10-10 RX ORDER — FENTANYL CITRATE 50 UG/ML
INJECTION, SOLUTION INTRAMUSCULAR; INTRAVENOUS
Status: COMPLETED
Start: 2024-10-10 | End: 2024-10-10

## 2024-10-10 RX ORDER — DIAZEPAM 5 MG/1
5 TABLET ORAL EVERY 6 HOURS PRN
Status: DISCONTINUED | OUTPATIENT
Start: 2024-10-10 | End: 2024-10-17 | Stop reason: HOSPADM

## 2024-10-10 RX ORDER — FENTANYL CITRATE 50 UG/ML
50 INJECTION, SOLUTION INTRAMUSCULAR; INTRAVENOUS
Status: DISCONTINUED | OUTPATIENT
Start: 2024-10-10 | End: 2024-10-10

## 2024-10-10 RX ORDER — SODIUM CHLORIDE AND POTASSIUM CHLORIDE 150; 900 MG/100ML; MG/100ML
100 INJECTION, SOLUTION INTRAVENOUS CONTINUOUS
Status: DISCONTINUED | OUTPATIENT
Start: 2024-10-10 | End: 2024-10-11

## 2024-10-10 RX ORDER — IBUPROFEN 400 MG/1
400 TABLET, FILM COATED ORAL EVERY 6 HOURS PRN
Status: DISCONTINUED | OUTPATIENT
Start: 2024-10-10 | End: 2024-10-17 | Stop reason: HOSPADM

## 2024-10-10 RX ORDER — NICOTINE POLACRILEX 4 MG
15 LOZENGE BUCCAL
Status: DISCONTINUED | OUTPATIENT
Start: 2024-10-10 | End: 2024-10-17 | Stop reason: HOSPADM

## 2024-10-10 RX ORDER — ROSUVASTATIN CALCIUM 10 MG/1
10 TABLET, COATED ORAL NIGHTLY
Status: DISCONTINUED | OUTPATIENT
Start: 2024-10-10 | End: 2024-10-17 | Stop reason: HOSPADM

## 2024-10-10 RX ORDER — SCOLOPAMINE TRANSDERMAL SYSTEM 1 MG/1
1 PATCH, EXTENDED RELEASE TRANSDERMAL
Status: DISCONTINUED | OUTPATIENT
Start: 2024-10-10 | End: 2024-10-10 | Stop reason: HOSPADM

## 2024-10-10 RX ORDER — LIDOCAINE HYDROCHLORIDE 10 MG/ML
0.5 INJECTION, SOLUTION EPIDURAL; INFILTRATION; INTRACAUDAL; PERINEURAL ONCE AS NEEDED
Status: COMPLETED | OUTPATIENT
Start: 2024-10-10 | End: 2024-10-10

## 2024-10-10 RX ORDER — ONDANSETRON 4 MG/1
4 TABLET, ORALLY DISINTEGRATING ORAL EVERY 6 HOURS PRN
Status: DISCONTINUED | OUTPATIENT
Start: 2024-10-10 | End: 2024-10-17 | Stop reason: HOSPADM

## 2024-10-10 RX ORDER — BUPIVACAINE HYDROCHLORIDE 2.5 MG/ML
INJECTION, SOLUTION EPIDURAL; INFILTRATION; INTRACAUDAL AS NEEDED
Status: DISCONTINUED | OUTPATIENT
Start: 2024-10-10 | End: 2024-10-10

## 2024-10-10 RX ORDER — DROPERIDOL 2.5 MG/ML
0.62 INJECTION, SOLUTION INTRAMUSCULAR; INTRAVENOUS ONCE AS NEEDED
Status: COMPLETED | OUTPATIENT
Start: 2024-10-10 | End: 2024-10-10

## 2024-10-10 RX ORDER — HYDROMORPHONE HYDROCHLORIDE 1 MG/ML
0.5 INJECTION, SOLUTION INTRAMUSCULAR; INTRAVENOUS; SUBCUTANEOUS
Status: DISCONTINUED | OUTPATIENT
Start: 2024-10-10 | End: 2024-10-10

## 2024-10-10 RX ORDER — SODIUM CHLORIDE, SODIUM LACTATE, POTASSIUM CHLORIDE, CALCIUM CHLORIDE 600; 310; 30; 20 MG/100ML; MG/100ML; MG/100ML; MG/100ML
9 INJECTION, SOLUTION INTRAVENOUS CONTINUOUS
Status: DISCONTINUED | OUTPATIENT
Start: 2024-10-11 | End: 2024-10-10

## 2024-10-10 RX ORDER — FENTANYL CITRATE 50 UG/ML
INJECTION, SOLUTION INTRAMUSCULAR; INTRAVENOUS AS NEEDED
Status: DISCONTINUED | OUTPATIENT
Start: 2024-10-10 | End: 2024-10-10 | Stop reason: SURG

## 2024-10-10 RX ORDER — PREGABALIN 75 MG/1
75 CAPSULE ORAL ONCE
Status: COMPLETED | OUTPATIENT
Start: 2024-10-10 | End: 2024-10-10

## 2024-10-10 RX ORDER — DEXTROSE MONOHYDRATE 25 G/50ML
25 INJECTION, SOLUTION INTRAVENOUS
Status: DISCONTINUED | OUTPATIENT
Start: 2024-10-10 | End: 2024-10-17 | Stop reason: HOSPADM

## 2024-10-10 RX ORDER — ULTRASOUND COUPLING MEDIUM
GEL (GRAM) TOPICAL AS NEEDED
Status: DISCONTINUED | OUTPATIENT
Start: 2024-10-10 | End: 2024-10-10 | Stop reason: HOSPADM

## 2024-10-10 RX ORDER — SODIUM CHLORIDE, SODIUM LACTATE, POTASSIUM CHLORIDE, CALCIUM CHLORIDE 600; 310; 30; 20 MG/100ML; MG/100ML; MG/100ML; MG/100ML
100 INJECTION, SOLUTION INTRAVENOUS CONTINUOUS
Status: DISCONTINUED | OUTPATIENT
Start: 2024-10-10 | End: 2024-10-10

## 2024-10-10 RX ORDER — HEPARIN SODIUM 5000 [USP'U]/ML
5000 INJECTION, SOLUTION INTRAVENOUS; SUBCUTANEOUS ONCE
Status: COMPLETED | OUTPATIENT
Start: 2024-10-10 | End: 2024-10-10

## 2024-10-10 RX ORDER — ALBUMIN, HUMAN INJ 5% 5 %
SOLUTION INTRAVENOUS CONTINUOUS PRN
Status: DISCONTINUED | OUTPATIENT
Start: 2024-10-10 | End: 2024-10-10 | Stop reason: SURG

## 2024-10-10 RX ORDER — GABAPENTIN 300 MG/1
600 CAPSULE ORAL EVERY 8 HOURS SCHEDULED
Status: DISCONTINUED | OUTPATIENT
Start: 2024-10-10 | End: 2024-10-17 | Stop reason: HOSPADM

## 2024-10-10 RX ORDER — SODIUM CHLORIDE 9 MG/ML
INJECTION, SOLUTION INTRAVENOUS AS NEEDED
Status: DISCONTINUED | OUTPATIENT
Start: 2024-10-10 | End: 2024-10-10 | Stop reason: HOSPADM

## 2024-10-10 RX ORDER — DROPERIDOL 2.5 MG/ML
0.62 INJECTION, SOLUTION INTRAMUSCULAR; INTRAVENOUS ONCE AS NEEDED
Status: DISCONTINUED | OUTPATIENT
Start: 2024-10-10 | End: 2024-10-10

## 2024-10-10 RX ORDER — HEPARIN SODIUM 5000 [USP'U]/ML
5000 INJECTION, SOLUTION INTRAVENOUS; SUBCUTANEOUS EVERY 8 HOURS SCHEDULED
Status: DISCONTINUED | OUTPATIENT
Start: 2024-10-11 | End: 2024-10-17 | Stop reason: HOSPADM

## 2024-10-10 RX ORDER — FAMOTIDINE 20 MG/1
20 TABLET, FILM COATED ORAL ONCE
Status: COMPLETED | OUTPATIENT
Start: 2024-10-10 | End: 2024-10-10

## 2024-10-10 RX ORDER — INSULIN LISPRO 100 [IU]/ML
3-14 INJECTION, SOLUTION INTRAVENOUS; SUBCUTANEOUS
Status: DISCONTINUED | OUTPATIENT
Start: 2024-10-10 | End: 2024-10-17 | Stop reason: HOSPADM

## 2024-10-10 RX ORDER — MAGNESIUM HYDROXIDE 1200 MG/15ML
LIQUID ORAL AS NEEDED
Status: DISCONTINUED | OUTPATIENT
Start: 2024-10-10 | End: 2024-10-10 | Stop reason: HOSPADM

## 2024-10-10 RX ORDER — NALOXONE HCL 0.4 MG/ML
0.4 VIAL (ML) INJECTION
Status: DISCONTINUED | OUTPATIENT
Start: 2024-10-10 | End: 2024-10-10

## 2024-10-10 RX ORDER — ONDANSETRON 2 MG/ML
4 INJECTION INTRAMUSCULAR; INTRAVENOUS EVERY 6 HOURS PRN
Status: DISCONTINUED | OUTPATIENT
Start: 2024-10-10 | End: 2024-10-17 | Stop reason: HOSPADM

## 2024-10-10 RX ORDER — MELOXICAM 15 MG/1
15 TABLET ORAL ONCE
Status: COMPLETED | OUTPATIENT
Start: 2024-10-10 | End: 2024-10-10

## 2024-10-10 RX ORDER — HYDROMORPHONE HYDROCHLORIDE 1 MG/ML
0.5 INJECTION, SOLUTION INTRAMUSCULAR; INTRAVENOUS; SUBCUTANEOUS
Status: DISCONTINUED | OUTPATIENT
Start: 2024-10-10 | End: 2024-10-10 | Stop reason: HOSPADM

## 2024-10-10 RX ORDER — ONDANSETRON 2 MG/ML
INJECTION INTRAMUSCULAR; INTRAVENOUS AS NEEDED
Status: DISCONTINUED | OUTPATIENT
Start: 2024-10-10 | End: 2024-10-10 | Stop reason: SURG

## 2024-10-10 RX ORDER — NALOXONE HCL 0.4 MG/ML
0.4 VIAL (ML) INJECTION
Status: DISCONTINUED | OUTPATIENT
Start: 2024-10-10 | End: 2024-10-17 | Stop reason: HOSPADM

## 2024-10-10 RX ORDER — PANTOPRAZOLE SODIUM 40 MG/1
40 TABLET, DELAYED RELEASE ORAL
Status: DISCONTINUED | OUTPATIENT
Start: 2024-10-11 | End: 2024-10-17 | Stop reason: HOSPADM

## 2024-10-10 RX ORDER — LISINOPRIL 10 MG/1
10 TABLET ORAL DAILY
Status: DISCONTINUED | OUTPATIENT
Start: 2024-10-11 | End: 2024-10-11

## 2024-10-10 RX ORDER — ESMOLOL HYDROCHLORIDE 10 MG/ML
INJECTION INTRAVENOUS AS NEEDED
Status: DISCONTINUED | OUTPATIENT
Start: 2024-10-10 | End: 2024-10-10 | Stop reason: SURG

## 2024-10-10 RX ORDER — FENTANYL CITRATE 50 UG/ML
50 INJECTION, SOLUTION INTRAMUSCULAR; INTRAVENOUS
Status: DISCONTINUED | OUTPATIENT
Start: 2024-10-10 | End: 2024-10-10 | Stop reason: HOSPADM

## 2024-10-10 RX ORDER — LABETALOL HYDROCHLORIDE 5 MG/ML
10 INJECTION, SOLUTION INTRAVENOUS EVERY 4 HOURS PRN
Status: DISCONTINUED | OUTPATIENT
Start: 2024-10-10 | End: 2024-10-17 | Stop reason: HOSPADM

## 2024-10-10 RX ADMIN — HYDROMORPHONE HYDROCHLORIDE 1 MG: 1 INJECTION, SOLUTION INTRAMUSCULAR; INTRAVENOUS; SUBCUTANEOUS at 19:46

## 2024-10-10 RX ADMIN — POTASSIUM CHLORIDE AND SODIUM CHLORIDE 100 ML/HR: 900; 150 INJECTION, SOLUTION INTRAVENOUS at 18:07

## 2024-10-10 RX ADMIN — PREGABALIN 75 MG: 75 CAPSULE ORAL at 06:18

## 2024-10-10 RX ADMIN — ALBUMIN (HUMAN): 12.5 INJECTION, SOLUTION INTRAVENOUS at 08:31

## 2024-10-10 RX ADMIN — HEPARIN SODIUM 5000 UNITS: 5000 INJECTION INTRAVENOUS; SUBCUTANEOUS at 06:18

## 2024-10-10 RX ADMIN — LIDOCAINE HYDROCHLORIDE 0.5 ML: 10 INJECTION, SOLUTION EPIDURAL; INFILTRATION; INTRACAUDAL; PERINEURAL at 06:00

## 2024-10-10 RX ADMIN — ACETAMINOPHEN 1000 MG: 500 TABLET ORAL at 06:18

## 2024-10-10 RX ADMIN — ALBUMIN (HUMAN): 12.5 INJECTION, SOLUTION INTRAVENOUS at 08:59

## 2024-10-10 RX ADMIN — BUPIVACAINE HYDROCHLORIDE 60 ML: 2.5 INJECTION, SOLUTION EPIDURAL; INFILTRATION; INTRACAUDAL; PERINEURAL at 07:40

## 2024-10-10 RX ADMIN — GABAPENTIN 600 MG: 300 CAPSULE ORAL at 20:56

## 2024-10-10 RX ADMIN — SODIUM CHLORIDE, POTASSIUM CHLORIDE, SODIUM LACTATE AND CALCIUM CHLORIDE: 600; 310; 30; 20 INJECTION, SOLUTION INTRAVENOUS at 09:40

## 2024-10-10 RX ADMIN — ROCURONIUM BROMIDE 50 MG: 10 INJECTION INTRAVENOUS at 08:48

## 2024-10-10 RX ADMIN — DEXAMETHASONE SODIUM PHOSPHATE 4 MG: 10 INJECTION, SOLUTION INTRAMUSCULAR; INTRAVENOUS at 07:40

## 2024-10-10 RX ADMIN — ACETAMINOPHEN 650 MG: 325 TABLET ORAL at 17:36

## 2024-10-10 RX ADMIN — DROPERIDOL 0.62 MG: 2.5 INJECTION, SOLUTION INTRAMUSCULAR; INTRAVENOUS at 13:58

## 2024-10-10 RX ADMIN — FAMOTIDINE 20 MG: 20 TABLET, FILM COATED ORAL at 06:18

## 2024-10-10 RX ADMIN — ESMOLOL HYDROCHLORIDE 10 MG: 10 INJECTION, SOLUTION INTRAVENOUS at 07:50

## 2024-10-10 RX ADMIN — Medication 100 MCG: at 08:16

## 2024-10-10 RX ADMIN — MELOXICAM 15 MG: 15 TABLET ORAL at 06:18

## 2024-10-10 RX ADMIN — HYDROMORPHONE HYDROCHLORIDE 0.5 MG: 1 INJECTION, SOLUTION INTRAMUSCULAR; INTRAVENOUS; SUBCUTANEOUS at 13:39

## 2024-10-10 RX ADMIN — SCOPOLAMINE 1 PATCH: 1.5 PATCH, EXTENDED RELEASE TRANSDERMAL at 06:18

## 2024-10-10 RX ADMIN — FENTANYL CITRATE 50 MCG: 50 INJECTION, SOLUTION INTRAMUSCULAR; INTRAVENOUS at 13:12

## 2024-10-10 RX ADMIN — ERTAPENEM 1000 MG: 1 INJECTION INTRAMUSCULAR; INTRAVENOUS at 07:38

## 2024-10-10 RX ADMIN — ONDANSETRON 4 MG: 2 INJECTION INTRAMUSCULAR; INTRAVENOUS at 11:45

## 2024-10-10 RX ADMIN — ESMOLOL HYDROCHLORIDE 10 MG: 10 INJECTION, SOLUTION INTRAVENOUS at 08:58

## 2024-10-10 RX ADMIN — PROPOFOL 50 MG: 10 INJECTION, EMULSION INTRAVENOUS at 07:47

## 2024-10-10 RX ADMIN — SODIUM CHLORIDE, POTASSIUM CHLORIDE, SODIUM LACTATE AND CALCIUM CHLORIDE 9 ML/HR: 600; 310; 30; 20 INJECTION, SOLUTION INTRAVENOUS at 06:00

## 2024-10-10 RX ADMIN — FENTANYL CITRATE 100 MCG: 50 INJECTION, SOLUTION INTRAMUSCULAR; INTRAVENOUS at 11:50

## 2024-10-10 RX ADMIN — DIAZEPAM 5 MG: 5 TABLET ORAL at 20:56

## 2024-10-10 RX ADMIN — ROCURONIUM BROMIDE 50 MG: 10 INJECTION INTRAVENOUS at 07:38

## 2024-10-10 RX ADMIN — ESMOLOL HYDROCHLORIDE 10 MG: 10 INJECTION, SOLUTION INTRAVENOUS at 08:04

## 2024-10-10 RX ADMIN — FENTANYL CITRATE 100 MCG: 50 INJECTION, SOLUTION INTRAMUSCULAR; INTRAVENOUS at 08:00

## 2024-10-10 RX ADMIN — ROSUVASTATIN 10 MG: 10 TABLET, FILM COATED ORAL at 20:56

## 2024-10-10 RX ADMIN — TAMSULOSIN HYDROCHLORIDE 0.4 MG: 0.4 CAPSULE ORAL at 20:56

## 2024-10-10 RX ADMIN — SUGAMMADEX 200 MG: 100 INJECTION, SOLUTION INTRAVENOUS at 11:45

## 2024-10-10 RX ADMIN — LIDOCAINE HYDROCHLORIDE 50 MG: 10 INJECTION, SOLUTION EPIDURAL; INFILTRATION; INTRACAUDAL; PERINEURAL at 07:38

## 2024-10-10 RX ADMIN — PROPOFOL 200 MG: 10 INJECTION, EMULSION INTRAVENOUS at 07:38

## 2024-10-10 RX ADMIN — PROPOFOL 50 MG: 10 INJECTION, EMULSION INTRAVENOUS at 07:42

## 2024-10-10 RX ADMIN — HYDROCODONE BITARTRATE AND ACETAMINOPHEN 1 TABLET: 7.5; 325 TABLET ORAL at 17:36

## 2024-10-10 NOTE — ANESTHESIA POSTPROCEDURE EVALUATION
Patient: Valentin Vásquez    Procedure Summary       Date: 10/10/24 [facility]    Anesthesia Start: 0734 Anesthesia Stop: 1204    Procedures:       CONVERTED TO OPEN COLON RESECTION ULTRA LOW ANTERIOR LAPAROSCOPIC WITH LOOP ILEOSTOMY (Abdomen)      CYSTOSCOPY TEMPORARY PLACEMENT BILATERAL URETERAL CATHETER (Bilateral: Bladder) Diagnosis:     Surgeons: José Manuel Hutchins MD; Ke Courtney MD Provider: Fito Jackson MD    Anesthesia Type: general with block ASA Status: 3            Anesthesia Type: general with block    Vitals  Vitals Value Taken Time   /82 10/10/24 1159   Temp     Pulse 86 10/10/24 1203   Resp     SpO2 85 % 10/10/24 1203   Vitals shown include unfiled device data.        Post Anesthesia Care and Evaluation    Patient location during evaluation: PACU  Patient participation: complete - patient participated  Level of consciousness: awake  Pain score: 0  Pain management: adequate    Airway patency: patent  Anesthetic complications: No anesthetic complications  PONV Status: none  Cardiovascular status: acceptable and stable  Respiratory status: nasal cannula, unassisted, acceptable and spontaneous ventilation  Hydration status: acceptable

## 2024-10-10 NOTE — ANESTHESIA PROCEDURE NOTES
"Peripheral Block      Patient reassessed immediately prior to procedure    Patient location during procedure: OR  Reason for block: at surgeon's request and post-op pain management  Performed by  Anesthesiologist: Nils Lroa MD  Preanesthetic Checklist  Completed: patient identified, IV checked, site marked, risks and benefits discussed, surgical consent, monitors and equipment checked, pre-op evaluation and timeout performed  Prep:  Pt Position: supine  Sterile barriers:cap, gloves, mask and washed/disinfected hands  Prep: ChloraPrep  Patient monitoring: blood pressure monitoring, continuous pulse oximetry and EKG  Procedure    Sedation: yes  Performed under: general  Guidance:ultrasound guided  Images:still images obtained, printed/placed on chart    Laterality:Bilateral  Block Type:TAP  Injection Technique:single-shot  Needle Type:short-bevel and echogenic  Needle Gauge:20 G  Resistance on Injection: none    Medications Used: dexamethasone sodium phosphate injection - Injection   4 mg - 10/10/2024 7:40:00 AM  bupivacaine PF (MARCAINE) 0.25 % injection - Injection   60 mL - 10/10/2024 7:40:00 AM      Medications  Comment:Block Injection:  LA dose divided between Right and Left block        Post Assessment  Injection Assessment: negative aspiration for heme, incremental injection and no paresthesia on injection  Patient Tolerance:comfortable throughout block  Complications:no  Additional Notes    Subcostal TAPs    A high-frequency linear transducer, with sterile cover, was placed sub-xiphoid to identify [place], right and left Rectus Abdominus Muscles (NADER). The transducer was moved either right or left subcostally to identify the NADER and the Transverse Abdominus Muscle (MATTHEWS). The insertion site was prepped in sterile fashion and then localized with 2-5 ml of 1% Lidocaine. Using ultrasound-guidance, a 20-gauge B-Vieyra 4\" Ultraplex 360 non-stimulating echogenic needle was advanced in plane, from medial to " "lateral, until the tip of the needle was in the fascial plane between the NADER and MATTHEWS. 1-3ml of preservative free normal saline was used to hydro-dissect the fascial planes. After the fascial plane was verified, the local anesthetic (LA) was injected. The procedure was repeated on the opposite side for bilateral coverage. Aspiration every 5 ml to prevent intravascular injection. Injection was completed with negative aspiration of blood and negative intravascular injection. Injection pressures were normal with minimal resistance. The subcostal approach to the TAP nerve block ideally anesthetizes the intercostal nerves T6-T9.     Mid-Axillary/Lateral TAPs    A high-frequency linear transducer, with sterile cover, was placed in the midaxillary line between the ASIS and costal margin. The External Oblique Muscle (EOM), Internal Oblique Muscle (IOM), Transverse Abdominus Muscle (MATTHEWS), and Peritoneum were identified. The insertion site was prepped in sterile fashion and then localized with 2-5 ml of 1% Lidocaine. Using ultrasound-guidance, a 20-gauge B-Vieyra 4\" Ultraplex 360 non-stimulating echogenic needle was advanced in plane, from medial to lateral, until the tip of the needle was in the fascial plane between the IOM and MATTHEWS. 1-3ml of preservative free normal saline was used to hydro-dissect the fascial planes. After the fascial plane was verified, the local anesthetic (LA) was injected. The procedure was repeated on the opposite side for bilateral coverage. Aspiration every 5 ml to prevent intravascular injection. Injection was completed with negative aspiration of blood and negative intravascular injection. Injection pressures were normal with minimal resistance. Midaxillary TAPs should reach intercostal nerves T10- T11 and the subcostal nerve T12.    Performed by: Jaskaran Jasso CRNA            "

## 2024-10-10 NOTE — H&P
Admission HP     Patient Name: Valentin Vásquez  MRN: [id number]  : 1965  DOS: 10/10/2024    Attending: José Manuel Hutchins MD    Primary Care Provider: Tristen Stuart DO      Patient Care Team:  Tristen Stuart DO as PCP - General (Family Medicine)  Virginia Brown RN as Nurse Navigator  Cong Cartagena MD as Consulting Physician (Hospitalist)    Chief complaint: Colon cancer    Subjective   Patient is a pleasant 59 y.o. male presented for scheduled surgery by Dr. Hutchins.    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  He was seen by Dr. Hutchins and opted to proceed with surgery.    Today he underwent the following procedures by Dr. Hutchins:  ULTRA LOW ANTERIOR RESECTION  DIVERTING LOOP ILEOSTOMY  APPENDECTOMYH  DAVINCI ROBOT ASSISTED  Repair of incarcerated umbilical hernia    He also had cystoscopy with bilateral ureteral catheter placement, temporary.  He tolerated surgery well, was admitted for further management.    Seen in his room where he is quite drowsy, awakens briefly and answers occasional questions.  His wife and sister are present and they help with history taking as well.    Reviewed patient's past medical history including diabetes.  He is on high dose of long-acting insulin of Tresiba twice daily along with oral agents.      Allergies:  No Known Allergies     Medications Prior to Admission   Medication Sig Dispense Refill Last Dose    dapagliflozin Propanediol (Farxiga) 10 MG tablet Take 10 mg by mouth Daily.   10/9/2024 at 1930    fenofibrate (TRICOR) 145 MG tablet Take 1 tablet by mouth Daily.   10/10/2024 at 0430    gabapentin (NEURONTIN) 600 MG tablet Take 1 tablet by mouth 3 (Three) Times a Day.   10/10/2024 at 0430    glipizide (GLUCOTROL XL) 10 MG 24 hr tablet Take 1 tablet by mouth Daily With Breakfast.   10/9/2024 at 0400    lisinopril (PRINIVIL,ZESTRIL) 10 MG tablet Take 1 tablet by mouth Daily.   10/9/2024 at 0400     metFORMIN (GLUCOPHAGE) 1000 MG tablet Take 1 tablet by mouth 2 (Two) Times a Day With Meals.   10/9/2024 at 1930    metoprolol succinate XL (TOPROL XL) 25 MG 24 hr tablet Take 1 tablet by mouth Daily. 30 tablet 0 10/10/2024 at 0430    omeprazole (priLOSEC) 40 MG capsule Take 1 capsule by mouth Daily.   10/9/2024 at 0400    rosuvastatin (CRESTOR) 10 MG tablet Take 1 tablet by mouth Every Night.   10/9/2024 at 1930    Tresiba FlexTouch 200 UNIT/ML solution pen-injector pen injection Inject  under the skin into the appropriate area as directed Take As Directed. 64 units in am and 60 units at night   10/9/2024 at 0400    Aspirin Low Dose 81 MG EC tablet Take 1 tablet by mouth Daily.   9/19/2024    Continuous Glucose Sensor (FreeStyle Shilpa 2 Sensor) misc Use. Possible left side- pt might change 8th       HYDROcodone-acetaminophen (NORCO) 7.5-325 MG per tablet Take 1 tablet by mouth Every 6 (Six) Hours As Needed for Moderate Pain . 30 tablet 0 10/7/2024    Insulin Lispro, 1 Unit Dial, (HUMALOG) 100 UNIT/ML solution pen-injector Inject 5 Units under the skin into the appropriate area as directed 3 (Three) Times a Day Before Meals.   10/8/2024    tamsulosin (FLOMAX) 0.4 MG capsule 24 hr capsule TAKE 1 CAPSULE BY MOUTH EVERY NIGHT 90 capsule 3 10/3/2024    VITAMIN D PO Take 1.25 mg by mouth 1 (One) Time Per Week. Saturday   10/5/2024         Past Medical History:   Diagnosis Date    Arthritis     Colon cancer     Colon polyp     Diabetes mellitus     Diverticulitis of colon     GERD (gastroesophageal reflux disease)     Gout     Hyperlipidemia     Hypertension     Injury of back     Kidney stone     passed and surgery-   x3 times surgery    Pancreatitis     Tear of right rotator cuff     Wears glasses     readers     Past Surgical History:   Procedure Laterality Date    ADENOIDECTOMY      COLONOSCOPY N/A 05/21/2024    Procedure: COLONOSCOPY;  Surgeon: Rosa Patrick MD;  Location: [facility] OR;  Service:  "Gastroenterology;  Laterality: N/A;    CYSTOSCOPY LITHOLAPAXY BLADDER STONE EXTRACTION N/A 09/28/2022    Procedure: CYSTOSCOPY LASER LITHOLAPAXY BLADDER STONE EXTRACTION;  Surgeon: Mykel Jeffers MD;  Location: [facility];  Service: Urology;  Laterality: N/A;    KIDNEY STONE SURGERY      x3 surgeries-  same stone    SHOULDER ARTHROSCOPY W/ ROTATOR CUFF REPAIR Right 07/19/2022    Procedure: RIGHT SHOULDER ARTHROSCOPY WITH OPEN ACROMIOPLASTY,  ROTATOR CUFF REPAIR, EXCISION LATERAL CLAVICLE;  Surgeon: Edmundo Huitron MD;  Location: [facility];  Service: Orthopedics;  Laterality: Right;    TONSILLECTOMY       Family History   Problem Relation Age of Onset    Diabetes Father     Diabetes Sister      Social History     Tobacco Use    Smoking status: Never    Smokeless tobacco: Current     Types: Snuff   Vaping Use    Vaping status: Never Used   Substance Use Topics    Alcohol use: Not Currently     Comment: occasionally    Drug use: No       Review of Systems  Pertinent items are noted in HPI    Vital Signs  /82 (BP Location: Right arm, Patient Position: Lying)   Pulse 89   Temp 97.6 °F (36.4 °C) (Oral)   Resp 16   Ht 170.2 cm (67\")   Wt 88.5 kg (195 lb)   SpO2 93%   BMI 30.54 kg/m²     Physical Exam:    General Appearance:  Drowsy cooperative, in no acute distress   Head:    Normocephalic, without obvious abnormality, atraumatic   Eyes:            Lids and lashes normal, conjunctivae and sclerae normal, no   icterus, no pallor, corneas clear   Ears:    Ears appear intact with no abnormalities noted   Throat:   No oral lesions, no thrush, oral mucosa moist   Neck:   No adenopathy, supple, trachea midline, no thyromegaly         Lungs:     Clear to auscultation,respirations regular, even and       unlabored. No wheezes or rales.    Heart:    Regular rhythm and normal rate, normal S1 and S2, no murmur    Abdomen:      Soft with intact dressing over midline incision.  Right-sided ileostomy " is pink with a bridge.  Left-sided GUSTAVO drain with serosanguineous output.   Genitalia:    Deferred  Oreilly with blood-tinged urine.   Extremities:   Moves all extremities well, no edema, no cyanosis, no              redness   Pulses:   Pulses palpable and equal bilaterally   Skin:   No bleeding, bruising or rash   Neurologic:   Cranial nerves 2 - 12 grossly intact, no gross motor deficit.     Results from last 7 days   Lab Units 10/04/24  1308   WBC 10*3/mm3 8.03   HEMOGLOBIN g/dL 15.9   HEMATOCRIT % 50.6   PLATELETS 10*3/mm3 150     Results from last 7 days   Lab Units 10/10/24  0610   SODIUM mmol/L 138   POTASSIUM mmol/L 4.2   CHLORIDE mmol/L 105   CO2 mmol/L 19.0*   BUN mg/dL 28*   CREATININE mg/dL 1.09   CALCIUM mg/dL 9.7   BILIRUBIN mg/dL 0.4   ALK PHOS U/L 45   ALT (SGPT) U/L 32   AST (SGOT) U/L 42*   GLUCOSE mg/dL 89     Lab Results   Component Value Date    HGBA1C 7.10 (H) 10/04/2024       Assessment and Plan:       S/P colectomy , ultra low anterior resection, robotic assisted, with appy, repair of incarcerated hernia, diverting loop ileostomy    Rectal cancer    HTN (hypertension)    Hyperlipidemia    DM2 (diabetes mellitus, type 2)      Plan  1. Ambulation, several times daily  2. Pain control-PRNs   3. IS-encourage  4. DVT proph- Mechanical, subcutaneous heparin.  5. Bowel regimen, alvimopan  6. Resume home medications as appropriate  7. Monitor post-op labs  8. Discharge planning   9. Diet, Clears, advance diet as tolerated.  IVF initially, monitor volume status.    - Hypertension:  Resume home medications as appropriate, formulary substitution when indicated.  Holding parameters.  PRN medications for elevated blood pressure.    -Dyslipidemia:  Resume home regimen statin ( formulary substitution when appropriate).    -DM type 2   Hgb A1C 7.1  Hold OHA as appropriate  FSBG AC/HS, ( q 6 when NPO), along with correction Humalog.  Long acting insulin, adjusted doses down from home regimen    -New  stoma:  Wound care stoma nurse consult for stoma care and education throughout patient's hospitalization.      Dragon disclaimer:  Part of this encounter note is an electronic transcription/translation of spoken language to printed text. The electronic translation of spoken language may permit erroneous, or at times, nonsensical words or phrases to be inadvertently transcribed; Although I have reviewed the note for such errors, some may still exist.    Cong Cartagena MD  10/10/24  15:30 EDT

## 2024-10-10 NOTE — ANESTHESIA PREPROCEDURE EVALUATION
Anesthesia Evaluation     Patient summary reviewed and Nursing notes reviewed   no history of anesthetic complications:   NPO Solid Status: > 8 hours  NPO Liquid Status: > 2 hours           Airway   Mallampati: II  TM distance: >3 FB  Neck ROM: full  No difficulty expected  Dental - normal exam     Pulmonary - negative pulmonary ROS and normal exam    breath sounds clear to auscultation  Cardiovascular - normal exam    ECG reviewed  Rhythm: regular  Rate: normal    (+) hypertension      Neuro/Psych- negative ROS  GI/Hepatic/Renal/Endo    (+) GERD, diabetes mellitus type 2 using insulin    Musculoskeletal     Abdominal    Substance History      OB/GYN          Other   arthritis,   history of cancer (Colon ca)                Anesthesia Plan    ASA 3     general with block     (Bilateral TAP blocks post-induction for post-operative analgesia per request of Dr. Hutchins  )  intravenous induction     Anesthetic plan, risks, benefits, and alternatives have been provided, discussed and informed consent has been obtained with: patient.    Plan discussed with CRNA.    CODE STATUS:

## 2024-10-10 NOTE — OP NOTE
Cystourethroscopy & Intraoperative Bilateral Catheter Placement Operative Report    Patient Name:  Valnetin Vásquez  YOB: 1965    Date of Surgery:  10/10/2024     Indications: 59-year-old male with history of colon cancer.  Presenting today for ultralow anterior resection with colorectal surgery.  Urology consulted intraoperatively for ureteral identification catheter placement    Pre-op Diagnosis:   Colon cancer       Post-Op Diagnosis Codes:  Colon cancer    Procedure/CPT® Codes: 69534, 27398    1. CYSTOSCOPY   2. URETHRAL DILATION  2. BILATERAL URETERAL CATHETER/STENT INSERTION  2. MODIFIER 50, BILATERAL STENT PLACEMENT  3. JULIEN CATHETER INSERTION  CHANGE    Staff:  Surgeon(s):  Ke Courtney MD      Anesthesia: General with Block    Estimated Blood Loss: none        Findings:   Mild distal urethral narrowing requiring dilation for insertion of 22 Malian scope.  Normal urinary bladder  Insertion of bilateral 5 Malian ureteral identification cath  Julien catheter insertion    Complications: None immediate    Description of Procedure:   The patient was identified in the preoperative holding area where informed consent was reviewed and signed. The patient was transported to the operating room per anesthesia and placed supine on the operating table. Smooth endotracheal intubation was performed without issue after administration of general anesthesia. The patient was then placed in the dorsal lithotomy position where genitals were prepped and draped in the usual sterile fashion. A brief timeout was performed identifying the correct patient procedure and laterality. Perioperative antibiotics were administered. All pressure points were padded.      Procedure began by inserting a 22 Malian cystoscope was attempted to atraumatically be inserted per urethra.  There was narrowing at the fossa navicularis.  This required dilation which was performed.  The scope was then able to be advanced easily into the  urinary bladder.  Normal pendulous, bulbar, prostatic urethra.  Upon entering the bladder formal cystoscopy was performed identifying bilateral orthotopic ureteral orifices and no evidence of tumor, stone, foreign body. Attention was then turned to the right ureteral orifice. A 5 Burkinan open ended ureteral catheter was inserted into the distal ureter and passed up to the level right renal collecting system without difficulty.   Attention was then turned to the left ureteral orifice.  A 5 Burkinan open ended ureteral catheter was inserted into the distal ureter and passed up to the level left renal collecting system without difficulty.       A 16F catheter was placed.  10 mL placed in the balloon.  The ureteral catheters were affixed to the Oreilly catheter.     Patient remained under general anesthesia.  The patient will continue scheduled procedure with colorectal service, Dr. Liset MD. Urology service available to assist in any way during the procedure.    Ke Courtney MD     Date: 10/10/2024  Time: 11:04 EDT

## 2024-10-10 NOTE — OP NOTE
[facility] ([facility])    ULTRA LOW ANTERIOR RESECTION  DIVERTING LOOP ILEOSTOMY  APPENDECTOMYH  DAVINCI ROBOT ASSISTED  Repair of incarcerated umbilical hernia     Procedure Note    Valentin Vásquez  10/10/2024    Pre-op Diagnosis: Rectal cancer at 9 cm above the dentate line with 1 mm margin and unfavorable pathologic features, less than 1 mm margin  Incarcerated umbilical hernia  Ink in place  BMI 30  ASA 3    Post-op Diagnosis:    Ink apparent  Fibrosis in the retroperitoneum and pelvis, open pelvic dissection    Anesthesia: General with Block    Staff:   Circulator: Pilar Gold RN  Scrub Person: Terry Kingsley; Crystal Milan  Nursing Assistant: Marilynn Diaz  Assistant: Merlene Acosta RNFA    Assistant: Merlene Acosta RNFA was responsible for performing the following activities: Retraction, Suction, Irrigation, Placing Dressing, and Held/Positioned Camera and their skilled assistance was necessary for the success of this case.    Colon Resection  Operation performed with curative intent Yes   Tumor Location (select all that apply) Rectum, NOS   Extent of colon and vascular resection  (select all that apply) Sigmoid resection - inferior mesenteric         Rectal Cancer Synoptic Operative Data      Case status:   Elective    Operation:  LAR    Modality:  Robotic    Location of tumor within rectum:  Middle    Height of lower edge of tumor from anal verge:  10 cm    Mobilization of splenic flexure:  No    Level of ligation of inferior mesenteric artery:  ASAEL    Level of ligation of inferior mesenteric vein:  High    Level of rectal transection distal to distal edge of tumor (distal margin):  6 cm    Type of reconstruction:  stapled end-end    Anastomotic testing method(s):  rectal air infusion under pelvic fluid    Creation of stoma:  yes ileostomy    En bloc resection:  no    Metastectomy:  no    Completeness of tumor resection:  R0    Intraoperative  complications:  no    Blood transfusion:  no    TME photographed:  yes in pathology report    Colon Resection  Operation performed with curative intent Yes   Tumor Location (select all that apply) Rectum, NOS   Extent of colon and vascular resection  (select all that apply) Sigmoid resection - inferior mesenteric           José Manuel WYATT Hutchins MD  10/10/24  12:05 EDT     Estimated Blood Loss: 80 mL    Specimens: Rectum and sigmoid, appendix        Drains: GUSTAVO drain in the pelvis    Findings: No evidence of distant disease    Complications: None evident    The procedure was reviewed in the preoperative area, the patient been counseled and marked by the stomal therapist, goals of surgery and postoperative care were reviewed with the patient and his spouse at the bedside.    We advanced to the operating room where general anesthesia was initiated, block was placed, urologic catheters were placed.    Antibiotic DVT prophylaxis and timeout protocol were all utilized.    The incarcerated umbilical hernia was addressed, content of omentum was reduced, GelPort was placed.    Robotic trocars were then placed with visualized laparoscopic entry, 12 mm in the right lower quadrant, 8 mm in a curvilinear distribution around the abdomen noting truncal obesity but thin character to the abdominal wall.    Laparoscopy demonstrated the ileocolic region to be tacked down in the pelvis and this was mobilized, appendectomy was performed during the course of the procedure with Enseal and DEBBIE.    Trendelenburg and tilting to the right was performed with robotic lateral to medial dissection noting redundant sigmoid and thickening of the base of the mesentery in the region of redundancy.    The ureter was identified with both medial to lateral and lateral to medial dissection.    Small bowel limited exposure.    The proximal rectum was mobilized away from the hypogastric nerves and peritoneal reflection at the junction of the mesorectum and the  mesentery.    Decision was made to open to facilitate exposure with small bowel limiting exposure robotically.    The ileocolic region was  further mobilized achieving excellent view of the pelvis.    Small bowel was run, ileocolic region inspected, all appeared satisfactory.    Now in Trendelenburg, Bookwalter retractor, midline incision, excellent exposure.    The sigmoid was divided with DEBBIE, the mesentery was sequentially divided, high ligation of the inferior mesenteric artery was performed.  Hide division of the inferior mesenteric vein was performed to facilitate reach of the left colon down to the pelvis-with Enseal.    Difficult pelvic dissection was then performed noting fibrosis associated with ink, the presacral plane, posterior lateral, lateral, anterior lateral, and anterior planes were dissected, there was ink and fibrosis extending throughout the mesorectum.    The dissection was taken down to the low pelvis, about 4 cm above the dentate line, distal to the prostate.    The rectum was divided with the contour device.    Proctoscopy demonstrated intact rectal closure.    Doppler auscultation noting unavailability of ICG demonstrated brisk arterial inflow into the end colon, the sigmoid was resected, anvil was placed with suture device and the end colon, reach was without tension or torsion.    The anastomosis was performed about 4 cm above the dentate line with proctoscopy demonstrating airtight hemostatic anastomosis.    Loop ileostomy was performed 20 cm upstream to the ileocolic junction at the preoperatively identified site in the right upper quadrant through the rectus.  This was secured with ileostomy inocencio.    10 flat GUSTAVO was placed in the deep pelvis.    All quadrant demonstrated hemostasis on inspection.    The midline fascia was reapproximated into retention of 0 Vicryl no 1 looped PDS    The incision was irrigated and the skin reapproximated skin clips.    I requested counts during the course  of the procedure given complexity of the procedure and they were right/announced as correct throughout the case.    The final counts were also announced as correct, the stoma was matured with Juliana eversion proximally and flat maturation distally.      José Manuel Hutchins MD     Date: 10/10/2024  Time: 12:02 EDT

## 2024-10-10 NOTE — ANESTHESIA PROCEDURE NOTES
Airway  Urgency: elective    Date/Time: 10/10/2024 7:42 AM  Airway not difficult    General Information and Staff    Patient location during procedure: OR  CRNA/CAA: Jaskaran Jasso CRNA    Indications and Patient Condition  Indications for airway management: airway protection    Preoxygenated: yes  MILS not maintained throughout  Mask difficulty assessment: 1 - vent by mask    Final Airway Details  Final airway type: endotracheal airway      Successful airway: ETT  Cuffed: yes   Successful intubation technique: direct laryngoscopy  Facilitating devices/methods: intubating stylet and anterior pressure/BURP  Endotracheal tube insertion site: oral  Blade: Galvez  Blade size: 2  ETT size (mm): 7.5  Cormack-Lehane Classification: grade IIb - view of arytenoids or posterior of glottis only  Placement verified by: chest auscultation and capnometry   Cuff volume (mL): 10  Measured from: lips  ETT/EBT  to lips (cm): 23  Number of attempts at approach: 1  Assessment: lips, teeth, and gum same as pre-op and atraumatic intubation    Additional Comments  Negative epigastric sounds, Breath sound equal bilaterally with symmetric chest rise and fall

## 2024-10-10 NOTE — INTERVAL H&P NOTE
Pre-Op H&P  Valetnin Vásquez  [id number]  1965      Chief complaint: Colon cancer      Subjective:  Patient is a 59 y.o.male presents for scheduled surgery by Dr. Hutchins.  He anticipates a colon resection ultralow anterior laparoscopic with loop ileostomy da Yariel robot cystoscopy temporal placement bilateral ureteral catheter today. H&P reviewed. The patient was examined and there are no changes to the H&P.          Review of Systems:  Constitutional-- No fever, chills or sweats. No fatigue.  CV-- No chest pain, palpitation or syncope  Resp-- No SOB, cough, hemoptysis  Skin--No rashes or lesions      Allergies: No Known Allergies    PMH:   Past Medical History:   Diagnosis Date    Arthritis     Colon cancer     Colon polyp     Diabetes mellitus     Diverticulitis of colon     GERD (gastroesophageal reflux disease)     Gout     Hyperlipidemia     Hypertension     Injury of back     Kidney stone     passed and surgery-   x3 times surgery    Pancreatitis     Tear of right rotator cuff     Wears glasses     readers     PSH:    Past Surgical History:   Procedure Laterality Date    ADENOIDECTOMY      COLONOSCOPY N/A 05/21/2024    Procedure: COLONOSCOPY;  Surgeon: Rosa Patrick MD;  Location: [facility];  Service: Gastroenterology;  Laterality: N/A;    CYSTOSCOPY LITHOLAPAXY BLADDER STONE EXTRACTION N/A 09/28/2022    Procedure: CYSTOSCOPY LASER LITHOLAPAXY BLADDER STONE EXTRACTION;  Surgeon: Mykel Jeffers MD;  Location: [facility];  Service: Urology;  Laterality: N/A;    KIDNEY STONE SURGERY      x3 surgeries-  same stone    SHOULDER ARTHROSCOPY W/ ROTATOR CUFF REPAIR Right 07/19/2022    Procedure: RIGHT SHOULDER ARTHROSCOPY WITH OPEN ACROMIOPLASTY,  ROTATOR CUFF REPAIR, EXCISION LATERAL CLAVICLE;  Surgeon: Edmundo Huitron MD;  Location: [facility];  Service: Orthopedics;  Laterality: Right;    TONSILLECTOMY         Immunization History:  Influenza: Up-to-date  Pneumococcal: Unsure  Tetanus:  "Up-to-date    Social History:   Tobacco:   Social History     Tobacco Use   Smoking Status Never   Smokeless Tobacco Current    Types: Snuff     Physical Exam:/90 (BP Location: Right arm, Patient Position: Lying)   Pulse 87   Temp 96.8 °F (36 °C) (Temporal)   Resp 16   Ht 170.2 cm (67\")   Wt 88.5 kg (195 lb)   SpO2 94%   BMI 30.54 kg/m²       General Appearance:    Alert, cooperative, no distress, appears stated age   Head:    Normocephalic, without obvious abnormality, atraumatic   Lungs:     Clear to auscultation bilaterally, respirations unlabored    Heart:   Regular rate and rhythm, S1 and S2 normal    Abdomen:    Soft without tenderness   Extremities:   Extremities normal, atraumatic, no cyanosis or edema   Skin:   Skin color, texture, turgor normal, no rashes or lesions   Neurologic:   Grossly intact     Results Review:     LABS:  Lab Results   Component Value Date    WBC 8.03 10/04/2024    HGB 15.9 10/04/2024    HCT 50.6 10/04/2024    MCV 86.3 10/04/2024     10/04/2024    NEUTROABS 4.49 05/23/2024    GLUCOSE 89 10/10/2024    BUN 28 (H) 10/10/2024    CREATININE 1.09 10/10/2024    EGFRIFNONA 81 07/10/2019     10/10/2024    K 4.2 10/10/2024     10/10/2024    CO2 19.0 (L) 10/10/2024    MG 1.9 07/09/2019    CALCIUM 9.7 10/10/2024    ALBUMIN 4.6 10/10/2024    AST 42 (H) 10/10/2024    ALT 32 10/10/2024    BILITOT 0.4 10/10/2024     I reviewed the patient's new clinical results.    Cancer Staging (if applicable)  Cancer Patient: X yes __no __unknown; If yes, clinical stage T:__ N:__M:__, stage group or __N/A      Impression: Rectal cancer      Plan: Colon resection ultralow anterior laparoscopic with loop ileostomy da Yariel robot cystoscopy temporary placement bilateral uterine ureteral catheter      Liz Goodman PA-C   10/10/2024   07:00 EDT    "

## 2024-10-11 LAB
ANION GAP SERPL CALCULATED.3IONS-SCNC: 13 MMOL/L (ref 5–15)
BASOPHILS # BLD AUTO: 0.03 10*3/MM3 (ref 0–0.2)
BASOPHILS NFR BLD AUTO: 0.3 % (ref 0–1.5)
BUN SERPL-MCNC: 25 MG/DL (ref 8–23)
BUN/CREAT SERPL: 18.9 (ref 7–25)
CALCIUM SPEC-SCNC: 8.6 MG/DL (ref 8.6–10.5)
CHLORIDE SERPL-SCNC: 106 MMOL/L (ref 98–107)
CO2 SERPL-SCNC: 19 MMOL/L (ref 22–29)
CREAT SERPL-MCNC: 1.32 MG/DL (ref 0.76–1.27)
DEPRECATED RDW RBC AUTO: 43.9 FL (ref 37–54)
EGFRCR SERPLBLD CKD-EPI 2021: 61.4 ML/MIN/1.73
EOSINOPHIL # BLD AUTO: 0.01 10*3/MM3 (ref 0–0.4)
EOSINOPHIL NFR BLD AUTO: 0.1 % (ref 0.3–6.2)
ERYTHROCYTE [DISTWIDTH] IN BLOOD BY AUTOMATED COUNT: 14.2 % (ref 12.3–15.4)
GLUCOSE BLDC GLUCOMTR-MCNC: 119 MG/DL (ref 70–130)
GLUCOSE BLDC GLUCOMTR-MCNC: 127 MG/DL (ref 70–130)
GLUCOSE BLDC GLUCOMTR-MCNC: 140 MG/DL (ref 70–130)
GLUCOSE BLDC GLUCOMTR-MCNC: 167 MG/DL (ref 70–130)
GLUCOSE SERPL-MCNC: 120 MG/DL (ref 65–99)
HCT VFR BLD AUTO: 39.8 % (ref 37.5–51)
HGB BLD-MCNC: 12.7 G/DL (ref 13–17.7)
IMM GRANULOCYTES # BLD AUTO: 0.03 10*3/MM3 (ref 0–0.05)
IMM GRANULOCYTES NFR BLD AUTO: 0.3 % (ref 0–0.5)
LYMPHOCYTES # BLD AUTO: 1.27 10*3/MM3 (ref 0.7–3.1)
LYMPHOCYTES NFR BLD AUTO: 11.4 % (ref 19.6–45.3)
MCH RBC QN AUTO: 27.3 PG (ref 26.6–33)
MCHC RBC AUTO-ENTMCNC: 31.9 G/DL (ref 31.5–35.7)
MCV RBC AUTO: 85.6 FL (ref 79–97)
MONOCYTES # BLD AUTO: 1.22 10*3/MM3 (ref 0.1–0.9)
MONOCYTES NFR BLD AUTO: 10.9 % (ref 5–12)
NEUTROPHILS NFR BLD AUTO: 77 % (ref 42.7–76)
NEUTROPHILS NFR BLD AUTO: 8.59 10*3/MM3 (ref 1.7–7)
NRBC BLD AUTO-RTO: 0 /100 WBC (ref 0–0.2)
PLATELET # BLD AUTO: 242 10*3/MM3 (ref 140–450)
PMV BLD AUTO: 10.6 FL (ref 6–12)
POTASSIUM SERPL-SCNC: 4.7 MMOL/L (ref 3.5–5.2)
RBC # BLD AUTO: 4.65 10*6/MM3 (ref 4.14–5.8)
SODIUM SERPL-SCNC: 138 MMOL/L (ref 136–145)
WBC NRBC COR # BLD AUTO: 11.15 10*3/MM3 (ref 3.4–10.8)

## 2024-10-11 PROCEDURE — 25010000002 HYDROMORPHONE 1 MG/ML SOLUTION: Performed by: COLON & RECTAL SURGERY

## 2024-10-11 PROCEDURE — 63710000001 INSULIN GLARGINE PER 5 UNITS: Performed by: INTERNAL MEDICINE

## 2024-10-11 PROCEDURE — 80048 BASIC METABOLIC PNL TOTAL CA: CPT | Performed by: COLON & RECTAL SURGERY

## 2024-10-11 PROCEDURE — 25810000003 SODIUM CHLORIDE 0.9 % SOLUTION: Performed by: COLON & RECTAL SURGERY

## 2024-10-11 PROCEDURE — 25010000002 SODIUM CHLORIDE 0.9 % WITH KCL 20 MEQ 20-0.9 MEQ/L-% SOLUTION: Performed by: COLON & RECTAL SURGERY

## 2024-10-11 PROCEDURE — 25010000002 HEPARIN (PORCINE) PER 1000 UNITS: Performed by: COLON & RECTAL SURGERY

## 2024-10-11 PROCEDURE — 97116 GAIT TRAINING THERAPY: CPT

## 2024-10-11 PROCEDURE — 82948 REAGENT STRIP/BLOOD GLUCOSE: CPT

## 2024-10-11 PROCEDURE — 97161 PT EVAL LOW COMPLEX 20 MIN: CPT

## 2024-10-11 PROCEDURE — 85025 COMPLETE CBC W/AUTO DIFF WBC: CPT | Performed by: COLON & RECTAL SURGERY

## 2024-10-11 PROCEDURE — 63710000001 INSULIN LISPRO (HUMAN) PER 5 UNITS: Performed by: INTERNAL MEDICINE

## 2024-10-11 RX ADMIN — POTASSIUM CHLORIDE AND SODIUM CHLORIDE 100 ML/HR: 900; 150 INJECTION, SOLUTION INTRAVENOUS at 04:09

## 2024-10-11 RX ADMIN — GABAPENTIN 600 MG: 300 CAPSULE ORAL at 14:25

## 2024-10-11 RX ADMIN — ROSUVASTATIN 10 MG: 10 TABLET, FILM COATED ORAL at 20:22

## 2024-10-11 RX ADMIN — HYDROCODONE BITARTRATE AND ACETAMINOPHEN 1 TABLET: 7.5; 325 TABLET ORAL at 12:26

## 2024-10-11 RX ADMIN — LISINOPRIL 10 MG: 10 TABLET ORAL at 10:10

## 2024-10-11 RX ADMIN — INSULIN LISPRO 3 UNITS: 100 INJECTION, SOLUTION INTRAVENOUS; SUBCUTANEOUS at 20:30

## 2024-10-11 RX ADMIN — DIAZEPAM 5 MG: 5 TABLET ORAL at 05:32

## 2024-10-11 RX ADMIN — ACETAMINOPHEN 650 MG: 325 TABLET ORAL at 00:21

## 2024-10-11 RX ADMIN — GABAPENTIN 600 MG: 300 CAPSULE ORAL at 20:21

## 2024-10-11 RX ADMIN — TAMSULOSIN HYDROCHLORIDE 0.4 MG: 0.4 CAPSULE ORAL at 20:22

## 2024-10-11 RX ADMIN — GABAPENTIN 600 MG: 300 CAPSULE ORAL at 06:11

## 2024-10-11 RX ADMIN — HEPARIN SODIUM 5000 UNITS: 5000 INJECTION INTRAVENOUS; SUBCUTANEOUS at 20:22

## 2024-10-11 RX ADMIN — HYDROMORPHONE HYDROCHLORIDE 1 MG: 1 INJECTION, SOLUTION INTRAMUSCULAR; INTRAVENOUS; SUBCUTANEOUS at 17:38

## 2024-10-11 RX ADMIN — HEPARIN SODIUM 5000 UNITS: 5000 INJECTION INTRAVENOUS; SUBCUTANEOUS at 05:23

## 2024-10-11 RX ADMIN — HYDROMORPHONE HYDROCHLORIDE 1 MG: 1 INJECTION, SOLUTION INTRAMUSCULAR; INTRAVENOUS; SUBCUTANEOUS at 05:32

## 2024-10-11 RX ADMIN — SODIUM CHLORIDE 500 ML: 9 INJECTION, SOLUTION INTRAVENOUS at 20:31

## 2024-10-11 RX ADMIN — HYDROCODONE BITARTRATE AND ACETAMINOPHEN 1 TABLET: 7.5; 325 TABLET ORAL at 20:22

## 2024-10-11 RX ADMIN — DIAZEPAM 5 MG: 5 TABLET ORAL at 20:30

## 2024-10-11 RX ADMIN — PANTOPRAZOLE SODIUM 40 MG: 40 TABLET, DELAYED RELEASE ORAL at 05:24

## 2024-10-11 RX ADMIN — METOPROLOL SUCCINATE 25 MG: 25 TABLET, EXTENDED RELEASE ORAL at 10:11

## 2024-10-11 RX ADMIN — HEPARIN SODIUM 5000 UNITS: 5000 INJECTION INTRAVENOUS; SUBCUTANEOUS at 14:26

## 2024-10-11 RX ADMIN — INSULIN GLARGINE 50 UNITS: 100 INJECTION, SOLUTION SUBCUTANEOUS at 20:31

## 2024-10-11 RX ADMIN — ACETAMINOPHEN 650 MG: 325 TABLET ORAL at 10:10

## 2024-10-11 NOTE — PLAN OF CARE
Problem: Adult Inpatient Plan of Care  Goal: Plan of Care Review  Outcome: Progressing  Goal: Patient-Specific Goal (Individualized)  Outcome: Progressing  Goal: Absence of Hospital-Acquired Illness or Injury  Outcome: Progressing  Intervention: Identify and Manage Fall Risk  Recent Flowsheet Documentation  Taken 10/11/2024 1600 by Krys Howell RN  Safety Promotion/Fall Prevention:   activity supervised   assistive device/personal items within reach   clutter free environment maintained   safety round/check completed   room organization consistent  Taken 10/11/2024 0800 by Krys Howell RN  Safety Promotion/Fall Prevention:   activity supervised   assistive device/personal items within reach   clutter free environment maintained   room organization consistent   safety round/check completed  Intervention: Prevent Skin Injury  Recent Flowsheet Documentation  Taken 10/11/2024 1600 by Krys Howell RN  Body Position: position changed independently  Skin Protection: adhesive use limited  Taken 10/11/2024 1200 by Krys Howell RN  Body Position: position changed independently  Skin Protection:   adhesive use limited   incontinence pads utilized  Taken 10/11/2024 0800 by Krys Howell RN  Body Position: position changed independently  Skin Protection:   adhesive use limited   tubing/devices free from skin contact  Intervention: Prevent and Manage VTE (Venous Thromboembolism) Risk  Recent Flowsheet Documentation  Taken 10/11/2024 1200 by Krys Howell RN  Activity Management: ambulated in room  Taken 10/11/2024 0800 by Krys Howell RN  Activity Management: activity encouraged  VTE Prevention/Management: (see mar)   sequential compression devices off   other (see comments)  Range of Motion: active ROM (range of motion) encouraged  Intervention: Prevent Infection  Recent Flowsheet Documentation  Taken 10/11/2024 1600 by Krys Howell RN  Infection Prevention: environmental surveillance  performed  Taken 10/11/2024 1200 by Krys Howell RN  Infection Prevention: environmental surveillance performed  Taken 10/11/2024 0800 by Krys Howell RN  Infection Prevention: environmental surveillance performed  Goal: Optimal Comfort and Wellbeing  Outcome: Progressing  Goal: Readiness for Transition of Care  Outcome: Progressing   Goal Outcome Evaluation:

## 2024-10-11 NOTE — PROGRESS NOTES
"[facility] ([facility])    Walked 5 laps earlier      Vital Signs:  Blood pressure 116/81, pulse (!) 125, temperature 98.7 °F (37.1 °C), temperature source Oral, resp. rate 19, height 170.2 cm (67\"), weight 88.5 kg (195 lb), SpO2 92%.    Labs past 24 hours:  Lab Results (last 24 hours)       Procedure Component Value Units Date/Time    POC Glucose Once [[id number]]  (Abnormal) Collected: 10/11/24 1925    Specimen: Blood Updated: 10/11/24 1927     Glucose 167 mg/dL     POC Glucose Once [[id number]]  (Normal) Collected: 10/11/24 1634    Specimen: Blood Updated: 10/11/24 1635     Glucose 119 mg/dL     POC Glucose Once [[id number]]  (Abnormal) Collected: 10/11/24 1116    Specimen: Blood Updated: 10/11/24 1118     Glucose 140 mg/dL     POC Glucose Once [[id number]]  (Normal) Collected: 10/11/24 0659    Specimen: Blood Updated: 10/11/24 0700     Glucose 127 mg/dL     Basic Metabolic Panel [[id number]]  (Abnormal) Collected: 10/11/24 0523    Specimen: Blood Updated: 10/11/24 0649     Glucose 120 mg/dL      BUN 25 mg/dL      Creatinine 1.32 mg/dL      Sodium 138 mmol/L      Potassium 4.7 mmol/L      Chloride 106 mmol/L      CO2 19.0 mmol/L      Calcium 8.6 mg/dL      BUN/Creatinine Ratio 18.9     Anion Gap 13.0 mmol/L      eGFR 61.4 mL/min/1.73     Narrative:      GFR Normal >60  Chronic Kidney Disease <60  Kidney Failure <15      CBC & Differential [[id number]]  (Abnormal) Collected: 10/11/24 0523    Specimen: Blood Updated: 10/11/24 0622    Narrative:      The following orders were created for panel order CBC & Differential.  Procedure                               Abnormality         Status                     ---------                               -----------         ------                     CBC Auto Differential[[id number]]        Abnormal            Final result                 Please view results for these tests on the individual orders.    CBC Auto Differential [[id number]]  (Abnormal) " Collected: 10/11/24 0523    Specimen: Blood Updated: 10/11/24 0622     WBC 11.15 10*3/mm3      RBC 4.65 10*6/mm3      Hemoglobin 12.7 g/dL      Hematocrit 39.8 %      MCV 85.6 fL      MCH 27.3 pg      MCHC 31.9 g/dL      RDW 14.2 %      RDW-SD 43.9 fl      MPV 10.6 fL      Platelets 242 10*3/mm3      Neutrophil % 77.0 %      Lymphocyte % 11.4 %      Monocyte % 10.9 %      Eosinophil % 0.1 %      Basophil % 0.3 %      Immature Grans % 0.3 %      Neutrophils, Absolute 8.59 10*3/mm3      Lymphocytes, Absolute 1.27 10*3/mm3      Monocytes, Absolute 1.22 10*3/mm3      Eosinophils, Absolute 0.01 10*3/mm3      Basophils, Absolute 0.03 10*3/mm3      Immature Grans, Absolute 0.03 10*3/mm3      nRBC 0.0 /100 WBC     POC Glucose Once [[id number]]  (Normal) Collected: 10/10/24 2033    Specimen: Blood Updated: 10/10/24 2039     Glucose 125 mg/dL             I/O last shift:  No intake/output data recorded.     PHYSICAL EXAM-  Comfortable  cv- rsr  Chest- clear, =  Abd- soft, mild distention, without stoma output    Pathology:  Order Name Source Comment Collection Info Order Time   COMPREHENSIVE METABOLIC PANEL   Collected By: Emely Mayo RN 10/10/2024  5:48 AM     Release to patient   Routine Release        TYPE AND SCREEN   Collected By: Genesis Georges RN 10/10/2024  5:48 AM     Release to patient   Routine Release        TISSUE PATHOLOGY EXAM Large Intestine, Appendix  Collected By: José Manuel Hutchins MD 10/10/2024  9:56 AM     Release to patient   Routine Release        .    Assessment and Plan:  Frequent ambulation  Bolus, follow Creatinine    José Manuel Hutchins MD  10/11/24  19:37 EDT

## 2024-10-11 NOTE — CASE MANAGEMENT/SOCIAL WORK
Discharge Planning Assessment  [facility]     Patient Name: Valentin Vásquez  MRN: [id number]  Today's Date: 10/11/2024    Admit Date: 10/10/2024    Plan: Home with wife   Discharge Needs Assessment       Row Name 10/11/24 1208       Living Environment    People in Home spouse    Current Living Arrangements home    Potentially Unsafe Housing Conditions none    In the past 12 months has the electric, gas, oil, or water company threatened to shut off services in your home? No    Primary Care Provided by self    Provides Primary Care For no one    Family Caregiver if Needed spouse    Quality of Family Relationships helpful;involved;supportive    Able to Return to Prior Arrangements yes       Resource/Environmental Concerns    Resource/Environmental Concerns none    Transportation Concerns none       Transportation Needs    In the past 12 months, has lack of transportation kept you from medical appointments or from getting medications? no    In the past 12 months, has lack of transportation kept you from meetings, work, or from getting things needed for daily living? No       Food Insecurity    Within the past 12 months, you worried that your food would run out before you got the money to buy more. Never true    Within the past 12 months, the food you bought just didn't last and you didn't have money to get more. Never true       Transition Planning    Patient/Family Anticipates Transition to home with family    Patient/Family Anticipated Services at Transition     Transportation Anticipated family or friend will provide       Discharge Needs Assessment    Readmission Within the Last 30 Days no previous admission in last 30 days    Equipment Currently Used at Home none    Concerns to be Addressed denies needs/concerns at this time    Anticipated Changes Related to Illness none    Equipment Needed After Discharge none                   Discharge Plan       Row Name 10/11/24 1203       Plan    Plan Home with  wife    Patient/Family in Agreement with Plan yes    Plan Comments CM spoke to patient and wife at bedside. He lives at home with his wife in [facility]. Prior to admission, he was independent with ADL's and still drives. He does not use DME or home oxygen. He is not current with home health. His PCP is Tristen Stuart. Verified [employer] P[employer]. His plan is home with his wife to transport. CM will continue to follow.    Final Discharge Disposition Code 01 - home or self-care                  Continued Care and Services - Admitted Since 10/10/2024    No active coordination exists for this encounter.          Demographic Summary       Row Name 10/11/24 1208       General Information    Admission Type inpatient    Arrived From emergency department    Referral Source admission list    Reason for Consult discharge planning    Preferred Language English                   Functional Status       Row Name 10/11/24 1208       Functional Status    Usual Activity Tolerance good    Current Activity Tolerance good       Functional Status, IADL    Medications independent    Meal Preparation independent    Housekeeping independent    Laundry independent    Shopping independent                   Psychosocial    No documentation.                  Abuse/Neglect    No documentation.                  Legal    No documentation.                  Substance Abuse    No documentation.                  Patient Forms    No documentation.                     Rose Mary Espinoza RN

## 2024-10-11 NOTE — THERAPY EVALUATION
Patient Name: Valentin Vásquez  : 1965    MRN: [id number]                              Today's Date: 10/11/2024       Admit Date: 10/10/2024    Visit Dx:     ICD-10-CM ICD-9-CM   1. Rectal cancer  C20 154.1   2. Malignant neoplasm of colon  C18.9 153.9     Patient Active Problem List   Diagnosis    Acute pancreatitis    Nephrolithiasis    Encounter for screening for malignant neoplasm of colon    Rectal cancer    HTN (hypertension)    Hyperlipidemia    DM2 (diabetes mellitus, type 2)    S/P colectomy , ultra low anterior resection, robotic assisted, with appy, repair of incarcerated hernia, diverting loop ileostomy     Past Medical History:   Diagnosis Date    Arthritis     Colon cancer     Colon polyp     Diabetes mellitus     Diverticulitis of colon     GERD (gastroesophageal reflux disease)     Gout     Hyperlipidemia     Hypertension     Injury of back     Kidney stone     passed and surgery-   x3 times surgery    Pancreatitis     Tear of right rotator cuff     Wears glasses     readers     Past Surgical History:   Procedure Laterality Date    ADENOIDECTOMY      COLON RESECTION WITH ILEOSTOMY N/A 10/10/2024    Procedure: CONVERTED TO OPEN COLON RESECTION ULTRA LOW ANTERIOR LAPAROSCOPIC WITH LOOP ILEOSTOMY;  Surgeon: José Manuel Hutchins MD;  Location:  [facility];  Service: Robotics - DaVinci;  Laterality: N/A;    COLONOSCOPY N/A 2024    Procedure: COLONOSCOPY;  Surgeon: Rosa Patrick MD;  Location:  [facility];  Service: Gastroenterology;  Laterality: N/A;    CYSTOSCOPY Bilateral 10/10/2024    Procedure: CYSTOSCOPY TEMPORARY PLACEMENT BILATERAL URETERAL CATHETER;  Surgeon: Ke Courtney MD;  Location:  [facility];  Service: Robotics - DaVinci;  Laterality: Bilateral;    CYSTOSCOPY LITHOLAPAXY BLADDER STONE EXTRACTION N/A 2022    Procedure: CYSTOSCOPY LASER LITHOLAPAXY BLADDER STONE EXTRACTION;  Surgeon: Mykel Jeffers MD;  Location:  [facility];  Service: Urology;  Laterality: N/A;     KIDNEY STONE SURGERY      x3 surgeries-  same stone    SHOULDER ARTHROSCOPY W/ ROTATOR CUFF REPAIR Right 07/19/2022    Procedure: RIGHT SHOULDER ARTHROSCOPY WITH OPEN ACROMIOPLASTY,  ROTATOR CUFF REPAIR, EXCISION LATERAL CLAVICLE;  Surgeon: Edmundo Huitron MD;  Location: [facility];  Service: Orthopedics;  Laterality: Right;    TONSILLECTOMY        General Information       Row Name 10/11/24 0957          Physical Therapy Time and Intention    Document Type evaluation  -LO     Mode of Treatment individual therapy;physical therapy  -LO       Row Name 10/11/24 0957          General Information    Patient Profile Reviewed yes  -LO     Prior Level of Function independent:;gait;transfer;bed mobility;dressing;bathing;home management;cooking;cleaning;driving;shopping;using stairs  independent without AD  -LO     Existing Precautions/Restrictions other (see comments)  GUSTAVO drain, talavera catheter, abd incision, ileostomy RUQ  -LO     Barriers to Rehab medically complex  -LO       Row Name 10/11/24 0957          Living Environment    People in Home spouse  -LO       Row Name 10/11/24 0957          Home Main Entrance    Number of Stairs, Main Entrance two  -LO     Stair Railings, Main Entrance none  -LO       Row Name 10/11/24 0957          Stairs Within Home, Primary    Stairs, Within Home, Primary steps to second level and steps to basement; does not have to navigate  -LO     Number of Stairs, Within Home, Primary other (see comments)  -LO       Row Name 10/11/24 0957          Cognition    Orientation Status (Cognition) oriented x 3  -LO       Row Name 10/11/24 0957          Safety Issues, Functional Mobility    Safety Issues Affecting Function (Mobility) at risk behavior observed;awareness of need for assistance;insight into deficits/self-awareness;positioning of assistive device;safety precaution awareness  -LO     Impairments Affecting Function (Mobility) balance;endurance/activity tolerance;pain;strength  -LO                User Key  (r) = Recorded By, (t) = Taken By, (c) = Cosigned By      Initials Name Provider Type    Brianne Norman, PT Physical Therapist                   Mobility       Row Name 10/11/24 1001          Bed Mobility    Bed Mobility sidelying-sit;sit-sidelying;rolling right  -LO     Rolling Right [place] (Bed Mobility) minimum assist (75% patient effort);verbal cues  -LO     Sidelying-Sit [place] (Bed Mobility) minimum assist (75% patient effort);verbal cues  -LO     Sit-Sidelying [place] (Bed Mobility) minimum assist (75% patient effort);verbal cues  -LO     Assistive Device (Bed Mobility) bed rails;head of bed elevated  -LO     Comment, (Bed Mobility) vc for sequencing of logroll for comfort, physical assist with BLE back into bed at end of session  -       Row Name 10/11/24 1001          Transfers    Comment, (Transfers) EOB>FWW>EOB; vc for HP each repetition, vc for equipment management  -       Row Name 10/11/24 1001          Bed-Chair Transfer    Bed-Chair [place] (Transfers) not tested;other (see comments)  declined chair this date  -LO     Assistive Device (Bed-Chair Transfers) walker, front-wheeled  -LO       Row Name 10/11/24 1001          Sit-Stand Transfer    Sit-Stand [place] (Transfers) contact guard  -LO     Assistive Device (Sit-Stand Transfers) walker, front-wheeled  -LO       Row Name 10/11/24 1001          Gait/Stairs (Locomotion)    [place] Level (Gait) contact guard  -LO     Assistive Device (Gait) walker, front-wheeled  -LO     Distance in Feet (Gait) 350  -LO     Deviations/Abnormal Patterns (Gait) bilateral deviations;ethan decreased;gait speed decreased  -LO     Bilateral Gait Deviations forward flexed posture  -LO     Comment, (Gait/Stairs) Patient ambulates with FWW CGA with step through pattern but with shortened step length, height, speed. Requires 1-2 brief standing rest breaks due to fatigue.  -LO               User Key  (r) = Recorded  By, (t) = Taken By, (c) = Cosigned By      Initials Name Provider Type    Brianne Norman PT Physical Therapist                   Obj/Interventions       [facility] Name 10/11/24 1005          Range of Motion Comprehensive    General Range of Motion bilateral lower extremity ROM WNL  -LO       Row Name 10/11/24 1005          Strength Comprehensive (MMT)    General Manual Muscle Testing (MMT) Assessment lower extremity strength deficits identified  -     Comment, General Manual Muscle Testing (MMT) Assessment BLE grossly 4/5  -[facility] Name 10/11/24 1005          Motor Skills    Motor Skills functional endurance  -LO     Functional Endurance fair, reduced from baseline  -LO       Row Name 10/11/24 1005          Balance    Balance Assessment sitting static balance;sitting dynamic balance;standing static balance;standing dynamic balance  -LO     Static Sitting Balance standby assist  -     Dynamic Sitting Balance contact guard  -LO     Position, Sitting Balance unsupported;sitting edge of bed  -LO     Static Standing Balance contact guard  -LO     Dynamic Standing Balance contact guard  -LO     Position/Device Used, Standing Balance supported;walker, rolling  -LO     Comment, Balance FWW CGA for safety in standing  -LO       Row Name 10/11/24 1005          Sensory Assessment (Somatosensory)    Sensory Assessment (Somatosensory) LE sensation intact  -               User Key  (r) = Recorded By, (t) = Taken By, (c) = Cosigned By      Initials Name Provider Type    Brianne Norman PT Physical Therapist                   Goals/Plan       Row Name 10/11/24 1008          Bed Mobility Goal 1 (PT)    Activity/Assistive Device (Bed Mobility Goal 1, PT) rolling to left;rolling to right;scooting;sit to supine;supine to sit  -LO     [place] Level/Cues Needed (Bed Mobility Goal 1, PT) contact guard required  -     Time Frame (Bed Mobility Goal 1, PT) short term goal (STG);5 days  -[facility] Name 10/11/24 1008           Transfer Goal 1 (PT)    Activity/Assistive Device (Transfer Goal 1, PT) sit-to-stand/stand-to-sit;bed-to-chair/chair-to-bed;car transfer  -LO     [place] Level/Cues Needed (Transfer Goal 1, PT) standby assist  -LO     Time Frame (Transfer Goal 1, PT) long term goal (LTG);10 days  -LO       Row Name 10/11/24 1008          Gait Training Goal 1 (PT)    Activity/Assistive Device (Gait Training Goal 1, PT) gait (walking locomotion);assistive device use;decrease fall risk;diminish gait deviation;improve balance and speed;increase endurance/gait distance  -LO     [place] Level (Gait Training Goal 1, PT) modified independence  -LO     Distance (Gait Training Goal 1, PT) 350  -LO     Time Frame (Gait Training Goal 1, PT) long term goal (LTG);10 days  -LO       Row Name 10/11/24 1008          Stairs Goal 1 (PT)    Activity/Assistive Device (Stairs Goal 1, PT) ascending stairs;descending stairs  -LO     [place] Level/Cues Needed (Stairs Goal 1, PT) contact guard required  -LO     Number of Stairs (Stairs Goal 1, PT) 2  -LO     Time Frame (Stairs Goal 1, PT) long term goal (LTG);10 days  -LO       Row Name 10/11/24 1008          Therapy Assessment/Plan (PT)    Planned Therapy Interventions (PT) balance training;bed mobility training;gait training;home exercise program;neuromuscular re-education;patient/family education;strengthening;stair training;transfer training  -LO               User Key  (r) = Recorded By, (t) = Taken By, (c) = Cosigned By      Initials Name Provider Type    Brianne Norman, PT Physical Therapist                   Clinical Impression       Row Name 10/11/24 1006          Pain    Additional Documentation Pain Scale: FACES Pre/Post-Treatment (Group)  -       Row Name 10/11/24 1006          Pain Scale: FACES Pre/Post-Treatment    Pain: FACES Scale, Pretreatment 2-->hurts little bit  -LO     Posttreatment Pain Rating 4-->hurts little more  -LO     Pain Location - abdomen  -LO      Pre/Posttreatment Pain Comment Pain increase with movement transitions, decreases at rest  -LO       Row Name 10/11/24 1006          Plan of Care Review    Plan of Care Reviewed With patient;spouse  -     Outcome Evaluation PT eval completed. Patient presenting with deficits in general BLE strength, standing dynamic balance, and functional endurance effecting functional mobility below baseline. Patient will benefit from skilled IP PT services to address impairments for return to PLOF. Recommend home with assist at [place].  -       Row Name 10/11/24 1006          Therapy Assessment/Plan (PT)    Patient/Family Therapy Goals Statement (PT) home  -     Rehab Potential (PT) good, to achieve stated therapy goals  -     Criteria for Skilled Interventions Met (PT) yes;meets criteria;skilled treatment is necessary  -     Therapy Frequency (PT) daily  -LO     Predicted Duration of Therapy Intervention (PT) 2 weeks  -       Row Name 10/11/24 1006          Vital Signs    Pre Systolic BP Rehab 126  -LO     Pre Treatment Diastolic BP 79  -LO     Pre SpO2 (%) 94  -LO     O2 Delivery Pre Treatment room air  -LO     O2 Delivery Intra Treatment room air  -LO     O2 Delivery Post Treatment room air  -LO     Pre Patient Position Supine  -LO     Intra Patient Position Standing  -LO     Post Patient Position Supine  -LO       Row Name 10/11/24 1006          Positioning and Restraints    Pre-Treatment Position in bed  -LO     Post Treatment Position bed  -LO     In Bed notified nsg;call light within reach;encouraged to call for assist;exit alarm on;with family/caregiver  -               User Key  (r) = Recorded By, (t) = Taken By, (c) = Cosigned By      Initials Name Provider Type    Brianne Norman, PT Physical Therapist                   Outcome Measures       Row Name 10/11/24 1009          How much help from another person do you currently need...    Turning from your back to your side while in flat bed without using  bedrails? 3  -LO     Moving from lying on back to sitting on the side of a flat bed without bedrails? 3  -LO     Moving to and from a bed to a chair (including a wheelchair)? 3  -LO     Standing up from a chair using your arms (e.g., wheelchair, bedside chair)? 4  -LO     Climbing 3-5 steps with a railing? 3  -LO     To walk in hospital room? 3  -LO     AM-PAC 6 Clicks Score (PT) 19  -LO     Highest Level of Mobility Goal 6 --> Walk 10 steps or more  -       Row Name 10/11/24 1009          Functional Assessment    Outcome Measure Options AM-PAC 6 Clicks Basic Mobility (PT)  -               User Key  (r) = Recorded By, (t) = Taken By, (c) = Cosigned By      Initials Name Provider Type    Brianne Norman, PT Physical Therapist                                 Physical Therapy Education       Title: PT OT SLP Therapies (Done)       Topic: Physical Therapy (Done)       Point: Mobility training (Done)       Learning Progress Summary             Patient Acceptance, E, VU,NR by  at 10/11/2024 0920    Comment: PT POC                         Point: Home exercise program (Done)       Learning Progress Summary             Patient Acceptance, E, VU,NR by  at 10/11/2024 0920    Comment: PT POC                         Point: Body mechanics (Done)       Learning Progress Summary             Patient Acceptance, E, VU,NR by  at 10/11/2024 0920    Comment: PT POC                         Point: Precautions (Done)       Learning Progress Summary             Patient Acceptance, E, VU,NR by  at 10/11/2024 0920    Comment: PT POC                                         User Key       Initials Effective Dates Name Provider Type Discipline     06/16/21 -  Brianne Soto, PT Physical Therapist PT                  PT Recommendation and Plan  Planned Therapy Interventions (PT): balance training, bed mobility training, gait training, home exercise program, neuromuscular re-education, patient/family education, strengthening, stair  training, transfer training  Plan of Care Reviewed With: patient, spouse  Outcome Evaluation: PT eval completed. Patient presenting with deficits in general BLE strength, standing dynamic balance, and functional endurance effecting functional mobility below baseline. Patient will benefit from skilled IP PT services to address impairments for return to PLOF. Recommend home with assist at [place].     Time Calculation:   PT Evaluation Complexity  History, PT Evaluation Complexity: 1-2 personal factors and/or comorbidities  Examination of Body Systems (PT Eval Complexity): 1-2 elements  Clinical Presentation (PT Evaluation Complexity): evolving  Clinical Decision Making (PT Evaluation Complexity): low complexity  Overall Complexity (PT Evaluation Complexity): low complexity     PT Charges       Row Name 10/11/24 0920             Time Calculation    Start Time 0920  -LO      PT Received On 10/11/24  -LO      PT Goal Re-Cert Due Date 10/21/24  -LO         Timed Charges    60556 - Gait Training Minutes  14  -LO      61046 - PT Therapeutic Activity Minutes 5  -LO         Untimed Charges    PT Eval/Re-eval Minutes 40  -LO         Total Minutes    Timed Charges Total Minutes 19  -LO      Untimed Charges Total Minutes 40  -LO       Total Minutes 59  -LO                User Key  (r) = Recorded By, (t) = Taken By, (c) = Cosigned By      Initials Name Provider Type    LO Brianne Soto, PT Physical Therapist                  Therapy Charges for Today       Code Description Service Date Service Provider Modifiers Qty    93414667305 HC GAIT TRAINING EA 15 MIN 10/11/2024 Brianne Soto, PT GP 1    31213790557 HC PT EVAL LOW COMPLEXITY 3 10/11/2024 Brianne Soto, PT GP 1            PT G-Codes  Outcome Measure Options: AM-PAC 6 Clicks Basic Mobility (PT)  AM-PAC 6 Clicks Score (PT): 19  PT Discharge Summary  Anticipated Discharge Disposition (PT): home with assist    Brianne Soto PT  10/11/2024

## 2024-10-11 NOTE — NURSING NOTE
[facility] visit for Stoma care and assessment    Surgery date: 10/10/2024  Surgical Procedures Since Admission:  Procedure(s):  CONVERTED TO OPEN COLON RESECTION ULTRA LOW ANTERIOR LAPAROSCOPIC WITH LOOP ILEOSTOMY  CYSTOSCOPY TEMPORARY PLACEMENT BILATERAL URETERAL CATHETER  Surgeon:  José Manuel Hutchins MD  Status:  1 Day Post-Op  -------------------       WO Care Outcome: Patient seen for ostomy care, assessment and education. Patient awake, drowsy.  Not wanting to eat. patient seen in the Medical/Surgical Floor .  Spouse at beside.    Stoma Type: Loop Ileostomy  Diameter/shape: Round  Location: RUQ  Protrusion: Budded  Stoma Characteristics: Round, Moist, Pink, Red, and Bridge in place  [place]: Yes, sutured to peristomal skin  Tension: Moderate amount of tension  Peristomal skin: ROSA =unable to assess  Effluent Characteristics: none  Effluent volume emptied: ml.    Current pouching system: Jeanne  Accessory products used:   Goal wear time: 3-4 days  Emptying frequency of appliance per day: Recommend emptying when 1/3-1/2 full.     Surgical Incision: Midline abdominal incision  Degree of approximation:   Approximating Devices: Staples  Drainage Characteristics: none  Method of Management: Gauze border dressing    Follow up visit summary:   Ostomy education folder provided.  Reviewed contents with patient and spouse.  Stoma viable, nonproductive at this time.  Appliance intact.  Reviewed importance of hydration and ambulation promoted.  Registered dietitian consulted for additional dietary recommendations.  Plan to remove stoma bridge on Monday and provide appliance change lesson with patient and spouse.  Outpatient ostomy clinic appointment referral made.      [facility] Nurse will continue to follow daily for ostomy education. Please contact with questions or if new needs arise.     Zachary French RN, BSN, CCRN, CWOCN  Wound, Ostomy and Continence (WOC) Department  [facility]

## 2024-10-11 NOTE — CONSULTS
Clinical Nutrition   Nutrition Education   Reason for Visit:   Physician Consult     Patient Name: Valentin Vásquez  YOB: 1965  MRN: [id number]  Date of Encounter: 10/11/24 13:55 EDT  Admission date: 10/10/2024         Applicable diagnosis:    S/P colectomy , ultra low anterior resection, robotic assisted, with appy, repair of incarcerated hernia, diverting loop ileostomy    Rectal cancer    HTN (hypertension)    Hyperlipidemia    DM2 (diabetes mellitus, type 2)      Reported/Observed/Food/Nutrition Related History:     RD provided patient and spouse with ileostomy nutrition education and written materials. RD reviewed low-fiber diet x 4-6 weeks post-op, slow re-introduction of high-fiber foods after 4-6 weeks, daily chewable/liquid MVI, high ileostomy output/dehydration/oral rehydration solutions, dietary strategies for managing high ileostomy output, food lists pertaining to gas/odor/blockage/diarrhea and stool thickening, appropriate oral nutrition supplements, and recommended daily protein intake for post-op wound healing. All questions asked were answered within scope of practice.    Labs reviewed   Yes     Nutrition Diagnosis   Date: 10/11  Updated:   Problem Food and nutrition knowledge deficit   Etiology S/p ileostomy creation   Signs/Symptoms Consult for education   Status: New    Goal:     Increase knowledge on diet/nutrition effects on condition/status     Nutrition Intervention     Nutrition Education provided regarding: Diet rationale, Key food habit change, and Ileostomy      RD provided printed material via [employer]-Nutrition Care Manual  and [facility] RDN created education material    Monitoring/Evaluation:     Pt acknowledged understanding of material covered  Need for follow up/reinforcement     Vesna Castro, MS,RD,LD  Time Spent: 30min

## 2024-10-11 NOTE — PLAN OF CARE
Goal Outcome Evaluation:  Plan of Care Reviewed With: patient, spouse           Outcome Evaluation: PT eval completed. Patient presenting with deficits in general BLE strength, standing dynamic balance, and functional endurance effecting functional mobility below baseline. Patient will benefit from skilled IP PT services to address impairments for return to PLOF. Recommend home with assist at dc.      Anticipated Discharge Disposition (PT): home with assist

## 2024-10-11 NOTE — PROGRESS NOTES
"IM progress note      Valentin Vásquez  [id number]  1965     LOS: 1 day     Attending: José Manuel Hutchins MD    Primary Care Provider: Tristen Stuart DO      Chief Complaint/Reason for visit:  No chief complaint on file.      Subjective    Feels ok. Some pain. Had ambulated in the hallway earlier. No n/vom. Tolerating clears. No stoma output yet. Good urine volume documented.   Tachycardia noted.     Objective      Visit Vitals  /78 (BP Location: Left arm, Patient Position: Lying)   Pulse (!) 125   Temp 99.6 °F (37.6 °C) (Oral)   Resp 19   Ht 170.2 cm (67\")   Wt 88.5 kg (195 lb)   SpO2 92%   BMI 30.54 kg/m²     Temp (24hrs), Av.3 °F (36.8 °C), Min:97 °F (36.1 °C), Max:99.6 °F (37.6 °C)      Intake/Output:    Intake/Output Summary (Last 24 hours) at 10/11/2024 1508  Last data filed at 10/11/2024 1300  Gross per 24 hour   Intake 240 ml   Output 3740 ml   Net -3500 ml          Physical Exam:     General Appearance:    Alert, cooperative, in no acute distress   Head:    Normocephalic, without obvious abnormality, atraumatic    Lungs:     Normal effort, symmetric chest rise,  clear to  auscultation bilaterally                 Heart:    Regular rhythm and normal rate, normal S1 and S2   Abdomen:     Soft and benign. Expected tenderness. Stoma pink/ bridge. GUSTAVO present. Incisions ok.    Extremities:   No clubbing, cyanosis or edema.  No deformities.    Pulses:   Pulses palpable and equal bilaterally   Skin:   No bleeding, bruising or rash          Results Review:     I reviewed the patient's new clinical results.   Results from last 7 days   Lab Units 10/11/24  0523   WBC 10*3/mm3 11.15*   HEMOGLOBIN g/dL 12.7*   HEMATOCRIT % 39.8   PLATELETS 10*3/mm3 242     Results from last 7 days   Lab Units 10/11/24  0523 10/10/24  0610   SODIUM mmol/L 138 138   POTASSIUM mmol/L 4.7 4.2   CHLORIDE mmol/L 106 105   CO2 mmol/L 19.0* 19.0*   BUN mg/dL 25* 28*   CREATININE mg/dL 1.32* 1.09   CALCIUM mg/dL 8.6 9.7 "   BILIRUBIN mg/dL  --  0.4   ALK PHOS U/L  --  45   ALT (SGPT) U/L  --  32   AST (SGOT) U/L  --  42*   GLUCOSE mg/dL 120* 89     I reviewed the patient's new imaging including images and reports.   Latest Reference Range & Units 10/11/24 05:23 10/11/24 06:59 10/11/24 11:16   Glucose 70 - 130 mg/dL 120 (H) 127 140 (H)   (H): Data is abnormally high      All medications reviewed.   acetaminophen, 650 mg, Oral, Q8H  gabapentin, 600 mg, Oral, Q8H  heparin (porcine), 5,000 Units, Subcutaneous, Q8H  insulin glargine, 50 Units, Subcutaneous, Q12H  insulin lispro, 3-14 Units, Subcutaneous, 4x Daily AC & at Bedtime  lisinopril, 10 mg, Oral, Daily  metoprolol succinate XL, 25 mg, Oral, Daily  pantoprazole, 40 mg, Oral, Q AM  rosuvastatin, 10 mg, Oral, Nightly  tamsulosin, 0.4 mg, Oral, Nightly          Assessment & Plan       S/P colectomy , ultra low anterior resection, robotic assisted, with appy, repair of incarcerated hernia, diverting loop ileostomy    Rectal cancer    HTN (hypertension)    Hyperlipidemia    DM2 (diabetes mellitus, type 2)       Plan   1. Ambulation, several times daily  2. Pain control-PRNs    3. IS-encouraged  4. DVT proph-Mechanical, subcutaneous heparin.  5. Bowel regimen, alvimopan  6. Resume home medications as appropriate  7. Monitor post-op labs  8. Discharge planning   9. Diet, Clears, advance diet as tolerated.  IVF initially, monitor volume status.     - Hypertension:  Resume home medications as appropriate, formulary substitution when indicated. Will dc Lisinopril till Cr is trending down.   Holding parameters.  PRN medications for elevated blood pressure.     -Dyslipidemia:  Resume home regimen statin ( formulary substitution when appropriate).     -DM type 2   Hgb A1C 7.1  Hold OHA as appropriate  FSBG AC/HS, ( q 6 when NPO), along with correction Humalog.  Long acting insulin, adjusted doses down from home regimen  Adjust further depending on intake and trends of FSBG.      -New  stoma:  Wound care stoma nurse consult for stoma care and education throughout patient's hospitalization.      Dragon disclaimer:  Part of this encounter note is an electronic transcription/translation of spoken language to printed text. The electronic translation of spoken language may permit erroneous, or at times, nonsensical words or phrases to be inadvertently transcribed; Although I have reviewed the note for such errors, some may still exist.    Cong Cartagena MD  10/11/24  15:08 EDT

## 2024-10-12 LAB
ANION GAP SERPL CALCULATED.3IONS-SCNC: 13 MMOL/L (ref 5–15)
BASOPHILS # BLD AUTO: 0.06 10*3/MM3 (ref 0–0.2)
BASOPHILS NFR BLD AUTO: 0.6 % (ref 0–1.5)
BUN SERPL-MCNC: 18 MG/DL (ref 8–23)
BUN/CREAT SERPL: 16.4 (ref 7–25)
CALCIUM SPEC-SCNC: 9.1 MG/DL (ref 8.6–10.5)
CHLORIDE SERPL-SCNC: 105 MMOL/L (ref 98–107)
CO2 SERPL-SCNC: 22 MMOL/L (ref 22–29)
CREAT SERPL-MCNC: 1.1 MG/DL (ref 0.76–1.27)
DEPRECATED RDW RBC AUTO: 46.5 FL (ref 37–54)
EGFRCR SERPLBLD CKD-EPI 2021: 76.4 ML/MIN/1.73
EOSINOPHIL # BLD AUTO: 0.05 10*3/MM3 (ref 0–0.4)
EOSINOPHIL NFR BLD AUTO: 0.5 % (ref 0.3–6.2)
ERYTHROCYTE [DISTWIDTH] IN BLOOD BY AUTOMATED COUNT: 14.4 % (ref 12.3–15.4)
GLUCOSE BLDC GLUCOMTR-MCNC: 120 MG/DL (ref 70–130)
GLUCOSE BLDC GLUCOMTR-MCNC: 121 MG/DL (ref 70–130)
GLUCOSE BLDC GLUCOMTR-MCNC: 123 MG/DL (ref 70–130)
GLUCOSE BLDC GLUCOMTR-MCNC: 80 MG/DL (ref 70–130)
GLUCOSE SERPL-MCNC: 84 MG/DL (ref 65–99)
HCT VFR BLD AUTO: 41.7 % (ref 37.5–51)
HGB BLD-MCNC: 13 G/DL (ref 13–17.7)
IMM GRANULOCYTES # BLD AUTO: 0.04 10*3/MM3 (ref 0–0.05)
IMM GRANULOCYTES NFR BLD AUTO: 0.4 % (ref 0–0.5)
LYMPHOCYTES # BLD AUTO: 1.86 10*3/MM3 (ref 0.7–3.1)
LYMPHOCYTES NFR BLD AUTO: 18.8 % (ref 19.6–45.3)
MCH RBC QN AUTO: 27.3 PG (ref 26.6–33)
MCHC RBC AUTO-ENTMCNC: 31.2 G/DL (ref 31.5–35.7)
MCV RBC AUTO: 87.4 FL (ref 79–97)
MONOCYTES # BLD AUTO: 0.95 10*3/MM3 (ref 0.1–0.9)
MONOCYTES NFR BLD AUTO: 9.6 % (ref 5–12)
NEUTROPHILS NFR BLD AUTO: 6.94 10*3/MM3 (ref 1.7–7)
NEUTROPHILS NFR BLD AUTO: 70.1 % (ref 42.7–76)
NRBC BLD AUTO-RTO: 0 /100 WBC (ref 0–0.2)
PLATELET # BLD AUTO: 209 10*3/MM3 (ref 140–450)
PMV BLD AUTO: 10 FL (ref 6–12)
POTASSIUM SERPL-SCNC: 4.7 MMOL/L (ref 3.5–5.2)
RBC # BLD AUTO: 4.77 10*6/MM3 (ref 4.14–5.8)
SODIUM SERPL-SCNC: 140 MMOL/L (ref 136–145)
WBC NRBC COR # BLD AUTO: 9.9 10*3/MM3 (ref 3.4–10.8)

## 2024-10-12 PROCEDURE — 25010000002 HYDROMORPHONE 1 MG/ML SOLUTION: Performed by: COLON & RECTAL SURGERY

## 2024-10-12 PROCEDURE — 85025 COMPLETE CBC W/AUTO DIFF WBC: CPT | Performed by: COLON & RECTAL SURGERY

## 2024-10-12 PROCEDURE — 82948 REAGENT STRIP/BLOOD GLUCOSE: CPT

## 2024-10-12 PROCEDURE — 25010000002 HEPARIN (PORCINE) PER 1000 UNITS: Performed by: COLON & RECTAL SURGERY

## 2024-10-12 PROCEDURE — 25810000003 SODIUM CHLORIDE 0.9 % SOLUTION: Performed by: COLON & RECTAL SURGERY

## 2024-10-12 PROCEDURE — 80048 BASIC METABOLIC PNL TOTAL CA: CPT | Performed by: COLON & RECTAL SURGERY

## 2024-10-12 RX ADMIN — HYDROCODONE BITARTRATE AND ACETAMINOPHEN 1 TABLET: 7.5; 325 TABLET ORAL at 18:34

## 2024-10-12 RX ADMIN — PANTOPRAZOLE SODIUM 40 MG: 40 TABLET, DELAYED RELEASE ORAL at 06:20

## 2024-10-12 RX ADMIN — HEPARIN SODIUM 5000 UNITS: 5000 INJECTION INTRAVENOUS; SUBCUTANEOUS at 21:06

## 2024-10-12 RX ADMIN — TAMSULOSIN HYDROCHLORIDE 0.4 MG: 0.4 CAPSULE ORAL at 21:06

## 2024-10-12 RX ADMIN — HEPARIN SODIUM 5000 UNITS: 5000 INJECTION INTRAVENOUS; SUBCUTANEOUS at 06:20

## 2024-10-12 RX ADMIN — DIAZEPAM 5 MG: 5 TABLET ORAL at 21:06

## 2024-10-12 RX ADMIN — GABAPENTIN 600 MG: 300 CAPSULE ORAL at 06:20

## 2024-10-12 RX ADMIN — SODIUM CHLORIDE 500 ML: 9 INJECTION, SOLUTION INTRAVENOUS at 14:24

## 2024-10-12 RX ADMIN — HEPARIN SODIUM 5000 UNITS: 5000 INJECTION INTRAVENOUS; SUBCUTANEOUS at 14:22

## 2024-10-12 RX ADMIN — HYDROMORPHONE HYDROCHLORIDE 1 MG: 1 INJECTION, SOLUTION INTRAMUSCULAR; INTRAVENOUS; SUBCUTANEOUS at 06:20

## 2024-10-12 RX ADMIN — HYDROMORPHONE HYDROCHLORIDE 1 MG: 1 INJECTION, SOLUTION INTRAMUSCULAR; INTRAVENOUS; SUBCUTANEOUS at 21:07

## 2024-10-12 RX ADMIN — GABAPENTIN 600 MG: 300 CAPSULE ORAL at 14:22

## 2024-10-12 RX ADMIN — HYDROMORPHONE HYDROCHLORIDE 1 MG: 1 INJECTION, SOLUTION INTRAMUSCULAR; INTRAVENOUS; SUBCUTANEOUS at 11:34

## 2024-10-12 RX ADMIN — GABAPENTIN 600 MG: 300 CAPSULE ORAL at 21:06

## 2024-10-12 RX ADMIN — ACETAMINOPHEN 650 MG: 325 TABLET ORAL at 08:37

## 2024-10-12 RX ADMIN — METOPROLOL SUCCINATE 25 MG: 25 TABLET, EXTENDED RELEASE ORAL at 08:37

## 2024-10-12 RX ADMIN — METOPROLOL SUCCINATE 25 MG: 25 TABLET, EXTENDED RELEASE ORAL at 11:35

## 2024-10-12 NOTE — PROGRESS NOTES
"[facility] ([facility])    Supportive spouse  Walked twice earlier  Without significant stoma output    Vital Signs:  Blood pressure 132/88, pulse (!) 124, temperature 97.6 °F (36.4 °C), temperature source Oral, resp. rate 18, height 170.2 cm (67\"), weight 88.5 kg (195 lb), SpO2 91%.    Labs past 24 hours:  Lab Results (last 24 hours)       Procedure Component Value Units Date/Time    POC Glucose Once [[id number]]  (Normal) Collected: 10/12/24 1156    Specimen: Blood Updated: 10/12/24 1157     Glucose 121 mg/dL     POC Glucose Once [[id number]]  (Normal) Collected: 10/12/24 0729    Specimen: Blood Updated: 10/12/24 0730     Glucose 80 mg/dL     Basic Metabolic Panel [[id number]]  (Normal) Collected: 10/12/24 0423    Specimen: Blood Updated: 10/12/24 0509     Glucose 84 mg/dL      BUN 18 mg/dL      Creatinine 1.10 mg/dL      Sodium 140 mmol/L      Potassium 4.7 mmol/L      Chloride 105 mmol/L      CO2 22.0 mmol/L      Calcium 9.1 mg/dL      BUN/Creatinine Ratio 16.4     Anion Gap 13.0 mmol/L      eGFR 76.4 mL/min/1.73     Narrative:      GFR Normal >60  Chronic Kidney Disease <60  Kidney Failure <15      CBC & Differential [[id number]]  (Abnormal) Collected: 10/12/24 0423    Specimen: Blood Updated: 10/12/24 0442    Narrative:      The following orders were created for panel order CBC & Differential.  Procedure                               Abnormality         Status                     ---------                               -----------         ------                     CBC Auto Differential[[id number]]        Abnormal            Final result                 Please view results for these tests on the individual orders.    CBC Auto Differential [[id number]]  (Abnormal) Collected: 10/12/24 0423    Specimen: Blood Updated: 10/12/24 0442     WBC 9.90 10*3/mm3      RBC 4.77 10*6/mm3      Hemoglobin 13.0 g/dL      Hematocrit 41.7 %      MCV 87.4 fL      MCH 27.3 pg      MCHC 31.2 g/dL      RDW " 14.4 %      RDW-SD 46.5 fl      MPV 10.0 fL      Platelets 209 10*3/mm3      Neutrophil % 70.1 %      Lymphocyte % 18.8 %      Monocyte % 9.6 %      Eosinophil % 0.5 %      Basophil % 0.6 %      Immature Grans % 0.4 %      Neutrophils, Absolute 6.94 10*3/mm3      Lymphocytes, Absolute 1.86 10*3/mm3      Monocytes, Absolute 0.95 10*3/mm3      Eosinophils, Absolute 0.05 10*3/mm3      Basophils, Absolute 0.06 10*3/mm3      Immature Grans, Absolute 0.04 10*3/mm3      nRBC 0.0 /100 WBC     POC Glucose Once [[id number]]  (Abnormal) Collected: 10/11/24 1925    Specimen: Blood Updated: 10/11/24 1927     Glucose 167 mg/dL     POC Glucose Once [[id number]]  (Normal) Collected: 10/11/24 1634    Specimen: Blood Updated: 10/11/24 1635     Glucose 119 mg/dL             I/O last shift:  I/O this shift:  In: 240 [P.O.:240]  Out: 1220 [Urine:1150; Drains:70]     PHYSICAL EXAM-  Comfortable  Nausea  Moderate abdominal distention  Not tachycardic  About 20:1 ratio on BUN to creatinine ratio  I cannulated the proximal aspect of his loop ileostomy with a Oreilly catheter and gas and stool percolated out, well-lubricated catheter, will leave catheter in place, I irrigated catheter with 100 cc water.    Pathology:  Order Name Source Comment Collection Info Order Time   COMPREHENSIVE METABOLIC PANEL   Collected By: Emely Mayo RN 10/10/2024  5:48 AM     Release to patient   Routine Release        TYPE AND SCREEN   Collected By: Genesis Georges RN 10/10/2024  5:48 AM     Release to patient   Routine Release        TISSUE PATHOLOGY EXAM Large Intestine, Appendix  Collected By: José Manuel Hutchins MD 10/10/2024  9:56 AM     Release to patient   Routine Release        .    Assessment and Plan:  Ileus, distention  Oreilly out in the morning  Continue interval labs  500 cc bolus over 3 hours  NG for distention, nausea  Catheter in stoma which should help    Frequent ambulation encouraged    Depending on clinical course, may consider films, if  very slow return of bowel function, nutritional supplementation.    José Manuel Hutchins MD  10/12/24  13:44 EDT

## 2024-10-12 NOTE — PLAN OF CARE
Problem: Adult Inpatient Plan of Care  Goal: Plan of Care Review  Outcome: Progressing  Goal: Patient-Specific Goal (Individualized)  Outcome: Progressing  Goal: Absence of Hospital-Acquired Illness or Injury  Outcome: Progressing  Intervention: Identify and Manage Fall Risk  Recent Flowsheet Documentation  Taken 10/12/2024 0800 by Krys Howell RN  Safety Promotion/Fall Prevention:   activity supervised   assistive device/personal items within reach   clutter free environment maintained   room organization consistent   safety round/check completed  Intervention: Prevent Skin Injury  Recent Flowsheet Documentation  Taken 10/12/2024 1200 by Krys Howell RN  Body Position: position changed independently  Skin Protection: incontinence pads utilized  Taken 10/12/2024 0800 by Krys Howell RN  Body Position: position changed independently  Skin Protection: incontinence pads utilized  Intervention: Prevent Infection  Recent Flowsheet Documentation  Taken 10/12/2024 1200 by Krys Howell RN  Infection Prevention: environmental surveillance performed  Taken 10/12/2024 0800 by Krys Howell RN  Infection Prevention: environmental surveillance performed  Goal: Optimal Comfort and Wellbeing  Outcome: Progressing  Goal: Readiness for Transition of Care  Outcome: Progressing   Goal Outcome Evaluation:

## 2024-10-12 NOTE — PROGRESS NOTES
"IM progress note      Valentin Vásquez  [id number]  1965     LOS: 2 days     Attending: José Manuel Hutchins MD    Primary Care Provider: Tristen Stuart DO      Chief Complaint/Reason for visit:  No chief complaint on file.      Subjective   Does not like the full liquid diet on his tray.  Good pain control.  No nausea or vomiting.  Ambulated often yesterday.  Stoma without output.    Objective      Visit Vitals  /77 (BP Location: Right arm, Patient Position: Lying)   Pulse (!) 124   Temp 97.6 °F (36.4 °C) (Oral)   Resp 18   Ht 170.2 cm (67\")   Wt 88.5 kg (195 lb)   SpO2 91%   BMI 30.54 kg/m²     Temp (24hrs), Av.4 °F (36.9 °C), Min:97.6 °F (36.4 °C), Max:99.6 °F (37.6 °C)      Intake/Output:    Intake/Output Summary (Last 24 hours) at 10/12/2024 0939  Last data filed at 10/12/2024 0844  Gross per 24 hour   Intake 480 ml   Output 4020 ml   Net -3540 ml          Physical Exam:     General Appearance:    Alert, cooperative, in no acute distress   Head:    Normocephalic, without obvious abnormality, atraumatic    Lungs:     Normal effort, symmetric chest rise,  clear to  auscultation bilaterally                 Heart:    Regular rhythm and normal rate, normal S1 and S2   Abdomen:     Soft and benign. Expected tenderness. Stoma pink/ bridge. GUSTAVO present. Incisions ok.    Extremities:   No clubbing, cyanosis or edema.  No deformities.    Pulses:   Pulses palpable and equal bilaterally   Skin:   No bleeding, bruising or rash          Results Review:     I reviewed the patient's new clinical results.   Results from last 7 days   Lab Units 10/12/24  0423 10/11/24  0523   WBC 10*3/mm3 9.90 11.15*   HEMOGLOBIN g/dL 13.0 12.7*   HEMATOCRIT % 41.7 39.8   PLATELETS 10*3/mm3 209 242     Results from last 7 days   Lab Units 10/12/24  0423 10/11/24  0523 10/10/24  0610   SODIUM mmol/L 140 138 138   POTASSIUM mmol/L 4.7 4.7 4.2   CHLORIDE mmol/L 105 106 105   CO2 mmol/L 22.0 19.0* 19.0*   BUN mg/dL 18 25* 28* "   CREATININE mg/dL 1.10 1.32* 1.09   CALCIUM mg/dL 9.1 8.6 9.7   BILIRUBIN mg/dL  --   --  0.4   ALK PHOS U/L  --   --  45   ALT (SGPT) U/L  --   --  32   AST (SGOT) U/L  --   --  42*   GLUCOSE mg/dL 84 120* 89         Latest Reference Range & Units 10/11/24 19:25 10/12/24 04:23 10/12/24 07:29   Glucose 70 - 130 mg/dL 167 (H) 84 80   (H): Data is abnormally high    All medications reviewed.   acetaminophen, 650 mg, Oral, Q8H  gabapentin, 600 mg, Oral, Q8H  heparin (porcine), 5,000 Units, Subcutaneous, Q8H  insulin glargine, 50 Units, Subcutaneous, Nightly  insulin lispro, 3-14 Units, Subcutaneous, 4x Daily AC & at Bedtime  metoprolol succinate XL, 25 mg, Oral, Daily  pantoprazole, 40 mg, Oral, Q AM  rosuvastatin, 10 mg, Oral, Nightly  tamsulosin, 0.4 mg, Oral, Nightly          Assessment & Plan       S/P colectomy , ultra low anterior resection, robotic assisted, with appy, repair of incarcerated hernia, diverting loop ileostomy    Rectal cancer    HTN (hypertension)    Hyperlipidemia    DM2 (diabetes mellitus, type 2)       Plan   1. Ambulation, several times daily  2. Pain control-PRNs    3. IS-encouraged  4. DVT proph-Mechanical, subcutaneous heparin.  5. Bowel regimen, alvimopan  6. Resume home medications as appropriate  7. Monitor post-op labs  8. Discharge planning   9. Diet, ADAT.      - Hypertension:  Resume home medications as appropriate, formulary substitution when indicated. Will dc Lisinopril till Cr is trending down.   Holding parameters.  PRN medications for elevated blood pressure.     -Dyslipidemia:  Resume home regimen statin ( formulary substitution when appropriate).     -DM type 2   Hgb A1C 7.1  Hold OHA as appropriate  FSBG AC/HS, ( q 6 when NPO), along with correction Humalog.  Long acting insulin, adjusted doses down from home regimen  Adjust further depending on intake and trends of FSBG.      -New stoma:  Wound care stoma nurse consult for stoma care and education throughout patient's  hospitalization.    D/w pt and wife, d/w RN.    Silvestre disclaimer:  Part of this encounter note is an electronic transcription/translation of spoken language to printed text. The electronic translation of spoken language may permit erroneous, or at times, nonsensical words or phrases to be inadvertently transcribed; Although I have reviewed the note for such errors, some may still exist.    Cong Cartagena MD  10/12/24  09:39 EDT

## 2024-10-13 ENCOUNTER — APPOINTMENT (OUTPATIENT)
Dept: GENERAL RADIOLOGY | Facility: HOSPITAL | Age: 59
End: 2024-10-13
Payer: COMMERCIAL

## 2024-10-13 LAB
ANION GAP SERPL CALCULATED.3IONS-SCNC: 23 MMOL/L (ref 5–15)
BASOPHILS # BLD AUTO: 0.05 10*3/MM3 (ref 0–0.2)
BASOPHILS NFR BLD AUTO: 0.4 % (ref 0–1.5)
BUN SERPL-MCNC: 25 MG/DL (ref 8–23)
BUN/CREAT SERPL: 21.9 (ref 7–25)
CALCIUM SPEC-SCNC: 9 MG/DL (ref 8.6–10.5)
CHLORIDE SERPL-SCNC: 103 MMOL/L (ref 98–107)
CO2 SERPL-SCNC: 14 MMOL/L (ref 22–29)
CREAT SERPL-MCNC: 1.14 MG/DL (ref 0.76–1.27)
D-LACTATE SERPL-SCNC: 1.3 MMOL/L (ref 0.5–2)
DEPRECATED RDW RBC AUTO: 45.1 FL (ref 37–54)
EGFRCR SERPLBLD CKD-EPI 2021: 73.2 ML/MIN/1.73
EOSINOPHIL # BLD AUTO: 0.13 10*3/MM3 (ref 0–0.4)
EOSINOPHIL NFR BLD AUTO: 0.9 % (ref 0.3–6.2)
ERYTHROCYTE [DISTWIDTH] IN BLOOD BY AUTOMATED COUNT: 14.3 % (ref 12.3–15.4)
GLUCOSE BLDC GLUCOMTR-MCNC: 130 MG/DL (ref 70–130)
GLUCOSE BLDC GLUCOMTR-MCNC: 144 MG/DL (ref 70–130)
GLUCOSE BLDC GLUCOMTR-MCNC: 160 MG/DL (ref 70–130)
GLUCOSE BLDC GLUCOMTR-MCNC: 244 MG/DL (ref 70–130)
GLUCOSE SERPL-MCNC: 170 MG/DL (ref 65–99)
HCT VFR BLD AUTO: 43.2 % (ref 37.5–51)
HGB BLD-MCNC: 13.5 G/DL (ref 13–17.7)
IMM GRANULOCYTES # BLD AUTO: 0.06 10*3/MM3 (ref 0–0.05)
IMM GRANULOCYTES NFR BLD AUTO: 0.4 % (ref 0–0.5)
LYMPHOCYTES # BLD AUTO: 1.57 10*3/MM3 (ref 0.7–3.1)
LYMPHOCYTES NFR BLD AUTO: 11.2 % (ref 19.6–45.3)
MCH RBC QN AUTO: 27.3 PG (ref 26.6–33)
MCHC RBC AUTO-ENTMCNC: 31.3 G/DL (ref 31.5–35.7)
MCV RBC AUTO: 87.4 FL (ref 79–97)
MONOCYTES # BLD AUTO: 1.02 10*3/MM3 (ref 0.1–0.9)
MONOCYTES NFR BLD AUTO: 7.3 % (ref 5–12)
NEUTROPHILS NFR BLD AUTO: 11.2 10*3/MM3 (ref 1.7–7)
NEUTROPHILS NFR BLD AUTO: 79.8 % (ref 42.7–76)
NRBC BLD AUTO-RTO: 0 /100 WBC (ref 0–0.2)
PLATELET # BLD AUTO: 272 10*3/MM3 (ref 140–450)
PMV BLD AUTO: 10.7 FL (ref 6–12)
POTASSIUM SERPL-SCNC: 4.9 MMOL/L (ref 3.5–5.2)
RBC # BLD AUTO: 4.94 10*6/MM3 (ref 4.14–5.8)
SODIUM SERPL-SCNC: 140 MMOL/L (ref 136–145)
WBC NRBC COR # BLD AUTO: 14.03 10*3/MM3 (ref 3.4–10.8)

## 2024-10-13 PROCEDURE — 25010000002 HEPARIN (PORCINE) PER 1000 UNITS: Performed by: COLON & RECTAL SURGERY

## 2024-10-13 PROCEDURE — 71046 X-RAY EXAM CHEST 2 VIEWS: CPT

## 2024-10-13 PROCEDURE — 85025 COMPLETE CBC W/AUTO DIFF WBC: CPT | Performed by: COLON & RECTAL SURGERY

## 2024-10-13 PROCEDURE — 25810000003 SODIUM CHLORIDE 0.9 % SOLUTION: Performed by: COLON & RECTAL SURGERY

## 2024-10-13 PROCEDURE — 82948 REAGENT STRIP/BLOOD GLUCOSE: CPT

## 2024-10-13 PROCEDURE — 83605 ASSAY OF LACTIC ACID: CPT | Performed by: COLON & RECTAL SURGERY

## 2024-10-13 PROCEDURE — 25010000002 HYDROMORPHONE 1 MG/ML SOLUTION: Performed by: COLON & RECTAL SURGERY

## 2024-10-13 PROCEDURE — 63710000001 INSULIN GLARGINE PER 5 UNITS: Performed by: INTERNAL MEDICINE

## 2024-10-13 PROCEDURE — 74019 RADEX ABDOMEN 2 VIEWS: CPT

## 2024-10-13 PROCEDURE — 97116 GAIT TRAINING THERAPY: CPT

## 2024-10-13 PROCEDURE — 80048 BASIC METABOLIC PNL TOTAL CA: CPT | Performed by: COLON & RECTAL SURGERY

## 2024-10-13 PROCEDURE — 25010000002 PIPERACILLIN SOD-TAZOBACTAM PER 1 G: Performed by: COLON & RECTAL SURGERY

## 2024-10-13 PROCEDURE — 63710000001 INSULIN LISPRO (HUMAN) PER 5 UNITS: Performed by: INTERNAL MEDICINE

## 2024-10-13 RX ADMIN — ACETAMINOPHEN 650 MG: 325 TABLET ORAL at 08:03

## 2024-10-13 RX ADMIN — PIPERACILLIN AND TAZOBACTAM 4.5 G: 4; .5 INJECTION, POWDER, FOR SOLUTION INTRAVENOUS at 13:10

## 2024-10-13 RX ADMIN — INSULIN GLARGINE 30 UNITS: 100 INJECTION, SOLUTION SUBCUTANEOUS at 20:10

## 2024-10-13 RX ADMIN — DIAZEPAM 5 MG: 5 TABLET ORAL at 20:09

## 2024-10-13 RX ADMIN — HEPARIN SODIUM 5000 UNITS: 5000 INJECTION INTRAVENOUS; SUBCUTANEOUS at 20:09

## 2024-10-13 RX ADMIN — PANTOPRAZOLE SODIUM 40 MG: 40 TABLET, DELAYED RELEASE ORAL at 06:16

## 2024-10-13 RX ADMIN — ROSUVASTATIN 10 MG: 10 TABLET, FILM COATED ORAL at 20:09

## 2024-10-13 RX ADMIN — GABAPENTIN 600 MG: 300 CAPSULE ORAL at 20:09

## 2024-10-13 RX ADMIN — HEPARIN SODIUM 5000 UNITS: 5000 INJECTION INTRAVENOUS; SUBCUTANEOUS at 13:10

## 2024-10-13 RX ADMIN — SODIUM CHLORIDE 1000 ML: 9 INJECTION, SOLUTION INTRAVENOUS at 13:10

## 2024-10-13 RX ADMIN — METOPROLOL SUCCINATE 25 MG: 25 TABLET, EXTENDED RELEASE ORAL at 08:03

## 2024-10-13 RX ADMIN — GABAPENTIN 600 MG: 300 CAPSULE ORAL at 13:10

## 2024-10-13 RX ADMIN — INSULIN LISPRO 5 UNITS: 100 INJECTION, SOLUTION INTRAVENOUS; SUBCUTANEOUS at 20:09

## 2024-10-13 RX ADMIN — HEPARIN SODIUM 5000 UNITS: 5000 INJECTION INTRAVENOUS; SUBCUTANEOUS at 06:16

## 2024-10-13 RX ADMIN — HYDROMORPHONE HYDROCHLORIDE 1 MG: 1 INJECTION, SOLUTION INTRAMUSCULAR; INTRAVENOUS; SUBCUTANEOUS at 03:04

## 2024-10-13 RX ADMIN — INSULIN LISPRO 3 UNITS: 100 INJECTION, SOLUTION INTRAVENOUS; SUBCUTANEOUS at 17:29

## 2024-10-13 RX ADMIN — GABAPENTIN 600 MG: 300 CAPSULE ORAL at 06:16

## 2024-10-13 RX ADMIN — PIPERACILLIN AND TAZOBACTAM 4.5 G: 4; .5 INJECTION, POWDER, FOR SOLUTION INTRAVENOUS at 21:01

## 2024-10-13 RX ADMIN — TAMSULOSIN HYDROCHLORIDE 0.4 MG: 0.4 CAPSULE ORAL at 20:09

## 2024-10-13 RX ADMIN — ACETAMINOPHEN 650 MG: 325 TABLET ORAL at 17:29

## 2024-10-13 NOTE — PROGRESS NOTES
High NG output >1500 yesterday  Labs / pre-renal    Will bolus    Will check lactic acid to screen for metabolic stress / obtain baseline  Cover with zosyn empirically... likely atelectasis / respiratory infection... will check films.

## 2024-10-13 NOTE — PLAN OF CARE
Problem: Adult Inpatient Plan of Care  Goal: Plan of Care Review  Outcome: Progressing  Goal: Patient-Specific Goal (Individualized)  Outcome: Progressing  Goal: Absence of Hospital-Acquired Illness or Injury  Outcome: Progressing  Intervention: Identify and Manage Fall Risk  Recent Flowsheet Documentation  Taken 10/13/2024 0000 by Meagan Oropeza RN  Safety Promotion/Fall Prevention:   safety round/check completed   activity supervised   assistive device/personal items within reach   clutter free environment maintained   fall prevention program maintained  Taken 10/12/2024 2200 by Meagan Oropeza RN  Safety Promotion/Fall Prevention:   safety round/check completed   activity supervised   assistive device/personal items within reach   clutter free environment maintained   fall prevention program maintained  Intervention: Prevent Skin Injury  Recent Flowsheet Documentation  Taken 10/13/2024 0000 by Meagan Oropeza RN  Body Position: position changed independently  Taken 10/12/2024 2200 by Meagan Oropeza RN  Body Position: position changed independently  Taken 10/12/2024 2100 by Meagan Oropeza RN  Body Position: position changed independently  Skin Protection: incontinence pads utilized  Intervention: Prevent Infection  Recent Flowsheet Documentation  Taken 10/13/2024 0000 by Meagan Oropeza RN  Infection Prevention: rest/sleep promoted  Taken 10/12/2024 2200 by Meagan Oropeza RN  Infection Prevention:   hand hygiene promoted   rest/sleep promoted  Taken 10/12/2024 2100 by Meagan Oropeza RN  Infection Prevention:   hand hygiene promoted   equipment surfaces disinfected   rest/sleep promoted  Goal: Optimal Comfort and Wellbeing  Outcome: Progressing  Intervention: Monitor Pain and Promote Comfort  Recent Flowsheet Documentation  Taken 10/12/2024 2107 by Meagan Oropeza RN  Pain Management Interventions:   pain medication given   pain management plan reviewed with patient/caregiver    unnecessary movement minimized   quiet environment facilitated   relaxation techniques promoted   pillow support provided   care clustered  Intervention: Provide Person-Centered Care  Recent Flowsheet Documentation  Taken 10/12/2024 2100 by Meagan Oropeza RN  Trust Relationship/Rapport:   care explained   choices provided   questions encouraged   questions answered  Goal: Readiness for Transition of Care  Outcome: Progressing     Problem: Skin Injury Risk Increased  Goal: Skin Health and Integrity  Outcome: Progressing  Intervention: Optimize Skin Protection  Recent Flowsheet Documentation  Taken 10/13/2024 0000 by Meagan Oropeza RN  Activity Management: activity minimized  Head of Bed (HOB) Positioning: HOB elevated  Taken 10/12/2024 2200 by Meagan Oropeza RN  Activity Management: activity minimized  Head of Bed (HOB) Positioning: HOB elevated  Taken 10/12/2024 2100 by Meagan Oropeza RN  Pressure Reduction Techniques:   frequent weight shift encouraged   weight shift assistance provided  Head of Bed (HOB) Positioning: HOB at 20-30 degrees  Pressure Reduction Devices: pressure-redistributing mattress utilized  Skin Protection: incontinence pads utilized     Problem: Fall Injury Risk  Goal: Absence of Fall and Fall-Related Injury  Outcome: Progressing  Intervention: Identify and Manage Contributors  Recent Flowsheet Documentation  Taken 10/12/2024 2100 by Meagan Oropeza RN  Medication Review/Management: medications reviewed  Intervention: Promote Injury-Free Environment  Recent Flowsheet Documentation  Taken 10/13/2024 0000 by Meagan Oropeza RN  Safety Promotion/Fall Prevention:   safety round/check completed   activity supervised   assistive device/personal items within reach   clutter free environment maintained   fall prevention program maintained  Taken 10/12/2024 2200 by Meagan Oropeza RN  Safety Promotion/Fall Prevention:   safety round/check completed   activity supervised    assistive device/personal items within reach   clutter free environment maintained   fall prevention program maintained   Goal Outcome Evaluation:

## 2024-10-13 NOTE — PROGRESS NOTES
"IM progress note      Valentin Vásquez  [id number]  1965     LOS: 3 days     Attending: José Manuel Hutchins MD    Primary Care Provider: Tristen Stuart DO      Chief Complaint/Reason for visit:  No chief complaint on file.      Subjective   Difficulties noted including distension .and with no output from stoma this was cannulated by , irrigated yesterday.  NGT placed and 1850 documented output yesterday.   Resting when seen, still with tachycardia.    Objective      Visit Vitals  /94 (BP Location: Left arm, Patient Position: Lying)   Pulse 111   Temp 97.6 °F (36.4 °C) (Oral)   Resp 18   Ht 170.2 cm (67\")   Wt 88.5 kg (195 lb)   SpO2 91%   BMI 30.54 kg/m²     Temp (24hrs), Av.8 °F (36.6 °C), Min:97 °F (36.1 °C), Max:98.2 °F (36.8 °C)      Intake/Output:    Intake/Output Summary (Last 24 hours) at 10/13/2024 1422  Last data filed at 10/13/2024 1000  Gross per 24 hour   Intake 390 ml   Output 4080 ml   Net -3690 ml          Physical Exam:     General Appearance:    Alert, cooperative, in no acute distress   Head:    Normocephalic, without obvious abnormality, atraumatic    Lungs:     Normal effort, symmetric chest rise,  clear to  auscultation bilaterally                 Heart:    Regular rhythm and normal rate, normal S1 and S2   Abdomen:     Soft and benign. Expected tenderness. Stoma pink/ bridge. GUSTAVO present. Incisions ok.    Extremities:   No clubbing, cyanosis or edema.  No deformities.    Pulses:   Pulses palpable and equal bilaterally   Skin:   No bleeding, bruising or rash          Results Review:     I reviewed the patient's new clinical results.   Results from last 7 days   Lab Units 10/13/24  0406 10/12/24  0423 10/11/24  0523   WBC 10*3/mm3 14.03* 9.90 11.15*   HEMOGLOBIN g/dL 13.5 13.0 12.7*   HEMATOCRIT % 43.2 41.7 39.8   PLATELETS 10*3/mm3 272 209 242     Results from last 7 days   Lab Units 10/13/24  0406 10/12/24  0423 10/11/24  0523 10/10/24  0610   SODIUM mmol/L 140 140 138 " 138   POTASSIUM mmol/L 4.9 4.7 4.7 4.2   CHLORIDE mmol/L 103 105 106 105   CO2 mmol/L 14.0* 22.0 19.0* 19.0*   BUN mg/dL 25* 18 25* 28*   CREATININE mg/dL 1.14 1.10 1.32* 1.09   CALCIUM mg/dL 9.0 9.1 8.6 9.7   BILIRUBIN mg/dL  --   --   --  0.4   ALK PHOS U/L  --   --   --  45   ALT (SGPT) U/L  --   --   --  32   AST (SGOT) U/L  --   --   --  42*   GLUCOSE mg/dL 170* 84 120* 89        All medications reviewed.   acetaminophen, 650 mg, Oral, Q8H  gabapentin, 600 mg, Oral, Q8H  heparin (porcine), 5,000 Units, Subcutaneous, Q8H  insulin glargine, 50 Units, Subcutaneous, Nightly  insulin lispro, 3-14 Units, Subcutaneous, 4x Daily AC & at Bedtime  metoprolol succinate XL, 25 mg, Oral, Daily  pantoprazole, 40 mg, Oral, Q AM  piperacillin-tazobactam, 4.5 g, Intravenous, Q8H  rosuvastatin, 10 mg, Oral, Nightly  sodium chloride, 1,000 mL, Intravenous, Once  tamsulosin, 0.4 mg, Oral, Nightly       Latest Reference Range & Units 10/13/24 07:25 10/13/24 11:31   Glucose 70 - 130 mg/dL 130 144 (H)   (H): Data is abnormally high    Assessment & Plan       S/P colectomy , ultra low anterior resection, robotic assisted, with appy, repair of incarcerated hernia, diverting loop ileostomy    Rectal cancer    HTN (hypertension)    Hyperlipidemia    DM2 (diabetes mellitus, type 2)    Postop ileus      Plan  NGT, ILWS  IVF.  Agree with empiric abx.  Watch for decrease in NGT output and more from stoma.     1. Ambulation, several times daily  2. Pain control-PRNs    3. IS-encouraged  4. DVT proph-Mechanical, subcutaneous heparin.  5. Bowel regimen   6. Resume home medications as appropriate  7. Monitor post-op labs           - Hypertension:  Resume home medications as appropriate, formulary substitution when indicated. Off Lisinopril for now  Holding parameters.  PRN medications for elevated blood pressure.     -Dyslipidemia:  Resume home regimen statin ( formulary substitution when appropriate).     -DM type 2   Hgb A1C 7.1  Hold OHA as  appropriate  FSBG AC/HS, ( q 6 when NPO), along with correction Humalog.  Long acting insulin, adjusted doses down from home regimen  Adjust further depending on intake and trends of FSBG.      -New stoma:  Wound care stoma nurse consult for stoma care and education throughout patient's hospitalization.    D/w pt and wife.    Dragon disclaimer:  Part of this encounter note is an electronic transcription/translation of spoken language to printed text. The electronic translation of spoken language may permit erroneous, or at times, nonsensical words or phrases to be inadvertently transcribed; Although I have reviewed the note for such errors, some may still exist.    Cong Cartagena MD  10/13/24  14:22 EDT

## 2024-10-13 NOTE — PROGRESS NOTES
"[facility] ([facility])    Voiding  About 250 out stoma  Catheter remains in stoma// irrigates with ease and flow    Vital Signs:  Blood pressure 120/90, pulse 111, temperature 98.2 °F (36.8 °C), temperature source Oral, resp. rate 16, height 170.2 cm (67\"), weight 88.5 kg (195 lb), SpO2 91%.    Labs past 24 hours:  Lab Results (last 24 hours)       Procedure Component Value Units Date/Time    POC Glucose Once [[id number]]  (Normal) Collected: 10/13/24 0725    Specimen: Blood Updated: 10/13/24 0728     Glucose 130 mg/dL     Basic Metabolic Panel [[id number]]  (Abnormal) Collected: 10/13/24 0406    Specimen: Blood Updated: 10/13/24 0526     Glucose 170 mg/dL      BUN 25 mg/dL      Creatinine 1.14 mg/dL      Sodium 140 mmol/L      Potassium 4.9 mmol/L      Chloride 103 mmol/L      CO2 14.0 mmol/L      Calcium 9.0 mg/dL      BUN/Creatinine Ratio 21.9     Anion Gap 23.0 mmol/L      eGFR 73.2 mL/min/1.73     Narrative:      GFR Normal >60  Chronic Kidney Disease <60  Kidney Failure <15      CBC & Differential [[id number]]  (Abnormal) Collected: 10/13/24 0406    Specimen: Blood Updated: 10/13/24 0442    Narrative:      The following orders were created for panel order CBC & Differential.  Procedure                               Abnormality         Status                     ---------                               -----------         ------                     CBC Auto Differential[[id number]]        Abnormal            Final result                 Please view results for these tests on the individual orders.    CBC Auto Differential [[id number]]  (Abnormal) Collected: 10/13/24 0406    Specimen: Blood Updated: 10/13/24 0442     WBC 14.03 10*3/mm3      RBC 4.94 10*6/mm3      Hemoglobin 13.5 g/dL      Hematocrit 43.2 %      MCV 87.4 fL      MCH 27.3 pg      MCHC 31.3 g/dL      RDW 14.3 %      RDW-SD 45.1 fl      MPV 10.7 fL      Platelets 272 10*3/mm3      Neutrophil % 79.8 %      Lymphocyte % " 11.2 %      Monocyte % 7.3 %      Eosinophil % 0.9 %      Basophil % 0.4 %      Immature Grans % 0.4 %      Neutrophils, Absolute 11.20 10*3/mm3      Lymphocytes, Absolute 1.57 10*3/mm3      Monocytes, Absolute 1.02 10*3/mm3      Eosinophils, Absolute 0.13 10*3/mm3      Basophils, Absolute 0.05 10*3/mm3      Immature Grans, Absolute 0.06 10*3/mm3      nRBC 0.0 /100 WBC     POC Glucose Once [[id number]]  (Normal) Collected: 10/12/24 2024    Specimen: Blood Updated: 10/12/24 2027     Glucose 123 mg/dL     POC Glucose Once [[id number]]  (Normal) Collected: 10/12/24 1633    Specimen: Blood Updated: 10/12/24 1634     Glucose 120 mg/dL     POC Glucose Once [[id number]]  (Normal) Collected: 10/12/24 1156    Specimen: Blood Updated: 10/12/24 1157     Glucose 121 mg/dL             I/O last shift:  I/O this shift:  In: -   Out: 460 [Emesis/NG output:400; Drains:60]     PHYSICAL EXAM-  Comfortable except ng  cv- rsr  Chest- clear, =  Abd- soft, mild distention, pink stoma    Pathology:  Order Name Source Comment Collection Info Order Time   COMPREHENSIVE METABOLIC PANEL   Collected By: Emely Mayo RN 10/10/2024  5:48 AM     Release to patient   Routine Release        TYPE AND SCREEN   Collected By: Genesis Georges RN 10/10/2024  5:48 AM     Release to patient   Routine Release        TISSUE PATHOLOGY EXAM Large Intestine, Appendix  Collected By: José Manuel Hutchins MD 10/10/2024  9:56 AM     Release to patient   Routine Release        .    Assessment and Plan:  Frequent ambulation encouraged to facilitate bowel function  Will check abdominal films to gauge degree of ileus / catheter locations.    José Manuel Hutchins MD  10/13/24  11:24 EDT

## 2024-10-13 NOTE — PLAN OF CARE
Goal Outcome Evaluation:  Plan of Care Reviewed With: patient        Progress: improving  Outcome Evaluation: patient ambulated 350' with CGA x1 and rolling walker for support, verbal cues for walker placement and upright posture, distance limited by weakness and pain. No LOB or unsteadiness noticed. Encouraged patient to ambulate multiple times throughout the day if nursing available. patient agreed. Recommend home with assist at D/C.

## 2024-10-13 NOTE — PLAN OF CARE
Problem: Adult Inpatient Plan of Care  Goal: Plan of Care Review  Outcome: Progressing  Goal: Patient-Specific Goal (Individualized)  Outcome: Progressing  Goal: Absence of Hospital-Acquired Illness or Injury  Outcome: Progressing  Intervention: Identify and Manage Fall Risk  Recent Flowsheet Documentation  Taken 10/13/2024 1600 by Genesis Jama RN  Safety Promotion/Fall Prevention:   activity supervised   assistive device/personal items within reach   clutter free environment maintained   toileting scheduled   room organization consistent   safety round/check completed  Taken 10/13/2024 1400 by Genesis Jama RN  Safety Promotion/Fall Prevention:   assistive device/personal items within reach   activity supervised   clutter free environment maintained   toileting scheduled   safety round/check completed   room organization consistent  Taken 10/13/2024 1200 by Genesis Jama RN  Safety Promotion/Fall Prevention:   assistive device/personal items within reach   activity supervised   clutter free environment maintained   toileting scheduled   safety round/check completed   room organization consistent  Taken 10/13/2024 1000 by Genesis Jama RN  Safety Promotion/Fall Prevention:   activity supervised   assistive device/personal items within reach   clutter free environment maintained   toileting scheduled   safety round/check completed   room organization consistent  Taken 10/13/2024 0800 by Genesis Jama RN  Safety Promotion/Fall Prevention:   activity supervised   assistive device/personal items within reach   clutter free environment maintained   toileting scheduled   safety round/check completed   room organization consistent  Intervention: Prevent Skin Injury  Recent Flowsheet Documentation  Taken 10/13/2024 1600 by Genesis Jama RN  Body Position: position changed independently  Skin Protection:   drying agents applied   transparent dressing maintained  Taken 10/13/2024 1400 by Wiley  CARRIE Hurtado  Body Position: position changed independently  Skin Protection:   drying agents applied   transparent dressing maintained  Taken 10/13/2024 1200 by Genesis Jama RN  Body Position: position changed independently  Skin Protection:   drying agents applied   transparent dressing maintained  Taken 10/13/2024 1000 by Genesis Jama RN  Body Position: position changed independently  Skin Protection:   drying agents applied   transparent dressing maintained  Taken 10/13/2024 0800 by Genesis Jama RN  Body Position: position changed independently  Skin Protection:   drying agents applied   transparent dressing maintained  Goal: Optimal Comfort and Wellbeing  Outcome: Progressing  Goal: Readiness for Transition of Care  Outcome: Progressing     Problem: Skin Injury Risk Increased  Goal: Skin Health and Integrity  Outcome: Progressing  Intervention: Optimize Skin Protection  Recent Flowsheet Documentation  Taken 10/13/2024 1600 by Genesis Jama RN  Activity Management: activity encouraged  Pressure Reduction Techniques: frequent weight shift encouraged  Head of Bed (HOB) Positioning: HOB elevated  Pressure Reduction Devices:   pressure-redistributing mattress utilized   positioning supports utilized  Skin Protection:   drying agents applied   transparent dressing maintained  Taken 10/13/2024 1400 by Genesis Jama RN  Activity Management: ambulated outside room  Pressure Reduction Techniques:   frequent weight shift encouraged   heels elevated off bed   pressure points protected   weight shift assistance provided  Head of Bed (HOB) Positioning: HOB elevated  Pressure Reduction Devices:   pressure-redistributing mattress utilized   positioning supports utilized   heel offloading device utilized   foam padding utilized  Skin Protection:   drying agents applied   transparent dressing maintained  Taken 10/13/2024 1200 by Genesis Jama RN  Activity Management: activity encouraged  Pressure  Reduction Techniques: frequent weight shift encouraged  Head of Bed (HOB) Positioning: HOB elevated  Pressure Reduction Devices:   pressure-redistributing mattress utilized   positioning supports utilized  Skin Protection:   drying agents applied   transparent dressing maintained  Taken 10/13/2024 1000 by Genesis Jama RN  Activity Management:   ambulated to bathroom   back to bed  Pressure Reduction Techniques: frequent weight shift encouraged  Head of Bed (HOB) Positioning: HOB elevated  Pressure Reduction Devices:   pressure-redistributing mattress utilized   positioning supports utilized  Skin Protection:   drying agents applied   transparent dressing maintained  Taken 10/13/2024 0800 by Genesis Jama RN  Activity Management: activity encouraged  Pressure Reduction Techniques: frequent weight shift encouraged  Head of Bed (HOB) Positioning: HOB elevated  Pressure Reduction Devices:   pressure-redistributing mattress utilized   positioning supports utilized  Skin Protection:   drying agents applied   transparent dressing maintained     Problem: Fall Injury Risk  Goal: Absence of Fall and Fall-Related Injury  Outcome: Progressing  Intervention: Promote Injury-Free Environment  Recent Flowsheet Documentation  Taken 10/13/2024 1600 by Genesis Jama RN  Safety Promotion/Fall Prevention:   activity supervised   assistive device/personal items within reach   clutter free environment maintained   toileting scheduled   room organization consistent   safety round/check completed  Taken 10/13/2024 1400 by Genesis Jama RN  Safety Promotion/Fall Prevention:   assistive device/personal items within reach   activity supervised   clutter free environment maintained   toileting scheduled   safety round/check completed   room organization consistent  Taken 10/13/2024 1200 by Genesis Jama RN  Safety Promotion/Fall Prevention:   assistive device/personal items within reach   activity supervised   clutter free  environment maintained   toileting scheduled   safety round/check completed   room organization consistent  Taken 10/13/2024 1000 by Genesis Jama, RN  Safety Promotion/Fall Prevention:   activity supervised   assistive device/personal items within reach   clutter free environment maintained   toileting scheduled   safety round/check completed   room organization consistent  Taken 10/13/2024 0800 by Genesis Jama, RN  Safety Promotion/Fall Prevention:   activity supervised   assistive device/personal items within reach   clutter free environment maintained   toileting scheduled   safety round/check completed   room organization consistent   Goal Outcome Evaluation:

## 2024-10-13 NOTE — THERAPY TREATMENT NOTE
Patient Name: Valentin Vásquez  : 1965    MRN: [id number]                              Today's Date: 10/13/2024       Admit Date: 10/10/2024    Visit Dx:     ICD-10-CM ICD-9-CM   1. Rectal cancer  C20 154.1   2. Malignant neoplasm of colon  C18.9 153.9     Patient Active Problem List   Diagnosis    Acute pancreatitis    Nephrolithiasis    Encounter for screening for malignant neoplasm of colon    Rectal cancer    HTN (hypertension)    Hyperlipidemia    DM2 (diabetes mellitus, type 2)    S/P colectomy , ultra low anterior resection, robotic assisted, with appy, repair of incarcerated hernia, diverting loop ileostomy     Past Medical History:   Diagnosis Date    Arthritis     Colon cancer     Colon polyp     Diabetes mellitus     Diverticulitis of colon     GERD (gastroesophageal reflux disease)     Gout     Hyperlipidemia     Hypertension     Injury of back     Kidney stone     passed and surgery-   x3 times surgery    Pancreatitis     Tear of right rotator cuff     Wears glasses     readers     Past Surgical History:   Procedure Laterality Date    ADENOIDECTOMY      COLON RESECTION WITH ILEOSTOMY N/A 10/10/2024    Procedure: CONVERTED TO OPEN COLON RESECTION ULTRA LOW ANTERIOR LAPAROSCOPIC WITH LOOP ILEOSTOMY;  Surgeon: José Manuel Hutchins MD;  Location:  [facility];  Service: Robotics - DaVinci;  Laterality: N/A;    COLONOSCOPY N/A 2024    Procedure: COLONOSCOPY;  Surgeon: Rosa Patrick MD;  Location:  [facility];  Service: Gastroenterology;  Laterality: N/A;    CYSTOSCOPY Bilateral 10/10/2024    Procedure: CYSTOSCOPY TEMPORARY PLACEMENT BILATERAL URETERAL CATHETER;  Surgeon: Ke Courtney MD;  Location:  [facility];  Service: Robotics - DaVinci;  Laterality: Bilateral;    CYSTOSCOPY LITHOLAPAXY BLADDER STONE EXTRACTION N/A 2022    Procedure: CYSTOSCOPY LASER LITHOLAPAXY BLADDER STONE EXTRACTION;  Surgeon: Mykel Jeffers MD;  Location:  [facility];  Service: Urology;  Laterality: N/A;     KIDNEY STONE SURGERY      x3 surgeries-  same stone    SHOULDER ARTHROSCOPY W/ ROTATOR CUFF REPAIR Right 07/19/2022    Procedure: RIGHT SHOULDER ARTHROSCOPY WITH OPEN ACROMIOPLASTY,  ROTATOR CUFF REPAIR, EXCISION LATERAL CLAVICLE;  Surgeon: Edmundo Huitron MD;  Location: [facility];  Service: Orthopedics;  Laterality: Right;    TONSILLECTOMY        General Information       Row Name 10/13/24 0834          Physical Therapy Time and Intention    Document Type therapy note (daily note)  -AS     Mode of Treatment physical therapy  -AS       Row Name 10/13/24 0834          General Information    Patient Profile Reviewed yes  -AS     Existing Precautions/Restrictions other (see comments)  GUSTAVO drain, NG tube, ileostomy RUQ, abdominal incision  -AS     Barriers to Rehab medically complex  -AS       Row Name 10/13/24 0834          Cognition    Orientation Status (Cognition) oriented x 3  -AS       Row Name 10/13/24 0834          Safety Issues/Impairments Affecting Functional Mobility    Safety Issues Affecting Function (Mobility) safety precaution awareness;positioning of assistive device  -AS     Impairments Affecting Function (Mobility) balance;endurance/activity tolerance;pain;strength  -AS     Comment, Safety Issues/Impairments (Mobility) alert and following commands  -AS               User Key  (r) = Recorded By, (t) = Taken By, (c) = Cosigned By      Initials Name Provider Type    AS Rebecca Menchaca PTA Physical Therapist Assistant                   Mobility       Row Name 10/13/24 0835          Bed Mobility    Sidelying-Sit [place] (Bed Mobility) verbal cues;contact guard;1 person assist  -AS     Sit-Sidelying [place] (Bed Mobility) verbal cues;minimum assist (75% patient effort);1 person assist  -AS     Assistive Device (Bed Mobility) head of bed elevated;bed rails  -AS     Comment, (Bed Mobility) verbal cues for log rol technique, increased assist needed for back to bed and B LE's, slow  transition d/t abdominal pain  -AS       [facility] Name 10/13/24 0835          Transfers    Comment, (Transfers) good hand placement  -AS       Row Name 10/13/24 0835          Bed-Chair Transfer    Bed-Chair [place] (Transfers) not tested  -AS     Comment, (Bed-Chair Transfer) requested back to bed as spouse sleeping in recliner  -AS       [facility] Name 10/13/24 0835          Sit-Stand Transfer    Sit-Stand [place] (Transfers) verbal cues;contact guard;1 person assist  -AS     Assistive Device (Sit-Stand Transfers) walker, front-wheeled  -AS       [facility] Name 10/13/24 0835          Gait/Stairs (Locomotion)    [place] Level (Gait) verbal cues;contact guard;1 person assist  -AS     Assistive Device (Gait) walker, front-wheeled  -AS     Distance in Feet (Gait) 350  -AS     Deviations/Abnormal Patterns (Gait) bilateral deviations;ethan decreased;gait speed decreased  -AS     Bilateral Gait Deviations forward flexed posture  -AS     Comment, (Gait/Stairs) paitnet ambulated 350' with CGA x1 and rolling walker for support, verbal cues for walker placement and upright posture, distance limited by weakness and pain. No LOB or unsteadiness noticed.  -AS               User Key  (r) = Recorded By, (t) = Taken By, (c) = Cosigned By      Initials Name Provider Type    AS Rebecca Menchaca PTA Physical Therapist Assistant                   Obj/Interventions       Row Name 10/13/24 0837          Balance    Dynamic Standing Balance verbal cues;contact guard;1-person assist  -AS     Position/Device Used, Standing Balance supported;walker, front-wheeled  -AS     Comment, Balance CGA for safety  -AS               User Key  (r) = Recorded By, (t) = Taken By, (c) = Cosigned By      Initials Name Provider Type    AS Rebecca Menchaca PTA Physical Therapist Assistant                   Goals/Plan    No documentation.                  Clinical Impression       Row Name 10/13/24 0837          Pain    Pretreatment Pain Rating 6/10   -AS     Posttreatment Pain Rating 6/10  -AS     Pain Location abdomen  -AS     Pain Management Interventions exercise or physical activity utilized;nursing notified  -AS     Response to Pain Interventions no change per patient report  -AS       Row Name 10/13/24 0837          Plan of Care Review    Plan of Care Reviewed With patient  -AS     Progress improving  -AS     Outcome Evaluation patient ambulated 350' with CGA x1 and rolling walker for support, verbal cues for walker placement and upright posture, distance limited by weakness and pain. No LOB or unsteadiness noticed. Encouraged patient to ambulate multiple times throughout the day if nursing available. patient agreed. Recommend home with assist at D/C.  -AS       Row Name 10/13/24 0837          Positioning and Restraints    Pre-Treatment Position in bed  -AS     Post Treatment Position bed  -AS     In Bed supine;call light within reach;encouraged to call for assist;with family/caregiver  -AS               User Key  (r) = Recorded By, (t) = Taken By, (c) = Cosigned By      Initials Name Provider Type    AS Rebecca Menchaca PTA Physical Therapist Assistant                   Outcome Measures       Row Name 10/13/24 0839 10/12/24 2100       How much help from another person do you currently need...    Turning from your back to your side while in flat bed without using bedrails? 3  -AS 3  -KM    Moving from lying on back to sitting on the side of a flat bed without bedrails? 3  -AS 3  -KM    Moving to and from a bed to a chair (including a wheelchair)? 3  -AS 3  -KM    Standing up from a chair using your arms (e.g., wheelchair, bedside chair)? 4  -AS 4  -KM    Climbing 3-5 steps with a railing? 3  -AS 3  -KM    To walk in hospital room? 3  -AS 3  -KM    AM-PAC 6 Clicks Score (PT) 19  -AS 19  -KM    Highest Level of Mobility Goal 6 --> Walk 10 steps or more  -AS 6 --> Walk 10 steps or more  -KM      Row Name 10/13/24 0839          Functional Assessment     Outcome Measure Options AM-PAC 6 Clicks Basic Mobility (PT)  -AS               User Key  (r) = Recorded By, (t) = Taken By, (c) = Cosigned By      Initials Name Provider Type    AS Rebecca Menchaca PTA Physical Therapist Assistant     Meagan Oropeza RN Registered Nurse                                 Physical Therapy Education       Title: PT OT SLP Therapies (In Progress)       Topic: Physical Therapy (In Progress)       Point: Mobility training (In Progress)       Learning Progress Summary            Patient Acceptance, E, NR by AS at 10/13/2024 0840    Acceptance, E, VU,NR by LO at 10/11/2024 0920    Comment: PT POC                      Point: Home exercise program (In Progress)       Learning Progress Summary            Patient Acceptance, E, NR by AS at 10/13/2024 0840    Acceptance, E, VU,NR by LO at 10/11/2024 0920    Comment: PT POC                      Point: Body mechanics (In Progress)       Learning Progress Summary            Patient Acceptance, E, NR by AS at 10/13/2024 0840    Acceptance, E, VU,NR by LO at 10/11/2024 0920    Comment: PT POC                      Point: Precautions (In Progress)       Learning Progress Summary            Patient Acceptance, E, NR by AS at 10/13/2024 0840    Acceptance, E, VU,NR by LO at 10/11/2024 0920    Comment: PT POC                                      User Key       Initials Effective Dates Name Provider Type Discipline    AS 04/28/23 -  Rebecca Menchaca PTA Physical Therapist Assistant PT     06/16/21 -  Brianne Soto, JOSE Physical Therapist PT                  PT Recommendation and Plan     Progress: improving  Outcome Evaluation: patient ambulated 350' with CGA x1 and rolling walker for support, verbal cues for walker placement and upright posture, distance limited by weakness and pain. No LOB or unsteadiness noticed. Encouraged patient to ambulate multiple times throughout the day if nursing available. patient agreed. Recommend home with assist  at D/C.     Time Calculation:         PT Charges       Row Name 10/13/24 0840             Time Calculation    Start Time 0807  -AS      PT Received On 10/13/24  -AS      PT Goal Re-Cert Due Date 10/21/24  -AS         Timed Charges    45520 - Gait Training Minutes  23  -AS         Total Minutes    Timed Charges Total Minutes 23  -AS       Total Minutes 23  -AS                User Key  (r) = Recorded By, (t) = Taken By, (c) = Cosigned By      Initials Name Provider Type    AS Rebecca Menchaca PTA Physical Therapist Assistant                  Therapy Charges for Today       Code Description Service Date Service Provider Modifiers Qty    [id number] HC GAIT TRAINING EA 15 MIN 10/13/2024 Rebecca Menchaca PTA GP 2            PT G-Codes  Outcome Measure Options: AM-PAC 6 Clicks Basic Mobility (PT)  AM-PAC 6 Clicks Score (PT): 19       Rebecca Menchaca PTA  10/13/2024

## 2024-10-14 ENCOUNTER — APPOINTMENT (OUTPATIENT)
Dept: CT IMAGING | Facility: HOSPITAL | Age: 59
End: 2024-10-14
Payer: COMMERCIAL

## 2024-10-14 ENCOUNTER — APPOINTMENT (OUTPATIENT)
Dept: CARDIOLOGY | Facility: HOSPITAL | Age: 59
End: 2024-10-14
Payer: COMMERCIAL

## 2024-10-14 LAB
ANION GAP SERPL CALCULATED.3IONS-SCNC: 14 MMOL/L (ref 5–15)
BASOPHILS # BLD AUTO: 0.05 10*3/MM3 (ref 0–0.2)
BASOPHILS NFR BLD AUTO: 0.5 % (ref 0–1.5)
BH CV LOWER VASCULAR LEFT COMMON FEMORAL AUGMENT: NORMAL
BH CV LOWER VASCULAR LEFT COMMON FEMORAL COMPRESS: NORMAL
BH CV LOWER VASCULAR LEFT COMMON FEMORAL PHASIC: NORMAL
BH CV LOWER VASCULAR LEFT COMMON FEMORAL SPONT: NORMAL
BH CV LOWER VASCULAR LEFT DISTAL FEMORAL AUGMENT: NORMAL
BH CV LOWER VASCULAR LEFT DISTAL FEMORAL COMPRESS: NORMAL
BH CV LOWER VASCULAR LEFT DISTAL FEMORAL PHASIC: NORMAL
BH CV LOWER VASCULAR LEFT DISTAL FEMORAL SPONT: NORMAL
BH CV LOWER VASCULAR LEFT GASTRONEMIUS COMPRESS: NORMAL
BH CV LOWER VASCULAR LEFT GREATER SAPH AK COMPRESS: NORMAL
BH CV LOWER VASCULAR LEFT GREATER SAPH BK COMPRESS: NORMAL
BH CV LOWER VASCULAR LEFT LESSER SAPH COMPRESS: NORMAL
BH CV LOWER VASCULAR LEFT MID FEMORAL AUGMENT: NORMAL
BH CV LOWER VASCULAR LEFT MID FEMORAL COMPRESS: NORMAL
BH CV LOWER VASCULAR LEFT MID FEMORAL PHASIC: NORMAL
BH CV LOWER VASCULAR LEFT MID FEMORAL SPONT: NORMAL
BH CV LOWER VASCULAR LEFT PERONEAL COMPRESS: NORMAL
BH CV LOWER VASCULAR LEFT POPLITEAL AUGMENT: NORMAL
BH CV LOWER VASCULAR LEFT POPLITEAL COMPRESS: NORMAL
BH CV LOWER VASCULAR LEFT POPLITEAL PHASIC: NORMAL
BH CV LOWER VASCULAR LEFT POPLITEAL SPONT: NORMAL
BH CV LOWER VASCULAR LEFT POSTERIOR TIBIAL COMPRESS: NORMAL
BH CV LOWER VASCULAR LEFT PROFUNDA FEMORAL PHASIC: NORMAL
BH CV LOWER VASCULAR LEFT PROFUNDA FEMORAL SPONT: NORMAL
BH CV LOWER VASCULAR LEFT PROXIMAL FEMORAL AUGMENT: NORMAL
BH CV LOWER VASCULAR LEFT PROXIMAL FEMORAL COMPRESS: NORMAL
BH CV LOWER VASCULAR LEFT PROXIMAL FEMORAL PHASIC: NORMAL
BH CV LOWER VASCULAR LEFT PROXIMAL FEMORAL SPONT: NORMAL
BH CV LOWER VASCULAR LEFT SAPHENOFEMORAL JUNCTION AUGMENT: NORMAL
BH CV LOWER VASCULAR LEFT SAPHENOFEMORAL JUNCTION COMPRESS: NORMAL
BH CV LOWER VASCULAR LEFT SAPHENOFEMORAL JUNCTION PHASIC: NORMAL
BH CV LOWER VASCULAR LEFT SAPHENOFEMORAL JUNCTION SPONT: NORMAL
BH CV LOWER VASCULAR RIGHT COMMON FEMORAL AUGMENT: NORMAL
BH CV LOWER VASCULAR RIGHT COMMON FEMORAL COMPRESS: NORMAL
BH CV LOWER VASCULAR RIGHT COMMON FEMORAL PHASIC: NORMAL
BH CV LOWER VASCULAR RIGHT COMMON FEMORAL SPONT: NORMAL
BH CV LOWER VASCULAR RIGHT DISTAL FEMORAL AUGMENT: NORMAL
BH CV LOWER VASCULAR RIGHT DISTAL FEMORAL COMPRESS: NORMAL
BH CV LOWER VASCULAR RIGHT DISTAL FEMORAL PHASIC: NORMAL
BH CV LOWER VASCULAR RIGHT DISTAL FEMORAL SPONT: NORMAL
BH CV LOWER VASCULAR RIGHT GASTRONEMIUS COMPRESS: NORMAL
BH CV LOWER VASCULAR RIGHT GREATER SAPH AK COMPRESS: NORMAL
BH CV LOWER VASCULAR RIGHT GREATER SAPH BK COMPRESS: NORMAL
BH CV LOWER VASCULAR RIGHT LESSER SAPH COMPRESS: NORMAL
BH CV LOWER VASCULAR RIGHT MID FEMORAL AUGMENT: NORMAL
BH CV LOWER VASCULAR RIGHT MID FEMORAL COMPRESS: NORMAL
BH CV LOWER VASCULAR RIGHT MID FEMORAL PHASIC: NORMAL
BH CV LOWER VASCULAR RIGHT MID FEMORAL SPONT: NORMAL
BH CV LOWER VASCULAR RIGHT PERONEAL COMPRESS: NORMAL
BH CV LOWER VASCULAR RIGHT POPLITEAL AUGMENT: NORMAL
BH CV LOWER VASCULAR RIGHT POPLITEAL COMPRESS: NORMAL
BH CV LOWER VASCULAR RIGHT POPLITEAL PHASIC: NORMAL
BH CV LOWER VASCULAR RIGHT POPLITEAL SPONT: NORMAL
BH CV LOWER VASCULAR RIGHT POSTERIOR TIBIAL COMPRESS: NORMAL
BH CV LOWER VASCULAR RIGHT PROFUNDA FEMORAL PHASIC: NORMAL
BH CV LOWER VASCULAR RIGHT PROFUNDA FEMORAL SPONT: NORMAL
BH CV LOWER VASCULAR RIGHT PROXIMAL FEMORAL AUGMENT: NORMAL
BH CV LOWER VASCULAR RIGHT PROXIMAL FEMORAL COMPRESS: NORMAL
BH CV LOWER VASCULAR RIGHT PROXIMAL FEMORAL PHASIC: NORMAL
BH CV LOWER VASCULAR RIGHT PROXIMAL FEMORAL SPONT: NORMAL
BH CV LOWER VASCULAR RIGHT SAPHENOFEMORAL JUNCTION AUGMENT: NORMAL
BH CV LOWER VASCULAR RIGHT SAPHENOFEMORAL JUNCTION COMPRESS: NORMAL
BH CV LOWER VASCULAR RIGHT SAPHENOFEMORAL JUNCTION PHASIC: NORMAL
BH CV LOWER VASCULAR RIGHT SAPHENOFEMORAL JUNCTION SPONT: NORMAL
BUN SERPL-MCNC: 23 MG/DL (ref 8–23)
BUN/CREAT SERPL: 22.5 (ref 7–25)
CALCIUM SPEC-SCNC: 8.9 MG/DL (ref 8.6–10.5)
CHLORIDE SERPL-SCNC: 103 MMOL/L (ref 98–107)
CO2 SERPL-SCNC: 22 MMOL/L (ref 22–29)
CREAT SERPL-MCNC: 1.02 MG/DL (ref 0.76–1.27)
D-LACTATE SERPL-SCNC: 1.2 MMOL/L (ref 0.5–2)
DEPRECATED RDW RBC AUTO: 42.6 FL (ref 37–54)
EGFRCR SERPLBLD CKD-EPI 2021: 83.6 ML/MIN/1.73
EOSINOPHIL # BLD AUTO: 0.22 10*3/MM3 (ref 0–0.4)
EOSINOPHIL NFR BLD AUTO: 2 % (ref 0.3–6.2)
ERYTHROCYTE [DISTWIDTH] IN BLOOD BY AUTOMATED COUNT: 13.8 % (ref 12.3–15.4)
GEN 5 2HR TROPONIN T REFLEX: 12 NG/L
GLUCOSE BLDC GLUCOMTR-MCNC: 115 MG/DL (ref 70–130)
GLUCOSE BLDC GLUCOMTR-MCNC: 135 MG/DL (ref 70–130)
GLUCOSE BLDC GLUCOMTR-MCNC: 154 MG/DL (ref 70–130)
GLUCOSE BLDC GLUCOMTR-MCNC: 173 MG/DL (ref 70–130)
GLUCOSE SERPL-MCNC: 149 MG/DL (ref 65–99)
HCT VFR BLD AUTO: 40 % (ref 37.5–51)
HGB BLD-MCNC: 12.8 G/DL (ref 13–17.7)
IMM GRANULOCYTES # BLD AUTO: 0.04 10*3/MM3 (ref 0–0.05)
IMM GRANULOCYTES NFR BLD AUTO: 0.4 % (ref 0–0.5)
LYMPHOCYTES # BLD AUTO: 1.85 10*3/MM3 (ref 0.7–3.1)
LYMPHOCYTES NFR BLD AUTO: 16.7 % (ref 19.6–45.3)
MCH RBC QN AUTO: 27.1 PG (ref 26.6–33)
MCHC RBC AUTO-ENTMCNC: 32 G/DL (ref 31.5–35.7)
MCV RBC AUTO: 84.7 FL (ref 79–97)
MONOCYTES # BLD AUTO: 0.9 10*3/MM3 (ref 0.1–0.9)
MONOCYTES NFR BLD AUTO: 8.1 % (ref 5–12)
NEUTROPHILS NFR BLD AUTO: 72.3 % (ref 42.7–76)
NEUTROPHILS NFR BLD AUTO: 8 10*3/MM3 (ref 1.7–7)
NRBC BLD AUTO-RTO: 0 /100 WBC (ref 0–0.2)
PLATELET # BLD AUTO: 274 10*3/MM3 (ref 140–450)
PMV BLD AUTO: 10.2 FL (ref 6–12)
POTASSIUM SERPL-SCNC: 4.6 MMOL/L (ref 3.5–5.2)
RBC # BLD AUTO: 4.72 10*6/MM3 (ref 4.14–5.8)
SODIUM SERPL-SCNC: 139 MMOL/L (ref 136–145)
TROPONIN T DELTA: 1 NG/L
TROPONIN T SERPL HS-MCNC: 11 NG/L
WBC NRBC COR # BLD AUTO: 11.06 10*3/MM3 (ref 3.4–10.8)

## 2024-10-14 PROCEDURE — 74178 CT ABD&PLV WO CNTR FLWD CNTR: CPT

## 2024-10-14 PROCEDURE — 85025 COMPLETE CBC W/AUTO DIFF WBC: CPT | Performed by: COLON & RECTAL SURGERY

## 2024-10-14 PROCEDURE — 93005 ELECTROCARDIOGRAM TRACING: CPT | Performed by: COLON & RECTAL SURGERY

## 2024-10-14 PROCEDURE — 25510000001 IOPAMIDOL 61 % SOLUTION: Performed by: COLON & RECTAL SURGERY

## 2024-10-14 PROCEDURE — 80048 BASIC METABOLIC PNL TOTAL CA: CPT | Performed by: COLON & RECTAL SURGERY

## 2024-10-14 PROCEDURE — 63710000001 INSULIN LISPRO (HUMAN) PER 5 UNITS: Performed by: INTERNAL MEDICINE

## 2024-10-14 PROCEDURE — 83605 ASSAY OF LACTIC ACID: CPT | Performed by: COLON & RECTAL SURGERY

## 2024-10-14 PROCEDURE — 93010 ELECTROCARDIOGRAM REPORT: CPT | Performed by: INTERNAL MEDICINE

## 2024-10-14 PROCEDURE — 25810000003 SODIUM CHLORIDE 0.9 % SOLUTION: Performed by: COLON & RECTAL SURGERY

## 2024-10-14 PROCEDURE — 84484 ASSAY OF TROPONIN QUANT: CPT | Performed by: COLON & RECTAL SURGERY

## 2024-10-14 PROCEDURE — 25010000002 HEPARIN (PORCINE) PER 1000 UNITS: Performed by: COLON & RECTAL SURGERY

## 2024-10-14 PROCEDURE — 82948 REAGENT STRIP/BLOOD GLUCOSE: CPT

## 2024-10-14 PROCEDURE — 93970 EXTREMITY STUDY: CPT

## 2024-10-14 PROCEDURE — 93970 EXTREMITY STUDY: CPT | Performed by: INTERNAL MEDICINE

## 2024-10-14 PROCEDURE — 25010000002 PIPERACILLIN SOD-TAZOBACTAM PER 1 G: Performed by: COLON & RECTAL SURGERY

## 2024-10-14 RX ORDER — IOPAMIDOL 612 MG/ML
100 INJECTION, SOLUTION INTRAVASCULAR
Status: COMPLETED | OUTPATIENT
Start: 2024-10-14 | End: 2024-10-14

## 2024-10-14 RX ORDER — SODIUM CHLORIDE 9 MG/ML
100 INJECTION, SOLUTION INTRAVENOUS CONTINUOUS
Status: ACTIVE | OUTPATIENT
Start: 2024-10-14 | End: 2024-10-15

## 2024-10-14 RX ADMIN — HEPARIN SODIUM 5000 UNITS: 5000 INJECTION INTRAVENOUS; SUBCUTANEOUS at 04:31

## 2024-10-14 RX ADMIN — ACETAMINOPHEN 650 MG: 325 TABLET ORAL at 15:31

## 2024-10-14 RX ADMIN — PIPERACILLIN AND TAZOBACTAM 4.5 G: 4; .5 INJECTION, POWDER, FOR SOLUTION INTRAVENOUS at 04:31

## 2024-10-14 RX ADMIN — DIAZEPAM 5 MG: 5 TABLET ORAL at 20:10

## 2024-10-14 RX ADMIN — SODIUM CHLORIDE 100 ML/HR: 9 INJECTION, SOLUTION INTRAVENOUS at 11:30

## 2024-10-14 RX ADMIN — IOPAMIDOL 85 ML: 612 INJECTION, SOLUTION INTRAVENOUS at 13:03

## 2024-10-14 RX ADMIN — GABAPENTIN 600 MG: 300 CAPSULE ORAL at 15:31

## 2024-10-14 RX ADMIN — PIPERACILLIN AND TAZOBACTAM 4.5 G: 4; .5 INJECTION, POWDER, FOR SOLUTION INTRAVENOUS at 15:31

## 2024-10-14 RX ADMIN — PIPERACILLIN AND TAZOBACTAM 4.5 G: 4; .5 INJECTION, POWDER, FOR SOLUTION INTRAVENOUS at 21:43

## 2024-10-14 RX ADMIN — TAMSULOSIN HYDROCHLORIDE 0.4 MG: 0.4 CAPSULE ORAL at 20:09

## 2024-10-14 RX ADMIN — HEPARIN SODIUM 5000 UNITS: 5000 INJECTION INTRAVENOUS; SUBCUTANEOUS at 15:30

## 2024-10-14 RX ADMIN — ACETAMINOPHEN 650 MG: 325 TABLET ORAL at 11:22

## 2024-10-14 RX ADMIN — ROSUVASTATIN 10 MG: 10 TABLET, FILM COATED ORAL at 20:10

## 2024-10-14 RX ADMIN — INSULIN LISPRO 3 UNITS: 100 INJECTION, SOLUTION INTRAVENOUS; SUBCUTANEOUS at 16:53

## 2024-10-14 RX ADMIN — INSULIN LISPRO 3 UNITS: 100 INJECTION, SOLUTION INTRAVENOUS; SUBCUTANEOUS at 12:41

## 2024-10-14 RX ADMIN — GABAPENTIN 600 MG: 300 CAPSULE ORAL at 04:31

## 2024-10-14 RX ADMIN — SODIUM CHLORIDE 250 ML: 9 INJECTION, SOLUTION INTRAVENOUS at 07:52

## 2024-10-14 RX ADMIN — PANTOPRAZOLE SODIUM 40 MG: 40 TABLET, DELAYED RELEASE ORAL at 04:31

## 2024-10-14 RX ADMIN — METOPROLOL SUCCINATE 25 MG: 25 TABLET, EXTENDED RELEASE ORAL at 11:22

## 2024-10-14 RX ADMIN — HEPARIN SODIUM 5000 UNITS: 5000 INJECTION INTRAVENOUS; SUBCUTANEOUS at 20:10

## 2024-10-14 RX ADMIN — GABAPENTIN 600 MG: 300 CAPSULE ORAL at 20:09

## 2024-10-14 NOTE — PROGRESS NOTES
"IM progress note      Valentin Vásquez  [id number]  1965     LOS: 4 days     Attending: José Manuel Hutchins MD    Primary Care Provider: Tristen Stuart DO      Chief Complaint/Reason for visit:  No chief complaint on file.      Subjective   Has his NG tube reconnected after going down for CT scan.  About 200 mL out.  Stoma with output.  Good pain control.  Ambulated 5 times yesterday.  Objective      Visit Vitals  /91   Pulse 112   Temp 99.1 °F (37.3 °C) (Oral)   Resp 19   Ht 170.2 cm (67.01\")   Wt 88.5 kg (195 lb 1.7 oz)   SpO2 95%   BMI 30.55 kg/m²     Temp (24hrs), Av °F (36.7 °C), Min:97 °F (36.1 °C), Max:99.1 °F (37.3 °C)      Intake/Output:    Intake/Output Summary (Last 24 hours) at 10/14/2024 1807  Last data filed at 10/14/2024 1713  Gross per 24 hour   Intake 560 ml   Output 1890 ml   Net -1330 ml          Physical Exam:     General Appearance:    Alert, cooperative, in no acute distress   Head:    Normocephalic, without obvious abnormality, atraumatic    Lungs:     Normal effort, symmetric chest rise,  clear to  auscultation bilaterally                 Heart:    Regular rhythm and normal rate, normal S1 and S2   Abdomen:     Soft and benign. Expected tenderness. Stoma pink.GUSTAVO present. Incisions ok.    Extremities:   No clubbing, cyanosis or edema.  No deformities.    Pulses:   Pulses palpable and equal bilaterally   Skin:   No bleeding, bruising or rash          Results Review:     I reviewed the patient's new clinical results.   Results from last 7 days   Lab Units 10/14/24  0420 10/13/24  0406 10/12/24  0423   WBC 10*3/mm3 11.06* 14.03* 9.90   HEMOGLOBIN g/dL 12.8* 13.5 13.0   HEMATOCRIT % 40.0 43.2 41.7   PLATELETS 10*3/mm3 274 272 209     Results from last 7 days   Lab Units 10/14/24  0420 10/13/24  0406 10/12/24  0423 10/11/24  0523 10/10/24  0610   SODIUM mmol/L 139 140 140   < > 138   POTASSIUM mmol/L 4.6 4.9 4.7   < > 4.2   CHLORIDE mmol/L 103 103 105   < > 105   CO2 mmol/L 22.0 " 14.0* 22.0   < > 19.0*   BUN mg/dL 23 25* 18   < > 28*   CREATININE mg/dL 1.02 1.14 1.10   < > 1.09   CALCIUM mg/dL 8.9 9.0 9.1   < > 9.7   BILIRUBIN mg/dL  --   --   --   --  0.4   ALK PHOS U/L  --   --   --   --  45   ALT (SGPT) U/L  --   --   --   --  32   AST (SGOT) U/L  --   --   --   --  42*   GLUCOSE mg/dL 149* 170* 84   < > 89    < > = values in this interval not displayed.       Latest Reference Range & Units 10/14/24 11:24 10/14/24 16:33   Glucose 70 - 130 mg/dL 173 (H) 154 (H)   (H): Data is abnormally high  All medications reviewed.   acetaminophen, 650 mg, Oral, Q8H  gabapentin, 600 mg, Oral, Q8H  heparin (porcine), 5,000 Units, Subcutaneous, Q8H  insulin glargine, 30 Units, Subcutaneous, Nightly  insulin lispro, 3-14 Units, Subcutaneous, 4x Daily AC & at Bedtime  metoprolol succinate XL, 25 mg, Oral, Daily  pantoprazole, 40 mg, Oral, Q AM  piperacillin-tazobactam, 4.5 g, Intravenous, Q8H  rosuvastatin, 10 mg, Oral, Nightly  tamsulosin, 0.4 mg, Oral, Nightly           Assessment & Plan       S/P colectomy , ultra low anterior resection, robotic assisted, with appy, repair of incarcerated hernia, diverting loop ileostomy    Rectal cancer    HTN (hypertension)    Hyperlipidemia    DM2 (diabetes mellitus, type 2)    Postop ileus   Left lung base opacity, possible early pneumonia versus atelectasis    Plan  NGT, ILWS  CT scan per Dr. Hutchins  IVF.  Agree with empiric abx.  Continue for now  Watch for decrease in NGT output and more from stoma.     1. Ambulation, several times daily  2. Pain control-PRNs    3. IS-encouraged  4. DVT proph-Mechanical, subcutaneous heparin.  5. Bowel regimen   6. Resume home medications as appropriate  7. Monitor post-op labs          - Hypertension:  Resume home medications as appropriate, formulary substitution when indicated. Off Lisinopril for now  Holding parameters.  PRN medications for elevated blood pressure.     -Dyslipidemia:  Resume home regimen statin ( formulary  substitution when appropriate).     -DM type 2   Hgb A1C 7.1  Hold OHA as appropriate  FSBG AC/HS, ( q 6 when NPO), along with correction Humalog.  Long acting insulin, adjusted doses down from home regimen  Adjust further depending on intake and trends of FSBG.      -New stoma:  Wound care stoma nurse consult for stoma care and education throughout patient's hospitalization.    D/w pt and wife.  Discussed with CARRIE Rivers disclaimer:  Part of this encounter note is an electronic transcription/translation of spoken language to printed text. The electronic translation of spoken language may permit erroneous, or at times, nonsensical words or phrases to be inadvertently transcribed; Although I have reviewed the note for such errors, some may still exist.    Cong Cartagena MD  10/14/24  18:07 EDT

## 2024-10-14 NOTE — NURSING NOTE
WOC follow up for ostomy assessment and education.    Patient off the floor for CT ABD with contrast.     Discussed education with spouse. Spouse has read through the educational material and asked good questions.    Ambulation promoted. Will follow up tomorrow to remove bride and change appliance with family.      Zachary French RN, BSN, CCRN, CWOCN  Wound, Ostomy and Continence (WOC) Department  [facility]

## 2024-10-14 NOTE — PROGRESS NOTES
"[facility] ([facility])    Lots out the stoma today  Large, >400 residual when I connected ng to suction tonight  Denies abdominal pain    Vital Signs:  Blood pressure 135/88, pulse 109, temperature 99.1 °F (37.3 °C), temperature source Oral, resp. rate 19, height 170.2 cm (67.01\"), weight 88.5 kg (195 lb 1.7 oz), SpO2 91%.    Labs past 24 hours:  Lab Results (last 24 hours)       Procedure Component Value Units Date/Time    POC Glucose Once [[id number]]  (Abnormal) Collected: 10/14/24 1633    Specimen: Blood Updated: 10/14/24 1637     Glucose 154 mg/dL     High Sensitivity Troponin T 2Hr [[id number]]  (Normal) Collected: 10/14/24 1359    Specimen: Blood Updated: 10/14/24 1438     HS Troponin T 12 ng/L      Troponin T Delta 1 ng/L     Narrative:      High Sensitive Troponin T Reference Range:  <14.0 ng/L- Negative Female for AMI  <22.0 ng/L- Negative Male for AMI  >=14 - Abnormal Female indicating possible myocardial injury.  >=22 - Abnormal Male indicating possible myocardial injury.   Clinicians would have to utilize clinical acumen, EKG, Troponin, and serial changes to determine if it is an Acute Myocardial Infarction or myocardial injury due to an underlying chronic condition.         POC Glucose Once [[id number]]  (Abnormal) Collected: 10/14/24 1124    Specimen: Blood Updated: 10/14/24 1126     Glucose 173 mg/dL     High Sensitivity Troponin T [[id number]]  (Normal) Collected: 10/14/24 0820    Specimen: Blood Updated: 10/14/24 0903     HS Troponin T 11 ng/L     Narrative:      High Sensitive Troponin T Reference Range:  <14.0 ng/L- Negative Female for AMI  <22.0 ng/L- Negative Male for AMI  >=14 - Abnormal Female indicating possible myocardial injury.  >=22 - Abnormal Male indicating possible myocardial injury.   Clinicians would have to utilize clinical acumen, EKG, Troponin, and serial changes to determine if it is an Acute Myocardial Infarction or myocardial injury due to an " underlying chronic condition.         POC Glucose Once [[id number]]  (Abnormal) Collected: 10/14/24 0735    Specimen: Blood Updated: 10/14/24 0746     Glucose 135 mg/dL     Basic Metabolic Panel [[id number]]  (Abnormal) Collected: 10/14/24 0420    Specimen: Blood Updated: 10/14/24 0505     Glucose 149 mg/dL      BUN 23 mg/dL      Creatinine 1.02 mg/dL      Sodium 139 mmol/L      Potassium 4.6 mmol/L      Chloride 103 mmol/L      CO2 22.0 mmol/L      Calcium 8.9 mg/dL      BUN/Creatinine Ratio 22.5     Anion Gap 14.0 mmol/L      eGFR 83.6 mL/min/1.73     Narrative:      GFR Normal >60  Chronic Kidney Disease <60  Kidney Failure <15      Lactic Acid, Plasma [[id number]]  (Normal) Collected: 10/14/24 0420    Specimen: Blood Updated: 10/14/24 0501     Lactate 1.2 mmol/L      Comment: Falsely depressed results may occur on samples drawn from patients receiving N-Acetylcysteine (NAC) or Metamizole.       CBC & Differential [[id number]]  (Abnormal) Collected: 10/14/24 0420    Specimen: Blood Updated: 10/14/24 0445    Narrative:      The following orders were created for panel order CBC & Differential.  Procedure                               Abnormality         Status                     ---------                               -----------         ------                     CBC Auto Differential[[id number]]        Abnormal            Final result                 Please view results for these tests on the individual orders.    CBC Auto Differential [[id number]]  (Abnormal) Collected: 10/14/24 0420    Specimen: Blood Updated: 10/14/24 0445     WBC 11.06 10*3/mm3      RBC 4.72 10*6/mm3      Hemoglobin 12.8 g/dL      Hematocrit 40.0 %      MCV 84.7 fL      MCH 27.1 pg      MCHC 32.0 g/dL      RDW 13.8 %      RDW-SD 42.6 fl      MPV 10.2 fL      Platelets 274 10*3/mm3      Neutrophil % 72.3 %      Lymphocyte % 16.7 %      Monocyte % 8.1 %      Eosinophil % 2.0 %      Basophil % 0.5 %      Immature Grans % 0.4 %      Neutrophils,  Absolute 8.00 10*3/mm3      Lymphocytes, Absolute 1.85 10*3/mm3      Monocytes, Absolute 0.90 10*3/mm3      Eosinophils, Absolute 0.22 10*3/mm3      Basophils, Absolute 0.05 10*3/mm3      Immature Grans, Absolute 0.04 10*3/mm3      nRBC 0.0 /100 WBC     POC Glucose Once [[id number]]  (Abnormal) Collected: 10/13/24 1856    Specimen: Blood Updated: 10/13/24 1900     Glucose 244 mg/dL             I/O last shift:  I/O this shift:  In: -   Out: 1350 [Emesis/NG output:1050; Stool:300]     PHYSICAL EXAM-  Comfortable  Chest-decreased in bases  Abdomen- distended, improved after ng suction tonight  Stoma with catheter    Pathology:  Order Name Source Comment Collection Info Order Time   COMPREHENSIVE METABOLIC PANEL   Collected By: Emely Mayo RN 10/10/2024  5:48 AM     Release to patient   Routine Release        TYPE AND SCREEN   Collected By: Genesis Georges RN 10/10/2024  5:48 AM     Release to patient   Routine Release        TISSUE PATHOLOGY EXAM Large Intestine, Appendix  Collected By: José Manuel Hutchins MD 10/10/2024  9:56 AM     Release to patient   Routine Release        .    Assessment and Plan:  Labs/ct reassuring  Likely ileus from pulmonary infection - responding to treatment with antibiotic.  Watching fluid balance.    José Manuel Hutchins MD  10/14/24  17:18 EDT

## 2024-10-14 NOTE — PAYOR COMM NOTE
"[facility]# [id number]   Clinical Update    MEERA Lopez, RN  Utilization Review  Phone [phone]  Fax [phone]    [facility]  [address] [place] [address]               Valentin Vásquez (59 y.o. Male)       Date of Birth   1965    Social Security Number       Address   [address] [place] [address]    Home Phone   [phone]    MRN   [id number]       Episcopalian   [facility]    Marital Status                               Admission Date   10/10/24    Admission Type   Elective    Admitting Provider   José Manuel Hutchins MD    Attending Provider   José Manuel Hutchins MD    Department, Room/Bed   [facility] 5G, S550/1       Discharge Date       Discharge Disposition       Discharge Destination                                 Attending Provider: José Manuel Hutchins MD    Allergies: No Known Allergies    Isolation: None   Infection: None   Code Status: CPR    Ht: 170.2 cm (67.01\")   Wt: 88.5 kg (195 lb 1.7 oz)    Admission Cmt: None   Principal Problem: S/P colectomy , ultra low anterior resection, robotic assisted, with appy, repair of incarcerated hernia, diverting loop ileostomy [Z90.49]                   Active Insurance as of 10/10/2024       Primary Coverage       Payor Plan Insurance Group Employer/Plan Group    [employer] BLUE CROSS [facility] [employer] [facility] PPO [id number]       Payor Plan Address Payor Plan Phone Number Payor Plan Fax Number Effective Dates    [address] [phone]  1/1/2020 - None Entered    [place] [address]         Subscriber Name Subscriber Birth Date Member ID       VALENTIN VÁSQUEZ 1965 [id number]                     Emergency Contacts        (Rel.) Home Phone Work Phone Mobile Phone    Sharri Vásquez (Spouse) [phone] -- [phone]                 Operative/Procedure Notes (all)        Ke Courtney MD at 10/10/24 0753  Version 1 of 1      Summary:Urology Operative Note                 Cystourethroscopy & " Intraoperative Bilateral Catheter Placement Operative Report    Patient Name:  Valentin Vásquez  YOB: 1965    Date of Surgery:  10/10/2024     Indications: 59-year-old male with history of colon cancer.  Presenting today for ultralow anterior resection with colorectal surgery.  Urology consulted intraoperatively for ureteral identification catheter placement    Pre-op Diagnosis:   Colon cancer       Post-Op Diagnosis Codes:  Colon cancer    Procedure/CPT® Codes: 45388, 62032    1. CYSTOSCOPY   2. URETHRAL DILATION  2. BILATERAL URETERAL CATHETER/STENT INSERTION  2. MODIFIER 50, BILATERAL STENT PLACEMENT  3. JULIEN CATHETER INSERTION  CHANGE    Staff:  Surgeon(s):  Ke Courtney MD      Anesthesia: General with Block    Estimated Blood Loss: none        Findings:   Mild distal urethral narrowing requiring dilation for insertion of 22 Romansh scope.  Normal urinary bladder  Insertion of bilateral 5 Romansh ureteral identification cath  Julien catheter insertion    Complications: None immediate    Description of Procedure:   The patient was identified in the preoperative holding area where informed consent was reviewed and signed. The patient was transported to the operating room per anesthesia and placed supine on the operating table. Smooth endotracheal intubation was performed without issue after administration of general anesthesia. The patient was then placed in the dorsal lithotomy position where genitals were prepped and draped in the usual sterile fashion. A brief timeout was performed identifying the correct patient procedure and laterality. Perioperative antibiotics were administered. All pressure points were padded.      Procedure began by inserting a 22 Romansh cystoscope was attempted to atraumatically be inserted per urethra.  There was narrowing at the fossa navicularis.  This required dilation which was performed.  The scope was then able to be advanced easily into the urinary bladder.  Normal  pendulous, bulbar, prostatic urethra.  Upon entering the bladder formal cystoscopy was performed identifying bilateral orthotopic ureteral orifices and no evidence of tumor, stone, foreign body. Attention was then turned to the right ureteral orifice. A 5 Moldovan open ended ureteral catheter was inserted into the distal ureter and passed up to the level right renal collecting system without difficulty.   Attention was then turned to the left ureteral orifice.  A 5 Moldovan open ended ureteral catheter was inserted into the distal ureter and passed up to the level left renal collecting system without difficulty.       A 16F catheter was placed.  10 mL placed in the balloon.  The ureteral catheters were affixed to the Oreilly catheter.     Patient remained under general anesthesia.  The patient will continue scheduled procedure with colorectal service, Dr. Liset MD. Urology service available to assist in any way during the procedure.    Ke Courtney MD     Date: 10/10/2024  Time: 11:04 EDT        Electronically signed by Ke Courtney MD at 10/10/24 1713       José Manuel Hutchins MD at 10/10/24 1332  Version 1 of 1         [facility]    ULTRA LOW ANTERIOR RESECTION  DIVERTING LOOP ILEOSTOMY  APPENDECTOMYH  DAVINCI ROBOT ASSISTED  Repair of incarcerated umbilical hernia     Procedure Note    Valentin Vásquez  10/10/2024    Pre-op Diagnosis: Rectal cancer at 9 cm above the dentate line with 1 mm margin and unfavorable pathologic features, less than 1 mm margin  Incarcerated umbilical hernia  Ink in place  BMI 30  ASA 3    Post-op Diagnosis:    Ink apparent  Fibrosis in the retroperitoneum and pelvis, open pelvic dissection    Anesthesia: General with Block    Staff:   Circulator: Pilar Gold RN  Scrub Person: Terry Kingsley; Crystal Milan  Nursing Assistant: Marilynn Diaz  Assistant: Merlene Acosta RNFA    Assistant: Merlene Acosta RNFA was responsible for  performing the following activities: Retraction, Suction, Irrigation, Placing Dressing, and Held/Positioned Camera and their skilled assistance was necessary for the success of this case.    Colon Resection  Operation performed with curative intent Yes   Tumor Location (select all that apply) Rectum, NOS   Extent of colon and vascular resection  (select all that apply) Sigmoid resection - inferior mesenteric         Rectal Cancer Synoptic Operative Data      Case status:   Elective    Operation:  LAR    Modality:  Robotic    Location of tumor within rectum:  Middle    Height of lower edge of tumor from anal verge:  10 cm    Mobilization of splenic flexure:  No    Level of ligation of inferior mesenteric artery:  ASAEL    Level of ligation of inferior mesenteric vein:  High    Level of rectal transection distal to distal edge of tumor (distal margin):  6 cm    Type of reconstruction:  stapled end-end    Anastomotic testing method(s):  rectal air infusion under pelvic fluid    Creation of stoma:  yes ileostomy    En bloc resection:  no    Metastectomy:  no    Completeness of tumor resection:  R0    Intraoperative complications:  no    Blood transfusion:  no    TME photographed:  yes in pathology report    Colon Resection  Operation performed with curative intent Yes   Tumor Location (select all that apply) Rectum, NOS   Extent of colon and vascular resection  (select all that apply) Sigmoid resection - inferior mesenteric           José Manuel WYATT Hutchins MD  10/10/24  12:05 EDT     Estimated Blood Loss: 80 mL    Specimens: Rectum and sigmoid, appendix        Drains: GUSTAVO drain in the pelvis    Findings: No evidence of distant disease    Complications: None evident    The procedure was reviewed in the preoperative area, the patient been counseled and marked by the stomal therapist, goals of surgery and postoperative care were reviewed with the patient and his spouse at the bedside.    We advanced to the operating room where  general anesthesia was initiated, block was placed, urologic catheters were placed.    Antibiotic DVT prophylaxis and timeout protocol were all utilized.    The incarcerated umbilical hernia was addressed, content of omentum was reduced, GelPort was placed.    Robotic trocars were then placed with visualized laparoscopic entry, 12 mm in the right lower quadrant, 8 mm in a curvilinear distribution around the abdomen noting truncal obesity but thin character to the abdominal wall.    Laparoscopy demonstrated the ileocolic region to be tacked down in the pelvis and this was mobilized, appendectomy was performed during the course of the procedure with Enseal and DEBBIE.    Trendelenburg and tilting to the right was performed with robotic lateral to medial dissection noting redundant sigmoid and thickening of the base of the mesentery in the region of redundancy.    The ureter was identified with both medial to lateral and lateral to medial dissection.    Small bowel limited exposure.    The proximal rectum was mobilized away from the hypogastric nerves and peritoneal reflection at the junction of the mesorectum and the mesentery.    Decision was made to open to facilitate exposure with small bowel limiting exposure robotically.    The ileocolic region was  further mobilized achieving excellent view of the pelvis.    Small bowel was run, ileocolic region inspected, all appeared satisfactory.    Now in Trendelenburg, Bookwalter retractor, midline incision, excellent exposure.    The sigmoid was divided with DEBBIE, the mesentery was sequentially divided, high ligation of the inferior mesenteric artery was performed.  Hide division of the inferior mesenteric vein was performed to facilitate reach of the left colon down to the pelvis-with Enseal.    Difficult pelvic dissection was then performed noting fibrosis associated with ink, the presacral plane, posterior lateral, lateral, anterior lateral, and anterior planes were  dissected, there was ink and fibrosis extending throughout the mesorectum.    The dissection was taken down to the low pelvis, about 4 cm above the dentate line, distal to the prostate.    The rectum was divided with the contour device.    Proctoscopy demonstrated intact rectal closure.    Doppler auscultation noting unavailability of ICG demonstrated brisk arterial inflow into the end colon, the sigmoid was resected, anvil was placed with suture device and the end colon, reach was without tension or torsion.    The anastomosis was performed about 4 cm above the dentate line with proctoscopy demonstrating airtight hemostatic anastomosis.    Loop ileostomy was performed 20 cm upstream to the ileocolic junction at the preoperatively identified site in the right upper quadrant through the rectus.  This was secured with ileostomy inocencio.    10 flat GUSTAVO was placed in the deep pelvis.    All quadrant demonstrated hemostasis on inspection.    The midline fascia was reapproximated into retention of 0 Vicryl no 1 looped PDS    The incision was irrigated and the skin reapproximated skin clips.    I requested counts during the course of the procedure given complexity of the procedure and they were right/announced as correct throughout the case.    The final counts were also announced as correct, the stoma was matured with Juliana eversion proximally and flat maturation distally.      José Manuel Hutchins MD     Date: 10/10/2024  Time: 12:02 EDT    Electronically signed by José Manuel Hutchins MD at 10/10/24 1213          Physician Progress Notes (all)        Cong Cartagena MD at 10/13/24 1422          IM progress note      Valentin Vásquez  [id number]  1965     LOS: 3 days     Attending: José Manuel Hutchins MD    Primary Care Provider: Tristen Stuart DO      Chief Complaint/Reason for visit:  No chief complaint on file.      Subjective   Difficulties noted including distension .and with no output from stoma this was cannulated by  ", irrigated yesterday.  NGT placed and 1850 documented output yesterday.   Resting when seen, still with tachycardia.    Objective      Visit Vitals  /94 (BP Location: Left arm, Patient Position: Lying)   Pulse 111   Temp 97.6 °F (36.4 °C) (Oral)   Resp 18   Ht 170.2 cm (67\")   Wt 88.5 kg (195 lb)   SpO2 91%   BMI 30.54 kg/m²     Temp (24hrs), Av.8 °F (36.6 °C), Min:97 °F (36.1 °C), Max:98.2 °F (36.8 °C)      Intake/Output:    Intake/Output Summary (Last 24 hours) at 10/13/2024 1422  Last data filed at 10/13/2024 1000  Gross per 24 hour   Intake 390 ml   Output 4080 ml   Net -3690 ml          Physical Exam:     General Appearance:    Alert, cooperative, in no acute distress   Head:    Normocephalic, without obvious abnormality, atraumatic    Lungs:     Normal effort, symmetric chest rise,  clear to  auscultation bilaterally                 Heart:    Regular rhythm and normal rate, normal S1 and S2   Abdomen:     Soft and benign. Expected tenderness. Stoma pink/ bridge. GUSTAVO present. Incisions ok.    Extremities:   No clubbing, cyanosis or edema.  No deformities.    Pulses:   Pulses palpable and equal bilaterally   Skin:   No bleeding, bruising or rash          Results Review:     I reviewed the patient's new clinical results.   Results from last 7 days   Lab Units 10/13/24  0406 10/12/24  0423 10/11/24  0523   WBC 10*3/mm3 14.03* 9.90 11.15*   HEMOGLOBIN g/dL 13.5 13.0 12.7*   HEMATOCRIT % 43.2 41.7 39.8   PLATELETS 10*3/mm3 272 209 242     Results from last 7 days   Lab Units 10/13/24  0406 10/12/24  0423 10/11/24  0523 10/10/24  0610   SODIUM mmol/L 140 140 138 138   POTASSIUM mmol/L 4.9 4.7 4.7 4.2   CHLORIDE mmol/L 103 105 106 105   CO2 mmol/L 14.0* 22.0 19.0* 19.0*   BUN mg/dL 25* 18 25* 28*   CREATININE mg/dL 1.14 1.10 1.32* 1.09   CALCIUM mg/dL 9.0 9.1 8.6 9.7   BILIRUBIN mg/dL  --   --   --  0.4   ALK PHOS U/L  --   --   --  45   ALT (SGPT) U/L  --   --   --  32   AST (SGOT) U/L  --   --   -- "  42*   GLUCOSE mg/dL 170* 84 120* 89        All medications reviewed.   acetaminophen, 650 mg, Oral, Q8H  gabapentin, 600 mg, Oral, Q8H  heparin (porcine), 5,000 Units, Subcutaneous, Q8H  insulin glargine, 50 Units, Subcutaneous, Nightly  insulin lispro, 3-14 Units, Subcutaneous, 4x Daily AC & at Bedtime  metoprolol succinate XL, 25 mg, Oral, Daily  pantoprazole, 40 mg, Oral, Q AM  piperacillin-tazobactam, 4.5 g, Intravenous, Q8H  rosuvastatin, 10 mg, Oral, Nightly  sodium chloride, 1,000 mL, Intravenous, Once  tamsulosin, 0.4 mg, Oral, Nightly       Latest Reference Range & Units 10/13/24 07:25 10/13/24 11:31   Glucose 70 - 130 mg/dL 130 144 (H)   (H): Data is abnormally high    Assessment & Plan       S/P colectomy , ultra low anterior resection, robotic assisted, with appy, repair of incarcerated hernia, diverting loop ileostomy    Rectal cancer    HTN (hypertension)    Hyperlipidemia    DM2 (diabetes mellitus, type 2)    Postop ileus      Plan  NGT, ILWS  IVF.  Agree with empiric abx.  Watch for decrease in NGT output and more from stoma.     1. Ambulation, several times daily  2. Pain control-PRNs    3. IS-encouraged  4. DVT proph-Mechanical, subcutaneous heparin.  5. Bowel regimen   6. Resume home medications as appropriate  7. Monitor post-op labs           - Hypertension:  Resume home medications as appropriate, formulary substitution when indicated. Off Lisinopril for now  Holding parameters.  PRN medications for elevated blood pressure.     -Dyslipidemia:  Resume home regimen statin ( formulary substitution when appropriate).     -DM type 2   Hgb A1C 7.1  Hold OHA as appropriate  FSBG AC/HS, ( q 6 when NPO), along with correction Humalog.  Long acting insulin, adjusted doses down from home regimen  Adjust further depending on intake and trends of FSBG.      -New stoma:  Wound care stoma nurse consult for stoma care and education throughout patient's hospitalization.    D/w pt and wife.    Silvestre  "disclaimer:  Part of this encounter note is an electronic transcription/translation of spoken language to printed text. The electronic translation of spoken language may permit erroneous, or at times, nonsensical words or phrases to be inadvertently transcribed; Although I have reviewed the note for such errors, some may still exist.    Cong Cartagena MD  10/13/24  14:22 EDT        Electronically signed by Cong Cartagena MD at 10/14/24 0937       José Manuel Hutchins MD at 10/13/24 1141          High NG output >1500 yesterday  Labs / pre-renal    Will bolus    Will check lactic acid to screen for metabolic stress / obtain baseline  Cover with zosyn empirically... likely atelectasis / respiratory infection... will check films.    Electronically signed by José Manuel Hutchins MD at 10/13/24 1143       José Manuel Hutchins MD at 10/13/24 1124          [facility] ([facility]    Voiding  About 250 out stoma  Catheter remains in stoma// irrigates with ease and flow    Vital Signs:  Blood pressure 120/90, pulse 111, temperature 98.2 °F (36.8 °C), temperature source Oral, resp. rate 16, height 170.2 cm (67\"), weight 88.5 kg (195 lb), SpO2 91%.    Labs past 24 hours:  Lab Results (last 24 hours)       Procedure Component Value Units Date/Time    POC Glucose Once [[id number]]  (Normal) Collected: 10/13/24 0725    Specimen: Blood Updated: 10/13/24 0728     Glucose 130 mg/dL     Basic Metabolic Panel [[id number]]  (Abnormal) Collected: 10/13/24 0406    Specimen: Blood Updated: 10/13/24 0526     Glucose 170 mg/dL      BUN 25 mg/dL      Creatinine 1.14 mg/dL      Sodium 140 mmol/L      Potassium 4.9 mmol/L      Chloride 103 mmol/L      CO2 14.0 mmol/L      Calcium 9.0 mg/dL      BUN/Creatinine Ratio 21.9     Anion Gap 23.0 mmol/L      eGFR 73.2 mL/min/1.73     Narrative:      GFR Normal >60  Chronic Kidney Disease <60  Kidney Failure <15      CBC & Differential [[id number]]  (Abnormal) Collected: " 10/13/24 0406    Specimen: Blood Updated: 10/13/24 0442    Narrative:      The following orders were created for panel order CBC & Differential.  Procedure                               Abnormality         Status                     ---------                               -----------         ------                     CBC Auto Differential[[id number]]        Abnormal            Final result                 Please view results for these tests on the individual orders.    CBC Auto Differential [[id number]]  (Abnormal) Collected: 10/13/24 0406    Specimen: Blood Updated: 10/13/24 0442     WBC 14.03 10*3/mm3      RBC 4.94 10*6/mm3      Hemoglobin 13.5 g/dL      Hematocrit 43.2 %      MCV 87.4 fL      MCH 27.3 pg      MCHC 31.3 g/dL      RDW 14.3 %      RDW-SD 45.1 fl      MPV 10.7 fL      Platelets 272 10*3/mm3      Neutrophil % 79.8 %      Lymphocyte % 11.2 %      Monocyte % 7.3 %      Eosinophil % 0.9 %      Basophil % 0.4 %      Immature Grans % 0.4 %      Neutrophils, Absolute 11.20 10*3/mm3      Lymphocytes, Absolute 1.57 10*3/mm3      Monocytes, Absolute 1.02 10*3/mm3      Eosinophils, Absolute 0.13 10*3/mm3      Basophils, Absolute 0.05 10*3/mm3      Immature Grans, Absolute 0.06 10*3/mm3      nRBC 0.0 /100 WBC     POC Glucose Once [[id number]]  (Normal) Collected: 10/12/24 2024    Specimen: Blood Updated: 10/12/24 2027     Glucose 123 mg/dL     POC Glucose Once [[id number]]  (Normal) Collected: 10/12/24 1633    Specimen: Blood Updated: 10/12/24 1634     Glucose 120 mg/dL     POC Glucose Once [[id number]]  (Normal) Collected: 10/12/24 1156    Specimen: Blood Updated: 10/12/24 1157     Glucose 121 mg/dL             I/O last shift:  I/O this shift:  In: -   Out: 460 [Emesis/NG output:400; Drains:60]     PHYSICAL EXAM-  Comfortable except ng  cv- rsr  Chest- clear, =  Abd- soft, mild distention, pink stoma    Pathology:  Order Name Source Comment Collection Info Order Time   COMPREHENSIVE METABOLIC PANEL   Collected By:  "Emely Mayo RN 10/10/2024  5:48 AM     Release to patient   Routine Release        TYPE AND SCREEN   Collected By: Genesis Georges RN 10/10/2024  5:48 AM     Release to patient   Routine Release        TISSUE PATHOLOGY EXAM Large Intestine, Appendix  Collected By: José Manuel Hutchins MD 10/10/2024  9:56 AM     Release to patient   Routine Release        .    Assessment and Plan:  Frequent ambulation encouraged to facilitate bowel function  Will check abdominal films to gauge degree of ileus / catheter locations.    José Manuel Hutchins MD  10/13/24  11:24 EDT    Electronically signed by José Manuel Hutchins MD at 10/13/24 1126       José Manuel Hutchins MD at 10/12/24 1344          [facility]    Supportive spouse  Walked twice earlier  Without significant stoma output    Vital Signs:  Blood pressure 132/88, pulse (!) 124, temperature 97.6 °F (36.4 °C), temperature source Oral, resp. rate 18, height 170.2 cm (67\"), weight 88.5 kg (195 lb), SpO2 91%.    Labs past 24 hours:  Lab Results (last 24 hours)       Procedure Component Value Units Date/Time    POC Glucose Once [[id number]]  (Normal) Collected: 10/12/24 1156    Specimen: Blood Updated: 10/12/24 1157     Glucose 121 mg/dL     POC Glucose Once [[id number]]  (Normal) Collected: 10/12/24 0729    Specimen: Blood Updated: 10/12/24 0730     Glucose 80 mg/dL     Basic Metabolic Panel [[id number]]  (Normal) Collected: 10/12/24 0423    Specimen: Blood Updated: 10/12/24 0509     Glucose 84 mg/dL      BUN 18 mg/dL      Creatinine 1.10 mg/dL      Sodium 140 mmol/L      Potassium 4.7 mmol/L      Chloride 105 mmol/L      CO2 22.0 mmol/L      Calcium 9.1 mg/dL      BUN/Creatinine Ratio 16.4     Anion Gap 13.0 mmol/L      eGFR 76.4 mL/min/1.73     Narrative:      GFR Normal >60  Chronic Kidney Disease <60  Kidney Failure <15      CBC & Differential [[id number]]  (Abnormal) Collected: 10/12/24 0423    Specimen: Blood Updated: 10/12/24 0442    " Narrative:      The following orders were created for panel order CBC & Differential.  Procedure                               Abnormality         Status                     ---------                               -----------         ------                     CBC Auto Differential[[id number]]        Abnormal            Final result                 Please view results for these tests on the individual orders.    CBC Auto Differential [[id number]]  (Abnormal) Collected: 10/12/24 0423    Specimen: Blood Updated: 10/12/24 0442     WBC 9.90 10*3/mm3      RBC 4.77 10*6/mm3      Hemoglobin 13.0 g/dL      Hematocrit 41.7 %      MCV 87.4 fL      MCH 27.3 pg      MCHC 31.2 g/dL      RDW 14.4 %      RDW-SD 46.5 fl      MPV 10.0 fL      Platelets 209 10*3/mm3      Neutrophil % 70.1 %      Lymphocyte % 18.8 %      Monocyte % 9.6 %      Eosinophil % 0.5 %      Basophil % 0.6 %      Immature Grans % 0.4 %      Neutrophils, Absolute 6.94 10*3/mm3      Lymphocytes, Absolute 1.86 10*3/mm3      Monocytes, Absolute 0.95 10*3/mm3      Eosinophils, Absolute 0.05 10*3/mm3      Basophils, Absolute 0.06 10*3/mm3      Immature Grans, Absolute 0.04 10*3/mm3      nRBC 0.0 /100 WBC     POC Glucose Once [[id number]]  (Abnormal) Collected: 10/11/24 1925    Specimen: Blood Updated: 10/11/24 1927     Glucose 167 mg/dL     POC Glucose Once [[id number]]  (Normal) Collected: 10/11/24 1634    Specimen: Blood Updated: 10/11/24 1635     Glucose 119 mg/dL             I/O last shift:  I/O this shift:  In: 240 [P.O.:240]  Out: 1220 [Urine:1150; Drains:70]     PHYSICAL EXAM-  Comfortable  Nausea  Moderate abdominal distention  Not tachycardic  About 20:1 ratio on BUN to creatinine ratio  I cannulated the proximal aspect of his loop ileostomy with a Oreilly catheter and gas and stool percolated out, well-lubricated catheter, will leave catheter in place, I irrigated catheter with 100 cc water.    Pathology:  Order Name Source Comment Collection Info Order Time  "  COMPREHENSIVE METABOLIC PANEL   Collected By: Emely Mayo RN 10/10/2024  5:48 AM     Release to patient   Routine Release        TYPE AND SCREEN   Collected By: Genesis Georges RN 10/10/2024  5:48 AM     Release to patient   Routine Release        TISSUE PATHOLOGY EXAM Large Intestine, Appendix  Collected By: Jsoé Manuel Hutchins MD 10/10/2024  9:56 AM     Release to patient   Routine Release        .    Assessment and Plan:  Ileus, distention  Oreilly out in the morning  Continue interval labs  500 cc bolus over 3 hours  NG for distention, nausea  Catheter in stoma which should help    Frequent ambulation encouraged    Depending on clinical course, may consider films, if very slow return of bowel function, nutritional supplementation.    José Manuel Hutchins MD  10/12/24  13:44 EDT    Electronically signed by José Manuel Hutchins MD at 10/12/24 1348       Cong Cartagena MD at 10/12/24 0939          IM progress note      Valentin Vásquez  [id number]  1965     LOS: 2 days     Attending: José Manuel Hutchins MD    Primary Care Provider: Tristen Stuart DO      Chief Complaint/Reason for visit:  No chief complaint on file.      Subjective   Does not like the full liquid diet on his tray.  Good pain control.  No nausea or vomiting.  Ambulated often yesterday.  Stoma without output.    Objective      Visit Vitals  /77 (BP Location: Right arm, Patient Position: Lying)   Pulse (!) 124   Temp 97.6 °F (36.4 °C) (Oral)   Resp 18   Ht 170.2 cm (67\")   Wt 88.5 kg (195 lb)   SpO2 91%   BMI 30.54 kg/m²     Temp (24hrs), Av.4 °F (36.9 °C), Min:97.6 °F (36.4 °C), Max:99.6 °F (37.6 °C)      Intake/Output:    Intake/Output Summary (Last 24 hours) at 10/12/2024 09  Last data filed at 10/12/2024 0844  Gross per 24 hour   Intake 480 ml   Output 4020 ml   Net -3540 ml          Physical Exam:     General Appearance:    Alert, cooperative, in no acute distress   Head:    Normocephalic, without obvious abnormality, " atraumatic    Lungs:     Normal effort, symmetric chest rise,  clear to  auscultation bilaterally                 Heart:    Regular rhythm and normal rate, normal S1 and S2   Abdomen:     Soft and benign. Expected tenderness. Stoma pink/ bridge. GUSTAVO present. Incisions ok.    Extremities:   No clubbing, cyanosis or edema.  No deformities.    Pulses:   Pulses palpable and equal bilaterally   Skin:   No bleeding, bruising or rash          Results Review:     I reviewed the patient's new clinical results.   Results from last 7 days   Lab Units 10/12/24  0423 10/11/24  0523   WBC 10*3/mm3 9.90 11.15*   HEMOGLOBIN g/dL 13.0 12.7*   HEMATOCRIT % 41.7 39.8   PLATELETS 10*3/mm3 209 242     Results from last 7 days   Lab Units 10/12/24  0423 10/11/24  0523 10/10/24  0610   SODIUM mmol/L 140 138 138   POTASSIUM mmol/L 4.7 4.7 4.2   CHLORIDE mmol/L 105 106 105   CO2 mmol/L 22.0 19.0* 19.0*   BUN mg/dL 18 25* 28*   CREATININE mg/dL 1.10 1.32* 1.09   CALCIUM mg/dL 9.1 8.6 9.7   BILIRUBIN mg/dL  --   --  0.4   ALK PHOS U/L  --   --  45   ALT (SGPT) U/L  --   --  32   AST (SGOT) U/L  --   --  42*   GLUCOSE mg/dL 84 120* 89         Latest Reference Range & Units 10/11/24 19:25 10/12/24 04:23 10/12/24 07:29   Glucose 70 - 130 mg/dL 167 (H) 84 80   (H): Data is abnormally high    All medications reviewed.   acetaminophen, 650 mg, Oral, Q8H  gabapentin, 600 mg, Oral, Q8H  heparin (porcine), 5,000 Units, Subcutaneous, Q8H  insulin glargine, 50 Units, Subcutaneous, Nightly  insulin lispro, 3-14 Units, Subcutaneous, 4x Daily AC & at Bedtime  metoprolol succinate XL, 25 mg, Oral, Daily  pantoprazole, 40 mg, Oral, Q AM  rosuvastatin, 10 mg, Oral, Nightly  tamsulosin, 0.4 mg, Oral, Nightly          Assessment & Plan       S/P colectomy , ultra low anterior resection, robotic assisted, with appy, repair of incarcerated hernia, diverting loop ileostomy    Rectal cancer    HTN (hypertension)    Hyperlipidemia    DM2 (diabetes mellitus, type  "2)       Plan   1. Ambulation, several times daily  2. Pain control-PRNs    3. IS-encouraged  4. DVT proph-Mechanical, subcutaneous heparin.  5. Bowel regimen, alvimopan  6. Resume home medications as appropriate  7. Monitor post-op labs  8. Discharge planning   9. Diet, ADAT.      - Hypertension:  Resume home medications as appropriate, formulary substitution when indicated. Will dc Lisinopril till Cr is trending down.   Holding parameters.  PRN medications for elevated blood pressure.     -Dyslipidemia:  Resume home regimen statin ( formulary substitution when appropriate).     -DM type 2   Hgb A1C 7.1  Hold OHA as appropriate  FSBG AC/HS, ( q 6 when NPO), along with correction Humalog.  Long acting insulin, adjusted doses down from home regimen  Adjust further depending on intake and trends of FSBG.      -New stoma:  Wound care stoma nurse consult for stoma care and education throughout patient's hospitalization.    D/w pt and wife, d/w RN.    Silvestre disclaimer:  Part of this encounter note is an electronic transcription/translation of spoken language to printed text. The electronic translation of spoken language may permit erroneous, or at times, nonsensical words or phrases to be inadvertently transcribed; Although I have reviewed the note for such errors, some may still exist.    Cong Cartagena MD  10/12/24  09:39 EDT        Electronically signed by Cong Cartagena MD at 10/12/24 0941       José Manuel Hutchins MD at 10/11/24 1937          [facility] ([facility]    Walked 5 laps earlier      Vital Signs:  Blood pressure 116/81, pulse (!) 125, temperature 98.7 °F (37.1 °C), temperature source Oral, resp. rate 19, height 170.2 cm (67\"), weight 88.5 kg (195 lb), SpO2 92%.    Labs past 24 hours:  Lab Results (last 24 hours)       Procedure Component Value Units Date/Time    POC Glucose Once [[id number]]  (Abnormal) Collected: 10/11/24 1925    Specimen: Blood Updated: 10/11/24 " 1927     Glucose 167 mg/dL     POC Glucose Once [[id number]]  (Normal) Collected: 10/11/24 1634    Specimen: Blood Updated: 10/11/24 1635     Glucose 119 mg/dL     POC Glucose Once [[id number]]  (Abnormal) Collected: 10/11/24 1116    Specimen: Blood Updated: 10/11/24 1118     Glucose 140 mg/dL     POC Glucose Once [[id number]]  (Normal) Collected: 10/11/24 0659    Specimen: Blood Updated: 10/11/24 0700     Glucose 127 mg/dL     Basic Metabolic Panel [[id number]]  (Abnormal) Collected: 10/11/24 0523    Specimen: Blood Updated: 10/11/24 0649     Glucose 120 mg/dL      BUN 25 mg/dL      Creatinine 1.32 mg/dL      Sodium 138 mmol/L      Potassium 4.7 mmol/L      Chloride 106 mmol/L      CO2 19.0 mmol/L      Calcium 8.6 mg/dL      BUN/Creatinine Ratio 18.9     Anion Gap 13.0 mmol/L      eGFR 61.4 mL/min/1.73     Narrative:      GFR Normal >60  Chronic Kidney Disease <60  Kidney Failure <15      CBC & Differential [[id number]]  (Abnormal) Collected: 10/11/24 0523    Specimen: Blood Updated: 10/11/24 0622    Narrative:      The following orders were created for panel order CBC & Differential.  Procedure                               Abnormality         Status                     ---------                               -----------         ------                     CBC Auto Differential[[id number]]        Abnormal            Final result                 Please view results for these tests on the individual orders.    CBC Auto Differential [[id number]]  (Abnormal) Collected: 10/11/24 0523    Specimen: Blood Updated: 10/11/24 0622     WBC 11.15 10*3/mm3      RBC 4.65 10*6/mm3      Hemoglobin 12.7 g/dL      Hematocrit 39.8 %      MCV 85.6 fL      MCH 27.3 pg      MCHC 31.9 g/dL      RDW 14.2 %      RDW-SD 43.9 fl      MPV 10.6 fL      Platelets 242 10*3/mm3      Neutrophil % 77.0 %      Lymphocyte % 11.4 %      Monocyte % 10.9 %      Eosinophil % 0.1 %      Basophil % 0.3 %      Immature Grans % 0.3 %      Neutrophils, Absolute 8.59  "10*3/mm3      Lymphocytes, Absolute 1.27 10*3/mm3      Monocytes, Absolute 1.22 10*3/mm3      Eosinophils, Absolute 0.01 10*3/mm3      Basophils, Absolute 0.03 10*3/mm3      Immature Grans, Absolute 0.03 10*3/mm3      nRBC 0.0 /100 WBC     POC Glucose Once [[id number]]  (Normal) Collected: 10/10/24 2033    Specimen: Blood Updated: 10/10/24 2039     Glucose 125 mg/dL             I/O last shift:  No intake/output data recorded.     PHYSICAL EXAM-  Comfortable  cv- rsr  Chest- clear, =  Abd- soft, mild distention, without stoma output    Pathology:  Order Name Source Comment Collection Info Order Time   COMPREHENSIVE METABOLIC PANEL   Collected By: Emely Mayo RN 10/10/2024  5:48 AM     Release to patient   Routine Release        TYPE AND SCREEN   Collected By: Genesis Georges RN 10/10/2024  5:48 AM     Release to patient   Routine Release        TISSUE PATHOLOGY EXAM Large Intestine, Appendix  Collected By: José Manuel Hutchins MD 10/10/2024  9:56 AM     Release to patient   Routine Release        .    Assessment and Plan:  Frequent ambulation  Bolus, follow Creatinine    José Manuel Hutchins MD  10/11/24  19:37 EDT    Electronically signed by José Manuel Hutchins MD at 10/11/24 1938       Cong Cartagena MD at 10/11/24 1508          IM progress note      Valentin JURADO Fahad  [id number]  1965     LOS: 1 day     Attending: José Manuel Hutchins MD    Primary Care Provider: Tristen Stuart DO      Chief Complaint/Reason for visit:  No chief complaint on file.      Subjective    Feels ok. Some pain. Had ambulated in the hallway earlier. No n/vom. Tolerating clears. No stoma output yet. Good urine volume documented.   Tachycardia noted.     Objective      Visit Vitals  /78 (BP Location: Left arm, Patient Position: Lying)   Pulse (!) 125   Temp 99.6 °F (37.6 °C) (Oral)   Resp 19   Ht 170.2 cm (67\")   Wt 88.5 kg (195 lb)   SpO2 92%   BMI 30.54 kg/m²     Temp (24hrs), Av.3 °F (36.8 °C), Min:97 °F (36.1 °C), Max:99.6 " °F (37.6 °C)      Intake/Output:    Intake/Output Summary (Last 24 hours) at 10/11/2024 1508  Last data filed at 10/11/2024 1300  Gross per 24 hour   Intake 240 ml   Output 3740 ml   Net -3500 ml          Physical Exam:     General Appearance:    Alert, cooperative, in no acute distress   Head:    Normocephalic, without obvious abnormality, atraumatic    Lungs:     Normal effort, symmetric chest rise,  clear to  auscultation bilaterally                 Heart:    Regular rhythm and normal rate, normal S1 and S2   Abdomen:     Soft and benign. Expected tenderness. Stoma pink/ bridge. GUSTAVO present. Incisions ok.    Extremities:   No clubbing, cyanosis or edema.  No deformities.    Pulses:   Pulses palpable and equal bilaterally   Skin:   No bleeding, bruising or rash          Results Review:     I reviewed the patient's new clinical results.   Results from last 7 days   Lab Units 10/11/24  0523   WBC 10*3/mm3 11.15*   HEMOGLOBIN g/dL 12.7*   HEMATOCRIT % 39.8   PLATELETS 10*3/mm3 242     Results from last 7 days   Lab Units 10/11/24  0523 10/10/24  0610   SODIUM mmol/L 138 138   POTASSIUM mmol/L 4.7 4.2   CHLORIDE mmol/L 106 105   CO2 mmol/L 19.0* 19.0*   BUN mg/dL 25* 28*   CREATININE mg/dL 1.32* 1.09   CALCIUM mg/dL 8.6 9.7   BILIRUBIN mg/dL  --  0.4   ALK PHOS U/L  --  45   ALT (SGPT) U/L  --  32   AST (SGOT) U/L  --  42*   GLUCOSE mg/dL 120* 89     I reviewed the patient's new imaging including images and reports.   Latest Reference Range & Units 10/11/24 05:23 10/11/24 06:59 10/11/24 11:16   Glucose 70 - 130 mg/dL 120 (H) 127 140 (H)   (H): Data is abnormally high      All medications reviewed.   acetaminophen, 650 mg, Oral, Q8H  gabapentin, 600 mg, Oral, Q8H  heparin (porcine), 5,000 Units, Subcutaneous, Q8H  insulin glargine, 50 Units, Subcutaneous, Q12H  insulin lispro, 3-14 Units, Subcutaneous, 4x Daily AC & at Bedtime  lisinopril, 10 mg, Oral, Daily  metoprolol succinate XL, 25 mg, Oral, Daily  pantoprazole, 40  mg, Oral, Q AM  rosuvastatin, 10 mg, Oral, Nightly  tamsulosin, 0.4 mg, Oral, Nightly          Assessment & Plan       S/P colectomy , ultra low anterior resection, robotic assisted, with appy, repair of incarcerated hernia, diverting loop ileostomy    Rectal cancer    HTN (hypertension)    Hyperlipidemia    DM2 (diabetes mellitus, type 2)       Plan   1. Ambulation, several times daily  2. Pain control-PRNs    3. IS-encouraged  4. DVT proph-Mechanical, subcutaneous heparin.  5. Bowel regimen, alvimopan  6. Resume home medications as appropriate  7. Monitor post-op labs  8. Discharge planning   9. Diet, Clears, advance diet as tolerated.  IVF initially, monitor volume status.     - Hypertension:  Resume home medications as appropriate, formulary substitution when indicated. Will dc Lisinopril till Cr is trending down.   Holding parameters.  PRN medications for elevated blood pressure.     -Dyslipidemia:  Resume home regimen statin ( formulary substitution when appropriate).     -DM type 2   Hgb A1C 7.1  Hold OHA as appropriate  FSBG AC/HS, ( q 6 when NPO), along with correction Humalog.  Long acting insulin, adjusted doses down from home regimen  Adjust further depending on intake and trends of FSBG.      -New stoma:  Wound care stoma nurse consult for stoma care and education throughout patient's hospitalization.      Dragon disclaimer:  Part of this encounter note is an electronic transcription/translation of spoken language to printed text. The electronic translation of spoken language may permit erroneous, or at times, nonsensical words or phrases to be inadvertently transcribed; Although I have reviewed the note for such errors, some may still exist.    Cong Cartagena MD  10/11/24  15:08 EDT        Electronically signed by Cong Cartagena MD at 10/11/24 2861       Consult Notes (all)    No notes of this type exist for this encounter.

## 2024-10-14 NOTE — PLAN OF CARE
Problem: Adult Inpatient Plan of Care  Goal: Plan of Care Review  Outcome: Progressing  Goal: Patient-Specific Goal (Individualized)  Outcome: Progressing  Goal: Absence of Hospital-Acquired Illness or Injury  Outcome: Progressing  Intervention: Identify and Manage Fall Risk  Recent Flowsheet Documentation  Taken 10/14/2024 0000 by Nicho Noel RN  Safety Promotion/Fall Prevention:   assistive device/personal items within reach   clutter free environment maintained   fall prevention program maintained   lighting adjusted   nonskid shoes/slippers when out of bed   room organization consistent   safety round/check completed   toileting scheduled  Taken 10/13/2024 1945 by Nicho Noel RN  Safety Promotion/Fall Prevention:   assistive device/personal items within reach   clutter free environment maintained   fall prevention program maintained   lighting adjusted   nonskid shoes/slippers when out of bed   room organization consistent   safety round/check completed   toileting scheduled  Intervention: Prevent Skin Injury  Recent Flowsheet Documentation  Taken 10/14/2024 0000 by Nicho Noel RN  Body Position: position changed independently  Taken 10/13/2024 1945 by Nicho Noel RN  Body Position: position changed independently  Skin Protection:   silicone foam dressing in place   transparent dressing maintained  Intervention: Prevent and Manage VTE (Venous Thromboembolism) Risk  Recent Flowsheet Documentation  Taken 10/13/2024 1945 by Nicho Noel RN  VTE Prevention/Management: (SQ heparin)   bilateral   SCDs (sequential compression devices) off   patient refused intervention   other (see comments)  Intervention: Prevent Infection  Recent Flowsheet Documentation  Taken 10/14/2024 0000 by Nicho Noel RN  Infection Prevention:   environmental surveillance performed   equipment surfaces disinfected   hand hygiene promoted   rest/sleep promoted   single  patient room provided  Taken 10/13/2024 1945 by Nicho Noel, RN  Infection Prevention:   environmental surveillance performed   equipment surfaces disinfected   hand hygiene promoted   rest/sleep promoted   single patient room provided  Goal: Optimal Comfort and Wellbeing  Outcome: Progressing  Intervention: Monitor Pain and Promote Comfort  Recent Flowsheet Documentation  Taken 10/13/2024 1945 by Nicho Noel, RN  Pain Management Interventions:   care clustered   medication offered but refused   pain management plan reviewed with patient/caregiver   pillow support provided   position adjusted   quiet environment facilitated  Intervention: Provide Person-Centered Care  Recent Flowsheet Documentation  Taken 10/13/2024 1945 by Nicho Noel, RN  Trust Relationship/Rapport:   care explained   choices provided   emotional support provided   empathic listening provided   questions answered   questions encouraged   reassurance provided   thoughts/feelings acknowledged  Goal: Readiness for Transition of Care  Outcome: Progressing     Problem: Skin Injury Risk Increased  Goal: Skin Health and Integrity  Outcome: Progressing  Intervention: Optimize Skin Protection  Recent Flowsheet Documentation  Taken 10/14/2024 0000 by Nicho Noel RN  Activity Management: activity encouraged  Head of Bed (HOB) Positioning: HOB at 30-45 degrees  Taken 10/13/2024 1945 by Nicho Noel RN  Activity Management: activity encouraged  Pressure Reduction Techniques:   heels elevated off bed   weight shift assistance provided   frequent weight shift encouraged  Head of Bed (HOB) Positioning: HOB at 30-45 degrees  Pressure Reduction Devices:   positioning supports utilized   pressure-redistributing mattress utilized  Skin Protection:   silicone foam dressing in place   transparent dressing maintained  Taken 10/13/2024 1930 by Nicho Noel, RN  Activity Management: activity encouraged      Problem: Fall Injury Risk  Goal: Absence of Fall and Fall-Related Injury  Outcome: Progressing  Intervention: Identify and Manage Contributors  Recent Flowsheet Documentation  Taken 10/14/2024 0000 by Nicho Noel RN  Medication Review/Management: medications reviewed  Taken 10/13/2024 1945 by Nicho Noel RN  Medication Review/Management: medications reviewed  Intervention: Promote Injury-Free Environment  Recent Flowsheet Documentation  Taken 10/14/2024 0000 by Nicho Noel RN  Safety Promotion/Fall Prevention:   assistive device/personal items within reach   clutter free environment maintained   fall prevention program maintained   lighting adjusted   nonskid shoes/slippers when out of bed   room organization consistent   safety round/check completed   toileting scheduled  Taken 10/13/2024 1945 by Nicho Noel RN  Safety Promotion/Fall Prevention:   assistive device/personal items within reach   clutter free environment maintained   fall prevention program maintained   lighting adjusted   nonskid shoes/slippers when out of bed   room organization consistent   safety round/check completed   toileting scheduled   Goal Outcome Evaluation:

## 2024-10-14 NOTE — CASE MANAGEMENT/SOCIAL WORK
Continued Stay Note  [facility]     Patient Name: Valentin Vásquez  MRN: [id number]  Today's Date: 10/14/2024    Admit Date: 10/10/2024    Plan: Home with wife   Discharge Plan       Row Name 10/14/24 0944       Plan    Plan Home with wife    Patient/Family in Agreement with Plan yes    Plan Comments CM spoke to patient and wife at bedside. His plan remains home with his wife to transport when medically ready. NGT. PT recommends home. Patient ambulated 350 feet with [facility] X1. No discharge needs at this time. CM will continue to follow.    Final Discharge Disposition Code 01 - home or self-care                   Discharge Codes    No documentation.                       Rose Mary Espinoza RN

## 2024-10-15 LAB
ANION GAP SERPL CALCULATED.3IONS-SCNC: 8 MMOL/L (ref 5–15)
BASOPHILS # BLD AUTO: 0.05 10*3/MM3 (ref 0–0.2)
BASOPHILS NFR BLD AUTO: 0.6 % (ref 0–1.5)
BUN SERPL-MCNC: 19 MG/DL (ref 8–23)
BUN/CREAT SERPL: 18.3 (ref 7–25)
CALCIUM SPEC-SCNC: 8.4 MG/DL (ref 8.6–10.5)
CHLORIDE SERPL-SCNC: 107 MMOL/L (ref 98–107)
CO2 SERPL-SCNC: 24 MMOL/L (ref 22–29)
CREAT SERPL-MCNC: 1.04 MG/DL (ref 0.76–1.27)
CYTO UR: NORMAL
DEPRECATED RDW RBC AUTO: 43.4 FL (ref 37–54)
EGFRCR SERPLBLD CKD-EPI 2021: 81.7 ML/MIN/1.73
EOSINOPHIL # BLD AUTO: 0.3 10*3/MM3 (ref 0–0.4)
EOSINOPHIL NFR BLD AUTO: 3.3 % (ref 0.3–6.2)
ERYTHROCYTE [DISTWIDTH] IN BLOOD BY AUTOMATED COUNT: 14.1 % (ref 12.3–15.4)
GLUCOSE BLDC GLUCOMTR-MCNC: 123 MG/DL (ref 70–130)
GLUCOSE BLDC GLUCOMTR-MCNC: 211 MG/DL (ref 70–130)
GLUCOSE BLDC GLUCOMTR-MCNC: 212 MG/DL (ref 70–130)
GLUCOSE BLDC GLUCOMTR-MCNC: 264 MG/DL (ref 70–130)
GLUCOSE SERPL-MCNC: 148 MG/DL (ref 65–99)
HCT VFR BLD AUTO: 36.8 % (ref 37.5–51)
HGB BLD-MCNC: 11.8 G/DL (ref 13–17.7)
IMM GRANULOCYTES # BLD AUTO: 0.04 10*3/MM3 (ref 0–0.05)
IMM GRANULOCYTES NFR BLD AUTO: 0.4 % (ref 0–0.5)
LAB AP CASE REPORT: NORMAL
LAB AP CLINICAL INFORMATION: NORMAL
LYMPHOCYTES # BLD AUTO: 1.57 10*3/MM3 (ref 0.7–3.1)
LYMPHOCYTES NFR BLD AUTO: 17.4 % (ref 19.6–45.3)
MCH RBC QN AUTO: 27.5 PG (ref 26.6–33)
MCHC RBC AUTO-ENTMCNC: 32.1 G/DL (ref 31.5–35.7)
MCV RBC AUTO: 85.8 FL (ref 79–97)
MONOCYTES # BLD AUTO: 0.74 10*3/MM3 (ref 0.1–0.9)
MONOCYTES NFR BLD AUTO: 8.2 % (ref 5–12)
NEUTROPHILS NFR BLD AUTO: 6.32 10*3/MM3 (ref 1.7–7)
NEUTROPHILS NFR BLD AUTO: 70.1 % (ref 42.7–76)
NRBC BLD AUTO-RTO: 0 /100 WBC (ref 0–0.2)
PATH REPORT.FINAL DX SPEC: NORMAL
PATH REPORT.GROSS SPEC: NORMAL
PLATELET # BLD AUTO: 264 10*3/MM3 (ref 140–450)
PMV BLD AUTO: 10 FL (ref 6–12)
POTASSIUM SERPL-SCNC: 4.3 MMOL/L (ref 3.5–5.2)
QT INTERVAL: 318 MS
QTC INTERVAL: 460 MS
RBC # BLD AUTO: 4.29 10*6/MM3 (ref 4.14–5.8)
SODIUM SERPL-SCNC: 139 MMOL/L (ref 136–145)
WBC NRBC COR # BLD AUTO: 9.02 10*3/MM3 (ref 3.4–10.8)

## 2024-10-15 PROCEDURE — 85025 COMPLETE CBC W/AUTO DIFF WBC: CPT | Performed by: COLON & RECTAL SURGERY

## 2024-10-15 PROCEDURE — 82948 REAGENT STRIP/BLOOD GLUCOSE: CPT

## 2024-10-15 PROCEDURE — 97530 THERAPEUTIC ACTIVITIES: CPT

## 2024-10-15 PROCEDURE — 63710000001 INSULIN LISPRO (HUMAN) PER 5 UNITS: Performed by: INTERNAL MEDICINE

## 2024-10-15 PROCEDURE — 25810000003 SODIUM CHLORIDE 0.9 % SOLUTION: Performed by: COLON & RECTAL SURGERY

## 2024-10-15 PROCEDURE — 63710000001 INSULIN GLARGINE PER 5 UNITS: Performed by: INTERNAL MEDICINE

## 2024-10-15 PROCEDURE — 25010000002 HEPARIN (PORCINE) PER 1000 UNITS: Performed by: COLON & RECTAL SURGERY

## 2024-10-15 PROCEDURE — 25010000002 PIPERACILLIN SOD-TAZOBACTAM PER 1 G: Performed by: COLON & RECTAL SURGERY

## 2024-10-15 PROCEDURE — 80048 BASIC METABOLIC PNL TOTAL CA: CPT | Performed by: COLON & RECTAL SURGERY

## 2024-10-15 RX ADMIN — INSULIN GLARGINE 30 UNITS: 100 INJECTION, SOLUTION SUBCUTANEOUS at 19:59

## 2024-10-15 RX ADMIN — GABAPENTIN 600 MG: 300 CAPSULE ORAL at 14:59

## 2024-10-15 RX ADMIN — HEPARIN SODIUM 5000 UNITS: 5000 INJECTION INTRAVENOUS; SUBCUTANEOUS at 04:33

## 2024-10-15 RX ADMIN — ACETAMINOPHEN 650 MG: 325 TABLET ORAL at 10:10

## 2024-10-15 RX ADMIN — INSULIN LISPRO 8 UNITS: 100 INJECTION, SOLUTION INTRAVENOUS; SUBCUTANEOUS at 12:29

## 2024-10-15 RX ADMIN — HEPARIN SODIUM 5000 UNITS: 5000 INJECTION INTRAVENOUS; SUBCUTANEOUS at 19:59

## 2024-10-15 RX ADMIN — SODIUM CHLORIDE 100 ML/HR: 9 INJECTION, SOLUTION INTRAVENOUS at 04:31

## 2024-10-15 RX ADMIN — HEPARIN SODIUM 5000 UNITS: 5000 INJECTION INTRAVENOUS; SUBCUTANEOUS at 15:00

## 2024-10-15 RX ADMIN — TAMSULOSIN HYDROCHLORIDE 0.4 MG: 0.4 CAPSULE ORAL at 19:59

## 2024-10-15 RX ADMIN — PANTOPRAZOLE SODIUM 40 MG: 40 TABLET, DELAYED RELEASE ORAL at 04:33

## 2024-10-15 RX ADMIN — INSULIN LISPRO 5 UNITS: 100 INJECTION, SOLUTION INTRAVENOUS; SUBCUTANEOUS at 20:00

## 2024-10-15 RX ADMIN — GABAPENTIN 600 MG: 300 CAPSULE ORAL at 19:59

## 2024-10-15 RX ADMIN — GABAPENTIN 600 MG: 300 CAPSULE ORAL at 04:33

## 2024-10-15 RX ADMIN — INSULIN LISPRO 5 UNITS: 100 INJECTION, SOLUTION INTRAVENOUS; SUBCUTANEOUS at 17:04

## 2024-10-15 RX ADMIN — ACETAMINOPHEN 650 MG: 325 TABLET ORAL at 15:00

## 2024-10-15 RX ADMIN — PIPERACILLIN AND TAZOBACTAM 4.5 G: 4; .5 INJECTION, POWDER, FOR SOLUTION INTRAVENOUS at 15:00

## 2024-10-15 RX ADMIN — PIPERACILLIN AND TAZOBACTAM 4.5 G: 4; .5 INJECTION, POWDER, FOR SOLUTION INTRAVENOUS at 21:51

## 2024-10-15 RX ADMIN — DIAZEPAM 5 MG: 5 TABLET ORAL at 19:59

## 2024-10-15 RX ADMIN — PIPERACILLIN AND TAZOBACTAM 4.5 G: 4; .5 INJECTION, POWDER, FOR SOLUTION INTRAVENOUS at 04:33

## 2024-10-15 RX ADMIN — ROSUVASTATIN 10 MG: 10 TABLET, FILM COATED ORAL at 19:59

## 2024-10-15 RX ADMIN — METOPROLOL SUCCINATE 25 MG: 25 TABLET, EXTENDED RELEASE ORAL at 10:10

## 2024-10-15 NOTE — PROGRESS NOTES
"          Clinical Nutrition Assessment     Patient Name: Valentin Vásquez  YOB: 1965  MRN: [id number]  Date of Encounter: 10/15/24 14:19 EDT  Admission date: 10/10/2024  Reason for Visit: Follow-up protocol    Assessment   Nutrition Assessment   Admission Diagnosis:  Rectal cancer [C20]    Problem List:    S/P colectomy , ultra low anterior resection, robotic assisted, with appy, repair of incarcerated hernia, diverting loop ileostomy    Rectal cancer    HTN (hypertension)    Hyperlipidemia    DM2 (diabetes mellitus, type 2)      PMH:   He  has a past medical history of Arthritis, Colon cancer, Colon polyp, Diabetes mellitus, Diverticulitis of colon, GERD (gastroesophageal reflux disease), Gout, Hyperlipidemia, Hypertension, Injury of back, Kidney stone, Pancreatitis, Tear of right rotator cuff, and Wears glasses.    PSH:  He  has a past surgical history that includes Tonsillectomy; Adenoidectomy; Kidney stone surgery; Shoulder arthroscopy w/ rotator cuff repair (Right, 07/19/2022); cystoscopy litholapaxy bladder stone extraction (N/A, 09/28/2022); Colonoscopy (N/A, 05/21/2024); COLON RESECTION WITH ILEOSTOMY (N/A, 10/10/2024); and Cystoscopy (Bilateral, 10/10/2024).    Applicable Nutrition History:   (10/10) s/p LAR with diverting loop ileostomy, appendectomy  (10/11) ileostomy education  (10/12) catheter in stoma    Anthropometrics     Height: Height: 170.2 cm (67.01\")  Last Filed Weight: Weight: 88.5 kg (195 lb 1.7 oz) (10/14/24 1027)  Method: Weight Method: Stated  BMI: BMI (Calculated): 30.6    UBW:     Weight      Weight (kg) Weight (lbs) Weight Method   5/21/2024 90.719 kg  200 lb     8/21/2024 87.544 kg  193 lb     10/4/2024 88.55 kg  195 lb 3.5 oz  Standing scale    10/10/2024 88.451 kg  195 lb  Stated    10/14/2024 88.5 kg  195 lb 1.7 oz       Weight change: No significant changes    Nutrition Focused Physical Exam    Date:  10/15       Pt does not meet criteria for malnutrition diagnosis, at " this time.      Subjective   Reported/Observed/Food/Nutrition Related History:     10/15  Pt with persistent post-op ileus however appears to be resolving. NG with decreased output overnight. NG clamping trial today - per RN will check residuals at 4pm. Now with appropriate output from ileostomy. Tolerated Clear ONS.     10/11  RD provided patient and spouse with ileostomy nutrition education and written materials. RD reviewed low-fiber diet x 4-6 weeks post-op, slow re-introduction of high-fiber foods after 4-6 weeks, daily chewable/liquid MVI, high ileostomy output/dehydration/oral rehydration solutions, dietary strategies for managing high ileostomy output, food lists pertaining to gas/odor/blockage/diarrhea and stool thickening, appropriate oral nutrition supplements, and recommended daily protein intake for post-op wound healing. All questions asked were answered within scope of practice.     Current Nutrition Prescription   PO: Diet: Liquid; Clear Liquid; Fluid Consistency: Thin (IDDSI 0)  Oral Nutrition Supplement: Boost Plus 3x daily  Intake: Insufficient data    Assessment & Plan   Nutrition Diagnosis   Date:  10/15            Updated:    Problem Inadequate oral intake    Etiology Altered GI fxn   Signs/Symptoms NG to LWS, now on CLD   Status: New    Goal:   Nutrition to support treatment and Advance diet as medically feasible/appropriate      Nutrition Intervention      Follow treatment progress, Care plan reviewed, Encourage intake, Supplement provided    Advance diet as clinically indicated/tolerated  Provide Boost Plus on CLD per CRS  Continue to monitor for Po tolerance    Monitoring/Evaluation:   Per protocol, I&O, PO intake, Pertinent labs, GI status    Vesna Castro MS,RD,LD  Time Spent: 25min

## 2024-10-15 NOTE — NURSING NOTE
[facility] visit for Stoma care and assessment     Surgery date: 10/10/2024  Surgical Procedures Since Admission:  Procedure(s):  CONVERTED TO OPEN COLON RESECTION ULTRA LOW ANTERIOR LAPAROSCOPIC WITH LOOP ILEOSTOMY  CYSTOSCOPY TEMPORARY PLACEMENT BILATERAL URETERAL CATHETER  Surgeon:  José Manuel Hutchins MD  Status:  5 Days Post-Op    -------------------     WO Care Outcome: Patient seen for ostomy care, assessment and education. Patient awake, has been ambulating  Not wanting to eat. patient seen in the Medical/Surgical Floor .  Spouse at beside.     Stoma Type: Loop Ileostomy  Diameter/shape: Round, 32 mm  Location: RUQ  Protrusion: Budded  Stoma Characteristics: Round, Moist, Pink, Red, and bridge removed without complication.   Peristomal skin: mild MASD to parastomal skin; treated  Effluent Characteristics: none  Effluent volume emptied: ml.     Current pouching system: [place], new image, red, flat drainable with barrier ring  Goal wear time: 3-4 days  Emptying frequency of appliance per day: Recommend emptying when 1/3-1/2 full.      Surgical Incision: Midline abdominal incision  Degree of approximation:   Approximating Devices: Staples  Drainage Characteristics: none  Method of Management: Gauze border dressing     Follow up visit summary:   Spouse has been reviewing educational material and actively participating during care today.  Appliance change lesson with patient and spouse. Frequency and bathing discussed.   [place] remains intubated in stoma  Ambulation encouraged/promoted.  Discussed importance of regular hydration.   [facility] Nurse will continue to follow daily for ostomy education. Please contact with questions or if new needs arise.      Zachary French RN, BSN, CCRN, CWOCN  Wound, Ostomy and Continence (WOC) Department  [facility]

## 2024-10-15 NOTE — THERAPY DISCHARGE NOTE
Patient Name: Valentin Vásquez  : 1965    MRN: [id number]                              Today's Date: 10/15/2024       Admit Date: 10/10/2024    Visit Dx:     ICD-10-CM ICD-9-CM   1. Rectal cancer  C20 154.1   2. Malignant neoplasm of colon  C18.9 153.9     Patient Active Problem List   Diagnosis    Acute pancreatitis    Nephrolithiasis    Encounter for screening for malignant neoplasm of colon    Rectal cancer    HTN (hypertension)    Hyperlipidemia    DM2 (diabetes mellitus, type 2)    S/P colectomy , ultra low anterior resection, robotic assisted, with appy, repair of incarcerated hernia, diverting loop ileostomy     Past Medical History:   Diagnosis Date    Arthritis     Colon cancer     Colon polyp     Diabetes mellitus     Diverticulitis of colon     GERD (gastroesophageal reflux disease)     Gout     Hyperlipidemia     Hypertension     Injury of back     Kidney stone     passed and surgery-   x3 times surgery    Pancreatitis     Tear of right rotator cuff     Wears glasses     readers     Past Surgical History:   Procedure Laterality Date    ADENOIDECTOMY      COLON RESECTION WITH ILEOSTOMY N/A 10/10/2024    Procedure: CONVERTED TO OPEN COLON RESECTION ULTRA LOW ANTERIOR LAPAROSCOPIC WITH LOOP ILEOSTOMY;  Surgeon: José Manuel Hutchins MD;  Location:  [facility];  Service: Robotics - DaVinci;  Laterality: N/A;    COLONOSCOPY N/A 2024    Procedure: COLONOSCOPY;  Surgeon: Rosa Patrick MD;  Location:  [facility];  Service: Gastroenterology;  Laterality: N/A;    CYSTOSCOPY Bilateral 10/10/2024    Procedure: CYSTOSCOPY TEMPORARY PLACEMENT BILATERAL URETERAL CATHETER;  Surgeon: Ke Courtney MD;  Location:  [facility];  Service: Robotics - DaVinci;  Laterality: Bilateral;    CYSTOSCOPY LITHOLAPAXY BLADDER STONE EXTRACTION N/A 2022    Procedure: CYSTOSCOPY LASER LITHOLAPAXY BLADDER STONE EXTRACTION;  Surgeon: Mykel Jeffers MD;  Location:  [facility];  Service: Urology;  Laterality: N/A;     KIDNEY STONE SURGERY      x3 surgeries-  same stone    SHOULDER ARTHROSCOPY W/ ROTATOR CUFF REPAIR Right 07/19/2022    Procedure: RIGHT SHOULDER ARTHROSCOPY WITH OPEN ACROMIOPLASTY,  ROTATOR CUFF REPAIR, EXCISION LATERAL CLAVICLE;  Surgeon: Edmundo Huitron MD;  Location: [facility];  Service: Orthopedics;  Laterality: Right;    TONSILLECTOMY        General Information       Row Name 10/15/24 1410          Physical Therapy Time and Intention    Document Type discharge evaluation/summary  -KW     Mode of Treatment physical therapy  -KW       Row Name 10/15/24 1410          General Information    Patient Profile Reviewed yes  -KW     Existing Precautions/Restrictions --  P drain, NG tube, ileostomy RUQ, abdominal incision  -KW               User Key  (r) = Recorded By, (t) = Taken By, (c) = Cosigned By      Initials Name Provider Type    Leidy Watkins PT Physical Therapist                   Mobility       Row Name 10/15/24 1410          Bed Mobility    Bed Mobility supine-sit-supine  -KW     Supine-Sit-Supine [place] (Bed Mobility) independent;other (see comments)  log roll  -KW       Row Name 10/15/24 1410          Bed-Chair Transfer    Bed-Chair [place] (Transfers) independent  -KW       Row Name 10/15/24 1410          Sit-Stand Transfer    Sit-Stand [place] (Transfers) independent  -KW       Row Name 10/15/24 1410          Gait/Stairs (Locomotion)    [place] Level (Gait) independent  -KW     Distance in Feet (Gait) 350  -KW     Comment, (Gait/Stairs) PT visualized patient ambulating laps in hallway with family without AD and independently  -KW               User Key  (r) = Recorded By, (t) = Taken By, (c) = Cosigned By      Initials Name Provider Type    Leidy Watkins PT Physical Therapist                   Obj/Interventions    No documentation.                  Goals/Plan       Row Name 10/15/24 1410          Bed Mobility Goal 1 (PT)    Activity/Assistive Device  (Bed Mobility Goal 1, PT) rolling to left;rolling to right;scooting;sit to supine;supine to sit  -KW     [place] Level/Cues Needed (Bed Mobility Goal 1, PT) contact guard required  -KW     Time Frame (Bed Mobility Goal 1, PT) short term goal (STG);5 days  -KW     Progress/Outcomes (Bed Mobility Goal 1, PT) goal met  -KW       Row Name 10/15/24 1410          Transfer Goal 1 (PT)    Activity/Assistive Device (Transfer Goal 1, PT) sit-to-stand/stand-to-sit;bed-to-chair/chair-to-bed  -KW     [place] Level/Cues Needed (Transfer Goal 1, PT) standby assist  -KW     Time Frame (Transfer Goal 1, PT) long term goal (LTG);10 days  -KW     Progress/Outcome (Transfer Goal 1, PT) goal met  -KW       Row Name 10/15/24 1410          Gait Training Goal 1 (PT)    Activity/Assistive Device (Gait Training Goal 1, PT) gait (walking locomotion);assistive device use;decrease fall risk;diminish gait deviation;improve balance and speed;increase endurance/gait distance  -KW     [place] Level (Gait Training Goal 1, PT) modified independence  -KW     Distance (Gait Training Goal 1, PT) 350  -KW     Time Frame (Gait Training Goal 1, PT) long term goal (LTG);10 days  -KW     Progress/Outcome (Gait Training Goal 1, PT) goal met  -KW       Row Name 10/15/24 1410          Stairs Goal 1 (PT)    Activity/Assistive Device (Stairs Goal 1, PT) ascending stairs;descending stairs  -KW     [place] Level/Cues Needed (Stairs Goal 1, PT) contact guard required  -KW     Number of Stairs (Stairs Goal 1, PT) 2  -KW     Time Frame (Stairs Goal 1, PT) long term goal (LTG);10 days  -KW     Progress/Outcome (Stairs Goal 1, PT) other (see comments);goal met  patient hooked to IV, unable to complete staircase however visualized patient complete 8 demonstration steps in room  -KW               User Key  (r) = Recorded By, (t) = Taken By, (c) = Cosigned By      Initials Name Provider Type    Leidy Watkins, PT Physical Therapist                    Clinical Impression       Row Name 10/15/24 1410          Pain    Pretreatment Pain Rating 0/10 - no pain  -KW       Row Name 10/15/24 1410          Plan of Care Review    Plan of Care Reviewed With patient  -KW     Progress improving  -KW     Outcome Evaluation PT treat complete. Patient demonstrating complete independence with bed mobility, transfers and gait. PT educated and renforced use of log roll and patient able to demonstrate bed mobility with HOB flat and without HR. PT also educated about pressure management. Patient demonstrates adequate balance and activity tolerance. Patient does not demonstrate continued need for skilled PT services at this time as he is completely independent. PT will sign off at this time. Patient provided education about importance of continued mobility to prevent deconditioning. If any changes noted please re-consult PT.  -KW       Row Name 10/15/24 1410          Therapy Assessment/Plan (PT)    Criteria for Skilled Interventions Met (PT) no;no problems identified which require skilled intervention  -KW       Row Name 10/15/24 1410          Plan of Care Review    Plan of Care Reviewed With patient  -KW               User Key  (r) = Recorded By, (t) = Taken By, (c) = Cosigned By      Initials Name Provider Type    Leidy Watkins, PT Physical Therapist                   Outcome Measures       Row Name 10/15/24 1410          How much help from another person do you currently need...    Turning from your back to your side while in flat bed without using bedrails? 4  -KW     Moving from lying on back to sitting on the side of a flat bed without bedrails? 4  -KW     Moving to and from a bed to a chair (including a wheelchair)? 4  -KW     Standing up from a chair using your arms (e.g., wheelchair, bedside chair)? 4  -KW     Climbing 3-5 steps with a railing? 4  -KW     To walk in hospital room? 4  -KW     AM-PAC 6 Clicks Score (PT) 24  -KW     Highest Level of Mobility Goal  8 --> Walked 250 feet or more  -KW       Row Name 10/15/24 1410          Functional Assessment    Outcome Measure Options AM-PAC 6 Clicks Basic Mobility (PT)  -KW       Row Name 10/15/24 1410          [place] Balance Scale    Sitting to Standing 4  -KW     Standing Unsupported 4  -KW     Sitting with Back Unsupported but Feet Supported on Floor or on Stool 4  -KW     Standing to Sitting 4  -KW     Transfers 4  -KW     Standing Unsupported with Eyes Closed 4  -KW     Standing Unsupported with Feet Together 4  -KW     Reaching Forward with Outstretched Arm While Standing 3  -KW      Object From the Floor From a Standing Position 4  -KW     Turning to Look Behind Over Left and Right Shoulders While Standing 4  -KW     Turn 360 Degrees 4  -KW     Place Alternate Foot on Step or Stool While Standing Unsupported 4  -KW     Standing Unsupported with One Foot in Front 4  -KW     Standing on One Leg 4  -KW     [place] Total Score 55  -KW               User Key  (r) = Recorded By, (t) = Taken By, (c) = Cosigned By      Initials Name Provider Type    Leidy Watkins PT Physical Therapist                  Physical Therapy Education       Title: PT OT SLP Therapies (In Progress)       Topic: Physical Therapy (In Progress)       Point: Mobility training (In Progress)       Learning Progress Summary            Patient Acceptance, E, NR by AS at 10/13/2024 0840    Acceptance, E, VU,NR by LO at 10/11/2024 0920    Comment: PT POC                      Point: Home exercise program (In Progress)       Learning Progress Summary            Patient Acceptance, E, NR by AS at 10/13/2024 0840    Acceptance, E, VU,NR by LO at 10/11/2024 0920    Comment: PT POC                      Point: Body mechanics (In Progress)       Learning Progress Summary            Patient Acceptance, E, NR by AS at 10/13/2024 0840    Acceptance, E, VU,NR by LO at 10/11/2024 0920    Comment: PT POC                      Point: Precautions (In Progress)        Learning Progress Summary            Patient Acceptance, E, NR by AS at 10/13/2024 0840    Acceptance, E, VU,NR by EDD at 10/11/2024 0920    Comment: PT POC                                      User Key       Initials Effective Dates Name Provider Type Discipline    AS 04/28/23 -  Rebecca Menchaca, PTA Physical Therapist Assistant PT    EDD 06/16/21 -  Brianne Soto, JOSE Physical Therapist PT                  PT Recommendation and Plan     Plan of Care Reviewed With: patient  Progress: improving  Outcome Evaluation: PT treat complete. Patient demonstrating complete independence with bed mobility, transfers and gait. PT educated and renforced use of log roll and patient able to demonstrate bed mobility with HOB flat and without HR. PT also educated about pressure management. Patient demonstrates adequate balance and activity tolerance. Patient does not demonstrate continued need for skilled PT services at this time as he is completely independent. PT will sign off at this time. Patient provided education about importance of continued mobility to prevent deconditioning. If any changes noted please re-consult PT.     Time Calculation:         PT Charges       Row Name 10/15/24 1410             Time Calculation    Start Time 1410  -KW      PT Received On 10/15/24  -KW         Timed Charges    58068 - PT Therapeutic Activity Minutes 16  -KW         Total Minutes    Timed Charges Total Minutes 16  -KW       Total Minutes 16  -KW                User Key  (r) = Recorded By, (t) = Taken By, (c) = Cosigned By      Initials Name Provider Type    Leidy Watkins PT Physical Therapist                  Therapy Charges for Today       Code Description Service Date Service Provider Modifiers Qty    68404310188 HC PT THERAPEUTIC ACT EA 15 MIN 10/15/2024 Leidy Kuo PT GP 1            PT G-Codes  Outcome Measure Options: AM-PAC 6 Clicks Basic Mobility (PT)  AM-PAC 6 Clicks Score (PT): 24  [place] Total Score:  55    PT Discharge Summary  Anticipated Discharge Disposition (PT): home with assist    Leidy Kuo, PT  10/15/2024

## 2024-10-15 NOTE — PLAN OF CARE
Problem: Adult Inpatient Plan of Care  Goal: Plan of Care Review  Outcome: Progressing  Goal: Patient-Specific Goal (Individualized)  Outcome: Progressing  Goal: Absence of Hospital-Acquired Illness or Injury  Outcome: Progressing  Intervention: Identify and Manage Fall Risk  Recent Flowsheet Documentation  Taken 10/15/2024 0000 by Nicho Noel RN  Safety Promotion/Fall Prevention:   assistive device/personal items within reach   clutter free environment maintained   fall prevention program maintained   lighting adjusted   nonskid shoes/slippers when out of bed   room organization consistent   safety round/check completed   toileting scheduled  Taken 10/14/2024 1954 by Nicho Noel RN  Safety Promotion/Fall Prevention:   assistive device/personal items within reach   clutter free environment maintained   fall prevention program maintained   lighting adjusted   nonskid shoes/slippers when out of bed   room organization consistent   safety round/check completed   toileting scheduled  Intervention: Prevent Skin Injury  Recent Flowsheet Documentation  Taken 10/15/2024 0000 by Nicho Noel RN  Body Position: position changed independently  Taken 10/14/2024 1954 by Nicho Noel RN  Body Position: position changed independently  Skin Protection: silicone foam dressing in place  Intervention: Prevent and Manage VTE (Venous Thromboembolism) Risk  Recent Flowsheet Documentation  Taken 10/14/2024 1954 by Nicho Noel RN  VTE Prevention/Management: (SQ Heparin)   bilateral   SCDs (sequential compression devices) off   patient refused intervention   other (see comments)  Intervention: Prevent Infection  Recent Flowsheet Documentation  Taken 10/15/2024 0000 by Nicho Noel RN  Infection Prevention:   environmental surveillance performed   equipment surfaces disinfected   hand hygiene promoted   rest/sleep promoted   single patient room provided  Taken 10/14/2024  1954 by Nicho Noel, RN  Infection Prevention:   environmental surveillance performed   equipment surfaces disinfected   hand hygiene promoted   rest/sleep promoted   single patient room provided  Goal: Optimal Comfort and Wellbeing  Outcome: Progressing  Intervention: Monitor Pain and Promote Comfort  Recent Flowsheet Documentation  Taken 10/14/2024 1954 by Nicho Noel, RN  Pain Management Interventions:   care clustered   medication offered but refused   pain management plan reviewed with patient/caregiver   pillow support provided   position adjusted   quiet environment facilitated  Intervention: Provide Person-Centered Care  Recent Flowsheet Documentation  Taken 10/14/2024 1954 by Nicho Noel RN  Trust Relationship/Rapport:   care explained   choices provided   emotional support provided   empathic listening provided   questions encouraged   questions answered   thoughts/feelings acknowledged   reassurance provided  Goal: Readiness for Transition of Care  Outcome: Progressing     Problem: Skin Injury Risk Increased  Goal: Skin Health and Integrity  Outcome: Progressing  Intervention: Optimize Skin Protection  Recent Flowsheet Documentation  Taken 10/15/2024 0000 by Nicho Noel, RN  Activity Management: activity encouraged  Head of Bed (HOB) Positioning: HOB at 30-45 degrees  Taken 10/14/2024 2200 by Nicho Noel RN  Activity Management:   ambulated outside room   activity encouraged  Taken 10/14/2024 1954 by Nicho Noel, RN  Activity Management: activity encouraged  Pressure Reduction Techniques:   frequent weight shift encouraged   heels elevated off bed   weight shift assistance provided  Head of Bed (HOB) Positioning: HOB at 30-45 degrees  Pressure Reduction Devices:   positioning supports utilized   pressure-redistributing mattress utilized  Skin Protection: silicone foam dressing in place     Problem: Fall Injury Risk  Goal: Absence of Fall  and Fall-Related Injury  Outcome: Progressing  Intervention: Identify and Manage Contributors  Recent Flowsheet Documentation  Taken 10/15/2024 0000 by Nicho Noel, RN  Medication Review/Management: medications reviewed  Taken 10/14/2024 1954 by Nicho Noel, RN  Medication Review/Management: medications reviewed  Intervention: Promote Injury-Free Environment  Recent Flowsheet Documentation  Taken 10/15/2024 0000 by Nicho Noel, RN  Safety Promotion/Fall Prevention:   assistive device/personal items within reach   clutter free environment maintained   fall prevention program maintained   lighting adjusted   nonskid shoes/slippers when out of bed   room organization consistent   safety round/check completed   toileting scheduled  Taken 10/14/2024 1954 by Nicho Noel, RN  Safety Promotion/Fall Prevention:   assistive device/personal items within reach   clutter free environment maintained   fall prevention program maintained   lighting adjusted   nonskid shoes/slippers when out of bed   room organization consistent   safety round/check completed   toileting scheduled   Goal Outcome Evaluation:

## 2024-10-15 NOTE — PROGRESS NOTES
"IM progress note      Valentin Vásquez  [id number]  1965     LOS: 5 days     Attending: José Manuel Hutchins MD    Primary Care Provider: Tristen Stuart DO      Chief Complaint/Reason for visit:  No chief complaint on file.      Subjective   10/14/2024 has his NG tube reconnected after going down for CT scan.  About 200 mL out.  Stoma with output.  Good pain control.  Ambulated 5 times yesterday.  10/15/2024  Tolerating clears.  NG tube to intermittent low wall suction, seems to slow down some.  Good pain control, no nausea or vomiting.  Stoma with good output.  Objective      Visit Vitals  /84 (BP Location: Right arm, Patient Position: Lying)   Pulse 102   Temp 98.3 °F (36.8 °C) (Oral)   Resp 16   Ht 170.2 cm (67.01\")   Wt 88.5 kg (195 lb 1.7 oz)   SpO2 95%   BMI 30.55 kg/m²     Temp (24hrs), Av.5 °F (36.9 °C), Min:98 °F (36.7 °C), Max:99 °F (37.2 °C)      Intake/Output:    Intake/Output Summary (Last 24 hours) at 10/15/2024 0936  Last data filed at 10/15/2024 0600  Gross per 24 hour   Intake 2170 ml   Output 1920 ml   Net 250 ml          Physical Exam:     General Appearance:    Alert, cooperative, in no acute distress   Head:    Normocephalic, without obvious abnormality, atraumatic    Lungs:     Normal effort, symmetric chest rise,  clear to  auscultation bilaterally                 Heart:    Regular rhythm and normal rate, normal S1 and S2   Abdomen:     Soft and benign. Expected tenderness. Stoma pink/[place] present in stoma.GUSTAVO present. Incisions ok.    Extremities:   No clubbing, cyanosis or edema.  No deformities.    Pulses:   Pulses palpable and equal bilaterally   Skin:   No bleeding, bruising or rash          Results Review:     I reviewed the patient's new clinical results.   Results from last 7 days   Lab Units 10/15/24  0336 10/14/24  0420 10/13/24  0406   WBC 10*3/mm3 9.02 11.06* 14.03*   HEMOGLOBIN g/dL 11.8* 12.8* 13.5   HEMATOCRIT % 36.8* 40.0 43.2   PLATELETS 10*3/mm3 264 274 272 "     Results from last 7 days   Lab Units 10/15/24  0336 10/14/24  0420 10/13/24  0406 10/11/24  0523 10/10/24  0610   SODIUM mmol/L 139 139 140   < > 138   POTASSIUM mmol/L 4.3 4.6 4.9   < > 4.2   CHLORIDE mmol/L 107 103 103   < > 105   CO2 mmol/L 24.0 22.0 14.0*   < > 19.0*   BUN mg/dL 19 23 25*   < > 28*   CREATININE mg/dL 1.04 1.02 1.14   < > 1.09   CALCIUM mg/dL 8.4* 8.9 9.0   < > 9.7   BILIRUBIN mg/dL  --   --   --   --  0.4   ALK PHOS U/L  --   --   --   --  45   ALT (SGPT) U/L  --   --   --   --  32   AST (SGOT) U/L  --   --   --   --  42*   GLUCOSE mg/dL 148* 149* 170*   < > 89    < > = values in this interval not displayed.       Latest Reference Range & Units 10/14/24 16:33 10/14/24 19:30 10/15/24 03:36   Glucose 65 - 99 mg/dL 154 (H) 115 148 (H)   (H): Data is abnormally high     All medications reviewed.   acetaminophen, 650 mg, Oral, Q8H  gabapentin, 600 mg, Oral, Q8H  heparin (porcine), 5,000 Units, Subcutaneous, Q8H  insulin glargine, 30 Units, Subcutaneous, Nightly  insulin lispro, 3-14 Units, Subcutaneous, 4x Daily AC & at Bedtime  metoprolol succinate XL, 25 mg, Oral, Daily  pantoprazole, 40 mg, Oral, Q AM  piperacillin-tazobactam, 4.5 g, Intravenous, Q8H  rosuvastatin, 10 mg, Oral, Nightly  tamsulosin, 0.4 mg, Oral, Nightly           Assessment & Plan       S/P colectomy , ultra low anterior resection, robotic assisted, with appy, repair of incarcerated hernia, diverting loop ileostomy    Rectal cancer    HTN (hypertension)    Hyperlipidemia    DM2 (diabetes mellitus, type 2)    Postop ileus   Left lung base opacity, possible early pneumonia versus atelectasis    Plan  NGT, ILWS  CT scan consistent with ileus, improving  IVF.  Agree with empiric abx.  Continue for now  Watch for decrease in NGT output and more from stoma.     1. Ambulation, several times daily  2. Pain control-PRNs    3. IS-encouraged  4. DVT proph-Mechanical, subcutaneous heparin.  5. Bowel regimen   6. Resume home medications as  appropriate  7. Monitor post-op labs       - Hypertension:  Resume home medications as appropriate, formulary substitution when indicated. Off Lisinopril for now  Holding parameters.  PRN medications for elevated blood pressure.     -Dyslipidemia:  Resume home regimen statin ( formulary substitution when appropriate).     -DM type 2   Hgb A1C 7.1  Hold OHA as appropriate  FSBG AC/HS, ( q 6 when NPO), along with correction Humalog.  Long acting insulin, adjusted doses down from home regimen  Adjust further depending on intake and trends of FSBG.      -New stoma:  Wound care stoma nurse consult for stoma care and education throughout patient's hospitalization.    D/w pt and wife.     Dragon disclaimer:  Part of this encounter note is an electronic transcription/translation of spoken language to printed text. The electronic translation of spoken language may permit erroneous, or at times, nonsensical words or phrases to be inadvertently transcribed; Although I have reviewed the note for such errors, some may still exist.    Cong Cartagena MD  10/15/24  09:36 EDT

## 2024-10-15 NOTE — PROGRESS NOTES
"[facility] ([facility])    Audible stool per stoma  Soft abdomen  NG with 400 cc overnight    Vital Signs:  Blood pressure 125/84, pulse 102, temperature 98.3 °F (36.8 °C), temperature source Oral, resp. rate 16, height 170.2 cm (67.01\"), weight 88.5 kg (195 lb 1.7 oz), SpO2 95%.    Labs past 24 hours:  Lab Results (last 24 hours)       Procedure Component Value Units Date/Time    POC Glucose Once [[id number]]  (Normal) Collected: 10/15/24 0721    Specimen: Blood Updated: 10/15/24 0727     Glucose 123 mg/dL     Basic Metabolic Panel [[id number]]  (Abnormal) Collected: 10/15/24 0336    Specimen: Blood Updated: 10/15/24 0535     Glucose 148 mg/dL      BUN 19 mg/dL      Creatinine 1.04 mg/dL      Sodium 139 mmol/L      Potassium 4.3 mmol/L      Comment: Slight hemolysis detected by analyzer. Result may be falsely elevated.        Chloride 107 mmol/L      CO2 24.0 mmol/L      Calcium 8.4 mg/dL      BUN/Creatinine Ratio 18.3     Anion Gap 8.0 mmol/L      eGFR 81.7 mL/min/1.73     Narrative:      GFR Normal >60  Chronic Kidney Disease <60  Kidney Failure <15      CBC & Differential [[id number]]  (Abnormal) Collected: 10/15/24 0336    Specimen: Blood Updated: 10/15/24 0405    Narrative:      The following orders were created for panel order CBC & Differential.  Procedure                               Abnormality         Status                     ---------                               -----------         ------                     CBC Auto Differential[[id number]]        Abnormal            Final result                 Please view results for these tests on the individual orders.    CBC Auto Differential [[id number]]  (Abnormal) Collected: 10/15/24 0336    Specimen: Blood Updated: 10/15/24 0405     WBC 9.02 10*3/mm3      RBC 4.29 10*6/mm3      Hemoglobin 11.8 g/dL      Hematocrit 36.8 %      MCV 85.8 fL      MCH 27.5 pg      MCHC 32.1 g/dL      RDW 14.1 %      RDW-SD 43.4 fl      MPV 10.0 fL  "     Platelets 264 10*3/mm3      Neutrophil % 70.1 %      Lymphocyte % 17.4 %      Monocyte % 8.2 %      Eosinophil % 3.3 %      Basophil % 0.6 %      Immature Grans % 0.4 %      Neutrophils, Absolute 6.32 10*3/mm3      Lymphocytes, Absolute 1.57 10*3/mm3      Monocytes, Absolute 0.74 10*3/mm3      Eosinophils, Absolute 0.30 10*3/mm3      Basophils, Absolute 0.05 10*3/mm3      Immature Grans, Absolute 0.04 10*3/mm3      nRBC 0.0 /100 WBC     POC Glucose Once [[id number]]  (Normal) Collected: 10/14/24 1930    Specimen: Blood Updated: 10/14/24 1932     Glucose 115 mg/dL     POC Glucose Once [[id number]]  (Abnormal) Collected: 10/14/24 1633    Specimen: Blood Updated: 10/14/24 1637     Glucose 154 mg/dL     High Sensitivity Troponin T 2Hr [[id number]]  (Normal) Collected: 10/14/24 1359    Specimen: Blood Updated: 10/14/24 1438     HS Troponin T 12 ng/L      Troponin T Delta 1 ng/L     Narrative:      High Sensitive Troponin T Reference Range:  <14.0 ng/L- Negative Female for AMI  <22.0 ng/L- Negative Male for AMI  >=14 - Abnormal Female indicating possible myocardial injury.  >=22 - Abnormal Male indicating possible myocardial injury.   Clinicians would have to utilize clinical acumen, EKG, Troponin, and serial changes to determine if it is an Acute Myocardial Infarction or myocardial injury due to an underlying chronic condition.         POC Glucose Once [[id number]]  (Abnormal) Collected: 10/14/24 1124    Specimen: Blood Updated: 10/14/24 1126     Glucose 173 mg/dL     High Sensitivity Troponin T [[id number]]  (Normal) Collected: 10/14/24 0820    Specimen: Blood Updated: 10/14/24 0903     HS Troponin T 11 ng/L     Narrative:      High Sensitive Troponin T Reference Range:  <14.0 ng/L- Negative Female for AMI  <22.0 ng/L- Negative Male for AMI  >=14 - Abnormal Female indicating possible myocardial injury.  >=22 - Abnormal Male indicating possible myocardial injury.   Clinicians would have to utilize clinical acumen, EKG,  Troponin, and serial changes to determine if it is an Acute Myocardial Infarction or myocardial injury due to an underlying chronic condition.         POC Glucose Once [[id number]]  (Abnormal) Collected: 10/14/24 0735    Specimen: Blood Updated: 10/14/24 0746     Glucose 135 mg/dL             I/O last shift:  No intake/output data recorded.     PHYSICAL EXAM-  Comfortable  Not tachycardic today  Abdomen soft  Fresh stool per stoma, catheter remains in stoma    Pathology:  Order Name Source Comment Collection Info Order Time   COMPREHENSIVE METABOLIC PANEL   Collected By: Emely Mayo RN 10/10/2024  5:48 AM     Release to patient   Routine Release        TYPE AND SCREEN   Collected By: Genesis Georges RN 10/10/2024  5:48 AM     Release to patient   Routine Release        TISSUE PATHOLOGY EXAM Large Intestine, Appendix  Collected By: José Manuel Hutchins MD 10/10/2024  9:56 AM     Release to patient   Routine Release        .    Assessment and Plan:  Frequent ambulation  Clamp NG  Clears and protein drinks.  Stoma output and soft abdomen suggest return of bowel function.  Continue antibiotics for pulmonary infection.    José Manuel Hutchins MD  10/15/24  07:40 EDT

## 2024-10-15 NOTE — PLAN OF CARE
Problem: Adult Inpatient Plan of Care  Goal: Plan of Care Review  Outcome: Progressing  Goal: Patient-Specific Goal (Individualized)  Outcome: Progressing  Goal: Absence of Hospital-Acquired Illness or Injury  Outcome: Progressing  Intervention: Identify and Manage Fall Risk  Recent Flowsheet Documentation  Taken 10/15/2024 1200 by Krys Howell RN  Safety Promotion/Fall Prevention:   activity supervised   assistive device/personal items within reach   clutter free environment maintained   room organization consistent   safety round/check completed  Intervention: Prevent Infection  Recent Flowsheet Documentation  Taken 10/15/2024 1200 by Krys Howell RN  Infection Prevention: environmental surveillance performed  Goal: Optimal Comfort and Wellbeing  Outcome: Progressing  Goal: Readiness for Transition of Care  Outcome: Progressing   Goal Outcome Evaluation:

## 2024-10-16 LAB
GLUCOSE BLDC GLUCOMTR-MCNC: 172 MG/DL (ref 70–130)
GLUCOSE BLDC GLUCOMTR-MCNC: 250 MG/DL (ref 70–130)
GLUCOSE BLDC GLUCOMTR-MCNC: 314 MG/DL (ref 70–130)
GLUCOSE BLDC GLUCOMTR-MCNC: 95 MG/DL (ref 70–130)

## 2024-10-16 PROCEDURE — 25010000002 HEPARIN (PORCINE) PER 1000 UNITS: Performed by: COLON & RECTAL SURGERY

## 2024-10-16 PROCEDURE — 25010000002 PIPERACILLIN SOD-TAZOBACTAM PER 1 G: Performed by: COLON & RECTAL SURGERY

## 2024-10-16 PROCEDURE — 63710000001 INSULIN GLARGINE PER 5 UNITS: Performed by: INTERNAL MEDICINE

## 2024-10-16 PROCEDURE — 82948 REAGENT STRIP/BLOOD GLUCOSE: CPT

## 2024-10-16 PROCEDURE — 63710000001 INSULIN LISPRO (HUMAN) PER 5 UNITS: Performed by: INTERNAL MEDICINE

## 2024-10-16 RX ADMIN — GABAPENTIN 600 MG: 300 CAPSULE ORAL at 20:36

## 2024-10-16 RX ADMIN — METOPROLOL SUCCINATE 25 MG: 25 TABLET, EXTENDED RELEASE ORAL at 08:24

## 2024-10-16 RX ADMIN — GABAPENTIN 600 MG: 300 CAPSULE ORAL at 13:04

## 2024-10-16 RX ADMIN — HYDROCODONE BITARTRATE AND ACETAMINOPHEN 1 TABLET: 7.5; 325 TABLET ORAL at 11:42

## 2024-10-16 RX ADMIN — ACETAMINOPHEN 650 MG: 325 TABLET ORAL at 16:25

## 2024-10-16 RX ADMIN — INSULIN GLARGINE 30 UNITS: 100 INJECTION, SOLUTION SUBCUTANEOUS at 20:38

## 2024-10-16 RX ADMIN — PANTOPRAZOLE SODIUM 40 MG: 40 TABLET, DELAYED RELEASE ORAL at 05:26

## 2024-10-16 RX ADMIN — HEPARIN SODIUM 5000 UNITS: 5000 INJECTION INTRAVENOUS; SUBCUTANEOUS at 05:26

## 2024-10-16 RX ADMIN — INSULIN LISPRO 8 UNITS: 100 INJECTION, SOLUTION INTRAVENOUS; SUBCUTANEOUS at 20:37

## 2024-10-16 RX ADMIN — ROSUVASTATIN 10 MG: 10 TABLET, FILM COATED ORAL at 20:36

## 2024-10-16 RX ADMIN — PIPERACILLIN AND TAZOBACTAM 4.5 G: 4; .5 INJECTION, POWDER, FOR SOLUTION INTRAVENOUS at 20:38

## 2024-10-16 RX ADMIN — ACETAMINOPHEN 650 MG: 325 TABLET ORAL at 08:24

## 2024-10-16 RX ADMIN — PIPERACILLIN AND TAZOBACTAM 4.5 G: 4; .5 INJECTION, POWDER, FOR SOLUTION INTRAVENOUS at 05:26

## 2024-10-16 RX ADMIN — PIPERACILLIN AND TAZOBACTAM 4.5 G: 4; .5 INJECTION, POWDER, FOR SOLUTION INTRAVENOUS at 13:42

## 2024-10-16 RX ADMIN — HEPARIN SODIUM 5000 UNITS: 5000 INJECTION INTRAVENOUS; SUBCUTANEOUS at 13:04

## 2024-10-16 RX ADMIN — INSULIN LISPRO 10 UNITS: 100 INJECTION, SOLUTION INTRAVENOUS; SUBCUTANEOUS at 17:11

## 2024-10-16 RX ADMIN — HEPARIN SODIUM 5000 UNITS: 5000 INJECTION INTRAVENOUS; SUBCUTANEOUS at 20:36

## 2024-10-16 RX ADMIN — HYDROCODONE BITARTRATE AND ACETAMINOPHEN 1 TABLET: 7.5; 325 TABLET ORAL at 20:53

## 2024-10-16 RX ADMIN — GABAPENTIN 600 MG: 300 CAPSULE ORAL at 05:26

## 2024-10-16 RX ADMIN — INSULIN LISPRO 3 UNITS: 100 INJECTION, SOLUTION INTRAVENOUS; SUBCUTANEOUS at 11:42

## 2024-10-16 RX ADMIN — TAMSULOSIN HYDROCHLORIDE 0.4 MG: 0.4 CAPSULE ORAL at 20:36

## 2024-10-16 NOTE — NURSING NOTE
[facility] visit for Stoma care and assessment     Surgery date: 10/10/2024  Surgical Procedures Since Admission:  Procedure(s):  CONVERTED TO OPEN COLON RESECTION ULTRA LOW ANTERIOR LAPAROSCOPIC WITH LOOP ILEOSTOMY  CYSTOSCOPY TEMPORARY PLACEMENT BILATERAL URETERAL CATHETER  Surgeon:  José Manuel Hutchins MD  Status:  6 Days Post-Op     -------------------     WO Care Outcome: Patient seen for ostomy care, assessment and education. Patient awake, alert, feels better now NG is out.  Wanting to eat solid foods. patient seen in the Medical/Surgical Floor .  Spouse at beside.     Stoma Type: Loop Ileostomy  Diameter/shape: Round, 32 mm  Location: RUQ  Protrusion: Budded  Stoma Characteristics: Round, Moist, Pink, Red, and bridge removed without complication.   Peristomal skin: mild MASD to parastomal skin; treated  Effluent Characteristics: liquid, bilious  Effluent volume emptied: ml.     Current pouching system: [place], new image, red, flat drainable with barrier ring  Goal wear time: 3-4 days  Emptying frequency of appliance per day: Recommend emptying when 1/3-1/2 full.      Surgical Incision: Midline abdominal incision  Degree of approximation:   Approximating Devices: Staples  Drainage Characteristics: none  Method of Management: Gauze border dressing     Follow up visit summary:   1.  Appliance is intact.  Stoma viable, starting to have bilious output.  2.  Patient has been ambulating.  Continue encourage this.  3.  Will review ordering supplies and provide discharge supplies tomorrow.   4.  Appreciate input from registered dietitian.  [facility] Nurse will continue to follow daily for ostomy education. Please contact with questions or if new needs arise.      Zachary French RN, BSN, CCRN, CWOCN  Wound, Ostomy and Continence (WOC) Department  [facility]

## 2024-10-16 NOTE — PLAN OF CARE
Problem: Adult Inpatient Plan of Care  Goal: Plan of Care Review  Outcome: Progressing  Goal: Patient-Specific Goal (Individualized)  Outcome: Progressing  Goal: Absence of Hospital-Acquired Illness or Injury  Outcome: Progressing  Intervention: Identify and Manage Fall Risk  Recent Flowsheet Documentation  Taken 10/16/2024 0000 by Nicho Noel RN  Safety Promotion/Fall Prevention:   assistive device/personal items within reach   clutter free environment maintained   fall prevention program maintained   lighting adjusted   nonskid shoes/slippers when out of bed   room organization consistent   safety round/check completed   toileting scheduled  Taken 10/15/2024 1911 by Nicho Noel RN  Safety Promotion/Fall Prevention:   assistive device/personal items within reach   clutter free environment maintained   fall prevention program maintained   lighting adjusted   nonskid shoes/slippers when out of bed   room organization consistent   safety round/check completed   toileting scheduled  Intervention: Prevent Skin Injury  Recent Flowsheet Documentation  Taken 10/16/2024 0000 by Nicho Noel RN  Body Position: position changed independently  Taken 10/15/2024 1911 by Nicho Noel RN  Body Position: position changed independently  Skin Protection: silicone foam dressing in place  Intervention: Prevent and Manage VTE (Venous Thromboembolism) Risk  Recent Flowsheet Documentation  Taken 10/15/2024 1911 by Nicho Noel RN  VTE Prevention/Management: (SQ Heparin)   bilateral   SCDs (sequential compression devices) off   patient refused intervention   other (see comments)  Intervention: Prevent Infection  Recent Flowsheet Documentation  Taken 10/16/2024 0000 by Nicho Noel RN  Infection Prevention:   environmental surveillance performed   equipment surfaces disinfected   hand hygiene promoted   rest/sleep promoted   single patient room provided  Taken 10/15/2024  1911 by Nicho Noel, RN  Infection Prevention:   environmental surveillance performed   equipment surfaces disinfected   hand hygiene promoted   rest/sleep promoted   single patient room provided  Goal: Optimal Comfort and Wellbeing  Outcome: Progressing  Intervention: Monitor Pain and Promote Comfort  Recent Flowsheet Documentation  Taken 10/15/2024 1911 by Nicho Noel RN  Pain Management Interventions:   care clustered   medication offered but refused   pain management plan reviewed with patient/caregiver   pillow support provided   position adjusted   quiet environment facilitated  Intervention: Provide Person-Centered Care  Recent Flowsheet Documentation  Taken 10/15/2024 1911 by Nicho Noel RN  Trust Relationship/Rapport:   care explained   emotional support provided   choices provided   empathic listening provided   questions answered   questions encouraged   thoughts/feelings acknowledged   reassurance provided  Goal: Readiness for Transition of Care  Outcome: Progressing     Problem: Skin Injury Risk Increased  Goal: Skin Health and Integrity  Outcome: Progressing  Intervention: Optimize Skin Protection  Recent Flowsheet Documentation  Taken 10/16/2024 0000 by Nicho Noel RN  Activity Management: activity encouraged  Head of Bed (HOB) Positioning: HOB at 30-45 degrees  Taken 10/15/2024 1911 by Nicho Noel RN  Activity Management:   activity encouraged   up ad erik  Pressure Reduction Techniques:   frequent weight shift encouraged   heels elevated off bed   weight shift assistance provided  Head of Bed (HOB) Positioning: HOB at 30-45 degrees  Pressure Reduction Devices:   positioning supports utilized   pressure-redistributing mattress utilized  Skin Protection: silicone foam dressing in place     Problem: Fall Injury Risk  Goal: Absence of Fall and Fall-Related Injury  Outcome: Progressing  Intervention: Identify and Manage Contributors  Recent  Flowsheet Documentation  Taken 10/16/2024 0000 by Nicho Noel, RN  Medication Review/Management: medications reviewed  Taken 10/15/2024 1911 by Nicho Noel, RN  Medication Review/Management: medications reviewed  Intervention: Promote Injury-Free Environment  Recent Flowsheet Documentation  Taken 10/16/2024 0000 by Nicho Noel, RN  Safety Promotion/Fall Prevention:   assistive device/personal items within reach   clutter free environment maintained   fall prevention program maintained   lighting adjusted   nonskid shoes/slippers when out of bed   room organization consistent   safety round/check completed   toileting scheduled  Taken 10/15/2024 1911 by Nicho Noel, RN  Safety Promotion/Fall Prevention:   assistive device/personal items within reach   clutter free environment maintained   fall prevention program maintained   lighting adjusted   nonskid shoes/slippers when out of bed   room organization consistent   safety round/check completed   toileting scheduled   Goal Outcome Evaluation:

## 2024-10-16 NOTE — PROGRESS NOTES
"IM progress note      Valentin Vásquez  [id number]  1965     LOS: 6 days     Attending: José Manuel Hutchins MD    Primary Care Provider: Tristen Stuart DO      Chief Complaint/Reason for visit:  No chief complaint on file.      Subjective   10/14/2024 has his NG tube reconnected after going down for CT scan.  About 200 mL out.  Stoma with output.  Good pain control.  Ambulated 5 times yesterday.  10/15/2024  Tolerating clears.  NG tube to intermittent low wall suction, seems to slow down some.  Good pain control, no nausea or vomiting.  Stoma with good output.    10/16/24: Feeling better. Adequate pain control. NGT out. Tolerating clears. Hasn't ambulated yet today. Stoma with output. Voiding. Denies f/c/n/v/sob/cp.    Objective      Visit Vitals  /81 (BP Location: Left arm, Patient Position: Lying)   Pulse 90   Temp 97.8 °F (36.6 °C) (Oral)   Resp 17   Ht 170.2 cm (67.01\")   Wt 88.5 kg (195 lb 1.7 oz)   SpO2 96%   BMI 30.55 kg/m²     Temp (24hrs), Av.8 °F (36.6 °C), Min:97.6 °F (36.4 °C), Max:98 °F (36.7 °C)      Physical Exam:     General Appearance:    Alert, cooperative, in no acute distress   Head:    Normocephalic, without obvious abnormality, atraumatic    Lungs:     Normal effort, symmetric chest rise,  clear to  auscultation bilaterally                 Heart:    Regular rhythm and normal rate, normal S1 and S2   Abdomen:     Soft and benign. Expected tenderness. Stoma pink, liquid stool in ostomy bag. GUSTAVO present. Incisions ok.    Extremities:   No clubbing, cyanosis or edema.  No deformities.    Pulses:   Pulses palpable and equal bilaterally   Skin:   No bleeding, bruising or rash          Results Review:     I reviewed the patient's new clinical results.   Results from last 7 days   Lab Units 10/15/24  0336 10/14/24  0420 10/13/24  0406   WBC 10*3/mm3 9.02 11.06* 14.03*   HEMOGLOBIN g/dL 11.8* 12.8* 13.5   HEMATOCRIT % 36.8* 40.0 43.2   PLATELETS 10*3/mm3 264 274 272     Results from last " 7 days   Lab Units 10/15/24  0336 10/14/24  0420 10/13/24  0406 10/11/24  0523 10/10/24  0610   SODIUM mmol/L 139 139 140   < > 138   POTASSIUM mmol/L 4.3 4.6 4.9   < > 4.2   CHLORIDE mmol/L 107 103 103   < > 105   CO2 mmol/L 24.0 22.0 14.0*   < > 19.0*   BUN mg/dL 19 23 25*   < > 28*   CREATININE mg/dL 1.04 1.02 1.14   < > 1.09   CALCIUM mg/dL 8.4* 8.9 9.0   < > 9.7   BILIRUBIN mg/dL  --   --   --   --  0.4   ALK PHOS U/L  --   --   --   --  45   ALT (SGPT) U/L  --   --   --   --  32   AST (SGOT) U/L  --   --   --   --  42*   GLUCOSE mg/dL 148* 149* 170*   < > 89    < > = values in this interval not displayed.       Latest Reference Range & Units 10/15/24 19:22 10/16/24 07:41 10/16/24 11:20   Glucose 70 - 130 mg/dL 212 (H) 95 172 (H)   (H): Data is abnormally high     All medications reviewed.   acetaminophen, 650 mg, Oral, Q8H  gabapentin, 600 mg, Oral, Q8H  heparin (porcine), 5,000 Units, Subcutaneous, Q8H  insulin glargine, 30 Units, Subcutaneous, Nightly  insulin lispro, 3-14 Units, Subcutaneous, 4x Daily AC & at Bedtime  metoprolol succinate XL, 25 mg, Oral, Daily  pantoprazole, 40 mg, Oral, Q AM  piperacillin-tazobactam, 4.5 g, Intravenous, Q8H  rosuvastatin, 10 mg, Oral, Nightly  tamsulosin, 0.4 mg, Oral, Nightly           Assessment & Plan       S/P colectomy , ultra low anterior resection, robotic assisted, with appy, repair of incarcerated hernia, diverting loop ileostomy    Rectal cancer    HTN (hypertension)    Hyperlipidemia    DM2 (diabetes mellitus, type 2)    Postop ileus   Left lung base opacity, possible early pneumonia versus atelectasis    Plan  1. Ambulation, several times daily  2. Pain control-PRNs    3. IS-encouraged  4. DVT proph-Mechanical, subcutaneous heparin.  5. Bowel regimen   6. Adv to full liquid diet  7. Monitor post-op labs     CT scan consistent with ileus, improving  Continue IVF.  Agree with empiric abx.  Continue for now    - Hypertension:  Resume home medications as  appropriate, formulary substitution when indicated. Off Lisinopril for now  Holding parameters.  PRN medications for elevated blood pressure.     -Dyslipidemia:  Resume home regimen statin ( formulary substitution when appropriate).     -DM type 2   Hgb A1C 7.1  Hold OHA as appropriate  FSBG AC/HS, ( q 6 when NPO), along with correction Humalog.  Long acting insulin, adjusted doses down from home regimen  Adjust further depending on intake and trends of FSBG.      -New stoma:  Wound care stoma nurse consult for stoma care and education throughout patient's hospitalization.      Dragon disclaimer:  Part of this encounter note is an electronic transcription/translation of spoken language to printed text. The electronic translation of spoken language may permit erroneous, or at times, nonsensical words or phrases to be inadvertently transcribed; Although I have reviewed the note for such errors, some may still exist.    Linda Bailey, LAURA  10/16/24  12:34 EDT

## 2024-10-16 NOTE — PROGRESS NOTES
"[facility]    No problems      Vital Signs:  Blood pressure 126/83, pulse 93, temperature 97.4 °F (36.3 °C), temperature source Oral, resp. rate 17, height 170.2 cm (67.01\"), weight 88.5 kg (195 lb 1.7 oz), SpO2 95%.    Labs past 24 hours:  Lab Results (last 24 hours)       Procedure Component Value Units Date/Time    POC Glucose Once [[id number]]  (Abnormal) Collected: 10/16/24 1702    Specimen: Blood Updated: 10/16/24 1703     Glucose 314 mg/dL     POC Glucose Once [[id number]]  (Abnormal) Collected: 10/16/24 1120    Specimen: Blood Updated: 10/16/24 1122     Glucose 172 mg/dL     POC Glucose Once [[id number]]  (Normal) Collected: 10/16/24 0741    Specimen: Blood Updated: 10/16/24 0743     Glucose 95 mg/dL     POC Glucose Once [[id number]]  (Abnormal) Collected: 10/15/24 1922    Specimen: Blood Updated: 10/15/24 1925     Glucose 212 mg/dL             I/O last shift:  I/O this shift:  In: 100 [IV Piggyback:100]  Out: 200 [Drains:50; Stool:150]     PHYSICAL EXAM-  Comfortable  cv- rsr  Chest- clear now  Abd- soft, without  distention, stool per stoma    Pathology:  Order Name Source Comment Collection Info Order Time   COMPREHENSIVE METABOLIC PANEL   Collected By: Emely Mayo RN 10/10/2024  5:48 AM     Release to patient   Routine Release        TYPE AND SCREEN   Collected By: Genesis Georges RN 10/10/2024  5:48 AM     Release to patient   Routine Release        TISSUE PATHOLOGY EXAM Large Intestine, Appendix  Collected By: José Manuel Hutchins MD 10/10/2024  9:56 AM     Release to patient   Routine Release        .    Assessment and Plan:  Likely home tomorrow  Discharge instructions reviewed.  D/c Drain    José Manuel Hutchins MD  10/16/24  17:07 EDT  "

## 2024-10-16 NOTE — CASE MANAGEMENT/SOCIAL WORK
Continued Stay Note  [facility]     Patient Name: Valentin Vásquez  MRN: [id number]  Today's Date: 10/16/2024    Admit Date: 10/10/2024    Plan: Home with wife   Discharge Plan       Row Name 10/16/24 0918       Plan    Plan Home with wife    Patient/Family in Agreement with Plan yes    Plan Comments CM spoke to patient and wife at bedside.  His plan remains home with his wife to transport when medically ready. PT recommends home. Patient ambulated 350 feet independently on 10/15. Patient denies any discharge needs at this time. CM will continue to follow.    Final Discharge Disposition Code 01 - home or self-care                   Discharge Codes    No documentation.                       Rose Mary Espinoza RN

## 2024-10-17 ENCOUNTER — READMISSION MANAGEMENT (OUTPATIENT)
Dept: CALL CENTER | Facility: HOSPITAL | Age: 59
End: 2024-10-17
Payer: COMMERCIAL

## 2024-10-17 VITALS
RESPIRATION RATE: 17 BRPM | OXYGEN SATURATION: 94 % | HEART RATE: 88 BPM | TEMPERATURE: 97.8 F | DIASTOLIC BLOOD PRESSURE: 80 MMHG | HEIGHT: 67 IN | BODY MASS INDEX: 30.62 KG/M2 | SYSTOLIC BLOOD PRESSURE: 130 MMHG | WEIGHT: 195.11 LBS

## 2024-10-17 LAB
GLUCOSE BLDC GLUCOMTR-MCNC: 150 MG/DL (ref 70–130)
GLUCOSE BLDC GLUCOMTR-MCNC: 210 MG/DL (ref 70–130)

## 2024-10-17 PROCEDURE — 25010000002 PIPERACILLIN SOD-TAZOBACTAM PER 1 G: Performed by: COLON & RECTAL SURGERY

## 2024-10-17 PROCEDURE — 63710000001 INSULIN LISPRO (HUMAN) PER 5 UNITS: Performed by: INTERNAL MEDICINE

## 2024-10-17 PROCEDURE — 25010000002 HEPARIN (PORCINE) PER 1000 UNITS: Performed by: COLON & RECTAL SURGERY

## 2024-10-17 PROCEDURE — 82948 REAGENT STRIP/BLOOD GLUCOSE: CPT

## 2024-10-17 RX ORDER — LOPERAMIDE HYDROCHLORIDE 2 MG/1
2 TABLET ORAL 4 TIMES DAILY PRN
Qty: 180 TABLET | Refills: 0 | Status: SHIPPED | OUTPATIENT
Start: 2024-10-17

## 2024-10-17 RX ADMIN — INSULIN LISPRO 5 UNITS: 100 INJECTION, SOLUTION INTRAVENOUS; SUBCUTANEOUS at 12:15

## 2024-10-17 RX ADMIN — INSULIN LISPRO 3 UNITS: 100 INJECTION, SOLUTION INTRAVENOUS; SUBCUTANEOUS at 08:20

## 2024-10-17 RX ADMIN — ACETAMINOPHEN 650 MG: 325 TABLET ORAL at 08:20

## 2024-10-17 RX ADMIN — HEPARIN SODIUM 5000 UNITS: 5000 INJECTION INTRAVENOUS; SUBCUTANEOUS at 06:05

## 2024-10-17 RX ADMIN — PIPERACILLIN AND TAZOBACTAM 4.5 G: 4; .5 INJECTION, POWDER, FOR SOLUTION INTRAVENOUS at 06:05

## 2024-10-17 RX ADMIN — GABAPENTIN 600 MG: 300 CAPSULE ORAL at 06:04

## 2024-10-17 RX ADMIN — METOPROLOL SUCCINATE 25 MG: 25 TABLET, EXTENDED RELEASE ORAL at 08:21

## 2024-10-17 RX ADMIN — PANTOPRAZOLE SODIUM 40 MG: 40 TABLET, DELAYED RELEASE ORAL at 06:04

## 2024-10-17 NOTE — DISCHARGE SUMMARY
Patient Name: Valentin Vásquez  MRN: [id number]  : 1965  DOS: 10/17/2024    Attending: José Manuel Hutchins MD    Primary Care Provider: Tristen Stuart DO    Date of Admission:.10/10/2024  5:29 AM    Date of Discharge:  10/17/2024    Discharge Diagnosis:   S/P colectomy , ultra low anterior resection, robotic assisted, with appy, repair of incarcerated hernia, diverting loop ileostomy    Rectal cancer    HTN (hypertension)    Hyperlipidemia    DM2 (diabetes mellitus, type 2)      Hospital Course  At admit    Patient is a pleasant 59 y.o. male presented for scheduled surgery by Dr. Hutchins.     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  He was seen by Dr. Hutchins and opted to proceed with surgery.     Today he underwent the following procedures by Dr. Hutchins:  ULTRA LOW ANTERIOR RESECTION  DIVERTING LOOP ILEOSTOMY  APPENDECTOMYH  DAVINCI ROBOT ASSISTED  Repair of incarcerated umbilical hernia     He also had cystoscopy with bilateral ureteral catheter placement, temporary.  He tolerated surgery well, was admitted for further management.     Seen in his room where he is quite drowsy, awakens briefly and answers occasional questions.  His wife and sister are present and they help with history taking as well.     Reviewed patient's past medical history including diabetes.  He is on high dose of long-acting insulin of Tresiba twice daily along with oral agents.    After admit    He was provided pain medication as needed for pain control.  Patient received DVT prophylaxis with subcutaneous heparin as well as mechanicals      Patient was started on clear liquid diet, he initially had difficulty tolerating advancing p.o. diet.  Had abdominal distention nausea and vomiting and required NG tube placement for decompression.  It was felt to be possible early ileus related to respiratory infection.  He was started on antibiotics, improved gradually.  He was able  "to tolerate p.o. diet and removal of NG tube.  Diet has since been advanced and tolerated well.      During his stay patient used an IS for atelectasis prophylaxis.    He was seen by WOCN, received extensive education and instructions during his stay. All pt questions were addressed;  education folder, stomal care, fluids, diet, activity, work, lifting restrictions for 6-8 weeks were reviewed.      Home medications were resumed as appropriate, and labs were monitored and remained fairly stable.    With the progress he has made, pt is ready for DC home today.      Discussed with patient regarding plan and he shows understanding and agreement.       Procedures Performed  Procedure(s):  CONVERTED TO OPEN COLON RESECTION ULTRA LOW ANTERIOR LAPAROSCOPIC WITH LOOP ILEOSTOMY  CYSTOSCOPY TEMPORARY PLACEMENT BILATERAL URETERAL CATHETER           Pertinent Test Results:    I reviewed the patient's new clinical results.   Results from last 7 days   Lab Units 10/15/24  0336 10/14/24  0420 10/13/24  0406   WBC 10*3/mm3 9.02 11.06* 14.03*   HEMOGLOBIN g/dL 11.8* 12.8* 13.5   HEMATOCRIT % 36.8* 40.0 43.2   PLATELETS 10*3/mm3 264 274 272     Results from last 7 days   Lab Units 10/15/24  0336 10/14/24  0420 10/13/24  0406   SODIUM mmol/L 139 139 140   POTASSIUM mmol/L 4.3 4.6 4.9   CHLORIDE mmol/L 107 103 103   CO2 mmol/L 24.0 22.0 14.0*   BUN mg/dL 19 23 25*   CREATININE mg/dL 1.04 1.02 1.14   CALCIUM mg/dL 8.4* 8.9 9.0   GLUCOSE mg/dL 148* 149* 170*     I reviewed the patient's new imaging including images and reports.         Discharge Assessment:       Visit Vitals  /80 (BP Location: Left arm, Patient Position: Lying)   Pulse 92   Temp 97.8 °F (36.6 °C) (Oral)   Resp 17   Ht 170.2 cm (67.01\")   Wt 88.5 kg (195 lb 1.7 oz)   SpO2 94%   BMI 30.55 kg/m²     Temp (24hrs), Av.9 °F (36.6 °C), Min:97.4 °F (36.3 °C), Max:98.2 °F (36.8 °C)      General Appearance:    Alert, cooperative, in no acute distress   Lungs:     Clear " to auscultation,respirations regular, even and                   unlabored    Heart:    Regular rhythm and normal rate, normal S1 and S2    Abdomen:   Soft and benign with clean incisions. Stoma pink, talavera cannulated, output in stoma bag.    Extremities:   Moves all extremities well, no edema, no cyanosis, no              redness   Pulses:   Pulses palpable and equal bilaterally   Skin:   No bleeding, bruising or rash            Discharge Disposition: Home          Discharge Medications        New Medications        Instructions Start Date   loperamide 2 MG tablet  Commonly known as: Imodium A-D   2 mg, Oral, 4 Times Daily PRN             Continue These Medications        Instructions Start Date   Aspirin Low Dose 81 MG EC tablet  Generic drug: aspirin   81 mg, Oral, Daily      Farxiga 10 MG tablet  Generic drug: dapagliflozin Propanediol   1 tablet, Oral, Daily      fenofibrate 145 MG tablet  Commonly known as: TRICOR   145 mg, Oral, Daily      FreeStyle Shilpa 2 Sensor misc   Does not apply, Possible left side- pt might change 8th       gabapentin 600 MG tablet  Commonly known as: NEURONTIN   600 mg, Oral, 3 Times Daily      glipizide 10 MG 24 hr tablet  Commonly known as: GLUCOTROL XL   10 mg, Oral, Daily With Breakfast      HYDROcodone-acetaminophen 7.5-325 MG per tablet  Commonly known as: NORCO   1 tablet, Oral, Every 6 Hours PRN      Insulin Lispro (1 Unit Dial) 100 UNIT/ML solution pen-injector  Commonly known as: HUMALOG   5 Units, Subcutaneous, 3 Times Daily Before Meals      lisinopril 10 MG tablet  Commonly known as: PRINIVIL,ZESTRIL   10 mg, Oral, Daily      metFORMIN 1000 MG tablet  Commonly known as: GLUCOPHAGE   1,000 mg, Oral, 2 Times Daily With Meals      metoprolol succinate XL 25 MG 24 hr tablet  Commonly known as: Toprol XL   25 mg, Oral, Daily      omeprazole 40 MG capsule  Commonly known as: priLOSEC   40 mg, Oral, Daily      rosuvastatin 10 MG tablet  Commonly known as: CRESTOR   10 mg, Oral,  Nightly      tamsulosin 0.4 MG capsule 24 hr capsule  Commonly known as: FLOMAX   0.4 mg, Oral, Nightly      Tresiba FlexTouch 200 UNIT/ML solution pen-injector pen injection  Generic drug: Insulin Degludec   Subcutaneous, Take As Directed, 64 units in am and 60 units at night              Stop These Medications      VITAMIN D PO              Discharge Diet: Diabetic GI soft low fiber    Activity at Discharge: Ambulate.  Restrictions per Dr. Hutchins    Follow-up Appointments:  José Manuel Hutchins MD per his orders    Discharge took over 30 min      Dragon disclaimer:  Part of this encounter note is an electronic transcription/translation of spoken language to printed text. The electronic translation of spoken language may permit erroneous, or at times, nonsensical words or phrases to be inadvertently transcribed; Although I have reviewed the note for such errors, some may still exist.       Cong Cartagena MD  10/17/24  10:55 EDT

## 2024-10-17 NOTE — NURSING NOTE
WOC follow-up for ostomy education and assessment.    Discharge supplies provided.  Reviewed how to order supplies from [employer].    Provided second less how to change ostomy appliance with patient and spouse.    Reviewed importance of hydration daily and the use of Imodium when the stool is liquid and high output.    Plan to contact patient after discharge to schedule follow-up in our outpatient ostomy clinic.    WOC sign off.    Zachary French RN, BSN, CCRN, CWOCN  Wound, Ostomy and Continence (WOC) Department  [facility]

## 2024-10-17 NOTE — PROCEDURES
No problems overnight    Anticipate home today    Soft abdomen, good stoma output, incision is okay.    Discharge instructions with emphasis on Imodium use reviewed with patient and spouse yesterday and again this morning.

## 2024-10-17 NOTE — PROGRESS NOTES
"          Clinical Nutrition Assessment     Patient Name: Valentin Vásquez  YOB: 1965  MRN: [id number]  Date of Encounter: 10/17/24 12:52 EDT  Admission date: 10/10/2024  Reason for Visit: Follow-up protocol    Assessment   Nutrition Assessment   Admission Diagnosis:  Rectal cancer [C20]    Problem List:    S/P colectomy , ultra low anterior resection, robotic assisted, with appy, repair of incarcerated hernia, diverting loop ileostomy    Rectal cancer    HTN (hypertension)    Hyperlipidemia    DM2 (diabetes mellitus, type 2)      PMH:   He  has a past medical history of Arthritis, Colon cancer, Colon polyp, Diabetes mellitus, Diverticulitis of colon, GERD (gastroesophageal reflux disease), Gout, Hyperlipidemia, Hypertension, Injury of back, Kidney stone, Pancreatitis, Tear of right rotator cuff, and Wears glasses.    PSH:  He  has a past surgical history that includes Tonsillectomy; Adenoidectomy; Kidney stone surgery; Shoulder arthroscopy w/ rotator cuff repair (Right, 07/19/2022); cystoscopy litholapaxy bladder stone extraction (N/A, 09/28/2022); Colonoscopy (N/A, 05/21/2024); COLON RESECTION WITH ILEOSTOMY (N/A, 10/10/2024); and Cystoscopy (Bilateral, 10/10/2024).    Applicable Nutrition History:   (10/10) s/p LAR with diverting loop ileostomy, appendectomy  (10/11) ileostomy education  (10/12) catheter in stoma  (10/16) NG out    Anthropometrics     Height: Height: 170.2 cm (67.01\")  Last Filed Weight: Weight: 88.5 kg (195 lb 1.7 oz) (10/14/24 1027)  Method: Weight Method: Stated  BMI: BMI (Calculated): 30.6    UBW:     Weight      Weight (kg) Weight (lbs) Weight Method   5/21/2024 90.719 kg  200 lb     8/21/2024 87.544 kg  193 lb     10/4/2024 88.55 kg  195 lb 3.5 oz  Standing scale    10/10/2024 88.451 kg  195 lb  Stated    10/14/2024 88.5 kg  195 lb 1.7 oz       Weight change: No significant changes    Nutrition Focused Physical Exam    Date:  10/15       Pt does not meet criteria for " malnutrition diagnosis, at this time.      Subjective   Reported/Observed/Food/Nutrition Related History:   10/17  Pt with NG d/c'd yesterday. Tolerating PO diet without difficulty. Has d/c orders in per CRS. 450mL out from ileostomy in past 24hrs.     10/15  Pt with persistent post-op ileus however appears to be resolving. NG with decreased output overnight. NG clamping trial today - per RN will check residuals at 4pm. Now with appropriate output from ileostomy. Tolerated Clear ONS.     10/11  RD provided patient and spouse with ileostomy nutrition education and written materials. RD reviewed low-fiber diet x 4-6 weeks post-op, slow re-introduction of high-fiber foods after 4-6 weeks, daily chewable/liquid MVI, high ileostomy output/dehydration/oral rehydration solutions, dietary strategies for managing high ileostomy output, food lists pertaining to gas/odor/blockage/diarrhea and stool thickening, appropriate oral nutrition supplements, and recommended daily protein intake for post-op wound healing. All questions asked were answered within scope of practice.     Current Nutrition Prescription   PO: Diet: Gastrointestinal; Low Irritant; Fluid Consistency: Thin (IDDSI 0)  Oral Nutrition Supplement: Boost Plus 3x daily  Intake: Insufficient data    Assessment & Plan   Nutrition Diagnosis   Date:  10/17 Updated:  Problem Altered GI function   Etiology S/p loop ileostomy creation   Signs/Symptoms Resolved ileus   Status: New    Date:  10/15            Updated:  10/17  Problem Inadequate oral intake    Etiology Altered GI fxn   Signs/Symptoms NG to LWS, now on CLD   Status: Resolved    Goal:   Nutrition to support treatment and Tolerate PO, Continue positive trend      Nutrition Intervention      Follow treatment progress, Care plan reviewed, Encourage intake, Supplement provided    Encourage PO intake on current diet as tolerated  Continue to encourage Boost Plus   Encourage foods to help thicken  stool    Monitoring/Evaluation:   Per protocol, I&O, PO intake, Pertinent labs, GI status    Vesna Castro, MS,RD,LD  Time Spent: 25min

## 2024-10-17 NOTE — PLAN OF CARE
Goal Outcome Evaluation:           Progress: improving            Problem: Adult Inpatient Plan of Care  Goal: Absence of Hospital-Acquired Illness or Injury  Intervention: Identify and Manage Fall Risk  Recent Flowsheet Documentation  Taken 10/16/2024 2000 by Gabriel Lawrence RN  Safety Promotion/Fall Prevention: activity supervised     Problem: Fall Injury Risk  Goal: Absence of Fall and Fall-Related Injury  Intervention: Promote Injury-Free Environment  Recent Flowsheet Documentation  Taken 10/16/2024 2000 by Gabriel Lawrence RN  Safety Promotion/Fall Prevention: activity supervised

## 2024-10-18 NOTE — OUTREACH NOTE
Prep Survey      Flowsheet Row Responses   [facility] patient discharged from? [place]   Is LACE score < 7 ? No   Eligibility Readm Mgmt   Discharge diagnosis Rectal cancer, CONVERTED TO OPEN COLON RESECTION ULTRA LOW ANTERIOR LAPAROSCOPIC WITH LOOP ILEOSTOMY   Does the patient have one of the following disease processes/diagnoses(primary or secondary)? General Surgery   Does the patient have Home health ordered? No   Is there a DME ordered? No   Prep survey completed? Yes            Luba RAMOS - Registered Nurse

## 2024-10-21 ENCOUNTER — READMISSION MANAGEMENT (OUTPATIENT)
Dept: CALL CENTER | Facility: HOSPITAL | Age: 59
End: 2024-10-21
Payer: COMMERCIAL

## 2024-10-21 NOTE — OUTREACH NOTE
General Surgery Week 1 Survey      Flowsheet Row Responses   [facility] patient discharged from? [place]   Does the patient have one of the following disease processes/diagnoses(primary or secondary)? General Surgery   Week 1 attempt successful? Yes   Call start time 1417   Call end time 1419   Discharge diagnosis Rectal cancer, CONVERTED TO OPEN COLON RESECTION ULTRA LOW ANTERIOR LAPAROSCOPIC WITH LOOP ILEOSTOMY   Meds reviewed with patient/caregiver? Yes   Is the patient having any side effects they believe may be caused by any medication additions or changes? No   Does the patient have all medications related to this admission filled (includes all antibiotics, pain medications, etc.) Yes   Is the patient taking all medications as directed (includes completed medication regime)? Yes   Does the patient have a follow up appointment scheduled with their surgeon? Yes   Has the patient kept scheduled appointments due by today? N/A   Has home health visited the patient within 72 hours of discharge? N/A   Psychosocial issues? No   Did the patient receive a copy of their discharge instructions? Yes   Nursing interventions Reviewed instructions with patient   What is the patient's perception of their health status since discharge? Improving   Is the patient /caregiver able to teach back basic post-op care? Drive as instructed by MD in discharge instructions, Take showers only when approved by MD-sponge bathe until then, Lifting as instructed by MD in discharge instructions   Is the patient/caregiver able to teach back signs and symptoms of incisional infection? Increased redness, swelling or pain at the incisonal site, Fever   Is the patient/caregiver able to teach back steps to recovery at home? Set small, achievable goals for return to baseline health, Rest and rebuild strength, gradually increase activity, Make a list of questions for surgeon's appointment   If the patient is a current smoker, are they able to  teach back resources for cessation? [id number]   Is the patient/caregiver able to teach back the hierarchy of who to call/visit for symptoms/problems? PCP, Specialist, Home health nurse, Urgent Care, ED, 911 Yes   Week 1 call completed? Yes   Call end time 1419            Diane RAMOS - Licensed Nurse

## 2024-10-26 ENCOUNTER — HOSPITAL ENCOUNTER (EMERGENCY)
Facility: HOSPITAL | Age: 59
Discharge: OTHER INSTITUTION | End: 2024-10-26
Attending: EMERGENCY MEDICINE
Payer: COMMERCIAL

## 2024-10-26 ENCOUNTER — APPOINTMENT (OUTPATIENT)
Dept: CT IMAGING | Facility: HOSPITAL | Age: 59
End: 2024-10-26
Payer: COMMERCIAL

## 2024-10-26 VITALS
BODY MASS INDEX: 29.51 KG/M2 | HEART RATE: 105 BPM | WEIGHT: 188 LBS | DIASTOLIC BLOOD PRESSURE: 57 MMHG | OXYGEN SATURATION: 94 % | SYSTOLIC BLOOD PRESSURE: 95 MMHG | HEIGHT: 67 IN | RESPIRATION RATE: 20 BRPM | TEMPERATURE: 97.8 F

## 2024-10-26 DIAGNOSIS — N17.9 ACUTE RENAL FAILURE, UNSPECIFIED ACUTE RENAL FAILURE TYPE: Primary | ICD-10-CM

## 2024-10-26 DIAGNOSIS — E87.5 HYPERKALEMIA: ICD-10-CM

## 2024-10-26 LAB
ALBUMIN SERPL-MCNC: 3.9 G/DL (ref 3.5–5.2)
ALBUMIN/GLOB SERPL: 0.8 G/DL
ALP SERPL-CCNC: 127 U/L (ref 39–117)
ALT SERPL W P-5'-P-CCNC: 18 U/L (ref 1–41)
ANION GAP SERPL CALCULATED.3IONS-SCNC: 15.7 MMOL/L (ref 5–15)
ANION GAP SERPL CALCULATED.3IONS-SCNC: 22.6 MMOL/L (ref 5–15)
AST SERPL-CCNC: 18 U/L (ref 1–40)
BASOPHILS # BLD AUTO: 0.07 10*3/MM3 (ref 0–0.2)
BASOPHILS NFR BLD AUTO: 0.6 % (ref 0–1.5)
BILIRUB SERPL-MCNC: 0.3 MG/DL (ref 0–1.2)
BUN SERPL-MCNC: 120 MG/DL (ref 8–23)
BUN SERPL-MCNC: 130 MG/DL (ref 8–23)
BUN/CREAT SERPL: 21.7 (ref 7–25)
BUN/CREAT SERPL: 22.7 (ref 7–25)
CALCIUM SPEC-SCNC: 10.2 MG/DL (ref 8.6–10.5)
CALCIUM SPEC-SCNC: 8.3 MG/DL (ref 8.6–10.5)
CHLORIDE SERPL-SCNC: 107 MMOL/L (ref 98–107)
CHLORIDE SERPL-SCNC: 97 MMOL/L (ref 98–107)
CO2 SERPL-SCNC: 12.3 MMOL/L (ref 22–29)
CO2 SERPL-SCNC: 7.4 MMOL/L (ref 22–29)
CREAT SERPL-MCNC: 5.28 MG/DL (ref 0.76–1.27)
CREAT SERPL-MCNC: 5.98 MG/DL (ref 0.76–1.27)
CRP SERPL-MCNC: 0.42 MG/DL (ref 0–0.5)
D-LACTATE SERPL-SCNC: 1.4 MMOL/L (ref 0.5–2)
DEPRECATED RDW RBC AUTO: 47.7 FL (ref 37–54)
EGFRCR SERPLBLD CKD-EPI 2021: 10 ML/MIN/1.73
EGFRCR SERPLBLD CKD-EPI 2021: 11.6 ML/MIN/1.73
EOSINOPHIL # BLD AUTO: 0.03 10*3/MM3 (ref 0–0.4)
EOSINOPHIL NFR BLD AUTO: 0.3 % (ref 0.3–6.2)
ERYTHROCYTE [DISTWIDTH] IN BLOOD BY AUTOMATED COUNT: 14.6 % (ref 12.3–15.4)
GLOBULIN UR ELPH-MCNC: 4.6 GM/DL
GLUCOSE SERPL-MCNC: 89 MG/DL (ref 65–99)
GLUCOSE SERPL-MCNC: 92 MG/DL (ref 65–99)
HCT VFR BLD AUTO: 45.6 % (ref 37.5–51)
HGB BLD-MCNC: 14 G/DL (ref 13–17.7)
IMM GRANULOCYTES # BLD AUTO: 0.05 10*3/MM3 (ref 0–0.05)
IMM GRANULOCYTES NFR BLD AUTO: 0.5 % (ref 0–0.5)
LYMPHOCYTES # BLD AUTO: 1.02 10*3/MM3 (ref 0.7–3.1)
LYMPHOCYTES NFR BLD AUTO: 9.4 % (ref 19.6–45.3)
MCH RBC QN AUTO: 27.5 PG (ref 26.6–33)
MCHC RBC AUTO-ENTMCNC: 30.7 G/DL (ref 31.5–35.7)
MCV RBC AUTO: 89.4 FL (ref 79–97)
MONOCYTES # BLD AUTO: 0.72 10*3/MM3 (ref 0.1–0.9)
MONOCYTES NFR BLD AUTO: 6.6 % (ref 5–12)
NEUTROPHILS NFR BLD AUTO: 8.97 10*3/MM3 (ref 1.7–7)
NEUTROPHILS NFR BLD AUTO: 82.6 % (ref 42.7–76)
NRBC BLD AUTO-RTO: 0 /100 WBC (ref 0–0.2)
PLATELET # BLD AUTO: 405 10*3/MM3 (ref 140–450)
PMV BLD AUTO: 10 FL (ref 6–12)
POTASSIUM SERPL-SCNC: 5.9 MMOL/L (ref 3.5–5.2)
POTASSIUM SERPL-SCNC: 8 MMOL/L (ref 3.5–5.2)
PROT SERPL-MCNC: 8.5 G/DL (ref 6–8.5)
QT INTERVAL: 362 MS
QTC INTERVAL: 466 MS
RBC # BLD AUTO: 5.1 10*6/MM3 (ref 4.14–5.8)
SODIUM SERPL-SCNC: 127 MMOL/L (ref 136–145)
SODIUM SERPL-SCNC: 135 MMOL/L (ref 136–145)
WBC NRBC COR # BLD AUTO: 10.86 10*3/MM3 (ref 3.4–10.8)

## 2024-10-26 PROCEDURE — 25810000003 SODIUM CHLORIDE 0.9 % SOLUTION: Performed by: NURSE PRACTITIONER

## 2024-10-26 PROCEDURE — 82550 ASSAY OF CK (CPK): CPT

## 2024-10-26 PROCEDURE — 63710000001 INSULIN REGULAR HUMAN PER 5 UNITS: Performed by: NURSE PRACTITIONER

## 2024-10-26 PROCEDURE — 74176 CT ABD & PELVIS W/O CONTRAST: CPT

## 2024-10-26 PROCEDURE — 83605 ASSAY OF LACTIC ACID: CPT | Performed by: NURSE PRACTITIONER

## 2024-10-26 PROCEDURE — 82550 ASSAY OF CK (CPK): CPT | Performed by: STUDENT IN AN ORGANIZED HEALTH CARE EDUCATION/TRAINING PROGRAM

## 2024-10-26 PROCEDURE — 94799 UNLISTED PULMONARY SVC/PX: CPT

## 2024-10-26 PROCEDURE — 96361 HYDRATE IV INFUSION ADD-ON: CPT

## 2024-10-26 PROCEDURE — 93005 ELECTROCARDIOGRAM TRACING: CPT | Performed by: EMERGENCY MEDICINE

## 2024-10-26 PROCEDURE — 96375 TX/PRO/DX INJ NEW DRUG ADDON: CPT

## 2024-10-26 PROCEDURE — 86140 C-REACTIVE PROTEIN: CPT | Performed by: NURSE PRACTITIONER

## 2024-10-26 PROCEDURE — 74176 CT ABD & PELVIS W/O CONTRAST: CPT | Performed by: RADIOLOGY

## 2024-10-26 PROCEDURE — 85025 COMPLETE CBC W/AUTO DIFF WBC: CPT | Performed by: NURSE PRACTITIONER

## 2024-10-26 PROCEDURE — 99285 EMERGENCY DEPT VISIT HI MDM: CPT

## 2024-10-26 PROCEDURE — 80053 COMPREHEN METABOLIC PANEL: CPT | Performed by: NURSE PRACTITIONER

## 2024-10-26 PROCEDURE — 96374 THER/PROPH/DIAG INJ IV PUSH: CPT

## 2024-10-26 PROCEDURE — 25010000002 CALCIUM GLUCONATE-NACL 1-0.675 GM/50ML-% SOLUTION: Performed by: NURSE PRACTITIONER

## 2024-10-26 RX ORDER — SODIUM CHLORIDE 0.9 % (FLUSH) 0.9 %
10 SYRINGE (ML) INJECTION AS NEEDED
Status: DISCONTINUED | OUTPATIENT
Start: 2024-10-26 | End: 2024-10-27 | Stop reason: HOSPADM

## 2024-10-26 RX ORDER — ALBUTEROL SULFATE 5 MG/ML
10 SOLUTION RESPIRATORY (INHALATION) ONCE
Status: COMPLETED | OUTPATIENT
Start: 2024-10-26 | End: 2024-10-26

## 2024-10-26 RX ORDER — CALCIUM GLUCONATE 20 MG/ML
1000 INJECTION, SOLUTION INTRAVENOUS ONCE
Status: COMPLETED | OUTPATIENT
Start: 2024-10-26 | End: 2024-10-26

## 2024-10-26 RX ORDER — DEXTROSE MONOHYDRATE 25 G/50ML
25 INJECTION, SOLUTION INTRAVENOUS ONCE
Status: COMPLETED | OUTPATIENT
Start: 2024-10-26 | End: 2024-10-26

## 2024-10-26 RX ADMIN — CALCIUM GLUCONATE 1000 MG: 20 INJECTION, SOLUTION INTRAVENOUS at 16:57

## 2024-10-26 RX ADMIN — ALBUTEROL SULFATE 10 MG: 2.5 SOLUTION RESPIRATORY (INHALATION) at 16:47

## 2024-10-26 RX ADMIN — SODIUM CHLORIDE 1000 ML: 9 INJECTION, SOLUTION INTRAVENOUS at 17:20

## 2024-10-26 RX ADMIN — SODIUM CHLORIDE 1000 ML: 9 INJECTION, SOLUTION INTRAVENOUS at 16:55

## 2024-10-26 RX ADMIN — SODIUM ZIRCONIUM CYCLOSILICATE 10 G: 10 POWDER, FOR SUSPENSION ORAL at 17:20

## 2024-10-26 RX ADMIN — SODIUM CHLORIDE 1000 ML: 9 INJECTION, SOLUTION INTRAVENOUS at 19:01

## 2024-10-26 RX ADMIN — INSULIN HUMAN 5 UNITS: 100 INJECTION, SOLUTION PARENTERAL at 16:54

## 2024-10-26 RX ADMIN — SODIUM CHLORIDE 500 ML: 9 INJECTION, SOLUTION INTRAVENOUS at 14:52

## 2024-10-26 RX ADMIN — SODIUM BICARBONATE 50 MEQ: 84 INJECTION INTRAVENOUS at 16:56

## 2024-10-26 RX ADMIN — DEXTROSE MONOHYDRATE 25 G: 25 INJECTION, SOLUTION INTRAVENOUS at 16:55

## 2024-10-26 NOTE — ED NOTES
Called lab and spoke with gibran she advised that it may take 45 min for her to come try to stick the pt

## 2024-10-26 NOTE — ED NOTES
Called [facility] for [facility] for a transfer  they are going to call the hospitalist and call us back

## 2024-10-26 NOTE — ED PROVIDER NOTES
Subjective   History of Present Illness  Patient is a 59-year-old male who presents today for vomiting and loose stools in his ileostomy.  Patient reports that he has been unable to keep food down.  Patient reports appetite he thinks about eating he gets sick.  Patient was recently discharged from [facility].  Patient reports that they called his surgeon who is Dr. Hutchins.        Review of Systems   Constitutional: Negative.    HENT: Negative.     Eyes: Negative.    Respiratory: Negative.     Cardiovascular: Negative.    Gastrointestinal: Negative.    Endocrine: Negative.    Genitourinary: Negative.    Musculoskeletal: Negative.    Skin: Negative.    Allergic/Immunologic: Negative.    Neurological: Negative.    Hematological: Negative.    Psychiatric/Behavioral: Negative.         Past Medical History:   Diagnosis Date    Arthritis     Colon cancer     Colon polyp     Diabetes mellitus     Diverticulitis of colon     GERD (gastroesophageal reflux disease)     Gout     Hyperlipidemia     Hypertension     Injury of back     Kidney stone     passed and surgery-   x3 times surgery    Pancreatitis     Tear of right rotator cuff     Wears glasses     readers       No Known Allergies    Past Surgical History:   Procedure Laterality Date    ADENOIDECTOMY      COLON RESECTION WITH ILEOSTOMY N/A 10/10/2024    Procedure: CONVERTED TO OPEN COLON RESECTION ULTRA LOW ANTERIOR LAPAROSCOPIC WITH LOOP ILEOSTOMY;  Surgeon: José Manuel Hutchins MD;  Location: [facility] OR;  Service: Robotics - [employer];  Laterality: N/A;    COLONOSCOPY N/A 05/21/2024    Procedure: COLONOSCOPY;  Surgeon: Rosa Patrick MD;  Location:  [facility] OR;  Service: Gastroenterology;  Laterality: N/A;    CYSTOSCOPY Bilateral 10/10/2024    Procedure: CYSTOSCOPY TEMPORARY PLACEMENT BILATERAL URETERAL CATHETER;  Surgeon: Ke Courtney MD;  Location: [facility] OR;  Service: Robotics - [employer];  Laterality: Bilateral;    CYSTOSCOPY LITHOLAPAXY BLADDER  STONE EXTRACTION N/A 09/28/2022    Procedure: CYSTOSCOPY LASER LITHOLAPAXY BLADDER STONE EXTRACTION;  Surgeon: Mykel Jeffers MD;  Location: [facility];  Service: Urology;  Laterality: N/A;    KIDNEY STONE SURGERY      x3 surgeries-  same stone    SHOULDER ARTHROSCOPY W/ ROTATOR CUFF REPAIR Right 07/19/2022    Procedure: RIGHT SHOULDER ARTHROSCOPY WITH OPEN ACROMIOPLASTY,  ROTATOR CUFF REPAIR, EXCISION LATERAL CLAVICLE;  Surgeon: Edmundo Huitron MD;  Location: [facility];  Service: Orthopedics;  Laterality: Right;    TONSILLECTOMY         Family History   Problem Relation Age of Onset    Diabetes Father     Diabetes Sister        Social History     Socioeconomic History    Marital status:    Tobacco Use    Smoking status: Never    Smokeless tobacco: Current     Types: Snuff   Vaping Use    Vaping status: Never Used   Substance and Sexual Activity    Alcohol use: Not Currently     Comment: occasionally    Drug use: No    Sexual activity: Not Currently     Partners: Female           Objective   Physical Exam  Vitals and nursing note reviewed.   Constitutional:       Appearance: He is well-developed.   HENT:      Head: Normocephalic.      Right Ear: External ear normal.      Left Ear: External ear normal.   Eyes:      Conjunctiva/sclera: Conjunctivae normal.      Pupils: Pupils are equal, round, and reactive to light.   Cardiovascular:      Rate and Rhythm: Normal rate and regular rhythm.      Heart sounds: Normal heart sounds.   Pulmonary:      Effort: Pulmonary effort is normal.      Breath sounds: Normal breath sounds.   Abdominal:      General: Bowel sounds are normal.      Palpations: Abdomen is soft.   Musculoskeletal:         General: Normal range of motion.      Cervical back: Normal range of motion and neck supple.   Skin:     General: Skin is warm and dry.      Capillary Refill: Capillary refill takes less than 2 seconds.   Neurological:      Mental Status: He is alert and oriented to  person, place, and time.   Psychiatric:         Behavior: Behavior normal.         Thought Content: Thought content normal.         Procedures       Results for orders placed or performed during the hospital encounter of 10/26/24   Comprehensive Metabolic Panel    Collection Time: 10/26/24  3:23 PM    Specimen: Blood   Result Value Ref Range    Glucose 92 65 - 99 mg/dL     (H) 8 - 23 mg/dL    Creatinine 5.98 (H) 0.76 - 1.27 mg/dL    Sodium 127 (L) 136 - 145 mmol/L    Potassium 8.0 (C) 3.5 - 5.2 mmol/L    Chloride 97 (L) 98 - 107 mmol/L    CO2 7.4 (C) 22.0 - 29.0 mmol/L    Calcium 10.2 8.6 - 10.5 mg/dL    Total Protein 8.5 6.0 - 8.5 g/dL    Albumin 3.9 3.5 - 5.2 g/dL    ALT (SGPT) 18 1 - 41 U/L    AST (SGOT) 18 1 - 40 U/L    Alkaline Phosphatase 127 (H) 39 - 117 U/L    Total Bilirubin 0.3 0.0 - 1.2 mg/dL    Globulin 4.6 gm/dL    A/G Ratio 0.8 g/dL    BUN/Creatinine Ratio 21.7 7.0 - 25.0    Anion Gap 22.6 (H) 5.0 - 15.0 mmol/L    eGFR 10.0 (L) >60.0 mL/min/1.73   CBC Auto Differential    Collection Time: 10/26/24  3:23 PM    Specimen: Blood   Result Value Ref Range    WBC 10.86 (H) 3.40 - 10.80 10*3/mm3    RBC 5.10 4.14 - 5.80 10*6/mm3    Hemoglobin 14.0 13.0 - 17.7 g/dL    Hematocrit 45.6 37.5 - 51.0 %    MCV 89.4 79.0 - 97.0 fL    MCH 27.5 26.6 - 33.0 pg    MCHC 30.7 (L) 31.5 - 35.7 g/dL    RDW 14.6 12.3 - 15.4 %    RDW-SD 47.7 37.0 - 54.0 fl    MPV 10.0 6.0 - 12.0 fL    Platelets 405 140 - 450 10*3/mm3    Neutrophil % 82.6 (H) 42.7 - 76.0 %    Lymphocyte % 9.4 (L) 19.6 - 45.3 %    Monocyte % 6.6 5.0 - 12.0 %    Eosinophil % 0.3 0.3 - 6.2 %    Basophil % 0.6 0.0 - 1.5 %    Immature Grans % 0.5 0.0 - 0.5 %    Neutrophils, Absolute 8.97 (H) 1.70 - 7.00 10*3/mm3    Lymphocytes, Absolute 1.02 0.70 - 3.10 10*3/mm3    Monocytes, Absolute 0.72 0.10 - 0.90 10*3/mm3    Eosinophils, Absolute 0.03 0.00 - 0.40 10*3/mm3    Basophils, Absolute 0.07 0.00 - 0.20 10*3/mm3    Immature Grans, Absolute 0.05 0.00 - 0.05 10*3/mm3     nRBC 0.0 0.0 - 0.2 /100 WBC   C-reactive Protein    Collection Time: 10/26/24  3:23 PM    Specimen: Blood   Result Value Ref Range    C-Reactive Protein 0.42 0.00 - 0.50 mg/dL   ECG 12 Lead Electrolyte Imbalance    Collection Time: 10/26/24  4:26 PM   Result Value Ref Range    QT Interval 362 ms    QTC Interval 466 ms   Lactic Acid, Plasma    Collection Time: 10/26/24  5:13 PM    Specimen: Arm, Left; Blood   Result Value Ref Range    Lactate 1.4 0.5 - 2.0 mmol/L   Basic Metabolic Panel    Collection Time: 10/26/24  7:35 PM    Specimen: Arm, Left; Blood   Result Value Ref Range    Glucose 89 65 - 99 mg/dL     (H) 8 - 23 mg/dL    Creatinine 5.28 (H) 0.76 - 1.27 mg/dL    Sodium 135 (L) 136 - 145 mmol/L    Potassium 5.9 (H) 3.5 - 5.2 mmol/L    Chloride 107 98 - 107 mmol/L    CO2 12.3 (L) 22.0 - 29.0 mmol/L    Calcium 8.3 (L) 8.6 - 10.5 mg/dL    BUN/Creatinine Ratio 22.7 7.0 - 25.0    Anion Gap 15.7 (H) 5.0 - 15.0 mmol/L    eGFR 11.6 (L) >60.0 mL/min/1.73     CT Abdomen Pelvis Without Contrast   Final Result   No acute process noted in the abdomen pelvis.   Cholelithiasis without associated acute process.   Previous colonic surgery probably related to partial or total   colectomy..                   [id number]   Zip code [id number]                       This report was finalized on 10/26/2024 5:19 PM by Dr. Ghada Jimenez MD.                  ED Course  ED Course as of 10/26/24 2208   Sat Oct 26, 2024   1626 EKG reveals sinus rhythm at a rate of 100.  GA interval 168.  QTc 466.  Peaked T waves.  No ST elevation. Electronically signed by Juancarlos Davis MD, 10/26/24, 4:27 PM EDT.  [MD]   1801 CT Abdomen Pelvis Without Contrast [EA]   2026 Patient was previously accepted to [facility] but we are awaiting getting the potassium under 6.5. [ANKUR]      ED Course User Index  [EA] Kacy Macdonald MD  [ANKUR] Ayaz Julien APRN  [MD] Juancarlos Davis MD                                               Medical  Decision Making  MDM:    Escalation of care including admission/observation considered    - Discussions of management with other providers:  None and Hospitalist    - Discussed/reviewed with Radiology regarding test interpretation    - Independent interpretation: None    - Additional patient history obtained from: None and [facility]    - Review of external non-ED record (if available):  Prior Inpt record, Office record, Outpt record, Prior Outpt labs, PCP record, Outside ED record, Other    - Chronic conditions affecting care: See HPI and medical Hx.    - Social Determinants of health significantly affecting care:  None        Medical Decision Making Discussion:    Patient is a 59-year-old male who presents today for vomiting and loose stools in his ileostomy.  Patient reports that he has been unable to keep food down.  Patient reports appetite he thinks about eating he gets sick.  Patient was recently discharged from [facility].  Patient reports that they called his surgeon who is Dr. Hutchins.    Patient was accepted for transfer to [facility] pending potassium decreasing under 6.5       The patient has been given very strict return precautions to return to the emergency department should there be any acute change or worsening of their condition.  I have explained my findings and the patient has expressed understanding to me.  I explained that the work-up performed in the ED has been based on the specific complaint and concern, as the nature of emergency medicine is complaint driven and they understand that new symptoms may arise.  I have told them that, should there be any new symptoms, worsening or changing symptoms, a new work-up may be indicated that they are encouraged to return to the emergency department or promptly contact their primary care physician. We have employed a shared decision-making process as the discussion of their disposition.  The patient has been educated as to the  nature of the visit, the tests and work-up performed and the findings from today's visit. At this time, there does not appear to be any acute emergent process that necessitates admission to the hospital, however, the patient understands that this can change unexpectedly. At this time, the patient is stable for discharge home and agrees to follow-up with her primary care physician in the next 24 to 48 hours or earlier should they be able to obtain an appointment.    The patient was counseled regarding diagnostic results and treatment plan and patient has indicated understanding of these instructions.      Problems Addressed:  Acute renal failure, unspecified acute renal failure type: complicated acute illness or injury  Hyperkalemia: complicated acute illness or injury    Amount and/or Complexity of Data Reviewed  Labs: ordered. Decision-making details documented in ED Course.  Radiology: ordered. Decision-making details documented in ED Course.  ECG/medicine tests: ordered. Decision-making details documented in ED Course.    Risk  OTC drugs.  Prescription drug management.        Final diagnoses:   Acute renal failure, unspecified acute renal failure type   Hyperkalemia       ED Disposition  ED Disposition       ED Disposition   Transfer to Another Facility     Condition   --    Comment   --               No follow-up provider specified.       Medication List      No changes were made to your prescriptions during this visit.            Ayaz Julien, APRN  10/26/24 2179

## 2024-10-27 ENCOUNTER — READMISSION MANAGEMENT (OUTPATIENT)
Dept: CALL CENTER | Facility: HOSPITAL | Age: 59
End: 2024-10-27
Payer: COMMERCIAL

## 2024-10-27 ENCOUNTER — HOSPITAL ENCOUNTER (INPATIENT)
Facility: HOSPITAL | Age: 59
LOS: 3 days | Discharge: HOME | End: 2024-10-30
Attending: INTERNAL MEDICINE | Admitting: INTERNAL MEDICINE
Payer: COMMERCIAL

## 2024-10-27 DIAGNOSIS — Z90.49 S/P COLECTOMY: Primary | ICD-10-CM

## 2024-10-27 PROBLEM — N17.9 AKI (ACUTE KIDNEY INJURY): Status: ACTIVE | Noted: 2024-10-27

## 2024-10-27 LAB
ANION GAP SERPL CALCULATED.3IONS-SCNC: 10 MMOL/L (ref 5–15)
ANION GAP SERPL CALCULATED.3IONS-SCNC: 10 MMOL/L (ref 5–15)
ANION GAP SERPL CALCULATED.3IONS-SCNC: 14 MMOL/L (ref 5–15)
ARTERIAL PATENCY WRIST A: POSITIVE
ATMOSPHERIC PRESS: ABNORMAL MM[HG]
BASE EXCESS BLDA CALC-SCNC: -11.5 MMOL/L (ref 0–2)
BDY SITE: ABNORMAL
BODY TEMPERATURE: 37
BUN SERPL-MCNC: 116 MG/DL (ref 8–23)
BUN SERPL-MCNC: 80 MG/DL (ref 8–23)
BUN SERPL-MCNC: 95 MG/DL (ref 8–23)
BUN/CREAT SERPL: 25.2 (ref 7–25)
BUN/CREAT SERPL: 29.4 (ref 7–25)
BUN/CREAT SERPL: 34.5 (ref 7–25)
CALCIUM SPEC-SCNC: 8.6 MG/DL (ref 8.6–10.5)
CALCIUM SPEC-SCNC: 9.1 MG/DL (ref 8.6–10.5)
CALCIUM SPEC-SCNC: 9.4 MG/DL (ref 8.6–10.5)
CHLORIDE SERPL-SCNC: 105 MMOL/L (ref 98–107)
CHLORIDE SERPL-SCNC: 106 MMOL/L (ref 98–107)
CHLORIDE SERPL-SCNC: 108 MMOL/L (ref 98–107)
CK SERPL-CCNC: 28 U/L (ref 20–200)
CO2 BLDA-SCNC: 15.4 MMOL/L (ref 22–33)
CO2 SERPL-SCNC: 16 MMOL/L (ref 22–29)
CO2 SERPL-SCNC: 19 MMOL/L (ref 22–29)
CO2 SERPL-SCNC: 20 MMOL/L (ref 22–29)
COHGB MFR BLD: 1.1 % (ref 0–2)
CREAT SERPL-MCNC: 2.32 MG/DL (ref 0.76–1.27)
CREAT SERPL-MCNC: 3.23 MG/DL (ref 0.76–1.27)
CREAT SERPL-MCNC: 4.61 MG/DL (ref 0.76–1.27)
CREAT UR-MCNC: 57.5 MG/DL
EGFRCR SERPLBLD CKD-EPI 2021: 13.7 ML/MIN/1.73
EGFRCR SERPLBLD CKD-EPI 2021: 21 ML/MIN/1.73
EGFRCR SERPLBLD CKD-EPI 2021: 31.2 ML/MIN/1.73
EPAP: 0
GLUCOSE BLDC GLUCOMTR-MCNC: 129 MG/DL (ref 70–130)
GLUCOSE BLDC GLUCOMTR-MCNC: 140 MG/DL (ref 70–130)
GLUCOSE BLDC GLUCOMTR-MCNC: 94 MG/DL (ref 70–130)
GLUCOSE BLDC GLUCOMTR-MCNC: 96 MG/DL (ref 70–130)
GLUCOSE SERPL-MCNC: 191 MG/DL (ref 65–99)
GLUCOSE SERPL-MCNC: 287 MG/DL (ref 65–99)
GLUCOSE SERPL-MCNC: 81 MG/DL (ref 65–99)
HCO3 BLDA-SCNC: 14.4 MMOL/L (ref 20–26)
HCT VFR BLD CALC: 35.7 % (ref 38–51)
HGB BLDA-MCNC: 11.6 G/DL (ref 13.5–17.5)
INHALED O2 CONCENTRATION: 21 %
IPAP: 0
METHGB BLD QL: 0.1 % (ref 0–1.5)
MODALITY: ABNORMAL
OXYHGB MFR BLDV: 93.7 % (ref 94–99)
PAW @ PEAK INSP FLOW SETTING VENT: 0 CMH2O
PCO2 BLDA: 31.9 MM HG (ref 35–45)
PCO2 TEMP ADJ BLD: 31.9 MM HG (ref 35–48)
PH BLDA: 7.26 PH UNITS (ref 7.35–7.45)
PH, TEMP CORRECTED: 7.26 PH UNITS
PO2 BLDA: 76.6 MM HG (ref 83–108)
PO2 TEMP ADJ BLD: 76.6 MM HG (ref 83–108)
POTASSIUM SERPL-SCNC: 5.2 MMOL/L (ref 3.5–5.2)
POTASSIUM SERPL-SCNC: 5.9 MMOL/L (ref 3.5–5.2)
POTASSIUM SERPL-SCNC: 6 MMOL/L (ref 3.5–5.2)
PROT ?TM UR-MCNC: 6.2 MG/DL
SODIUM SERPL-SCNC: 135 MMOL/L (ref 136–145)
SODIUM SERPL-SCNC: 135 MMOL/L (ref 136–145)
SODIUM SERPL-SCNC: 138 MMOL/L (ref 136–145)
TOTAL RATE: 0 BREATHS/MINUTE
UUN 24H UR-MCNC: 398 MG/DL

## 2024-10-27 PROCEDURE — 83050 HGB METHEMOGLOBIN QUAN: CPT

## 2024-10-27 PROCEDURE — 25810000003 SODIUM CHLORIDE 0.9 % SOLUTION: Performed by: STUDENT IN AN ORGANIZED HEALTH CARE EDUCATION/TRAINING PROGRAM

## 2024-10-27 PROCEDURE — 99223 1ST HOSP IP/OBS HIGH 75: CPT | Performed by: STUDENT IN AN ORGANIZED HEALTH CARE EDUCATION/TRAINING PROGRAM

## 2024-10-27 PROCEDURE — 84540 ASSAY OF URINE/UREA-N: CPT | Performed by: STUDENT IN AN ORGANIZED HEALTH CARE EDUCATION/TRAINING PROGRAM

## 2024-10-27 PROCEDURE — 63710000001 INSULIN REGULAR HUMAN PER 5 UNITS: Performed by: INTERNAL MEDICINE

## 2024-10-27 PROCEDURE — 82375 ASSAY CARBOXYHB QUANT: CPT

## 2024-10-27 PROCEDURE — 36600 WITHDRAWAL OF ARTERIAL BLOOD: CPT

## 2024-10-27 PROCEDURE — 80048 BASIC METABOLIC PNL TOTAL CA: CPT | Performed by: STUDENT IN AN ORGANIZED HEALTH CARE EDUCATION/TRAINING PROGRAM

## 2024-10-27 PROCEDURE — 82948 REAGENT STRIP/BLOOD GLUCOSE: CPT

## 2024-10-27 PROCEDURE — 82805 BLOOD GASES W/O2 SATURATION: CPT

## 2024-10-27 PROCEDURE — 82570 ASSAY OF URINE CREATININE: CPT | Performed by: STUDENT IN AN ORGANIZED HEALTH CARE EDUCATION/TRAINING PROGRAM

## 2024-10-27 PROCEDURE — 80048 BASIC METABOLIC PNL TOTAL CA: CPT | Performed by: INTERNAL MEDICINE

## 2024-10-27 PROCEDURE — 84156 ASSAY OF PROTEIN URINE: CPT | Performed by: STUDENT IN AN ORGANIZED HEALTH CARE EDUCATION/TRAINING PROGRAM

## 2024-10-27 RX ORDER — ROSUVASTATIN CALCIUM 10 MG/1
10 TABLET, COATED ORAL NIGHTLY
Status: DISCONTINUED | OUTPATIENT
Start: 2024-10-27 | End: 2024-10-30 | Stop reason: HOSPADM

## 2024-10-27 RX ORDER — ASPIRIN 81 MG/1
81 TABLET ORAL DAILY
Status: DISCONTINUED | OUTPATIENT
Start: 2024-10-27 | End: 2024-10-30 | Stop reason: HOSPADM

## 2024-10-27 RX ORDER — GABAPENTIN 300 MG/1
600 CAPSULE ORAL DAILY
Status: DISCONTINUED | OUTPATIENT
Start: 2024-10-27 | End: 2024-10-30 | Stop reason: HOSPADM

## 2024-10-27 RX ORDER — DEXTROSE MONOHYDRATE 25 G/50ML
25 INJECTION, SOLUTION INTRAVENOUS ONCE
Status: COMPLETED | OUTPATIENT
Start: 2024-10-27 | End: 2024-10-27

## 2024-10-27 RX ORDER — GLIPIZIDE 5 MG/1
5 TABLET ORAL
Status: DISCONTINUED | OUTPATIENT
Start: 2024-10-27 | End: 2024-10-30 | Stop reason: HOSPADM

## 2024-10-27 RX ORDER — ACETAMINOPHEN 325 MG/1
650 TABLET ORAL EVERY 4 HOURS PRN
Status: DISCONTINUED | OUTPATIENT
Start: 2024-10-27 | End: 2024-10-30 | Stop reason: HOSPADM

## 2024-10-27 RX ORDER — HYDROCODONE BITARTRATE AND ACETAMINOPHEN 7.5; 325 MG/1; MG/1
1 TABLET ORAL EVERY 6 HOURS PRN
Status: DISCONTINUED | OUTPATIENT
Start: 2024-10-27 | End: 2024-10-30 | Stop reason: HOSPADM

## 2024-10-27 RX ORDER — LOPERAMIDE HYDROCHLORIDE 2 MG/1
2 CAPSULE ORAL 4 TIMES DAILY PRN
Status: DISCONTINUED | OUTPATIENT
Start: 2024-10-27 | End: 2024-10-27

## 2024-10-27 RX ORDER — SODIUM CHLORIDE 0.9 % (FLUSH) 0.9 %
10 SYRINGE (ML) INJECTION AS NEEDED
Status: DISCONTINUED | OUTPATIENT
Start: 2024-10-27 | End: 2024-10-30 | Stop reason: HOSPADM

## 2024-10-27 RX ORDER — METOPROLOL SUCCINATE 25 MG/1
25 TABLET, EXTENDED RELEASE ORAL DAILY
Status: DISCONTINUED | OUTPATIENT
Start: 2024-10-27 | End: 2024-10-30 | Stop reason: HOSPADM

## 2024-10-27 RX ORDER — ACETAMINOPHEN 650 MG/1
650 SUPPOSITORY RECTAL EVERY 4 HOURS PRN
Status: DISCONTINUED | OUTPATIENT
Start: 2024-10-27 | End: 2024-10-30 | Stop reason: HOSPADM

## 2024-10-27 RX ORDER — LISINOPRIL 10 MG/1
10 TABLET ORAL DAILY
Status: DISCONTINUED | OUTPATIENT
Start: 2024-10-27 | End: 2024-10-30 | Stop reason: HOSPADM

## 2024-10-27 RX ORDER — TAMSULOSIN HYDROCHLORIDE 0.4 MG/1
0.4 CAPSULE ORAL NIGHTLY
Status: DISCONTINUED | OUTPATIENT
Start: 2024-10-27 | End: 2024-10-30 | Stop reason: HOSPADM

## 2024-10-27 RX ORDER — ACETAMINOPHEN 160 MG/5ML
650 SOLUTION ORAL EVERY 4 HOURS PRN
Status: DISCONTINUED | OUTPATIENT
Start: 2024-10-27 | End: 2024-10-30 | Stop reason: HOSPADM

## 2024-10-27 RX ORDER — LOPERAMIDE HYDROCHLORIDE 2 MG/1
2 CAPSULE ORAL 3 TIMES DAILY
Status: DISCONTINUED | OUTPATIENT
Start: 2024-10-27 | End: 2024-10-30 | Stop reason: HOSPADM

## 2024-10-27 RX ORDER — SODIUM CHLORIDE 0.9 % (FLUSH) 0.9 %
10 SYRINGE (ML) INJECTION EVERY 12 HOURS SCHEDULED
Status: DISCONTINUED | OUTPATIENT
Start: 2024-10-27 | End: 2024-10-30 | Stop reason: HOSPADM

## 2024-10-27 RX ORDER — PANTOPRAZOLE SODIUM 40 MG/1
40 TABLET, DELAYED RELEASE ORAL
Status: DISCONTINUED | OUTPATIENT
Start: 2024-10-27 | End: 2024-10-30 | Stop reason: HOSPADM

## 2024-10-27 RX ADMIN — LOPERAMIDE HYDROCHLORIDE 2 MG: 2 CAPSULE ORAL at 14:05

## 2024-10-27 RX ADMIN — SODIUM BICARBONATE 75 MEQ: 84 INJECTION, SOLUTION INTRAVENOUS at 17:35

## 2024-10-27 RX ADMIN — Medication 10 ML: at 21:07

## 2024-10-27 RX ADMIN — Medication 10 ML: at 08:15

## 2024-10-27 RX ADMIN — INSULIN HUMAN 8 UNITS: 100 INJECTION, SOLUTION PARENTERAL at 12:55

## 2024-10-27 RX ADMIN — SODIUM ZIRCONIUM CYCLOSILICATE 15 G: 10 POWDER, FOR SUSPENSION ORAL at 12:58

## 2024-10-27 RX ADMIN — HYDROCODONE BITARTRATE AND ACETAMINOPHEN 1 TABLET: 7.5; 325 TABLET ORAL at 19:51

## 2024-10-27 RX ADMIN — SODIUM CHLORIDE 1000 ML: 9 INJECTION, SOLUTION INTRAVENOUS at 05:52

## 2024-10-27 RX ADMIN — Medication 10 ML: at 19:51

## 2024-10-27 RX ADMIN — SODIUM BICARBONATE 75 MEQ: 84 INJECTION, SOLUTION INTRAVENOUS at 03:07

## 2024-10-27 RX ADMIN — LOPERAMIDE HYDROCHLORIDE 2 MG: 2 CAPSULE ORAL at 19:51

## 2024-10-27 RX ADMIN — DEXTROSE MONOHYDRATE 25 ML: 25 INJECTION, SOLUTION INTRAVENOUS at 12:56

## 2024-10-27 NOTE — CONSULTS
Valentin áVsquez  [id number]  [id number]  1965    Patient Care Team:  Tristen Stuart DO as PCP - General (Family Medicine)  Virginia Brown RN as Nurse Navigator  Cong Cartagena MD as Consulting Physician (Hospitalist)    Consulting Provider AGATHA Byrne    Reason for Consult postop readmission    Subjective     This is a 59-year-old male who is a patient of my partners Dr. Mccoy who carries a history of rectal cancer.  He underwent a low anterior resection with diverting loop ileostomy and appendectomy performed on 10/10/2024.  Sounds like he had postoperative ileus even with his ileostomy which required additional length in the hospital.  He is subsequently discharged.    They called me yesterday and stated that he had had multiple days of nausea vomiting with inability to tolerate any oral intake as well as high ileostomy output.  He was dizzy.  I informed them to go to the local ER.  That was [facility].  Upon arrival at [facility] he was noted to have significant DAJA with a creatinine of 5.98 potassium of 8 and a CO2 of 7.4.  He was given protective measures and large-volume substation was transferred here overnight.  His labs this morning show that his CO2 has increased to 16, his potassium down to 5.9 and his creatinine down to 4.61.  Overall he feels significantly better.  Since being here he is only had 250 mL out.    Review of Systems   Pertinent items are noted in HPI, all other systems reviewed and negative. Specifically, there is no F/C/N/V/NSAID abuse, recent abx, new/unusual HA or visual disturbances, CP/SOB. Limb swelling, gait disturbance, new rashes or arthritis.       History  Past Medical History:   Diagnosis Date    Arthritis     Colon cancer     Colon polyp     Diabetes mellitus     Diverticulitis of colon     GERD (gastroesophageal reflux disease)     Gout     Hyperlipidemia     Hypertension     Injury of back     Kidney stone     passed and surgery-   x3 times  surgery    Pancreatitis     Tear of right rotator cuff     Wears glasses     readers     Past Surgical History:   Procedure Laterality Date    ADENOIDECTOMY      COLON RESECTION WITH ILEOSTOMY N/A 10/10/2024    Procedure: CONVERTED TO OPEN COLON RESECTION ULTRA LOW ANTERIOR LAPAROSCOPIC WITH LOOP ILEOSTOMY;  Surgeon: José Manuel Hutchins MD;  Location:  [facility];  Service: Robotics - [facility];  Laterality: N/A;    COLONOSCOPY N/A 05/21/2024    Procedure: COLONOSCOPY;  Surgeon: Rosa Patrick MD;  Location:  [facility] OR;  Service: Gastroenterology;  Laterality: N/A;    CYSTOSCOPY Bilateral 10/10/2024    Procedure: CYSTOSCOPY TEMPORARY PLACEMENT BILATERAL URETERAL CATHETER;  Surgeon: Ke Courtney MD;  Location:  [facility] OR;  Service: Robotics - [facility];  Laterality: Bilateral;    CYSTOSCOPY LITHOLAPAXY BLADDER STONE EXTRACTION N/A 09/28/2022    Procedure: CYSTOSCOPY LASER LITHOLAPAXY BLADDER STONE EXTRACTION;  Surgeon: Mykel Jeffers MD;  Location:  [facility] OR;  Service: Urology;  Laterality: N/A;    KIDNEY STONE SURGERY      x3 surgeries-  same stone    SHOULDER ARTHROSCOPY W/ ROTATOR CUFF REPAIR Right 07/19/2022    Procedure: RIGHT SHOULDER ARTHROSCOPY WITH OPEN ACROMIOPLASTY,  ROTATOR CUFF REPAIR, EXCISION LATERAL CLAVICLE;  Surgeon: Edmundo Huitron MD;  Location: [facility] OR;  Service: Orthopedics;  Laterality: Right;    TONSILLECTOMY       Family History   Problem Relation Age of Onset    Diabetes Father     Diabetes Sister      Social History     Tobacco Use    Smoking status: Never    Smokeless tobacco: Current     Types: Snuff   Vaping Use    Vaping status: Never Used   Substance Use Topics    Alcohol use: Not Currently     Comment: occasionally    Drug use: No     Medications Prior to Admission   Medication Sig Dispense Refill Last Dose/Taking    amoxicillin-clavulanate (AUGMENTIN) 875-125 MG per tablet Take 1 tablet by mouth 2 (Two) Times a Day. 10 tablet 0 10/26/2024    Aspirin Low Dose 81  MG EC tablet Take 1 tablet by mouth Daily.   10/26/2024    Continuous Glucose Sensor (FreeStyle Shilpa 2 Sensor) misc Use. Possible left side- pt might change 8th   10/26/2024    dapagliflozin Propanediol (Farxiga) 10 MG tablet Take 10 mg by mouth Daily.   10/26/2024    fenofibrate (TRICOR) 145 MG tablet Take 1 tablet by mouth Daily.   10/26/2024    gabapentin (NEURONTIN) 600 MG tablet Take 1 tablet by mouth 3 (Three) Times a Day.   10/26/2024    glipizide (GLUCOTROL XL) 10 MG 24 hr tablet Take 1 tablet by mouth Daily With Breakfast.   10/26/2024    HYDROcodone-acetaminophen (NORCO) 7.5-325 MG per tablet Take 1 tablet by mouth Every 6 (Six) Hours As Needed for Moderate Pain . 30 tablet 0 10/26/2024    Insulin Lispro, 1 Unit Dial, (HUMALOG) 100 UNIT/ML solution pen-injector Inject 5 Units under the skin into the appropriate area as directed 3 (Three) Times a Day Before Meals.   10/26/2024    lisinopril (PRINIVIL,ZESTRIL) 10 MG tablet Take 1 tablet by mouth Daily.   10/26/2024    loperamide (Imodium A-D) 2 MG tablet Take 1 tablet by mouth 4 (Four) Times a Day As Needed for Diarrhea. 180 tablet 0 10/26/2024    metFORMIN (GLUCOPHAGE) 1000 MG tablet Take 1 tablet by mouth 2 (Two) Times a Day With Meals.   10/26/2024    metoprolol succinate XL (TOPROL XL) 25 MG 24 hr tablet Take 1 tablet by mouth Daily. 30 tablet 0 10/26/2024    omeprazole (priLOSEC) 40 MG capsule Take 1 capsule by mouth Daily.   10/26/2024    rosuvastatin (CRESTOR) 10 MG tablet Take 1 tablet by mouth Every Night.   10/26/2024    tamsulosin (FLOMAX) 0.4 MG capsule 24 hr capsule TAKE 1 CAPSULE BY MOUTH EVERY NIGHT 90 capsule 3 10/26/2024    Tresiba FlexTouch 200 UNIT/ML solution pen-injector pen injection Inject  under the skin into the appropriate area as directed Take As Directed. 64 units in am and 60 units at night   10/26/2024     Allergies:  Patient has no known allergies.    Objective     Vital Signs  Blood pressure 99/64, pulse 99, temperature 96.6  °F (35.9 °C), temperature source Oral, resp. rate 18, weight 83.5 kg (184 lb), SpO2 95%.  I/O last 3 completed shifts:  In: 1000 [I.V.:1000]  Out: 1350 [Urine:1100; Stool:250]    Physical Exam:  General Appearance: Alert and Oriented  Head: normocephalic, atraumatic  Eyes: MICHELLE  Neck: trachea midline  Lungs: nonlabored respirations  Heart: regular rhythm & normal rate    Rectal:  Deferred    Abdomen: Soft, nontender, nondistended.  Incision is clean dry and intact.  Ileostomy is pink patent and productive with thin output.    Skin: no bruising or rash  Neurologic: Cranial Nerves grossly intact   Psych: normal      Results Review:   LABS  Results from last 7 days   Lab Units 10/26/24  1523   WBC 10*3/mm3 10.86*   HEMOGLOBIN g/dL 14.0   HEMATOCRIT % 45.6   PLATELETS 10*3/mm3 405     Results from last 7 days   Lab Units 10/27/24  0419 10/26/24  1935 10/26/24  1523   SODIUM mmol/L 135* 135* 127*   POTASSIUM mmol/L 5.9* 5.9* 8.0*   CHLORIDE mmol/L 105 107 97*   CO2 mmol/L 16.0* 12.3* 7.4*   BUN mg/dL 116* 120* 130*   CREATININE mg/dL 4.61* 5.28* 5.98*   GLUCOSE mg/dL 81 89 92   CALCIUM mg/dL 9.1 8.3* 10.2       IMAGING  Imaging Results (Last 72 Hours)       ** No results found for the last 72 hours. **          Able to personally review his CT scan from 10/26/2024 which shows no evidence of pneumoperitoneum.  Intact low coloanal anastomosis.  Diverting loop ileostomy in place.  No abscess.   I reviewed the patient's new clinical results.    Assessment & Plan       DAJA (acute kidney injury)      59-year-old male postop day 17 from low anterior resection, coloanal anastomosis and diverting loop ileostomy he Jose presents with high ileostomy output with DAJA and subsequent electrolyte abnormalities.    Plan  Large-volume resuscitation  Initiate Imodium to decrease his ileostomy output  He will need repeat aggressive education to prevent any second readmission  Supportive measures  He may have a renal diet    I discussed the  patients findings and my recommendations with patient and primary care team.     Poncho Rudolph MD  10/27/24  12:10 EDT    Time: More than 50% of time spent in counseling and coordination of care:  Total face-to-face/floor time 25 min.  Time spent in counseling 15 min. Counseling included the following topics: Ileostomy and high ileostomy output.

## 2024-10-27 NOTE — PLAN OF CARE
Problem: Adult Inpatient Plan of Care  Goal: Optimal Comfort and Wellbeing  Intervention: Provide Person-Centered Care  Recent Flowsheet Documentation  Taken 10/27/2024 0000 by Hillary Holland RN  Trust Relationship/Rapport:   care explained   reassurance provided   thoughts/feelings acknowledged   Goal Outcome Evaluation:

## 2024-10-27 NOTE — ED NOTES
Called [facility] dispatch and requested ALS transport to [facility]. She is going to have the [facility] call me.

## 2024-10-27 NOTE — H&P
[facility] Medicine Services  HISTORY AND PHYSICAL    Patient Name: Valentin Vásquez  : 1965  MRN: [id number]  Primary Care Physician: Tristen Stuart DO  Date of admission: 10/27/2024      Subjective   Subjective     Note: this patient was seen by Dr. VELEZ last admission but was accepted as transfer by prior physician on call, I am seeing patient to provide acute management but would be happy to transfer care back to appropriate primary service in the morning.     Chief Complaint:  High ostomy output    HPI:  Valentin Vásquez is a 59 y.o. male with PMH T2DM, GERD, colon cancer who underwent colectomy, incarcerated hernia repair, diverting loop ileostomy on 10/10 as well as bilateral ureteral stent for obstructive uropathy.  His course was complicated by postop ileus and ultimately recovered well, having ureteral stents removed prior to discharge.  He was discharged on 10/17 and reports that since he has been out of the hospital he has had ongoing high ostomy output.  Every time he eats he feels his ostomy bag several times about an hour later with soft to watery stool.  He had called his colorectal surgeon who had instructed him to take Imodium over-the-counter.  No specific dietary triggers noted.  He has no abdominal pain or discomfort.  No fevers or chills.  No vomiting.  He was initially seen at [facility] and transferred here for higher level of care due to significantly decreased renal function.  He is currently asymptomatic.      Personal History     Past Medical History:   Diagnosis Date    Arthritis     Colon cancer     Colon polyp     Diabetes mellitus     Diverticulitis of colon     GERD (gastroesophageal reflux disease)     Gout     Hyperlipidemia     Hypertension     Injury of back     Kidney stone     passed and surgery-   x3 times surgery    Pancreatitis     Tear of right rotator cuff     Wears glasses     readers         Oncology Problem List:  Rectal  cancer (10/10/2024; Status: Active)    Oncology/Hematology History     Past Surgical History:   Procedure Laterality Date    ADENOIDECTOMY      COLON RESECTION WITH ILEOSTOMY N/A 10/10/2024    Procedure: CONVERTED TO OPEN COLON RESECTION ULTRA LOW ANTERIOR LAPAROSCOPIC WITH LOOP ILEOSTOMY;  Surgeon: José Manuel Hutchins MD;  Location: [facility] OR;  Service: Robotics - DaVinci;  Laterality: N/A;    COLONOSCOPY N/A 05/21/2024    Procedure: COLONOSCOPY;  Surgeon: Rosa Patrick MD;  Location:  [facility] OR;  Service: Gastroenterology;  Laterality: N/A;    CYSTOSCOPY Bilateral 10/10/2024    Procedure: CYSTOSCOPY TEMPORARY PLACEMENT BILATERAL URETERAL CATHETER;  Surgeon: Ke Courtney MD;  Location:  [facility] OR;  Service: Robotics - DaVinci;  Laterality: Bilateral;    CYSTOSCOPY LITHOLAPAXY BLADDER STONE EXTRACTION N/A 09/28/2022    Procedure: CYSTOSCOPY LASER LITHOLAPAXY BLADDER STONE EXTRACTION;  Surgeon: Mykel Jeffers MD;  Location: [facility] OR;  Service: Urology;  Laterality: N/A;    KIDNEY STONE SURGERY      x3 surgeries-  same stone    SHOULDER ARTHROSCOPY W/ ROTATOR CUFF REPAIR Right 07/19/2022    Procedure: RIGHT SHOULDER ARTHROSCOPY WITH OPEN ACROMIOPLASTY,  ROTATOR CUFF REPAIR, EXCISION LATERAL CLAVICLE;  Surgeon: Edmundo Huitron MD;  Location: [facility] OR;  Service: Orthopedics;  Laterality: Right;    TONSILLECTOMY         Family History: family history includes Diabetes in his father and sister.     Social History:  reports that he has never smoked. His smokeless tobacco use includes snuff. He reports that he does not currently use alcohol. He reports that he does not use drugs.  Social History     Social History Narrative    Not on file       Medications:  Available home medication information reviewed.  FreeStyle Shilpa 2 Sensor, HYDROcodone-acetaminophen, Insulin Degludec, Insulin Lispro (1 Unit Dial), amoxicillin-clavulanate, aspirin, dapagliflozin Propanediol, fenofibrate, gabapentin,  glipizide, lisinopril, loperamide, metFORMIN, metoprolol succinate XL, omeprazole, rosuvastatin, and tamsulosin    No Known Allergies    Objective   Objective     Vital Signs:   Temp:  [97.6 °F (36.4 °C)] 97.6 °F (36.4 °C)  Heart Rate:  [91] 91  Resp:  [17] 17  BP: (92)/(51) 92/51       Physical Exam   Awake alert oriented x 3  Resting comfortably in bed  Nondiaphoretic, nonicteric  Mucous membranes moist  Heart regular rate and rhythm  Lungs clear to auscultation bilaterally  Abdomen soft with mild tenderness to deep palpation in the right upper quadrant without rebound guarding or rigidity, midline incision is intact without purulence or drainage.  Staples are in place.  There is no asterixis  There is no peripheral edema    Result Review:  I have personally reviewed the results from the time of this admission to 10/27/2024 05:03 EDT and agree with these findings:  [x]  Laboratory list / accordion  []  Microbiology  [x]  Radiology  [x]  EKG/Telemetry   []  Cardiology/Vascular   []  Pathology  [x]  Old records  []  Other:  Most notable findings include: See assessment and plan      LAB RESULTS:      Lab 10/26/24  1713 10/26/24  1523   WBC  --  10.86*   HEMOGLOBIN  --  14.0   HEMATOCRIT  --  45.6   PLATELETS  --  405   NEUTROS ABS  --  8.97*   IMMATURE GRANS (ABS)  --  0.05   LYMPHS ABS  --  1.02   MONOS ABS  --  0.72   EOS ABS  --  0.03   MCV  --  89.4   CRP  --  0.42   LACTATE 1.4  --          Lab 10/26/24  1935 10/26/24  1523   SODIUM 135* 127*   POTASSIUM 5.9* 8.0*   CHLORIDE 107 97*   CO2 12.3* 7.4*   ANION GAP 15.7* 22.6*   * 130*   CREATININE 5.28* 5.98*   EGFR 11.6* 10.0*   GLUCOSE 89 92   CALCIUM 8.3* 10.2         Lab 10/26/24  1523   TOTAL PROTEIN 8.5   ALBUMIN 3.9   GLOBULIN 4.6   ALT (SGPT) 18   AST (SGOT) 18   BILIRUBIN 0.3   ALK PHOS 127*                     Lab 10/27/24  0116   PH, ARTERIAL 7.263*   PCO2, ARTERIAL 31.9*   PO2 ART 76.6*   FIO2 21   HCO3 ART 14.4*   BASE EXCESS ART -11.5*    CARBOXYHEMOGLOBIN 1.1         Microbiology Results (last 10 days)       ** No results found for the last 240 hours. **            CT Abdomen Pelvis Without Contrast    Result Date: 10/26/2024  VERIFICATION OBSERVER NAME: Ghada Jimenez MD.  Technique: Axial images were obtained along with coronal and sagittal reconstruction. DLP in mGycm reported in the EMR records. Dose lowering technique: Automated exposure control with adjustment of the MA and/or KV, use of iterative reconstruction. Data included in the medical records.  HISTORY/COMPARISON/FINDINGS:  Comparison: None.  Subsegmental atelectasis at lung bases. Liver and spleen appeared unremarkable. Pancreas is normal.  Cholelithiasis.  No associated acute process. No adrenal nodules.  Bilateral renal cysts, the largest is located on the RIGHT side measuring 5.4 cm.  There is 1 mm calyceal stone in the lower pole RIGHT kidney.  No calyceal stones on the LEFT side.  No hydronephrosis in either kidney. There is colostomy in RIGHT lower abdomen.  No evidence of free air, free fluid or small bowel obstruction.  Status post colonic surgery. Urinary bladder demonstrates thickening of the anterior wall.  Surgical clips surrounding the rectum at the junction with the bowel. Moderate narrowing disc space of L5-S1.      Impression: No acute process noted in the abdomen pelvis. Cholelithiasis without associated acute process. Previous colonic surgery probably related to partial or total colectomy..     [id number] Zip code [id number]      This report was finalized on 10/26/2024 5:19 PM by Dr. Ghada Jimenez MD.           Assessment & Plan   Assessment & Plan       DAJA (acute kidney injury)    Acute kidney injury  Metabolic acidemia  Hyperkalemia  -suspect 2/2 high ostomy output and low oral intake, poor renal perfusion and possible ATN from hypotension. Hypotension initially responded to fluids. Hyper K improved with treatment at OSH but awaiting repeat that was ordered on admission.  Had temporary ureteral stents placed prior which have been removed, no hydronephrosis on current imaging.   -CK, urine Cr, protein, urea, renal US pending  -Start D51/2NS w/ 75 meq Na bicarb  -strict I+O, still making urine  -Noted drop in pressure this am, additional 1L NS bolus ordered, closely monitor to optimize renal perfusion. Hold antiHTN meds and all nephrotoxic agents  -Closely follow electrolytes, c/w telemetry  -Nephrology in am    Colorectal cancer s/p diverting ostomy 10/10 with Dr Hutchins  High ostomy output  -no current infectious signs, will monitor output. CT abd w/o acute process  -C/w loperamide. Consider bulking agent.   -Hold PPI. DC metformin  -Colorectal consult in am    HTN  -meds on hold    T2DM  -low range sugars, hold meds, monitor accuchecks     HLD   -crestor on hold given renal function    Pain  -gabapentin held   -norco +tylenol PRN      VTE Prophylaxis:  Mechanical VTE prophylaxis orders are present.          CODE STATUS:    Code Status and Medical Interventions: CPR (Attempt to Resuscitate); Full Support   Ordered at: 10/27/24 0243     Code Status (Patient has no pulse and is not breathing):    CPR (Attempt to Resuscitate)     Medical Interventions (Patient has pulse or is breathing):    Full Support       Expected Discharge   Expected discharge date/ time has not been documented.     Danilo Keating MD  10/27/24

## 2024-10-28 ENCOUNTER — NURSE NAVIGATOR (OUTPATIENT)
Dept: ONCOLOGY | Facility: CLINIC | Age: 59
End: 2024-10-28
Payer: COMMERCIAL

## 2024-10-28 ENCOUNTER — APPOINTMENT (OUTPATIENT)
Dept: ULTRASOUND IMAGING | Facility: HOSPITAL | Age: 59
End: 2024-10-28
Payer: COMMERCIAL

## 2024-10-28 PROBLEM — E87.5 HYPERKALEMIA: Status: ACTIVE | Noted: 2024-10-28

## 2024-10-28 LAB
ANION GAP SERPL CALCULATED.3IONS-SCNC: 12 MMOL/L (ref 5–15)
BUN SERPL-MCNC: 53 MG/DL (ref 8–23)
BUN/CREAT SERPL: 33.3 (ref 7–25)
CALCIUM SPEC-SCNC: 9.5 MG/DL (ref 8.6–10.5)
CHLORIDE SERPL-SCNC: 107 MMOL/L (ref 98–107)
CO2 SERPL-SCNC: 21 MMOL/L (ref 22–29)
CREAT SERPL-MCNC: 1.59 MG/DL (ref 0.76–1.27)
EGFRCR SERPLBLD CKD-EPI 2021: 49.1 ML/MIN/1.73
GLUCOSE BLDC GLUCOMTR-MCNC: 130 MG/DL (ref 70–130)
GLUCOSE BLDC GLUCOMTR-MCNC: 145 MG/DL (ref 70–130)
GLUCOSE BLDC GLUCOMTR-MCNC: 156 MG/DL (ref 70–130)
GLUCOSE SERPL-MCNC: 183 MG/DL (ref 65–99)
POTASSIUM SERPL-SCNC: 5.6 MMOL/L (ref 3.5–5.2)
SODIUM SERPL-SCNC: 140 MMOL/L (ref 136–145)

## 2024-10-28 PROCEDURE — 76775 US EXAM ABDO BACK WALL LIM: CPT

## 2024-10-28 PROCEDURE — 25010000002 HEPARIN (PORCINE) PER 1000 UNITS: Performed by: INTERNAL MEDICINE

## 2024-10-28 PROCEDURE — 82948 REAGENT STRIP/BLOOD GLUCOSE: CPT

## 2024-10-28 PROCEDURE — 80048 BASIC METABOLIC PNL TOTAL CA: CPT | Performed by: INTERNAL MEDICINE

## 2024-10-28 RX ORDER — HEPARIN SODIUM 5000 [USP'U]/ML
5000 INJECTION, SOLUTION INTRAVENOUS; SUBCUTANEOUS EVERY 8 HOURS SCHEDULED
Status: DISCONTINUED | OUTPATIENT
Start: 2024-10-28 | End: 2024-10-30 | Stop reason: HOSPADM

## 2024-10-28 RX ADMIN — LOPERAMIDE HYDROCHLORIDE 2 MG: 2 CAPSULE ORAL at 20:31

## 2024-10-28 RX ADMIN — LOPERAMIDE HYDROCHLORIDE 2 MG: 2 CAPSULE ORAL at 14:43

## 2024-10-28 RX ADMIN — SODIUM BICARBONATE 75 MEQ: 84 INJECTION, SOLUTION INTRAVENOUS at 07:23

## 2024-10-28 RX ADMIN — Medication 10 ML: at 08:43

## 2024-10-28 RX ADMIN — HEPARIN SODIUM 5000 UNITS: 5000 INJECTION INTRAVENOUS; SUBCUTANEOUS at 20:31

## 2024-10-28 RX ADMIN — TAMSULOSIN HYDROCHLORIDE 0.4 MG: 0.4 CAPSULE ORAL at 20:31

## 2024-10-28 RX ADMIN — HEPARIN SODIUM 5000 UNITS: 5000 INJECTION INTRAVENOUS; SUBCUTANEOUS at 16:09

## 2024-10-28 RX ADMIN — HYDROCODONE BITARTRATE AND ACETAMINOPHEN 1 TABLET: 7.5; 325 TABLET ORAL at 18:38

## 2024-10-28 RX ADMIN — ROSUVASTATIN 10 MG: 10 TABLET, FILM COATED ORAL at 20:31

## 2024-10-28 RX ADMIN — Medication 10 ML: at 20:32

## 2024-10-28 RX ADMIN — LOPERAMIDE HYDROCHLORIDE 2 MG: 2 CAPSULE ORAL at 08:43

## 2024-10-28 NOTE — CASE MANAGEMENT/SOCIAL WORK
Discharge Planning Assessment  [facility]     Patient Name: Valentin Vásquez  MRN: [id number]  Today's Date: 10/28/2024    Admit Date: 10/27/2024    Plan: home   Discharge Needs Assessment       Row Name 10/28/24 0929       Living Environment    People in Home spouse    Current Living Arrangements home    Potentially Unsafe Housing Conditions none    In the past 12 months has the electric, gas, oil, or water company threatened to shut off services in your home? No    Primary Care Provided by self    Provides Primary Care For no one    Family Caregiver if Needed spouse    Quality of Family Relationships helpful;involved;supportive    Able to Return to Prior Arrangements yes       Resource/Environmental Concerns    Resource/Environmental Concerns none    Transportation Concerns none       Transportation Needs    In the past 12 months, has lack of transportation kept you from medical appointments or from getting medications? no    In the past 12 months, has lack of transportation kept you from meetings, work, or from getting things needed for daily living? No       Food Insecurity    Within the past 12 months, you worried that your food would run out before you got the money to buy more. Never true    Within the past 12 months, the food you bought just didn't last and you didn't have money to get more. Never true       Transition Planning    Patient/Family Anticipates Transition to home with family    Patient/Family Anticipated Services at Transition none    Transportation Anticipated family or friend will provide       Discharge Needs Assessment    Readmission Within the Last 30 Days current reason for admission unrelated to previous admission    Equipment Currently Used at Home glucometer;other (see comments)  ostomy supplies    Concerns to be Addressed discharge planning    Do you want help finding or keeping work or a job? I do not need or want help    Do you want help with school or training? For example, starting  Patient: Melody Martin  : 1938    Encounter Date: 10/22/2024    PROGRESS NOTE    Subjective  Chief complaint: Melody Martin is a 86 y.o. female who is an acute skilled patient being seen and evaluated for weakness and monthly general medical care and follow-up.    HPI:  HPI  Patient presents for general medical care and f/u.  Patient seen and examined at bedside.  No issues per nursing.  Patient has no acute complaints.  Therapy is currently working with patient and patient is requiring total dependence for transfers and max assist to total dependence for ADLs and self-care.  Patient with diagnosis of ESRD on HD, tolerating treatment well.  AFIB stable, denies palpitations and chest pain.  HTN BP at goal.  Denies chest pain and headache.  Anemia stable, denies fatigue, sob, and palpitations.  GERD controlled.  Denies heartburn, regurgitation, epigastric discomfort, sour taste, and cough.  DM, denies polydipsia polyuria polyphagia.  Patient with diagnosis of depression.  Mood is stable.  Denies feeling down and thoughts of harming self or others.  CHF stable, denies sob, orthopnea, weight gain.  Hx CVA, denies changes in weakness and speech.  Patient does require assistance with ADLs.  Mentation at baseline, no acute distress.    Objective  Vital signs: 112/62, 66, 16, 97.9, 95% blood sugar 151    Physical Exam  Constitutional:       General: She is not in acute distress.  Eyes:      Extraocular Movements: Extraocular movements intact.   Cardiovascular:      Rate and Rhythm: Normal rate and regular rhythm.   Pulmonary:      Effort: Pulmonary effort is normal.      Breath sounds: Normal breath sounds.   Abdominal:      General: Bowel sounds are normal.      Palpations: Abdomen is soft.   Musculoskeletal:      Cervical back: Neck supple.      Right lower leg: No edema.      Left lower leg: No edema.   Skin:     Comments: Dressing to left heel clean dry and intact  Dressing to sacrum clean dry and intact    Neurological:      Mental Status: She is alert and oriented to person, place, and time.   Psychiatric:         Mood and Affect: Mood normal.         Behavior: Behavior is cooperative.         Assessment/Plan  Problem List Items Addressed This Visit       Type 2 diabetes mellitus with chronic kidney disease on chronic dialysis, with long-term current use of insulin (Multi)     Carb controlled diet  Monitor Glucoscan  Continue insulin    HD per nephrology  Renal diet  Monitor labs         GERD without esophagitis     PPI  Monitor GI symptoms         Anemia     Stable on recent labs.  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  On HD per nephrology  Isosorbide dinitrate  Metoprolol  Diltiazem   Renal diet  Monitor BP  Remove extra fluid in dialysis         Weakness     Continue therapy, work towards goals established         Atrial fibrillation (Multi) - Primary     Monitor heart rate, controlled  Amiodarone  Metoprolol   Apixaban  Bleeding precautions         Depression     Continue antidepressants  Monitor mood and behaviors          Medications, treatments, and labs reviewed  Continue medications and treatments as listed in EMR      Scribe Attestation  I, Phoebe Martinez   attest that this documentation has been prepared under the direction and in the presence of Wilbert Lock MD    Provider Attestation - Scribe documentation  All medical record entries made by the Scribe were at my direction and personally dictated by me. I have reviewed the chart and agree that the record accurately reflects my personal performance of the history, physical exam, discussion and plan.   Wilbert Lock MD        Electronically Signed By: Wilbert Lock MD   10/23/24   or completing job training or getting a high school diploma, GED or equivalent No    Equipment Needed After Discharge none                   Discharge Plan       Row Name 10/28/24 0931       Plan    Plan home    Patient/Family in Agreement with Plan yes    Plan Comments Met with Mr. Vásquez and his wife at the bedside to initiate discharge plan. They share a [facility] home. Mr. Vásquez is independent with activities of daily living and does not use DME for mobility. He has a cpap and ostomy supplies. His primary care provider is Dr. Tristen Stuart. He denies obstacles to getting medical care or obtaining medications. He also denies the receipt of home health or outpatient services. No discharge planning needs were identified today.  will continue to follow plan of care and assist with discharge planning needs as indicated.    Final Discharge Disposition Code 01 - home or self-care                  Continued Care and Services - Admitted Since 10/27/2024    No active coordination exists for this encounter.          Demographic Summary       Row Name 10/28/24 0953       General Information    Admission Type inpatient    Arrived From home    Referral Source admission list    Reason for Consult discharge planning    Preferred Language English       Contact Information    Permission Granted to Share Info With family/designee    Contact Information Comments Sharri Vásquez Spouse [phone]                   Functional Status       Row Name 10/28/24 0928       Functional Status    Usual Activity Tolerance good    Current Activity Tolerance good       Physical Activity    On average, how many days per week do you engage in moderate to strenuous exercise (like a brisk walk)? 5 days    On average, how many minutes do you engage in exercise at this level? 20 min    Number of minutes of exercise per week 100       Assessment of Health Literacy    How often do you have someone help you read hospital materials?  4:18 PM   Never    How often do you have a problem understanding what is told to you about your medical condition? Never    How confident are you filling out medical forms by yourself? Extremely    Health Literacy Good       Functional Status, IADL    Medications independent    Meal Preparation independent    Housekeeping independent    Laundry independent    Shopping independent    If for any reason you need help with day-to-day activities such as bathing, preparing meals, shopping, managing finances, etc., do you get the help you need? I don't need any help       Mental Status    General Appearance WDL WDL       Mental Status Summary    Recent Changes in Mental Status/Cognitive Functioning no changes                   Psychosocial    No documentation.                  Abuse/Neglect    No documentation.                  Legal    No documentation.                  Substance Abuse    No documentation.                  Patient Forms    No documentation.                     Diane Melendez RN

## 2024-10-28 NOTE — PROGRESS NOTES
progress note      Valentin Vásquez  [id number]  1965     LOS: 1 day     Attending: Cong Cartagena MD    Primary Care Provider: Tristen Stuart DO      Chief Complaint/Reason for visit:  No chief complaint on file.  Patient was admitted in transfer from [facility] after he presented there with emesis, decreased appetite, and high stoma output, was found to be in acute kidney failure with hyperkalemia.    I saw him following his admit yesterday, renal function improving, potassium was still elevated and I dosed him with Lokelma and a D50 followed by regular insulin IV.    IV fluids containing bicarb ongoing, p.o. diet initiated and patient excited to have p.o. offered.    He was seen by nephrology afterwards, input appreciated.    Subjective   Doing well when seen in his room today.  Encouraged to ambulate which he did afterwards.  Has tolerated some p.o. diet and stoma output seems to have decreased some with stoma having thick liquids currently.  No nausea or vomiting.  Renal ultrasound was done earlier.    Objective        Visit Vitals  /77 (BP Location: Left arm, Patient Position: Lying)   Pulse 93   Temp 97.3 °F (36.3 °C) (Oral)   Resp 16   Wt 83.5 kg (184 lb)   SpO2 97%   BMI 28.82 kg/m²     Temp (24hrs), Av.6 °F (36.4 °C), Min:97.3 °F (36.3 °C), Max:98 °F (36.7 °C)      Intake/Output:    Intake/Output Summary (Last 24 hours) at 10/28/2024 1358  Last data filed at 10/27/2024 1735  Gross per 24 hour   Intake 1522 ml   Output --   Net 1522 ml          Physical Exam:     General Appearance:    Alert, cooperative, in no acute distress   Head:    Normocephalic, without obvious abnormality, atraumatic    Lungs:     Normal effort, symmetric chest rise,  clear to      auscultation bilaterally              Heart:    Regular rhythm and normal rate, normal S1 and S2    Abdomen:   Soft and benign with pink stoma with output.   Extremities:  No clubbing, cyanosis or edema.  No  deformities.    Pulses:   Pulses palpable and equal bilaterally   Skin:   No bleeding, bruising or rash          Results Review:     I reviewed the patient's new clinical results.   Results from last 7 days   Lab Units 10/26/24  1523   WBC 10*3/mm3 10.86*   HEMOGLOBIN g/dL 14.0   HEMATOCRIT % 45.6   PLATELETS 10*3/mm3 405     Results from last 7 days   Lab Units 10/28/24  1031 10/27/24  1950 10/27/24  1137 10/26/24  1935 10/26/24  1523   SODIUM mmol/L 140 135* 138   < > 127*   POTASSIUM mmol/L 5.6* 5.2 6.0*   < > 8.0*   CHLORIDE mmol/L 107 106 108*   < > 97*   CO2 mmol/L 21.0* 19.0* 20.0*   < > 7.4*   BUN mg/dL 53* 80* 95*   < > 130*   CREATININE mg/dL 1.59* 2.32* 3.23*   < > 5.98*   CALCIUM mg/dL 9.5 9.4 8.6   < > 10.2   BILIRUBIN mg/dL  --   --   --   --  0.3   ALK PHOS U/L  --   --   --   --  127*   ALT (SGPT) U/L  --   --   --   --  18   AST (SGOT) U/L  --   --   --   --  18   GLUCOSE mg/dL 183* 191* 287*   < > 92    < > = values in this interval not displayed.     I reviewed the patient's new imaging including images and reports.    All medications reviewed.   [Held by provider] aspirin, 81 mg, Oral, Daily  [Held by provider] empagliflozin, 10 mg, Oral, Daily  [Held by provider] gabapentin, 600 mg, Oral, Daily  [Held by provider] glipizide, 5 mg, Oral, BID AC  [Held by provider] lisinopril, 10 mg, Oral, Daily  loperamide, 2 mg, Oral, TID  [Held by provider] metoprolol succinate XL, 25 mg, Oral, Daily  [Held by provider] pantoprazole, 40 mg, Oral, Q AM  [Held by provider] rosuvastatin, 10 mg, Oral, Nightly  sodium chloride, 10 mL, Intravenous, Q12H  [Held by provider] tamsulosin, 0.4 mg, Oral, Nightly      acetaminophen, 650 mg, Q4H PRN   Or  acetaminophen, 650 mg, Q4H PRN   Or  acetaminophen, 650 mg, Q4H PRN  HYDROcodone-acetaminophen, 1 tablet, Q6H PRN  sodium chloride, 10 mL, PRN        Assessment & Plan       DAJA (acute kidney injury)    HTN (hypertension)    Hyperlipidemia    DM2 (diabetes mellitus, type  2)    S/P colectomy , ultra low anterior resection, robotic assisted, with appy, repair of incarcerated hernia, diverting loop ileostomy    Hyperkalemia         Plan  Continue IV fluids.  Improving renal function.  Monitor potassium and treat as indicated.    Will resume statin and aspirin  Will continue to hold antihypertensives including ACE inhibitor.    Will have wound care stoma nurse follow-up with the patient and his wife.    Encouraged ambulation.  Add heparin subcu for DVT prophylaxis    Nephrology and colorectal surgery following.    Cong Cartagena MD  10/28/24  13:58 EDT

## 2024-10-28 NOTE — PROGRESS NOTES
LOS: 1 day    Patient Care Team:  Tristen Stuart DO as PCP - General (Family Medicine)  Virginia Brown, RN as Nurse Navigator  Cong Cartagena MD as Consulting Physician (Hospitalist)    Subjective     Stable overnight.  Denies any new complaints.  No chest pain shortness of breath.  Ostomy output slowed.    Objective     Vital Signs:  Blood pressure 114/77, pulse 93, temperature 97.3 °F (36.3 °C), temperature source Oral, resp. rate 16, weight 83.5 kg (184 lb), SpO2 97%.      Intake/Output Summary (Last 24 hours) at 10/28/2024 1406  Last data filed at 10/27/2024 1735  Gross per 24 hour   Intake 1522 ml   Output --   Net 1522 ml        10/27 0701 - 10/28 0700  In: 2522 [P.O.:480; I.V.:1042]  Out: 1000 [Urine:900]    Physical Exam:        GENERAL: WD WM NAD  NEURO: Awake and alert, oriented. No focal deficit  PSYCHIATRIC: NMA. Cooperative with PE  EYE: PE, no icterus, no conjunctivitis  ENT: ommm, dentition intact,  Hearing intact  NECK: Supple , No JVD discernable,  Trachea midline  CV: No edema, RRR  LUNGS:  Quiet,  Nonlabored resp.  Symmetrical expansion  ABDOMEN: + Ostomy  : No [place], no palp bladder  SKIN: Warm and dry without rash      Labs:  Results from last 7 days   Lab Units 10/26/24  1523   WBC 10*3/mm3 10.86*   HEMOGLOBIN g/dL 14.0   PLATELETS 10*3/mm3 405     Results from last 7 days   Lab Units 10/28/24  1031 10/27/24  1950 10/27/24  1137 10/27/24  0419 10/26/24  1935 10/26/24  1523   SODIUM mmol/L 140 135* 138 135*   < > 127*   POTASSIUM mmol/L 5.6* 5.2 6.0* 5.9*   < > 8.0*   CHLORIDE mmol/L 107 106 108* 105   < > 97*   CO2 mmol/L 21.0* 19.0* 20.0* 16.0*   < > 7.4*   BUN mg/dL 53* 80* 95* 116*   < > 130*   CREATININE mg/dL 1.59* 2.32* 3.23* 4.61*   < > 5.98*   CALCIUM mg/dL 9.5 9.4 8.6 9.1   < > 10.2   ALBUMIN g/dL  --   --   --   --   --  3.9    < > = values in this interval not displayed.     Results from last 7 days   Lab Units 10/26/24  1523   ALK PHOS U/L 127*   BILIRUBIN mg/dL  0.3   ALT (SGPT) U/L 18   AST (SGOT) U/L 18     Results from last 7 days   Lab Units 10/27/24  0116   PH, ARTERIAL pH units 7.263*   PO2 ART mm Hg 76.6*   PCO2, ARTERIAL mm Hg 31.9*   HCO3 ART mmol/L 14.4*               Estimated Creatinine Clearance: 50.4 mL/min (A) (by C-G formula based on SCr of 1.59 mg/dL (H)).         A/P:      ARF: Creatinine continues to improve down to 1.6.  Urine output nonoliguric.  Baseline cr ~ 1.0 2 weeks prior to this admission. Admitted at OSH w cr 5.9mg/dl. Etiology likely severe hemodynamic injury in the setting of high ostomy output on ACE I and metformin use. No significant proteinuria on this admission. CK normal.  For now continue supportive care.  Continue to monitor renal function closely.    HTN: Blood pressure stable.  Rest suppression on hold due to ARF.  Continue to monitor blood pressure for now.     Hyperkalemia: Potassium back up to 5.6 overnight.. K ~ 8 on admission with renal failure on rest suppression along with metabolic acidosis.  Currently on bicarb drip.  Will give additional Lokelma.    Hyponatremia: Stable today.     Met acidosis: Severe due to DAJA and increase bicarb loss due to high ostomy output.  Continues on bicarb drip.     Volume: Patient dehydrated on admission with renal failure.  Thought due to high ostomy output.  Continue IV fluids for now.  Strict I's and O's.     High output ostomy: Patient post low anterior resection with diverting loop ileostomy 10/10/24 due to rectal cancer.  Complicated by post operative ileus.  Patient returns with several days of nausea/vomiting along with high ileostomy output.  Evaluated by colorectal surgery.  Ostomy output improving..      High risk and complexity patient.      Terry Ramirez MD  10/28/24  14:06 EDT

## 2024-10-28 NOTE — PLAN OF CARE
Goal Outcome Evaluation:  Plan of Care Reviewed With: patient         Pt resting in bed comfortably. Meds given as ordered. Midline incision intact. Ind oob. Pt showered during shift. Wife at bedside. Call bell within reach.

## 2024-10-28 NOTE — PROGRESS NOTES
Patient's PO Treatment Discission Summary from  on 10/27/2024 was sent to the presenting Physician, Dr. José Manuel Hutchins, per secured email on 10/27/2024. I also sent to [facility] [place] personnel at [facility], per secured email at the request of Rectal Director, Dr. Gisela De Leon.    Patient did not see Medical Oncologist due to having surgery as definitive treatment for his Rectal Cancer.

## 2024-10-28 NOTE — PROGRESS NOTES
[facility] Definitive Treatment: Post-Op        Treating Physician: Dr. José Manuel Hutchins, Colorectal Surgeon  Pathologist at Tumor Board: Dr. Jake Diaz  Radiologist at Tumor Board: Dr. Sam Butt  Radiologist who Read MRI Pelvis with RP: Dr. Juan Hart    MRN: [id number]     Patient Name: Valentin Vásquez  : 1965     Age/Sex: 59 y.o. male  Address: [address] [place] [address]  Home Phone: [phone]     Patient Care Team:  Tristen Stuart DO as PCP - General (Family Medicine)  Virginia Brown RN as Nurse Navigator  Cong Cartagena MD as Consulting Physician (Hospitalist)    PRE-SURGICAL TREATMENT   Neoadjuvant Therapy was not recommended.  Date Neoadjuvant Therapy Completed: NA  Patient tolerance to therapy:  NA    SURGICAL OUTCOME REVIEW   Review Date: 10/28/24  Surgery Date: 10/10/2024  Surgical Outcome:        Approach: Robotic and LAR       Stoma Present: Ileostomy in place  Specimen photographs obtained: anterior view, posterior view, 2 lateral views, and shown in Tumor Board per Dr. Diaz, Pathologist and Rectal Team Member  Complications / Unexpected Findings: Renal Failure/Hyperkalemia- Inpatient after going through ER.  Discharge Date: 10/17/2024  Readmissions: yes, admitted to hospital on 10/27/2024    FINAL PATHOLOGY   Histology Response: Adenocarcinoma per original diagnosis biopsy; surgical path was negative for residual neoplasia/malignancy.  Circumferential resection margins and distal margin status: Free of tumor; distance from proximal mucosal margin: Free of tumor, 23 CM; Distance from distal mucosal margin: Free of tumor, 3.2 CM; Distance from radial or mesenteric margin: Free of tumor, 2.2 CM.  Tumor Regression Grade:  NA, no prior treatment before surgery.  Mesorectal Grade: Complete  MSI: Intact  Sites of Involvement: Mid rectum  Lymph node status: 0  Pretreatment clinical stage:      Stage I: T 1, N 0, and M 0  Final AJCC pathologic stage or post therapy path clinical  stage:        Final AJCC pathologic stage:  T 1 and N 0  Pre-treatment CEA: 5/23/2024-2.39 and 8/29/2024-<2.0    Post-treatment CEA: ANA Brown RN - 10/28/24, 1:55 PM EDT

## 2024-10-28 NOTE — PROGRESS NOTES
Transfer with profound dehydration and hyperkalemia    High output stoma responding to treatment  Abdomen soft    Renal ultrasound reviewed, no evidence of hydronephrosis    Use of Imodium reviewed  Plan for interval labs in the morning  DC skin staples.

## 2024-10-28 NOTE — PAYOR COMM NOTE
"Ref# [id number]   Transfer to [facility] from [facility]    MEERA Lopez, RN  Utilization Review  Phone [phone]  Fax [phone]    [place] [id number]       Valentin Vásquez (59 y.o. Male)       Date of Birth   1965    Social Security Number       Address   [address] [place] [address]    Home Phone   [phone]    MRN   [id number]       Jew   [facility]    Marital Status                               Admission Date   10/27/24    Admission Type   Urgent    Admitting Provider   Cong Cartagena MD    Attending Provider   Cong Cartagena MD    Department, Room/Bed   [facility] 5G, S566/1       Discharge Date       Discharge Disposition       Discharge Destination                                 Attending Provider: Cong Cartagena MD    Allergies: No Known Allergies    Isolation: None   Infection: None   Code Status: CPR    Ht: 170.2 cm (67\")   Wt: 83.5 kg (184 lb)    Admission Cmt: None   Principal Problem: DAJA (acute kidney injury) [N17.9]                   Active Insurance as of 10/27/2024       Primary Coverage       Payor Plan Insurance Group Employer/Plan Group    ANTHEM BLUE CROSS ANTHEM BLUE CROSS BLUE SHIELD PPO [id number]       Payor Plan Address Payor Plan Phone Number Payor Plan Fax Number Effective Dates    [address] [phone]  1/1/2020 - None Entered    [place] [address]         Subscriber Name Subscriber Birth Date Member ID       VALENTIN VÁSQUEZ 1965 [id number]                     Emergency Contacts        (Rel.) Home Phone Work Phone Mobile Phone    FahadSharri (Spouse) [phone] -- [phone]    KASANDRA VÁSQUEZ (Daughter) -- -- [phone]              [place]ber: NPI [id number] Tax ID [id number]  Insurance Information                  ANTHEM BLUE CROSS/ANTHEM BLUE CROSS BLUE SHIELD PPO Phone: [phone]    Subscriber: Valentin Vásquez Subscriber#: [id number]    Group#: " [id number] Precert#: --    Authorization#: -- Effective Date: --             History & Physical        Danilo Keating MD at 10/27/24 0245              [facility] Medicine Services  HISTORY AND PHYSICAL    Patient Name: Valentin Vásquez  : 1965  MRN: [id number]  Primary Care Physician: Tristen Stuart DO  Date of admission: 10/27/2024      Subjective   Subjective     Note: this patient was seen by Dr. VELEZ last admission but was accepted as transfer by prior physician on call, I am seeing patient to provide acute management but would be happy to transfer care back to appropriate primary service in the morning.     Chief Complaint:  High ostomy output    HPI:  Valentin Vásquez is a 59 y.o. male with PMH T2DM, GERD, colon cancer who underwent colectomy, incarcerated hernia repair, diverting loop ileostomy on 10/10 as well as bilateral ureteral stent for obstructive uropathy.  His course was complicated by postop ileus and ultimately recovered well, having ureteral stents removed prior to discharge.  He was discharged on 10/17 and reports that since he has been out of the hospital he has had ongoing high ostomy output.  Every time he eats he feels his ostomy bag several times about an hour later with soft to watery stool.  He had called his colorectal surgeon who had instructed him to take Imodium over-the-counter.  No specific dietary triggers noted.  He has no abdominal pain or discomfort.  No fevers or chills.  No vomiting.  He was initially seen at [facility] and transferred here for higher level of care due to significantly decreased renal function.  He is currently asymptomatic.      Personal History     Past Medical History:   Diagnosis Date    Arthritis     Colon cancer     Colon polyp     Diabetes mellitus     Diverticulitis of colon     GERD (gastroesophageal reflux disease)     Gout     Hyperlipidemia     Hypertension     Injury of back     Kidney stone     passed  and surgery-   x3 times surgery    Pancreatitis     Tear of right rotator cuff     Wears glasses     readers         Oncology Problem List:  Rectal cancer (10/10/2024; Status: Active)    Oncology/Hematology History     Past Surgical History:   Procedure Laterality Date    ADENOIDECTOMY      COLON RESECTION WITH ILEOSTOMY N/A 10/10/2024    Procedure: CONVERTED TO OPEN COLON RESECTION ULTRA LOW ANTERIOR LAPAROSCOPIC WITH LOOP ILEOSTOMY;  Surgeon: José Manuel Hutchins MD;  Location:  [facility];  Service: [facility] - [facility];  Laterality: N/A;    COLONOSCOPY N/A 05/21/2024    Procedure: COLONOSCOPY;  Surgeon: Rosa Patrick MD;  Location:  [facility];  Service: Gastroenterology;  Laterality: N/A;    CYSTOSCOPY Bilateral 10/10/2024    Procedure: CYSTOSCOPY TEMPORARY PLACEMENT BILATERAL URETERAL CATHETER;  Surgeon: Ke Courtney MD;  Location:  [facility];  Service: [facility] - [facility];  Laterality: Bilateral;    CYSTOSCOPY LITHOLAPAXY BLADDER STONE EXTRACTION N/A 09/28/2022    Procedure: CYSTOSCOPY LASER LITHOLAPAXY BLADDER STONE EXTRACTION;  Surgeon: Mykel Jeffers MD;  [facility]:  [facility];  Service: Urology;  Laterality: N/A;    KIDNEY STONE SURGERY      x3 surgeries-  same stone    SHOULDER ARTHROSCOPY W/ ROTATOR CUFF REPAIR Right 07/19/2022    Procedure: RIGHT SHOULDER ARTHROSCOPY WITH OPEN ACROMIOPLASTY,  ROTATOR CUFF REPAIR, EXCISION LATERAL CLAVICLE;  Surgeon: Edmundo Huitron MD;  Location:  [facility];  Service: Orthopedics;  Laterality: Right;    TONSILLECTOMY         Family History: family history includes Diabetes in his father and sister.     Social History:  reports that he has never smoked. His smokeless tobacco use includes snuff. He reports that he does not currently use alcohol. He reports that he does not use drugs.  Social History     Social History Narrative    Not on file       Medications:  Available home medication information reviewed.  FreeStyle Shilpa 2 Sensor,  HYDROcodone-acetaminophen, Insulin Degludec, Insulin Lispro (1 Unit Dial), amoxicillin-clavulanate, aspirin, dapagliflozin Propanediol, fenofibrate, gabapentin, glipizide, lisinopril, loperamide, metFORMIN, metoprolol succinate XL, omeprazole, rosuvastatin, and tamsulosin    No Known Allergies    Objective   Objective     Vital Signs:   Temp:  [97.6 °F (36.4 °C)] 97.6 °F (36.4 °C)  Heart Rate:  [91] 91  Resp:  [17] 17  BP: (92)/(51) 92/51       Physical Exam   Awake alert oriented x 3  Resting comfortably in bed  Nondiaphoretic, nonicteric  Mucous membranes moist  Heart regular rate and rhythm  Lungs clear to auscultation bilaterally  Abdomen soft with mild tenderness to deep palpation in the right upper quadrant without rebound guarding or rigidity, midline incision is intact without purulence or drainage.  Staples are in place.  There is no asterixis  There is no peripheral edema    Result Review:  I have personally reviewed the results from the time of this admission to 10/27/2024 05:03 EDT and agree with these findings:  [x]  Laboratory list / accordion  []  Microbiology  [x]  Radiology  [x]  EKG/Telemetry   []  Cardiology/Vascular   []  Pathology  [x]  Old records  []  Other:  Most notable findings include: See assessment and plan      LAB RESULTS:      Lab 10/26/24  1713 10/26/24  1523   WBC  --  10.86*   HEMOGLOBIN  --  14.0   HEMATOCRIT  --  45.6   PLATELETS  --  405   NEUTROS ABS  --  8.97*   IMMATURE GRANS (ABS)  --  0.05   LYMPHS ABS  --  1.02   MONOS ABS  --  0.72   EOS ABS  --  0.03   MCV  --  89.4   CRP  --  0.42   LACTATE 1.4  --          Lab 10/26/24  1935 10/26/24  1523   SODIUM 135* 127*   POTASSIUM 5.9* 8.0*   CHLORIDE 107 97*   CO2 12.3* 7.4*   ANION GAP 15.7* 22.6*   * 130*   CREATININE 5.28* 5.98*   EGFR 11.6* 10.0*   GLUCOSE 89 92   CALCIUM 8.3* 10.2         Lab 10/26/24  1523   TOTAL PROTEIN 8.5   ALBUMIN 3.9   GLOBULIN 4.6   ALT (SGPT) 18   AST (SGOT) 18   BILIRUBIN 0.3   ALK PHOS  127*                     Lab 10/27/24  0116   PH, ARTERIAL 7.263*   PCO2, ARTERIAL 31.9*   PO2 ART 76.6*   FIO2 21   HCO3 ART 14.4*   BASE EXCESS ART -11.5*   CARBOXYHEMOGLOBIN 1.1         Microbiology Results (last 10 days)       ** No results found for the last 240 hours. **            CT Abdomen Pelvis Without Contrast    Result Date: 10/26/2024  VERIFICATION OBSERVER NAME: Ghada Jimenez MD.  Technique: Axial images were obtained along with coronal and sagittal reconstruction. DLP in mGycm reported in the EMR records. Dose lowering technique: Automated exposure control with adjustment of the MA and/or KV, use of iterative reconstruction. Data included in the medical records.  HISTORY/COMPARISON/FINDINGS:  Comparison: None.  Subsegmental atelectasis at lung bases. Liver and spleen appeared unremarkable. Pancreas is normal.  Cholelithiasis.  No associated acute process. No adrenal nodules.  Bilateral renal cysts, the largest is located on the RIGHT side measuring 5.4 cm.  There is 1 mm calyceal stone in the lower pole RIGHT kidney.  No calyceal stones on the LEFT side.  No hydronephrosis in either kidney. There is colostomy in RIGHT lower abdomen.  No evidence of free air, free fluid or small bowel obstruction.  Status post colonic surgery. Urinary bladder demonstrates thickening of the anterior wall.  Surgical clips surrounding the rectum at the junction with the bowel. Moderate narrowing disc space of L5-S1.      Impression: No acute process noted in the abdomen pelvis. Cholelithiasis without associated acute process. Previous colonic surgery probably related to partial or total colectomy..     [id number] Zip code [id number]      This report was finalized on 10/26/2024 5:19 PM by Dr. Ghada Jimenez MD.           Assessment & Plan   Assessment & Plan       DAJA (acute kidney injury)    Acute kidney injury  Metabolic acidemia  Hyperkalemia  -suspect 2/2 high ostomy output and low oral intake, poor renal perfusion and  possible ATN from hypotension. Hypotension initially responded to fluids. Hyper K improved with treatment at OSH but awaiting repeat that was ordered on admission. Had temporary ureteral stents placed prior which have been removed, no hydronephrosis on current imaging.   -CK, urine Cr, protein, urea, renal US pending  -Start D51/2NS w/ 75 meq Na bicarb  -strict I+O, still making urine  -Noted drop in pressure this am, additional 1L NS bolus ordered, closely monitor to optimize renal perfusion. Hold antiHTN meds and all nephrotoxic agents  -Closely follow electrolytes, c/w telemetry  -Nephrology in am    Colorectal cancer s/p diverting ostomy 10/10 with Dr Liset Holland ostomy output  -no current infectious signs, will monitor output. CT abd w/o acute process  -C/w loperamide. Consider bulking agent.   -Hold PPI. DC metformin  -Colorectal consult in am    HTN  -meds on hold    T2DM  -low range sugars, hold meds, monitor accuchecks     HLD   -crestor on hold given renal function    Pain  -gabapentin held   -norco +tylenol PRN      VTE Prophylaxis:  Mechanical VTE prophylaxis orders are present.          CODE STATUS:    Code Status and Medical Interventions: CPR (Attempt to Resuscitate); Full Support   Ordered at: 10/27/24 0243     Code Status (Patient has no pulse and is not breathing):    CPR (Attempt to Resuscitate)     Medical Interventions (Patient has pulse or is breathing):    Full Support       Expected Discharge   Expected discharge date/ time has not been documented.     Danilo Keating MD  10/27/24      Electronically signed by Danilo Keating MD at 10/27/24 0603       Facility-Administered Medications as of 10/28/2024   Medication Dose Route Frequency Provider Last Rate Last Admin    acetaminophen (TYLENOL) tablet 650 mg  650 mg Oral Q4H PRN Danilo Keating MD        Or    acetaminophen (TYLENOL) 160 MG/5ML oral solution 650 mg  650 mg Oral Q4H PRN Danilo Keating MD        Or    acetaminophen  (TYLENOL) suppository 650 mg  650 mg Rectal Q4H PRN Danilo Keating MD        [COMPLETED] albuterol (PROVENTIL) nebulizer solution 0.5% 2.5 mg/0.5mL  10 mg Nebulization Once Ayaz Julien APRN   10 mg at 10/26/24 1647    [Held by provider] aspirin EC tablet 81 mg  81 mg Oral Daily Danilo Keating MD        [COMPLETED] calcium gluconate 1000 Mg/50ml 0.675% NaCl IV SOLN  1,000 mg Intravenous Once Ayaz Julien APRN   Stopped at 10/26/24 1720    [COMPLETED] dextrose (D50W) (25 g/50 mL) IV injection 25 g  25 g Intravenous Once Ayaz Julien APRN   25 g at 10/26/24 1655    [COMPLETED] dextrose (D50W) (25 g/50 mL) IV injection 25 mL  25 mL Intravenous Once Cong Cartagena MD   25 mL at 10/27/24 1256    [Held by provider] empagliflozin (JARDIANCE) tablet 10 mg  10 mg Oral Daily Danilo Keating MD        [Held by provider] gabapentin (NEURONTIN) capsule 600 mg  600 mg Oral Daily Danilo Keating MD        [Held by provider] glipizide (GLUCOTROL) tablet 5 mg  5 mg Oral BID AC Danilo Keating MD        HYDROcodone-acetaminophen (NORCO) 7.5-325 MG per tablet 1 tablet  1 tablet Oral Q6H PRN Danilo Keating MD   1 tablet at 10/27/24 1951    [COMPLETED] insulin regular (humuLIN R,novoLIN R) injection 5 Units  5 Units Intravenous Once Ayaz Julien APRN   5 Units at 10/26/24 1654    [COMPLETED] insulin regular (humuLIN R,novoLIN R) injection 8 Units  8 Units Intravenous Once Cong Cartagena MD   8 Units at 10/27/24 1255    [Held by provider] lisinopril (PRINIVIL,ZESTRIL) tablet 10 mg  10 mg Oral Daily Danilo Keating MD        loperamide (IMODIUM) capsule 2 mg  2 mg Oral TID Poncho Rudolph MD   2 mg at 10/28/24 0843    [Held by provider] metoprolol succinate XL (TOPROL-XL) 24 hr tablet 25 mg  25 mg Oral Daily Danilo Keating MD        [Held by provider] pantoprazole (PROTONIX) EC tablet 40 mg  40 mg Oral Q AM Danilo Keating MD        [Held by provider] rosuvastatin (CRESTOR)  tablet 10 mg  10 mg Oral Nightly Danilo Keating MD        sodium bicarbonate 8.4 % 75 mEq in dextrose 5 % and sodium chloride 0.45 % 1,000 mL infusion (less than/equal to 100 mEq)  75 mEq Intravenous Continuous Danilo Keating  mL/hr at 10/28/24 0723 75 mEq at 10/28/24 0723    [COMPLETED] sodium bicarbonate injection 8.4% 50 mEq  50 mEq Intravenous Once Ayaz Julien APRN   50 mEq at 10/26/24 1656    [COMPLETED] sodium chloride 0.9 % bolus 1,000 mL  1,000 mL Intravenous Once Ayaz Julien APRN   Stopped at 10/26/24 1856    [COMPLETED] sodium chloride 0.9 % bolus 1,000 mL  1,000 mL Intravenous Once Ayaz Julien APRN   Stopped at 10/26/24 1856    [COMPLETED] sodium chloride 0.9 % bolus 1,000 mL  1,000 mL Intravenous Once Ayaz Julien APRN   Stopped at 10/26/24 2037    [COMPLETED] sodium chloride 0.9 % bolus 1,000 mL  1,000 mL Intravenous Once Danilo Keating MD 4,000 mL/hr at 10/27/24 0552 1,000 mL at 10/27/24 0552    [COMPLETED] sodium chloride 0.9 % bolus 500 mL  500 mL Intravenous Once Ayaz Julien APRN   Stopped at 10/26/24 1540    sodium chloride 0.9 % flush 10 mL  10 mL Intravenous Q12H Danilo Keating MD   10 mL at 10/28/24 0843    sodium chloride 0.9 % flush 10 mL  10 mL Intravenous PRN Danilo Keating MD        [COMPLETED] sodium zirconium cyclosilicate (LOKELMA) packet 10 g  10 g Oral Once Ayaz Julien APRN   10 g at 10/26/24 1720    [COMPLETED] sodium zirconium cyclosilicate (LOKELMA) packet 15 g  15 g Oral Once Cong Cartagena MD   15 g at 10/27/24 1258    [Held by provider] tamsulosin (FLOMAX) 24 hr capsule 0.4 mg  0.4 mg Oral Nightly Danilo Keating MD         Lab Results (all)       Procedure Component Value Units Date/Time    POC Glucose Once [[id number]]  (Abnormal) Collected: 10/28/24 1141    Specimen: Blood Updated: 10/28/24 1142     Glucose 145 mg/dL     Basic Metabolic Panel [[id number]]  (Abnormal) Collected: 10/28/24 1031    Specimen: Blood  Updated: 10/28/24 1114     Glucose 183 mg/dL      BUN 53 mg/dL      Creatinine 1.59 mg/dL      Sodium 140 mmol/L      Potassium 5.6 mmol/L      Chloride 107 mmol/L      CO2 21.0 mmol/L      Calcium 9.5 mg/dL      BUN/Creatinine Ratio 33.3     Anion Gap 12.0 mmol/L      eGFR 49.1 mL/min/1.73     Narrative:      GFR Normal >60  Chronic Kidney Disease <60  Kidney Failure <15      POC Glucose Once [[id number]]  (Normal) Collected: 10/28/24 0536    Specimen: Blood Updated: 10/28/24 0536     Glucose 130 mg/dL     POC Glucose Once [[id number]]  (Abnormal) Collected: 10/27/24 2348    Specimen: Blood Updated: 10/27/24 2349     Glucose 140 mg/dL     Basic Metabolic Panel [[id number]]  (Abnormal) Collected: 10/27/24 1950    Specimen: Blood Updated: 10/27/24 2049     Glucose 191 mg/dL      BUN 80 mg/dL      Creatinine 2.32 mg/dL      Sodium 135 mmol/L      Potassium 5.2 mmol/L      Chloride 106 mmol/L      CO2 19.0 mmol/L      Calcium 9.4 mg/dL      BUN/Creatinine Ratio 34.5     Anion Gap 10.0 mmol/L      eGFR 31.2 mL/min/1.73     Narrative:      GFR Normal >60  Chronic Kidney Disease <60  Kidney Failure <15      POC Glucose Once [[id number]]  (Normal) Collected: 10/27/24 1638    Specimen: Blood Updated: 10/27/24 1639     Glucose 129 mg/dL     Creatinine Urine Random (kidney function) GFR component - Urine, Clean Catch [[id number]] Collected: 10/27/24 0225    Specimen: Urine, Clean Catch Updated: 10/27/24 1322     Creatinine, Urine 57.5 mg/dL     Narrative:      Reference intervals for random urine have not been established.  Clinical usage is dependent upon physician's interpretation in combination with other laboratory tests.       Urea Nitrogen, Urine - Urine, Clean Catch [[id number]] Collected: 10/27/24 0225    Specimen: Urine, Clean Catch Updated: 10/27/24 1322     Urea Nitrogen, Urine 398 mg/dL     Narrative:      Reference intervals for random urine have not been established.  Clinical usage is dependent upon physician's  interpretation in combination with other laboratory tests.       Basic Metabolic Panel [[id number]]  (Abnormal) Collected: 10/27/24 1137    Specimen: Blood Updated: 10/27/24 1216     Glucose 287 mg/dL      BUN 95 mg/dL      Creatinine 3.23 mg/dL      Sodium 138 mmol/L      Potassium 6.0 mmol/L      Chloride 108 mmol/L      CO2 20.0 mmol/L      Calcium 8.6 mg/dL      BUN/Creatinine Ratio 29.4     Anion Gap 10.0 mmol/L      eGFR 21.0 mL/min/1.73     Narrative:      GFR Normal >60  Chronic Kidney Disease <60  Kidney Failure <15      POC Glucose Once [[id number]]  (Normal) Collected: 10/27/24 1129    Specimen: Blood Updated: 10/27/24 1131     Glucose 96 mg/dL     Basic Metabolic Panel [[id number]]  (Abnormal) Collected: 10/27/24 0419    Specimen: Blood Updated: 10/27/24 0653     Glucose 81 mg/dL       mg/dL      Creatinine 4.61 mg/dL      Sodium 135 mmol/L      Potassium 5.9 mmol/L      Chloride 105 mmol/L      CO2 16.0 mmol/L      Calcium 9.1 mg/dL      BUN/Creatinine Ratio 25.2     Anion Gap 14.0 mmol/L      eGFR 13.7 mL/min/1.73      Comment: <15 Indicative of kidney failure       Narrative:      GFR Normal >60  Chronic Kidney Disease <60  Kidney Failure <15      Protein, Urine, Random - Urine, Clean Catch [[id number]] Collected: 10/27/24 0225    Specimen: Urine, Clean Catch Updated: 10/27/24 0248     Total Protein, Urine 6.2 mg/dL     Narrative:      Reference intervals for random urine have not been established.  Clinical usage is dependent upon physician's interpretation in combination with other laboratory tests.       POC Glucose Once [[id number]]  (Normal) Collected: 10/27/24 0243    Specimen: Blood Updated: 10/27/24 0246     Glucose 94 mg/dL     Blood Gas, Arterial With Co-Ox [[id number]]  (Abnormal) Collected: 10/27/24 0116    Specimen: Arterial Blood Updated: 10/27/24 0116     Site Left Radial     Giancarlo's Test Positive     pH, Arterial 7.263 pH units      Comment: 84 Value below reference range        pCO2,  Arterial 31.9 mm Hg      Comment: 84 Value below reference range        pO2, Arterial 76.6 mm Hg      Comment: 84 Value below reference range        HCO3, Arterial 14.4 mmol/L      Base Excess, Arterial -11.5 mmol/L      Hemoglobin, Blood Gas 11.6 g/dL      Comment: 84 Value below reference range        Hematocrit, Blood Gas 35.7 %      Oxyhemoglobin 93.7 %      Comment: 84 Value below reference range        Methemoglobin 0.10 %      Carboxyhemoglobin 1.1 %      CO2 Content 15.4 mmol/L      Temperature 37.0     Barometric Pressure for Blood Gas --     Comment: N/A        Modality Room Air     FIO2 21 %      Rate 0 Breaths/minute      PIP 0 cmH2O      Comment: Meter: [id number]     :  [id number]        IPAP 0     EPAP 0     pH, Temp Corrected 7.263 pH Units      pCO2, Temperature Corrected 31.9 mm Hg      pO2, Temperature Corrected 76.6 mm Hg     CK [[id number]]  (Normal) Collected: 10/26/24 1935    Specimen: Blood from Arm, Left Updated: 10/27/24 0109     Creatine Kinase 28 U/L           Imaging Results (All)       Procedure Component Value Units Date/Time    US Renal Bilateral [[id number]] Resulted: 10/28/24 1300     Updated: 10/28/24 1300          ECG/EMG Results (all)       None          Physician Progress Notes (all)    No notes of this type exist for this encounter.          Consult Notes (all)        Severo Jc MD at 10/27/24 1537        Consult Orders    1. Inpatient Nephrology Consult [[id number]] ordered by Danilo Keating MD                   Patient Care Team:  Tristen Stuart DO as PCP - General (Family Medicine)  Virginia Brown RN as Nurse Navigator  Cong Cartagena MD as Consulting Physician (Hospitalist)    Chief complaint: Acute renal failure  Hyperkalemia  Met acidosis  High ostomy output     Inpatient Nephrology Consult  Consult performed by: Severo Jc MD  Consult ordered by: Danilo Keating MD  Reason for consult: Acute kidney injury         History of Present  Illness: Valentin Vásquez is a 59 y.o. male with PMH T2DM, GERD, colon cancer who underwent colectomy, incarcerated hernia repair, diverting loop ileostomy on 10/10 as well as bilateral ureteral stent placement during the surgery. Patient developed high ostomy output for last 2 weeks. Unfortunately he continued taking his home meds including lisinopril and metformin. He tried to stay hydrated but noticed decrease appetite. He presented at OSH in [place] and found to have severe renal failure w severe met acidosis and severe hyperkalemia. He was later transferred to [facility] for higher level of care. Patient stephanie taking any NSAID in last 2 weeks. Didn't notice any change in UOP as well. Since admission he has been getting IV fluids for volume expansion there has been interval improvement in renal function and met acidosis but there is persistent hyperkalemia noted. Nephrology has been consulted for evaluation and management of acute kidney injury and persistent hyperkalemia.     Review of Systems   Constitutional: Negative.    HENT: Negative.     Respiratory: Negative.     Gastrointestinal:  Positive for diarrhea.   Genitourinary: Negative.  Negative for difficulty urinating.   Musculoskeletal: Negative.    Neurological: Negative.    Hematological: Negative.    Psychiatric/Behavioral: Negative.          Past Medical History:   Diagnosis Date    Arthritis     Colon cancer     Colon polyp     Diabetes mellitus     Diverticulitis of colon     GERD (gastroesophageal reflux disease)     Gout     Hyperlipidemia     Hypertension     Injury of back     Kidney stone     passed and surgery-   x3 times surgery    Pancreatitis     Tear of right rotator cuff     Wears glasses     readers   ,   Past Surgical History:   Procedure Laterality Date    ADENOIDECTOMY      COLON RESECTION WITH ILEOSTOMY N/A 10/10/2024    Procedure: CONVERTED TO OPEN COLON RESECTION ULTRA LOW ANTERIOR LAPAROSCOPIC WITH LOOP ILEOSTOMY;  Surgeon: José Manuel Hutchins,  MD;  Location:  [facility];  Service: Robotics - [facility];  Laterality: N/A;    COLONOSCOPY N/A 05/21/2024    Procedure: COLONOSCOPY;  Surgeon: Rosa Patrick MD;  Location:  [facility];  Service: Gastroenterology;  Laterality: N/A;    CYSTOSCOPY Bilateral 10/10/2024    Procedure: CYSTOSCOPY TEMPORARY PLACEMENT BILATERAL URETERAL CATHETER;  Surgeon: Ke Courtney MD;  Location:  [facility];  Service: [employer] - [place];  Laterality: Bilateral;    CYSTOSCOPY LITHOLAPAXY BLADDER STONE EXTRACTION N/A 09/28/2022    Procedure: CYSTOSCOPY LASER LITHOLAPAXY BLADDER STONE EXTRACTION;  Surgeon: Mykel Jeffers MD;  Location: [facility] OR;  Service: Urology;  Laterality: N/A;    KIDNEY STONE SURGERY      x3 surgeries-  same stone    SHOULDER ARTHROSCOPY W/ ROTATOR CUFF REPAIR Right 07/19/2022    Procedure: RIGHT SHOULDER ARTHROSCOPY WITH OPEN ACROMIOPLASTY,  ROTATOR CUFF REPAIR, EXCISION LATERAL CLAVICLE;  Surgeon: Edmundo Huitron MD;  Location: [facility] OR;  Service: Orthopedics;  Laterality: Right;    TONSILLECTOMY     ,   Family History   Problem Relation Age of Onset    Diabetes Father     Diabetes Sister    ,   Social History     Socioeconomic History    Marital status:    Tobacco Use    Smoking status: Never    Smokeless tobacco: Current     Types: Snuff   Vaping Use    Vaping status: Never Used   Substance and Sexual Activity    Alcohol use: Not Currently     Comment: occasionally    Drug use: No    Sexual activity: Not Currently     Partners: Female     E-cigarette/Vaping    E-cigarette/Vaping Use Never User     Passive Exposure No     Counseling Given No      E-cigarette/Vaping Substances    Nicotine No     THC No     CBD No     Flavoring No      E-cigarette/Vaping Devices    Disposable No     Pre-filled or Refillable Cartridge No     Refillable Tank No     Pre-filled Pod No          ,   Medications Prior to Admission   Medication Sig Dispense Refill Last Dose/Taking     amoxicillin-clavulanate (AUGMENTIN) 875-125 MG per tablet Take 1 tablet by mouth 2 (Two) Times a Day. 10 tablet 0 10/26/2024    Aspirin Low Dose 81 MG EC tablet Take 1 tablet by mouth Daily.   10/26/2024    Continuous Glucose Sensor (FreeStyle Shilpa 2 Sensor) misc Use. Possible left side- pt might change 8th   10/26/2024    dapagliflozin Propanediol (Farxiga) 10 MG tablet Take 10 mg by mouth Daily.   10/26/2024    fenofibrate (TRICOR) 145 MG tablet Take 1 tablet by mouth Daily.   10/26/2024    gabapentin (NEURONTIN) 600 MG tablet Take 1 tablet by mouth 3 (Three) Times a Day.   10/26/2024    glipizide (GLUCOTROL XL) 10 MG 24 hr tablet Take 1 tablet by mouth Daily With Breakfast.   10/26/2024    HYDROcodone-acetaminophen (NORCO) 7.5-325 MG per tablet Take 1 tablet by mouth Every 6 (Six) Hours As Needed for Moderate Pain . 30 tablet 0 10/26/2024    Insulin Lispro, 1 Unit Dial, (HUMALOG) 100 UNIT/ML solution pen-injector Inject 5 Units under the skin into the appropriate area as directed 3 (Three) Times a Day Before Meals.   10/26/2024    lisinopril (PRINIVIL,ZESTRIL) 10 MG tablet Take 1 tablet by mouth Daily.   10/26/2024    loperamide (Imodium A-D) 2 MG tablet Take 1 tablet by mouth 4 (Four) Times a Day As Needed for Diarrhea. 180 tablet 0 10/26/2024    metFORMIN (GLUCOPHAGE) 1000 MG tablet Take 1 tablet by mouth 2 (Two) Times a Day With Meals.   10/26/2024    metoprolol succinate XL (TOPROL XL) 25 MG 24 hr tablet Take 1 tablet by mouth Daily. 30 tablet 0 10/26/2024    omeprazole (priLOSEC) 40 MG capsule Take 1 capsule by mouth Daily.   10/26/2024    rosuvastatin (CRESTOR) 10 MG tablet Take 1 tablet by mouth Every Night.   10/26/2024    tamsulosin (FLOMAX) 0.4 MG capsule 24 hr capsule TAKE 1 CAPSULE BY MOUTH EVERY NIGHT 90 capsule 3 10/26/2024    Tresiba FlexTouch 200 UNIT/ML solution pen-injector pen injection Inject  under the skin into the appropriate area as directed Take As Directed. 64 units in am and 60 units  at night   10/26/2024   , Scheduled Meds:  [Held by provider] aspirin, 81 mg, Oral, Daily  [Held by provider] empagliflozin, 10 mg, Oral, Daily  [Held by provider] gabapentin, 600 mg, Oral, Daily  [Held by provider] glipizide, 5 mg, Oral, BID AC  [Held by provider] lisinopril, 10 mg, Oral, Daily  loperamide, 2 mg, Oral, TID  [Held by provider] metoprolol succinate XL, 25 mg, Oral, Daily  [Held by provider] pantoprazole, 40 mg, Oral, Q AM  [Held by provider] rosuvastatin, 10 mg, Oral, Nightly  sodium chloride, 10 mL, Intravenous, Q12H  [Held by provider] tamsulosin, 0.4 mg, Oral, Nightly   , and Continuous Infusions:  sodium bicarbonate 8.4 % 75 mEq in dextrose 5 % and sodium chloride 0.45 % 1,000 mL infusion (less than/equal to 100 mEq), 75 mEq, Last Rate: 75 mEq (10/27/24 0307)       Objective     Vital Signs  Temp:  [94.9 °F (34.9 °C)-97.8 °F (36.6 °C)] 96.6 °F (35.9 °C)  Heart Rate:  [] 99  Resp:  [16-18] 18  BP: ()/(51-70) 99/64    I/O this shift:  In: 1000 [IV Piggyback:1000]  Out: 1000 [Urine:900; Stool:100]  I/O last 3 completed shifts:  In: 1000 [I.V.:1000]  Out: 1350 [Urine:1100; Stool:250]    Physical Exam  Constitutional:       General: He is not in acute distress.     Appearance: Normal appearance. He is not ill-appearing.   HENT:      Head: Normocephalic.      Nose: Nose normal.      Mouth/Throat:      Mouth: Mucous membranes are moist.   Eyes:      Pupils: Pupils are equal, round, and reactive to light.   Cardiovascular:      Rate and Rhythm: Normal rate and regular rhythm.      Pulses: Normal pulses.      Heart sounds: Normal heart sounds. No murmur heard.     No friction rub.   Pulmonary:      Effort: Pulmonary effort is normal. No respiratory distress.      Breath sounds: Normal breath sounds. No stridor.   Abdominal:      General: Abdomen is flat.      Comments: Ostomy +   Musculoskeletal:         General: Normal range of motion.      Cervical back: Normal range of motion.      Right  "lower leg: No edema.      Left lower leg: No edema.   Skin:     General: Skin is warm.   Neurological:      General: No focal deficit present.      Mental Status: He is alert and oriented to person, place, and time. Mental status is at baseline.         Results Review:    I reviewed the patient's new clinical results.    WBC WBC   Date Value Ref Range Status   10/26/2024 10.86 (H) 3.40 - 10.80 10*3/mm3 Final      HGB Hemoglobin   Date Value Ref Range Status   10/26/2024 14.0 13.0 - 17.7 g/dL Final      HCT Hematocrit   Date Value Ref Range Status   10/26/2024 45.6 37.5 - 51.0 % Final      Platlets No results found for: \"LABPLAT\"   MCV MCV   Date Value Ref Range Status   10/26/2024 89.4 79.0 - 97.0 fL Final          Sodium Sodium   Date Value Ref Range Status   10/27/2024 138 136 - 145 mmol/L Final   10/27/2024 135 (L) 136 - 145 mmol/L Final   10/26/2024 135 (L) 136 - 145 mmol/L Final   10/26/2024 127 (L) 136 - 145 mmol/L Final      Potassium Potassium   Date Value Ref Range Status   10/27/2024 6.0 (H) 3.5 - 5.2 mmol/L Final   10/27/2024 5.9 (H) 3.5 - 5.2 mmol/L Final   10/26/2024 5.9 (H) 3.5 - 5.2 mmol/L Final   10/26/2024 8.0 (C) 3.5 - 5.2 mmol/L Final     Comment:     Slight hemolysis detected by analyzer. Result may be falsely elevated.      Chloride Chloride   Date Value Ref Range Status   10/27/2024 108 (H) 98 - 107 mmol/L Final   10/27/2024 105 98 - 107 mmol/L Final   10/26/2024 107 98 - 107 mmol/L Final   10/26/2024 97 (L) 98 - 107 mmol/L Final      CO2 CO2   Date Value Ref Range Status   10/27/2024 20.0 (L) 22.0 - 29.0 mmol/L Final   10/27/2024 16.0 (L) 22.0 - 29.0 mmol/L Final   10/26/2024 12.3 (L) 22.0 - 29.0 mmol/L Final   10/26/2024 7.4 (C) 22.0 - 29.0 mmol/L Final      BUN BUN   Date Value Ref Range Status   10/27/2024 95 (H) 8 - 23 mg/dL Final   10/27/2024 116 (H) 8 - 23 mg/dL Final   10/26/2024 120 (H) 8 - 23 mg/dL Final   10/26/2024 130 (H) 8 - 23 mg/dL Final      Creatinine Creatinine   Date Value " "Ref Range Status   10/27/2024 3.23 (H) 0.76 - 1.27 mg/dL Final   10/27/2024 4.61 (H) 0.76 - 1.27 mg/dL Final   10/26/2024 5.28 (H) 0.76 - 1.27 mg/dL Final   10/26/2024 5.98 (H) 0.76 - 1.27 mg/dL Final      Calcium Calcium   Date Value Ref Range Status   10/27/2024 8.6 8.6 - 10.5 mg/dL Final   10/27/2024 9.1 8.6 - 10.5 mg/dL Final   10/26/2024 8.3 (L) 8.6 - 10.5 mg/dL Final   10/26/2024 10.2 8.6 - 10.5 mg/dL Final      PO4 No results found for: \"CAPO4\"   Albumin Albumin   Date Value Ref Range Status   10/26/2024 3.9 3.5 - 5.2 g/dL Final      Magnesium No results found for: \"MG\"   Uric Acid No results found for: \"URICACID\"         Assessment & Plan       DAJA (acute kidney injury)      Assessment & Plan    Acute kidney injury: Baseline cr ~ 1.0 2 weeks prior to this admission. Admitted at OSH w cr 5.9mg/dl. Etiology likely severe hemodynamic injury in the setting of high ostomy output, Additional risk factor ACE I and metformin use. Pending renal US. No significant proteinuria on this admission. CK normal     Hyperkalemia: Severe hyperkalemia on admission. K ~ 8 on admission. Persistent hyperkalemia currently.     Met acidosis: Severe due to DAJA and increase bicarb loss due to high ostomy output    Hypovolemia: Due to high ostomy output    Incarcerated hernia repair: 2 weeks prior to this admission.     Recs  Continue with IV fluids. Will switch fluid to 0.45NS+ 75meq Na-bicarb for next 24hr.   Start lokelma 10 gm daily   Expect improvement in hyperkalemia as renal function improves.   Patient most likely will need some oral bicarb supp if ostomy output remains high   F/u w renal US  No indication for dialysis at present  Recheck BMP ~ 8 pm today     I discussed the patients findings and my recommendations with patient    Severo Jc MD  10/27/24  15:37 EDT              Electronically signed by Severo Jc MD at 10/27/24 1557       Poncho Rudolph MD at 10/27/24 1210        Consult Orders    1. Inpatient " Colorectal Surgery Consult [[id number]] ordered by Danilo Keating MD at 10/27/24 0245                 Valentin Vásquez  [id number]  [id number]  1965    Patient Care Team:  Tristen Stuart DO as PCP - General (Family Medicine)  Virginia Brown RN as Nurse Navigator  Cong Cartagena MD as Consulting Physician (Hospitalist)    Consulting Provider AGATHA Byrne    Reason for Consult postop readmission    Subjective     This is a 59-year-old male who is a patient of my partners Dr. Mccoy who carries a history of rectal cancer.  He underwent a low anterior resection with diverting loop ileostomy and appendectomy performed on 10/10/2024.  Sounds like he had postoperative ileus even with his ileostomy which required additional length in the hospital.  He is subsequently discharged.    They called me yesterday and stated that he had had multiple days of nausea vomiting with inability to tolerate any oral intake as well as high ileostomy output.  He was dizzy.  I informed them to go to the local ER.  That was [facility].  Upon arrival at [facility] he was noted to have significant DAJA with a creatinine of 5.98 potassium of 8 and a CO2 of 7.4.  He was given protective measures and large-volume substation was transferred here overnight.  His labs this morning show that his CO2 has increased to 16, his potassium down to 5.9 and his creatinine down to 4.61.  Overall he feels significantly better.  Since being here he is only had 250 mL out.    Review of Systems   Pertinent items are noted in HPI, all other systems reviewed and negative. Specifically, there is no F/C/N/V/NSAID abuse, recent abx, new/unusual HA or visual disturbances, CP/SOB. Limb swelling, gait disturbance, new rashes or arthritis.       History  Past Medical History:   Diagnosis Date    Arthritis     Colon cancer     Colon polyp     Diabetes mellitus     Diverticulitis of colon     GERD (gastroesophageal reflux disease)     Gout      Hyperlipidemia     Hypertension     Injury of back     Kidney stone     passed and surgery-   x3 times surgery    Pancreatitis     Tear of right rotator cuff     Wears glasses     readers     Past Surgical History:   Procedure Laterality Date    ADENOIDECTOMY      COLON RESECTION WITH ILEOSTOMY N/A 10/10/2024    Procedure: CONVERTED TO OPEN COLON RESECTION ULTRA LOW ANTERIOR LAPAROSCOPIC WITH LOOP ILEOSTOMY;  Surgeon: José Manuel Hutchins MD;  Location:  [facility] OR;  Service: Robotics - [facility];  Laterality: N/A;    COLONOSCOPY N/A 05/21/2024    Procedure: COLONOSCOPY;  Surgeon: Rosa Patrick MD;  Location:  [facility] OR;  Service: Gastroenterology;  Laterality: N/A;    CYSTOSCOPY Bilateral 10/10/2024    Procedure: CYSTOSCOPY TEMPORARY PLACEMENT BILATERAL URETERAL CATHETER;  Surgeon: Ke Courtney MD;  Location:  [facility] OR;  Service: Robotics - [facility];  Laterality: Bilateral;    CYSTOSCOPY LITHOLAPAXY BLADDER STONE EXTRACTION N/A 09/28/2022    Procedure: CYSTOSCOPY LASER LITHOLAPAXY BLADDER STONE EXTRACTION;  Surgeon: Mykel Jeffers MD;  Location: [facility] OR;  Service: Urology;  Laterality: N/A;    KIDNEY STONE SURGERY      x3 surgeries-  same stone    SHOULDER ARTHROSCOPY W/ ROTATOR CUFF REPAIR Right 07/19/2022    Procedure: RIGHT SHOULDER ARTHROSCOPY WITH OPEN ACROMIOPLASTY,  ROTATOR CUFF REPAIR, EXCISION LATERAL CLAVICLE;  Surgeon: Edmundo Huitron MD;  Location:  [facility] OR;  Service: Orthopedics;  Laterality: Right;    TONSILLECTOMY       Family History   Problem Relation Age of Onset    Diabetes Father     Diabetes Sister      Social History     Tobacco Use    Smoking status: Never    Smokeless tobacco: Current     Types: Snuff   Vaping Use    Vaping status: Never Used   Substance Use Topics    Alcohol use: Not Currently     Comment: occasionally    Drug use: No     Medications Prior to Admission   Medication Sig Dispense Refill Last Dose/Taking    amoxicillin-clavulanate (AUGMENTIN) 875125  MG per tablet Take 1 tablet by mouth 2 (Two) Times a Day. 10 tablet 0 10/26/2024    Aspirin Low Dose 81 MG EC tablet Take 1 tablet by mouth Daily.   10/26/2024    Continuous Glucose Sensor (FreeStyle Shilpa 2 Sensor) misc Use. Possible left side- pt might change 8th   10/26/2024    dapagliflozin Propanediol (Farxiga) 10 MG tablet Take 10 mg by mouth Daily.   10/26/2024    fenofibrate (TRICOR) 145 MG tablet Take 1 tablet by mouth Daily.   10/26/2024    gabapentin (NEURONTIN) 600 MG tablet Take 1 tablet by mouth 3 (Three) Times a Day.   10/26/2024    glipizide (GLUCOTROL XL) 10 MG 24 hr tablet Take 1 tablet by mouth Daily With Breakfast.   10/26/2024    HYDROcodone-acetaminophen (NORCO) 7.5-325 MG per tablet Take 1 tablet by mouth Every 6 (Six) Hours As Needed for Moderate Pain . 30 tablet 0 10/26/2024    Insulin Lispro, 1 Unit Dial, (HUMALOG) 100 UNIT/ML solution pen-injector Inject 5 Units under the skin into the appropriate area as directed 3 (Three) Times a Day Before Meals.   10/26/2024    lisinopril (PRINIVIL,ZESTRIL) 10 MG tablet Take 1 tablet by mouth Daily.   10/26/2024    loperamide (Imodium A-D) 2 MG tablet Take 1 tablet by mouth 4 (Four) Times a Day As Needed for Diarrhea. 180 tablet 0 10/26/2024    metFORMIN (GLUCOPHAGE) 1000 MG tablet Take 1 tablet by mouth 2 (Two) Times a Day With Meals.   10/26/2024    metoprolol succinate XL (TOPROL XL) 25 MG 24 hr tablet Take 1 tablet by mouth Daily. 30 tablet 0 10/26/2024    omeprazole (priLOSEC) 40 MG capsule Take 1 capsule by mouth Daily.   10/26/2024    rosuvastatin (CRESTOR) 10 MG tablet Take 1 tablet by mouth Every Night.   10/26/2024    tamsulosin (FLOMAX) 0.4 MG capsule 24 hr capsule TAKE 1 CAPSULE BY MOUTH EVERY NIGHT 90 capsule 3 10/26/2024    Tresiba FlexTouch 200 UNIT/ML solution pen-injector pen injection Inject  under the skin into the appropriate area as directed Take As Directed. 64 units in am and 60 units at night   10/26/2024     Allergies:  Patient  has no known allergies.    Objective     Vital Signs  Blood pressure 99/64, pulse 99, temperature 96.6 °F (35.9 °C), temperature source Oral, resp. rate 18, weight 83.5 kg (184 lb), SpO2 95%.  I/O last 3 completed shifts:  In: 1000 [I.V.:1000]  Out: 1350 [Urine:1100; Stool:250]    Physical Exam:  General Appearance: Alert and Oriented  Head: normocephalic, atraumatic  Eyes: MICHELLE  Neck: trachea midline  Lungs: nonlabored respirations  Heart: regular rhythm & normal rate    Rectal:  Deferred    Abdomen: Soft, nontender, nondistended.  Incision is clean dry and intact.  Ileostomy is pink patent and productive with thin output.    Skin: no bruising or rash  Neurologic: Cranial Nerves grossly intact   Psych: normal      Results Review:   LABS  Results from last 7 days   Lab Units 10/26/24  1523   WBC 10*3/mm3 10.86*   HEMOGLOBIN g/dL 14.0   HEMATOCRIT % 45.6   PLATELETS 10*3/mm3 405     Results from last 7 days   Lab Units 10/27/24  0419 10/26/24  1935 10/26/24  1523   SODIUM mmol/L 135* 135* 127*   POTASSIUM mmol/L 5.9* 5.9* 8.0*   CHLORIDE mmol/L 105 107 97*   CO2 mmol/L 16.0* 12.3* 7.4*   BUN mg/dL 116* 120* 130*   CREATININE mg/dL 4.61* 5.28* 5.98*   GLUCOSE mg/dL 81 89 92   CALCIUM mg/dL 9.1 8.3* 10.2       IMAGING  Imaging Results (Last 72 Hours)       ** No results found for the last 72 hours. **          Able to personally review his CT scan from 10/26/2024 which shows no evidence of pneumoperitoneum.  Intact low coloanal anastomosis.  Diverting loop ileostomy in place.  No abscess.   I reviewed the patient's new clinical results.    Assessment & Plan       DAJA (acute kidney injury)      59-year-old male postop day 17 from low anterior resection, coloanal anastomosis and diverting loop ileostomy he Jose presents with high ileostomy output with DAJA and subsequent electrolyte abnormalities.    Plan  Large-volume resuscitation  Initiate Imodium to decrease his ileostomy output  He will need repeat aggressive  education to prevent any second readmission  Supportive measures  He may have a renal diet    I discussed the patients findings and my recommendations with patient and primary care team.     Poncho Rudolph MD  10/27/24  12:10 EDT    Time: More than 50% of time spent in counseling and coordination of care:  Total face-to-face/floor time 25 min.  Time spent in counseling 15 min. Counseling included the following topics: Ileostomy and high ileostomy output.    Electronically signed by Poncho Rudolph MD at 10/27/24 3409

## 2024-10-29 LAB
ALBUMIN SERPL-MCNC: 3.5 G/DL (ref 3.5–5.2)
ALBUMIN/GLOB SERPL: 1.2 G/DL
ALP SERPL-CCNC: 67 U/L (ref 39–117)
ALT SERPL W P-5'-P-CCNC: 10 U/L (ref 1–41)
ANION GAP SERPL CALCULATED.3IONS-SCNC: 11 MMOL/L (ref 5–15)
AST SERPL-CCNC: 17 U/L (ref 1–40)
BASOPHILS # BLD AUTO: 0.05 10*3/MM3 (ref 0–0.2)
BASOPHILS NFR BLD AUTO: 0.9 % (ref 0–1.5)
BILIRUB SERPL-MCNC: 0.2 MG/DL (ref 0–1.2)
BUN SERPL-MCNC: 30 MG/DL (ref 8–23)
BUN/CREAT SERPL: 24.8 (ref 7–25)
CALCIUM SPEC-SCNC: 8.6 MG/DL (ref 8.6–10.5)
CHLORIDE SERPL-SCNC: 107 MMOL/L (ref 98–107)
CO2 SERPL-SCNC: 21 MMOL/L (ref 22–29)
CREAT SERPL-MCNC: 1.21 MG/DL (ref 0.76–1.27)
DEPRECATED RDW RBC AUTO: 46.3 FL (ref 37–54)
EGFRCR SERPLBLD CKD-EPI 2021: 68.1 ML/MIN/1.73
EOSINOPHIL # BLD AUTO: 0.08 10*3/MM3 (ref 0–0.4)
EOSINOPHIL NFR BLD AUTO: 1.4 % (ref 0.3–6.2)
ERYTHROCYTE [DISTWIDTH] IN BLOOD BY AUTOMATED COUNT: 14.7 % (ref 12.3–15.4)
GLOBULIN UR ELPH-MCNC: 3 GM/DL
GLUCOSE BLDC GLUCOMTR-MCNC: 170 MG/DL (ref 70–130)
GLUCOSE BLDC GLUCOMTR-MCNC: 182 MG/DL (ref 70–130)
GLUCOSE BLDC GLUCOMTR-MCNC: 183 MG/DL (ref 70–130)
GLUCOSE BLDC GLUCOMTR-MCNC: 195 MG/DL (ref 70–130)
GLUCOSE BLDC GLUCOMTR-MCNC: 203 MG/DL (ref 70–130)
GLUCOSE BLDC GLUCOMTR-MCNC: 208 MG/DL (ref 70–130)
GLUCOSE SERPL-MCNC: 198 MG/DL (ref 65–99)
HCT VFR BLD AUTO: 35.4 % (ref 37.5–51)
HGB BLD-MCNC: 11.2 G/DL (ref 13–17.7)
IMM GRANULOCYTES # BLD AUTO: 0.01 10*3/MM3 (ref 0–0.05)
IMM GRANULOCYTES NFR BLD AUTO: 0.2 % (ref 0–0.5)
LYMPHOCYTES # BLD AUTO: 1.27 10*3/MM3 (ref 0.7–3.1)
LYMPHOCYTES NFR BLD AUTO: 22.4 % (ref 19.6–45.3)
MCH RBC QN AUTO: 27.3 PG (ref 26.6–33)
MCHC RBC AUTO-ENTMCNC: 31.6 G/DL (ref 31.5–35.7)
MCV RBC AUTO: 86.1 FL (ref 79–97)
MONOCYTES # BLD AUTO: 0.35 10*3/MM3 (ref 0.1–0.9)
MONOCYTES NFR BLD AUTO: 6.2 % (ref 5–12)
NEUTROPHILS NFR BLD AUTO: 3.9 10*3/MM3 (ref 1.7–7)
NEUTROPHILS NFR BLD AUTO: 68.9 % (ref 42.7–76)
NRBC BLD AUTO-RTO: 0 /100 WBC (ref 0–0.2)
PLATELET # BLD AUTO: 287 10*3/MM3 (ref 140–450)
PMV BLD AUTO: 9.9 FL (ref 6–12)
POTASSIUM SERPL-SCNC: 4.9 MMOL/L (ref 3.5–5.2)
PROT SERPL-MCNC: 6.5 G/DL (ref 6–8.5)
RBC # BLD AUTO: 4.11 10*6/MM3 (ref 4.14–5.8)
SODIUM SERPL-SCNC: 139 MMOL/L (ref 136–145)
WBC NRBC COR # BLD AUTO: 5.66 10*3/MM3 (ref 3.4–10.8)

## 2024-10-29 PROCEDURE — 82948 REAGENT STRIP/BLOOD GLUCOSE: CPT

## 2024-10-29 PROCEDURE — 25010000002 HEPARIN (PORCINE) PER 1000 UNITS: Performed by: INTERNAL MEDICINE

## 2024-10-29 PROCEDURE — 85025 COMPLETE CBC W/AUTO DIFF WBC: CPT | Performed by: COLON & RECTAL SURGERY

## 2024-10-29 PROCEDURE — 80053 COMPREHEN METABOLIC PANEL: CPT | Performed by: COLON & RECTAL SURGERY

## 2024-10-29 RX ADMIN — TAMSULOSIN HYDROCHLORIDE 0.4 MG: 0.4 CAPSULE ORAL at 20:42

## 2024-10-29 RX ADMIN — Medication 10 ML: at 20:42

## 2024-10-29 RX ADMIN — HYDROCODONE BITARTRATE AND ACETAMINOPHEN 1 TABLET: 7.5; 325 TABLET ORAL at 18:53

## 2024-10-29 RX ADMIN — Medication 10 ML: at 09:30

## 2024-10-29 RX ADMIN — SODIUM BICARBONATE 75 MEQ: 84 INJECTION, SOLUTION INTRAVENOUS at 07:23

## 2024-10-29 RX ADMIN — HEPARIN SODIUM 5000 UNITS: 5000 INJECTION INTRAVENOUS; SUBCUTANEOUS at 05:13

## 2024-10-29 RX ADMIN — HEPARIN SODIUM 5000 UNITS: 5000 INJECTION INTRAVENOUS; SUBCUTANEOUS at 20:42

## 2024-10-29 RX ADMIN — PANTOPRAZOLE SODIUM 40 MG: 40 TABLET, DELAYED RELEASE ORAL at 05:14

## 2024-10-29 RX ADMIN — LOPERAMIDE HYDROCHLORIDE 2 MG: 2 CAPSULE ORAL at 14:06

## 2024-10-29 RX ADMIN — LOPERAMIDE HYDROCHLORIDE 2 MG: 2 CAPSULE ORAL at 09:30

## 2024-10-29 RX ADMIN — ROSUVASTATIN 10 MG: 10 TABLET, FILM COATED ORAL at 20:42

## 2024-10-29 RX ADMIN — LOPERAMIDE HYDROCHLORIDE 2 MG: 2 CAPSULE ORAL at 20:42

## 2024-10-29 RX ADMIN — HEPARIN SODIUM 5000 UNITS: 5000 INJECTION INTRAVENOUS; SUBCUTANEOUS at 14:06

## 2024-10-29 RX ADMIN — ASPIRIN 81 MG: 81 TABLET, COATED ORAL at 09:30

## 2024-10-29 NOTE — CASE MANAGEMENT/SOCIAL WORK
Continued Stay Note  [facility]     Patient Name: Valentin Vásquez  MRN: [id number]  Today's Date: 10/29/2024    Admit Date: 10/27/2024    Plan: home   Discharge Plan       Row Name 10/29/24 4640       Plan    Plan home    Patient/Family in Agreement with Plan yes    Plan Comments Met with Mr. Vásquez and his wife at the bedside to discuss discharge plan. His plan is unchanged. He plans to go home with  his wife at discharge. His wife asked about what kind of diet he should follow at discharge. Explained that diet information will be given to patient in his discharge paperwork.  will continue to follow plan of care and assist with discharge planning needs as indicated.    Final Discharge Disposition Code 01 - home or self-care                   Discharge Codes    No documentation.                       Diane Melendez RN

## 2024-10-29 NOTE — NURSING NOTE
[facility] follow up for ostomy education    Surgery date: 10/10/2024    Ostomy type: Loop ileostomy    Appliance in place: Jeanne new image 2 piece 2 and three-quarter inch flat with drainable pouch.  Hand ring.    Last pouch change: Per wife it was 5 days ago.    Stoma Assessment: Stoma is oval in shape at an angle diagonal angle.  It is 32 mm tall by 25 mm wide.  There is evidence of mucocutaneous separation of the yellow slough in the wound bed at the proximal and distal aspects.  Sutures are still there.  It is almost flush with skin.    About 120 mL of stool were emptied from the pouch during pouch change and 3 staples were removed from the right upper lateral quadrant.    Education performed: Full education was given today.  From removal.  From skin care and assessment.  Cleansing of surrounding skin.  I did crusting on the MC junction as it would be leaking moisture to minimize of this we added powder and barrier spray.  Patient will need to continue to shaving the hair on his peristomal skin.  I went over how to measure how to cut superhot wafer to leave the device is much skin visible as possible to apply the ring to the back of the wafer this might be easier for her she was applying directly to the skin there is no big deal I know the education shows that but I have not personally had any luck applying rings this way.    After that I showed her how to press down on the adhesive to make sure it stuck in a hole in the hand over the wafer to let it melt into the skin for about 60 to 120 seconds.    Patient really wanted to know how to burp the pouch without having to empty it.  Did show her how she could just pull the top of the ring away from the wafer and squeeze the air out through the top and then resnapped the wafer back on.    We did discuss hydration as patient does not drink water.  I told him when its high output like that and you can guesstimate how much is high output if you are emptying the pouch  when it is a third of the way for it is roughly 100 250 mL so if you empty it 10 times that would be a liter over a liter anything over 1.25 L you cannot drink enough water to catch up so you need to come back to the hospital get hydration.  We discussed Gatorade.    We discussed adequate protein intake as the patient admitted he was not eating very much.  We discussed the kinds of things and even if they are considered bad foods if they stimulates you to eating it is better to eat something and get it on your stomach then nothing at all.  Explained how the stomach produces 2 to 3 L of juice a day no matter where you whether you eat or not so not eating does not slow down your output.    Patient wife requested Woc writing down the steps to the pouch change so she can visualize this when she does the care at home.  Pouches waivers and rings given.    Woc will continue to follow.     Please contact with questions or concerns.

## 2024-10-29 NOTE — PROGRESS NOTES
"          Clinical Nutrition Assessment     Patient Name: Valentin Vásquez  YOB: 1965  MRN: [id number]  Date of Encounter: 10/29/24 14:18 EDT  Admission date: 10/27/2024  Reason for Visit: Identified at risk by screening criteria, Unintentional weight loss, Reduced oral intake    Assessment   Nutrition Assessment   Admission Diagnosis:  DAJA (acute kidney injury) [N17.9]    Problem List:    DAJA (acute kidney injury)    HTN (hypertension)    Hyperlipidemia    DM2 (diabetes mellitus, type 2)    S/P colectomy , ultra low anterior resection, robotic assisted, with appy, repair of incarcerated hernia, diverting loop ileostomy    Hyperkalemia      PMH:   He  has a past medical history of Arthritis, Colon cancer, Colon polyp, Diabetes mellitus, Diverticulitis of colon, GERD (gastroesophageal reflux disease), Gout, Hyperlipidemia, Hypertension, Injury of back, Kidney stone, Pancreatitis, Tear of right rotator cuff, and Wears glasses.    PSH:  He  has a past surgical history that includes Tonsillectomy; Adenoidectomy; Kidney stone surgery; Shoulder arthroscopy w/ rotator cuff repair (Right, 07/19/2022); cystoscopy litholapaxy bladder stone extraction (N/A, 09/28/2022); Colonoscopy (N/A, 05/21/2024); COLON RESECTION WITH ILEOSTOMY (N/A, 10/10/2024); and Cystoscopy (Bilateral, 10/10/2024).    Applicable Nutrition History:   (10/10) s/p loop ileostomy output with prolonged hospital stay 2/2 ileus    Anthropometrics     Height: Height: 170.2 cm (67.01\")  Last Filed Weight: Weight: 83.5 kg (184 lb 1.4 oz) (10/28/24 2030)  Method: Weight Method: Bed scale  BMI: BMI (Calculated): 28.8    UBW:     Weight      Weight (kg) Weight (lbs) Weight Method   5/21/2024 90.719 kg  200 lb     8/21/2024 87.544 kg  193 lb     10/4/2024 88.55 kg  195 lb 3.5 oz  Standing scale    10/10/2024 88.451 kg  195 lb  Stated    10/14/2024 88.5 kg  195 lb 1.7 oz     10/26/2024 83.462 kg  184 lb  Bed scale    10/28/2024 83.5 kg  184 lb 1.4 oz  Bed " scale      Weight change: weight loss of 11 lbs (5.6%) over 1 month(s)    Significant?  Yes    Nutrition Focused Physical Exam    Date:  10/29       Patient meets criteria for malnutrition diagnosis, see MSA note.     Subjective   Reported/Observed/Food/Nutrition Related History:     Pt up in bed at time of visit, known to clinical nutrition from recent hospitalization. Reports limited tolerance of PO intake post-op and has had high output ileostomy. Attempted to gather recent PO hx - reported water, some gatorade, Boost, and juice. Pt recalls education materials provided prior to d/c - reported attempting multiple foods to thicken stool without success. Briefly reviewed high output ileostomy guidelines and recommendations. Pt reported Imodium has been effective in patient. NKFA    Current Nutrition Prescription   PO: Diet: Renal; Low Potassium, Low Sodium (2-3g); Fluid Consistency: Thin (IDDSI 0)  Oral Nutrition Supplement: N?A  Intake: Insufficient data    Assessment & Plan   Nutrition Diagnosis   Date: 10/29  Updated:  Problem Malnutrition, acute severe   Etiology Energy in < energy out   Signs/Symptoms </=50% of EEN x >/=5d, significant weight loss of 5.6% x 1 month, and moderate muscle wasting   Status: New    Goal:   Nutrition to support treatment, Improve nutrition related labs and Tolerate PO      Nutrition Intervention      Follow treatment progress, Care plan reviewed, Encourage intake, Supplement provided, Education provided    Encouraged PO intake on current diet as tolerated  Provide Boost GC 2x daily  Reviewed high output ileostomy guidelines  ORS  Foods to thicken stool and to avoid  Imodium      Monitoring/Evaluation:   Per protocol, I&O, PO intake, Pertinent labs, Weight, GI status, Symptoms    Vesna Castro, MS,RD,LD  Time Spent: 30min

## 2024-10-29 NOTE — PROGRESS NOTES
"   LOS: 2 days    Patient Care Team:  Tristen Stuart DO as PCP - General (Family Medicine)  Virginia Brown, RN as Nurse Navigator  Cong Cartagena MD as Consulting Physician (Hospitalist)    Subjective     Stable overnight.  Continues with decreased ostomy output.  Eating drinking better.    Objective     Vital Signs:  Blood pressure 120/70, pulse 100, temperature 98 °F (36.7 °C), temperature source Oral, resp. rate 18, height 170.2 cm (67.01\"), weight 83.5 kg (184 lb 1.4 oz), SpO2 95%.      Intake/Output Summary (Last 24 hours) at 10/29/2024 1231  Last data filed at 10/29/2024 1100  Gross per 24 hour   Intake 240 ml   Output 120 ml   Net 120 ml        No intake/output data recorded.    Physical Exam:        GENERAL: WD WM NAD  NEURO: Awake and alert, oriented. No focal deficit  PSYCHIATRIC: NMA. Cooperative with PE  EYE: PE, no icterus, no conjunctivitis  ENT: ommm, dentition intact,  Hearing intact  NECK: Supple , No JVD discernable,  Trachea midline  CV: No edema, RRR  LUNGS:  Quiet,  Nonlabored resp.  Symmetrical expansion  ABDOMEN: + Ostomy  : No [place], no palp bladder  SKIN: Warm and dry without rash      Labs:  Results from last 7 days   Lab Units 10/29/24  1054 10/26/24  1523   WBC 10*3/mm3 5.66 10.86*   HEMOGLOBIN g/dL 11.2* 14.0   PLATELETS 10*3/mm3 287 405     Results from last 7 days   Lab Units 10/29/24  0510 10/28/24  1031 10/27/24  1950 10/27/24  1137 10/26/24  1935 10/26/24  1523   SODIUM mmol/L 139 140 135* 138   < > 127*   POTASSIUM mmol/L 4.9 5.6* 5.2 6.0*   < > 8.0*   CHLORIDE mmol/L 107 107 106 108*   < > 97*   CO2 mmol/L 21.0* 21.0* 19.0* 20.0*   < > 7.4*   BUN mg/dL 30* 53* 80* 95*   < > 130*   CREATININE mg/dL 1.21 1.59* 2.32* 3.23*   < > 5.98*   CALCIUM mg/dL 8.6 9.5 9.4 8.6   < > 10.2   ALBUMIN g/dL 3.5  --   --   --   --  3.9    < > = values in this interval not displayed.     Results from last 7 days   Lab Units 10/29/24  0510   ALK PHOS U/L 67   BILIRUBIN mg/dL 0.2   ALT " (SGPT) U/L 10   AST (SGOT) U/L 17     Results from last 7 days   Lab Units 10/27/24  0116   PH, ARTERIAL pH units 7.263*   PO2 ART mm Hg 76.6*   PCO2, ARTERIAL mm Hg 31.9*   HCO3 ART mmol/L 14.4*               Estimated Creatinine Clearance: 66.3 mL/min (by C-G formula based on SCr of 1.21 mg/dL).         A/P:      ARF: Creatinine continues to improve down to 1.2. Urine output unmeasured overnight.  Baseline cr ~ 1.0 2 weeks prior to this admission. Admitted at OSH w cr 5.9mg/dl. Etiology likely severe hemodynamic injury in the setting of high ostomy output on ACE I and metformin use. No significant proteinuria on this admission. CK normal.  For now continue supportive care.  Continue to monitor renal function closely.    HTN: Blood pressure stable.  Rest suppression on hold due to ARF.  Continue to monitor blood pressure for now.     Hyperkalemia: Improved overnight post Lokelma.  K ~ 8 on admission with renal failure on rest suppression along with metabolic acidosis.  For now continue low potassium diet.    Hyponatremia: Stable today.     Met acidosis: Severe due to DAJA and increase bicarb loss due to high ostomy output.  Improved with bicarb drip.  Will stop bicarb drip.  Will likely need p.o. bicarb.  Reevaluate in AM.     Volume: Patient dehydrated on admission with renal failure.  Thought due to high ostomy output.  Continue IV fluids for now.  Strict I's and O's.     High output ostomy: Patient post low anterior resection with diverting loop ileostomy 10/10/24 due to rectal cancer.  Complicated by post operative ileus.  Patient returns with several days of nausea/vomiting along with high ileostomy output.  Evaluated by colorectal surgery.  Ostomy output improving..      High risk and complexity patient.      Terry Ramirez MD  10/29/24  12:31 EDT

## 2024-10-29 NOTE — PROGRESS NOTES
" progress note      Valentin Vásquez  [id number]  1965     LOS: 2 days     Attending: Cong Cartagena MD    Primary Care Provider: Tristen Stuart DO      Chief Complaint/Reason for visit:  No chief complaint on file.  Patient was admitted in transfer from [facility] after he presented there with emesis, decreased appetite, and high stoma output, was found to be in acute kidney failure with hyperkalemia.    I saw him following his admit yesterday, renal function improving, potassium was still elevated and I dosed him with Lokelma and a D50 followed by regular insulin IV.    IV fluids containing bicarb ongoing, p.o. diet initiated and patient excited to have p.o. offered.    He was seen by nephrology afterwards, input appreciated.    Subjective   10/28/24: Doing well when seen in his room today.  Encouraged to ambulate which he did afterwards.  Has tolerated some p.o. diet and stoma output seems to have decreased some with stoma having thick liquids currently.  No nausea or vomiting.  Renal ultrasound was done earlier.    10/29/24:   Feels ok overall. Tolerating PO. Stoma with thick liquid brown stool, no accurate record of output last 24 hours.       Objective        Visit Vitals  /70 (BP Location: Left arm, Patient Position: Lying)   Pulse 100   Temp 97.8 °F (36.6 °C) (Oral)   Resp 18   Ht 170.2 cm (67.01\")   Wt 83.5 kg (184 lb 1.4 oz)   SpO2 95%   BMI 28.82 kg/m²     Temp (24hrs), Av.7 °F (36.5 °C), Min:97.3 °F (36.3 °C), Max:98 °F (36.7 °C)      Intake/Output:    Intake/Output Summary (Last 24 hours) at 10/29/2024 1048  Last data filed at 10/29/2024 0900  Gross per 24 hour   Intake 240 ml   Output --   Net 240 ml          Physical Exam:     General Appearance:    Alert, cooperative, in no acute distress   Head:    Normocephalic, without obvious abnormality, atraumatic    Lungs:     Normal effort, symmetric chest rise,  clear to      auscultation bilaterally              Heart: "    Regular rhythm and normal rate, normal S1 and S2    Abdomen:   Soft and benign with pink stoma with output.   Extremities:  No clubbing, cyanosis or edema.  No deformities.    Pulses:   Pulses palpable and equal bilaterally   Skin:   No bleeding, bruising or rash          Results Review:     I reviewed the patient's new clinical results.   Results from last 7 days   Lab Units 10/26/24  1523   WBC 10*3/mm3 10.86*   HEMOGLOBIN g/dL 14.0   HEMATOCRIT % 45.6   PLATELETS 10*3/mm3 405     Results from last 7 days   Lab Units 10/29/24  0510 10/28/24  1031 10/27/24  1950 10/26/24  1935 10/26/24  1523   SODIUM mmol/L 139 140 135*   < > 127*   POTASSIUM mmol/L 4.9 5.6* 5.2   < > 8.0*   CHLORIDE mmol/L 107 107 106   < > 97*   CO2 mmol/L 21.0* 21.0* 19.0*   < > 7.4*   BUN mg/dL 30* 53* 80*   < > 130*   CREATININE mg/dL 1.21 1.59* 2.32*   < > 5.98*   CALCIUM mg/dL 8.6 9.5 9.4   < > 10.2   BILIRUBIN mg/dL 0.2  --   --   --  0.3   ALK PHOS U/L 67  --   --   --  127*   ALT (SGPT) U/L 10  --   --   --  18   AST (SGOT) U/L 17  --   --   --  18   GLUCOSE mg/dL 198* 183* 191*   < > 92    < > = values in this interval not displayed.     I reviewed the patient's new imaging including images and reports.    All medications reviewed.   aspirin, 81 mg, Oral, Daily  [Held by provider] empagliflozin, 10 mg, Oral, Daily  [Held by provider] gabapentin, 600 mg, Oral, Daily  [Held by provider] glipizide, 5 mg, Oral, BID AC  heparin (porcine), 5,000 Units, Subcutaneous, Q8H  [Held by provider] lisinopril, 10 mg, Oral, Daily  loperamide, 2 mg, Oral, TID  [Held by provider] metoprolol succinate XL, 25 mg, Oral, Daily  pantoprazole, 40 mg, Oral, Q AM  rosuvastatin, 10 mg, Oral, Nightly  sodium chloride, 10 mL, Intravenous, Q12H  tamsulosin, 0.4 mg, Oral, Nightly      acetaminophen, 650 mg, Q4H PRN   Or  acetaminophen, 650 mg, Q4H PRN   Or  acetaminophen, 650 mg, Q4H PRN  HYDROcodone-acetaminophen, 1 tablet, Q6H PRN  sodium chloride, 10 mL,  PRN        Assessment & Plan       DAJA (acute kidney injury)    HTN (hypertension)    Hyperlipidemia    DM2 (diabetes mellitus, type 2)    S/P colectomy , ultra low anterior resection, robotic assisted, with appy, repair of incarcerated hernia, diverting loop ileostomy    Hyperkalemia         Plan  Continue IV fluids. May be able to stop after current bag.   Improving renal function.  Monitor potassium and treat as indicated.    Resumed statin and aspirin  Will continue to hold antihypertensives including ACE inhibitor.    Will have wound care stoma nurse follow-up with the patient and his wife.    Encouraged ambulation.  Added heparin subcu for DVT prophylaxis    Nephrology and colorectal surgery following.    Possible dc home tomorrow if stable off IVF with acceptable stoma output.     Cong Cartagena MD  10/29/24  10:48 EDT

## 2024-10-29 NOTE — PROGRESS NOTES
Malnutrition Severity Assessment    Patient Name:  Valentin Vásquez  YOB: 1965  MRN: [id number]  Admit Date:  10/27/2024    Patient meets criteria for : Severe Malnutrition    Comments:  Pt meets criteria for severe, acute malnutrition with the indicators of </=50% of EEN x >/=5d, significant weight loss of 5.7% x 1 month, and moderate muscle wasting. Of note, pt with recent loop ileostomy creation 2/2 colon cancer - had prolonged hospitalization post-op as well as admission ow for high output ileostomy.      Malnutrition Severity Assessment  Malnutrition Type: Acute Disease or Injury - Related Malnutrition  Malnutrition Type (Last 8 Hours)       Malnutrition Severity Assessment       Row Name 10/29/24 1425       Malnutrition Severity Assessment    Malnutrition Type Acute Disease or Injury - Related Malnutrition      Row Name 10/29/24 1425       Insufficient Energy Intake     Insufficient Energy Intake Findings Severe    Insufficient Energy Intake  < or equal to 50% of est. energy requirement for > or equal to 5d)      Row Name 10/29/24 1425       Unintentional Weight Loss     Unintentional Weight Loss Findings Severe    Unintentional Weight Loss  Weight loss greater than 5% in one month  5.7% x 1 month      Row Name 10/29/24 1425       Muscle Loss    Loss of Muscle Mass Findings Moderate    [place] Region Moderate - slight depression    Clavicle Bone Region Moderate - some protrusion in females, visible in males    Acromion Bone Region Moderate - acromion may slightly protrude      Row Name 10/29/24 1425       Fat Loss    Orbital Region  Moderate -  somewhat hollowness, slightly dark circles      Row Name 10/29/24 1425       Criteria Met (Must meet criteria for severity in at least 2 of these categories: M Wasting, Fat Loss, Fluid, Secondary Signs, Wt. Status, Intake)    Patient meets criteria for  Severe Malnutrition                    Electronically signed by:  Vesna Castro, MS,RD,LD  10/29/24  14:26 EDT

## 2024-10-30 ENCOUNTER — READMISSION MANAGEMENT (OUTPATIENT)
Dept: CALL CENTER | Facility: HOSPITAL | Age: 59
End: 2024-10-30
Payer: COMMERCIAL

## 2024-10-30 VITALS
DIASTOLIC BLOOD PRESSURE: 64 MMHG | BODY MASS INDEX: 28.89 KG/M2 | OXYGEN SATURATION: 94 % | RESPIRATION RATE: 18 BRPM | HEART RATE: 73 BPM | HEIGHT: 67 IN | WEIGHT: 184.08 LBS | TEMPERATURE: 97.7 F | SYSTOLIC BLOOD PRESSURE: 110 MMHG

## 2024-10-30 PROBLEM — E43 SEVERE MALNUTRITION: Status: ACTIVE | Noted: 2024-10-30

## 2024-10-30 LAB
ANION GAP SERPL CALCULATED.3IONS-SCNC: 11 MMOL/L (ref 5–15)
BUN SERPL-MCNC: 19 MG/DL (ref 8–23)
BUN/CREAT SERPL: 17.6 (ref 7–25)
CALCIUM SPEC-SCNC: 9.2 MG/DL (ref 8.6–10.5)
CHLORIDE SERPL-SCNC: 108 MMOL/L (ref 98–107)
CO2 SERPL-SCNC: 19 MMOL/L (ref 22–29)
CREAT SERPL-MCNC: 1.08 MG/DL (ref 0.76–1.27)
EGFRCR SERPLBLD CKD-EPI 2021: 78.1 ML/MIN/1.73
GLUCOSE BLDC GLUCOMTR-MCNC: 162 MG/DL (ref 70–130)
GLUCOSE SERPL-MCNC: 162 MG/DL (ref 65–99)
POTASSIUM SERPL-SCNC: 5.4 MMOL/L (ref 3.5–5.2)
SODIUM SERPL-SCNC: 138 MMOL/L (ref 136–145)

## 2024-10-30 PROCEDURE — 80048 BASIC METABOLIC PNL TOTAL CA: CPT | Performed by: INTERNAL MEDICINE

## 2024-10-30 PROCEDURE — 82948 REAGENT STRIP/BLOOD GLUCOSE: CPT

## 2024-10-30 PROCEDURE — 25010000002 HEPARIN (PORCINE) PER 1000 UNITS: Performed by: INTERNAL MEDICINE

## 2024-10-30 RX ORDER — GLIPIZIDE 10 MG/1
10 TABLET, FILM COATED, EXTENDED RELEASE ORAL
Start: 2024-10-30

## 2024-10-30 RX ORDER — SODIUM BICARBONATE 650 MG/1
650 TABLET ORAL 2 TIMES DAILY
Status: DISCONTINUED | OUTPATIENT
Start: 2024-10-30 | End: 2024-10-30 | Stop reason: HOSPADM

## 2024-10-30 RX ORDER — LISINOPRIL 10 MG/1
10 TABLET ORAL DAILY
Start: 2024-10-30

## 2024-10-30 RX ORDER — GABAPENTIN 600 MG/1
600 TABLET ORAL 3 TIMES DAILY
Start: 2024-10-30

## 2024-10-30 RX ORDER — METOPROLOL SUCCINATE 25 MG/1
25 TABLET, EXTENDED RELEASE ORAL DAILY
Start: 2024-10-30

## 2024-10-30 RX ORDER — DAPAGLIFLOZIN 10 MG/1
1 TABLET, FILM COATED ORAL DAILY
Start: 2024-10-30

## 2024-10-30 RX ORDER — SODIUM BICARBONATE 650 MG/1
650 TABLET ORAL 2 TIMES DAILY
Qty: 14 TABLET | Refills: 0 | Status: SHIPPED | OUTPATIENT
Start: 2024-10-30 | End: 2024-11-06

## 2024-10-30 RX ORDER — SODIUM BICARBONATE 650 MG/1
650 TABLET ORAL 2 TIMES DAILY
Status: DISCONTINUED | OUTPATIENT
Start: 2024-10-30 | End: 2024-10-30

## 2024-10-30 RX ADMIN — HEPARIN SODIUM 5000 UNITS: 5000 INJECTION INTRAVENOUS; SUBCUTANEOUS at 14:12

## 2024-10-30 RX ADMIN — LOPERAMIDE HYDROCHLORIDE 2 MG: 2 CAPSULE ORAL at 09:08

## 2024-10-30 RX ADMIN — HEPARIN SODIUM 5000 UNITS: 5000 INJECTION INTRAVENOUS; SUBCUTANEOUS at 05:54

## 2024-10-30 RX ADMIN — Medication 10 ML: at 09:09

## 2024-10-30 RX ADMIN — LOPERAMIDE HYDROCHLORIDE 2 MG: 2 CAPSULE ORAL at 14:12

## 2024-10-30 RX ADMIN — SODIUM BICARBONATE 650 MG TABLET 650 MG: at 14:12

## 2024-10-30 RX ADMIN — SODIUM ZIRCONIUM CYCLOSILICATE 10 G: 10 POWDER, FOR SUSPENSION ORAL at 12:59

## 2024-10-30 RX ADMIN — ASPIRIN 81 MG: 81 TABLET, COATED ORAL at 09:08

## 2024-10-30 RX ADMIN — PANTOPRAZOLE SODIUM 40 MG: 40 TABLET, DELAYED RELEASE ORAL at 05:55

## 2024-10-30 NOTE — DISCHARGE SUMMARY
Patient Name: Valentin Vásquez  MRN: [id number]  : 1965  DOS: 10/30/2024    Attending: Cong Cartagena MD    Primary Care Provider: Tristen Stuart DO    Date of Admission:.10/27/2024 12:23 AM    Date of Discharge:  10/30/2024    Discharge Diagnosis:   DAJA (acute kidney injury)    HTN (hypertension)    Hyperlipidemia    DM2 (diabetes mellitus, type 2)    S/P colectomy , ultra low anterior resection, robotic assisted, with appy, repair of incarcerated hernia, diverting loop ileostomy    Hyperkalemia    Severe malnutrition      Hospital Course    At admit:    Valentin Vásquez is a 59 y.o. male with PMH T2DM, GERD, colon cancer who underwent colectomy, incarcerated hernia repair, diverting loop ileostomy on 10/10 as well as bilateral ureteral stent for obstructive uropathy.  His course was complicated by postop ileus and ultimately recovered well, having ureteral stents removed prior to discharge.  He was discharged on 10/17 and reports that since he has been out of the hospital he has had ongoing high ostomy output.  Every time he eats he feels his ostomy bag several times about an hour later with soft to watery stool.  He had called his colorectal surgeon who had instructed him to take Imodium over-the-counter.  No specific dietary triggers noted.  He has no abdominal pain or discomfort.  No fevers or chills.  No vomiting.  He was initially seen at [facility] and transferred here for higher level of care due to significantly decreased renal function.  He is currently asymptomatic.     After admit:    He was provided pain medication as needed for pain control.  Adjustments were made to pain medications to optimize postop pain management.   Risks and benefits of opiate medications discussed with patient.  Timmy report in chart was reviewed prior to discharge.    He received DVT prophylaxis with subcutaneous heparin as well as mechanicals      Nephrology was consulted for acute kidney injury.  "He received IVF and was treated for hyperkalemia. He is discharged home on bicarb tablets. He will have PCP follow-up 24 with BMP.    He used an IS for atelectasis prophylaxis.    With the progress he has made, pt is ready for DC home today.    Discussed with patient regarding plan and he shows understanding and agreement.       Consultants:  Dr. Elkins, nephrology  Dr. Rudolph, colorectal     Pertinent Test Results:    I reviewed the patient's new clinical results.   Results from last 7 days   Lab Units 10/29/24  1054 10/26/24  1523   WBC 10*3/mm3 5.66 10.86*   HEMOGLOBIN g/dL 11.2* 14.0   HEMATOCRIT % 35.4* 45.6   PLATELETS 10*3/mm3 287 405     Results from last 7 days   Lab Units 10/30/24  0819 10/29/24  0510 10/28/24  1031 10/26/24  1935 10/26/24  1523   SODIUM mmol/L 138 139 140   < > 127*   POTASSIUM mmol/L 5.4* 4.9 5.6*   < > 8.0*   CHLORIDE mmol/L 108* 107 107   < > 97*   CO2 mmol/L 19.0* 21.0* 21.0*   < > 7.4*   BUN mg/dL 19 30* 53*   < > 130*   CREATININE mg/dL 1.08 1.21 1.59*   < > 5.98*   CALCIUM mg/dL 9.2 8.6 9.5   < > 10.2   BILIRUBIN mg/dL  --  0.2  --   --  0.3   ALK PHOS U/L  --  67  --   --  127*   ALT (SGPT) U/L  --  10  --   --  18   AST (SGOT) U/L  --  17  --   --  18   GLUCOSE mg/dL 162* 198* 183*   < > 92    < > = values in this interval not displayed.      Latest Reference Range & Units 10/30/24 05:39 10/30/24 08:19   Glucose 65 - 99 mg/dL 162 (H) 162 (H)   (H): Data is abnormally high    I reviewed the patient's new imaging including images and reports.    Discharge Assessment:    Vital Signs  Visit Vitals  /64 (BP Location: Right arm, Patient Position: Lying)   Pulse 73   Temp 97.7 °F (36.5 °C) (Oral)   Resp 18   Ht 170.2 cm (67.01\")   Wt 83.5 kg (184 lb 1.4 oz)   SpO2 94%   BMI 28.82 kg/m²     Temp (24hrs), Av.8 °F (36.6 °C), Min:97.3 °F (36.3 °C), Max:98.1 °F (36.7 °C)      General Appearance:    Alert, cooperative, in no acute distress   Lungs:     Clear to " auscultation,respirations regular, even and  unlabored    Heart:    Regular rhythm and normal rate, normal S1 and S2    Abdomen:   Soft and benign with clean healing incisions. Stoma pink   Extremities:   Moves all extremities well, no edema, no cyanosis, no redness   Pulses:   Pulses palpable and equal bilaterally   Skin:   No bleeding, bruising or rash          Discharge Disposition: Home        Discharge Medications        New Medications        Instructions Start Date   sodium bicarbonate 650 MG tablet   650 mg, Oral, 2 Times Daily             Changes to Medications        Instructions Start Date   dapagliflozin Propanediol 10 MG tablet  Commonly known as: Farxiga  What changed: additional instructions   10 mg, Oral, Daily, Hold until follow-up with PCP      gabapentin 600 MG tablet  Commonly known as: NEURONTIN  What changed: additional instructions   600 mg, Oral, 3 Times Daily, Hold until follow-up with PCP      glipizide 10 MG 24 hr tablet  Commonly known as: GLUCOTROL XL  What changed: additional instructions   10 mg, Oral, Daily With Breakfast, Hold until follow-up with PCP      lisinopril 10 MG tablet  Commonly known as: PRINIVIL,ZESTRIL  What changed: additional instructions   10 mg, Oral, Daily, Hold until follow-up with PCP      metoprolol succinate XL 25 MG 24 hr tablet  Commonly known as: Toprol XL  What changed: additional instructions   25 mg, Oral, Daily, Hold until follow-up with PCP             Continue These Medications        Instructions Start Date   Aspirin Low Dose 81 MG EC tablet  Generic drug: aspirin   81 mg, Daily      fenofibrate 145 MG tablet  Commonly known as: TRICOR   145 mg, Daily      FreeStyle Shilpa 2 Sensor misc   Use. Possible left side- pt might change 8th      HYDROcodone-acetaminophen 7.5-325 MG per tablet  Commonly known as: NORCO   1 tablet, Oral, Every 6 Hours PRN      Insulin Lispro (1 Unit Dial) 100 UNIT/ML solution pen-injector  Commonly known as: HUMALOG   5 Units, 3  Times Daily Before Meals      loperamide 2 MG tablet  Commonly known as: Imodium A-D   2 mg, Oral, 4 Times Daily PRN      metFORMIN 1000 MG tablet  Commonly known as: GLUCOPHAGE   1,000 mg, 2 Times Daily With Meals      omeprazole 40 MG capsule  Commonly known as: priLOSEC   40 mg, Daily      rosuvastatin 10 MG tablet  Commonly known as: CRESTOR   10 mg, Nightly      tamsulosin 0.4 MG capsule 24 hr capsule  Commonly known as: FLOMAX   0.4 mg, Oral, Nightly      Tresiba FlexTouch 200 UNIT/ML solution pen-injector pen injection  Generic drug: Insulin Degludec   Take As Directed             Stop These Medications      amoxicillin-clavulanate 875-125 MG per tablet  Commonly known as: AUGMENTIN              Discharge Diet: Regular diet     Activity at Discharge: ambulate     Follow-up Appointments  Dr. Hutchins per his orders  PCP 11/1/24 with BMP    Discharge took over 30 min     LAURA Weiner  10/30/24  14:43 EDT    CC: Tristen Stuart DO

## 2024-10-30 NOTE — CASE MANAGEMENT/SOCIAL WORK
Continued Stay Note  [facility]     Patient Name: Valentin Vásquez  MRN: [id number]  Today's Date: 10/30/2024    Admit Date: 10/27/2024    Plan: home   Discharge Plan       Row Name 10/30/24 1517       Plan    Plan home    Patient/Family in Agreement with Plan yes    Plan Comments Met with Mr. Vásquez to finalize discharge plan. He is discharging back to his home with his wife today. No discharge needs were identified.    Final Discharge Disposition Code 01 - home or self-care                   Discharge Codes    No documentation.                 Expected Discharge Date and Time       Expected Discharge Date Expected Discharge Time    Oct 30, 2024               Diane Melendez RN

## 2024-10-30 NOTE — PROGRESS NOTES
"          Clinical Nutrition Assessment     Patient Name: Valentin Vásquez  YOB: 1965  MRN: [id number]  Date of Encounter: 10/30/24 15:18 EDT  Admission date: 10/27/2024  Reason for Visit: Follow-up protocol, Need for education    Assessment   Nutrition Assessment   Admission Diagnosis:  DAJA (acute kidney injury) [N17.9]    Problem List:    DAJA (acute kidney injury)    HTN (hypertension)    Hyperlipidemia    DM2 (diabetes mellitus, type 2)    S/P colectomy , ultra low anterior resection, robotic assisted, with appy, repair of incarcerated hernia, diverting loop ileostomy    Hyperkalemia    Severe malnutrition      PMH:   He  has a past medical history of Arthritis, Colon cancer, Colon polyp, Diabetes mellitus, Diverticulitis of colon, GERD (gastroesophageal reflux disease), Gout, Hyperlipidemia, Hypertension, Injury of back, Kidney stone, Pancreatitis, Tear of right rotator cuff, and Wears glasses.    PSH:  He  has a past surgical history that includes Tonsillectomy; Adenoidectomy; Kidney stone surgery; Shoulder arthroscopy w/ rotator cuff repair (Right, 07/19/2022); cystoscopy litholapaxy bladder stone extraction (N/A, 09/28/2022); Colonoscopy (N/A, 05/21/2024); COLON RESECTION WITH ILEOSTOMY (N/A, 10/10/2024); and Cystoscopy (Bilateral, 10/10/2024).    Applicable Nutrition History:   (10/10) s/p loop ileostomy output with prolonged hospital stay 2/2 ileus    Anthropometrics     Height: Height: 170.2 cm (67.01\")  Last Filed Weight: Weight: 83.5 kg (184 lb 1.4 oz) (10/28/24 2030)  Method: Weight Method: Bed scale  BMI: BMI (Calculated): 28.8    UBW:     Weight      Weight (kg) Weight (lbs) Weight Method   5/21/2024 90.719 kg  200 lb     8/21/2024 87.544 kg  193 lb     10/4/2024 88.55 kg  195 lb 3.5 oz  Standing scale    10/10/2024 88.451 kg  195 lb  Stated    10/14/2024 88.5 kg  195 lb 1.7 oz     10/26/2024 83.462 kg  184 lb  Bed scale    10/28/2024 83.5 kg  184 lb 1.4 oz  Bed scale      Weight change: " weight loss of 11 lbs (5.6%) over 1 month(s)    Significant?  Yes    Nutrition Focused Physical Exam    Date:  10/29       Patient meets criteria for malnutrition diagnosis, see MSA note.     Subjective   Reported/Observed/Food/Nutrition Related History:   10/30  RD received call for diet education prior to d/c. RD provided patient with ileostomy nutrition education and written materials. RD reviewed low-fiber diet x 4-6 weeks post-op, slow re-introduction of high-fiber foods after 4-6 weeks, daily chewable/liquid MVI, high ileostomy output/dehydration/oral rehydration solutions, dietary strategies for managing high ileostomy output, food lists pertaining to gas/odor/blockage/diarrhea and stool thickening, appropriate oral nutrition supplements, and recommended daily protein intake for post-op wound healing.    10/29  Pt up in bed at time of visit, known to clinical nutrition from recent hospitalization. Reports limited tolerance of PO intake post-op and has had high output ileostomy. Attempted to gather recent PO hx - reported water, some gatorade, Boost, and juice. Pt recalls education materials provided prior to d/c - reported attempting multiple foods to thicken stool without success. Briefly reviewed high output ileostomy guidelines and recommendations. Pt reported Imodium has been effective in patient. NKFA    Current Nutrition Prescription   PO: Diet: Renal; Low Potassium, Low Sodium (2-3g); Fluid Consistency: Thin (IDDSI 0)  Oral Nutrition Supplement: N?A  Intake: Insufficient data    Assessment & Plan   Nutrition Diagnosis   Date: 10/29  Updated:  Problem Malnutrition, acute severe   Etiology Energy in < energy out   Signs/Symptoms </=50% of EEN x >/=5d, significant weight loss of 5.6% x 1 month, and moderate muscle wasting   Status: New    Goal:   Nutrition to support treatment, Improve nutrition related labs and Tolerate PO      Nutrition Intervention      Follow treatment progress, Care plan reviewed,  Encourage intake, Supplement provided, Education provided    Encouraged PO intake on current diet as tolerated  Provide Boost GC 2x daily  Reviewed high output ileostomy guidelines  ORS  Foods to thicken stool and to avoid  Imodium      Monitoring/Evaluation:   Per protocol, I&O, PO intake, Pertinent labs, Weight, GI status, Symptoms    Vesna Castro MS,RD,LD  Time Spent: 30min

## 2024-10-30 NOTE — PAYOR COMM NOTE
"Ref# [id number]   Still Pending  Clinical Update    MEERA Lopez, RN  Utilization Review  Phone [phone]  Fax [phone]    [facility]  [address] [place] [id number]           Valentin Vásquez (59 y.o. Male)       Date of Birth   1965    Social Security Number       Address   [address] [place] [address]    Home Phone   [phone]    MRN   [id number]       Adventism   [facility]    Marital Status                               Admission Date   10/27/24    Admission Type   Urgent    Admitting Provider   Cong Cartagena MD    Attending Provider   Cong Cartagena MD    Department, Room/Bed   [facility] 5G, S566/1       Discharge Date       Discharge Disposition       Discharge Destination                                 Attending Provider: Cong Cartagena MD    Allergies: No Known Allergies    Isolation: None   Infection: None   Code Status: CPR    Ht: 170.2 cm (67.01\")   Wt: 83.5 kg (184 lb 1.4 oz)    Admission Cmt: None   Principal Problem: DAJA (acute kidney injury) [N17.9]                   Active Insurance as of 10/27/2024       Primary Coverage       Payor Plan Insurance Group Employer/Plan Group    [facility] [employer] [facility] [employer] [facility] PPO [id number]       Payor Plan Address Payor Plan Phone Number Payor Plan Fax Number Effective Dates    [address] [phone]  1/1/2020 - None Entered    [place] [address]         Subscriber Name Subscriber Birth Date Member ID       VALENTIN VÁSQUEZ 1965 [id number]                     Emergency Contacts        (Rel.) Home Phone Work Phone Mobile Phone    Sharri Vásquez (Spouse) [phone] -- [phone]    KASANDRA VÁSQUEZ (Daughter) -- -- [phone]              Current Facility-Administered Medications   Medication Dose Route Frequency Provider Last Rate Last Admin    acetaminophen (TYLENOL) tablet 650 mg  650 mg Oral Q4H PRN Danilo Keating MD        Or    " acetaminophen (TYLENOL) 160 MG/5ML oral solution 650 mg  650 mg Oral Q4H PRN Danilo Keating MD        Or    acetaminophen (TYLENOL) suppository 650 mg  650 mg Rectal Q4H PRN Danilo Keating MD        aspirin EC tablet 81 mg  81 mg Oral Daily Cong Cartagena MD   81 mg at 10/30/24 0908    [Held by provider] empagliflozin (JARDIANCE) tablet 10 mg  10 mg Oral Daily Danilo Keating MD        [Held by provider] gabapentin (NEURONTIN) capsule 600 mg  600 mg Oral Daily Danilo Keating MD        [Held by provider] glipizide (GLUCOTROL) tablet 5 mg  5 mg Oral BID AC Danilo Keating MD        heparin (porcine) 5000 UNIT/ML injection 5,000 Units  5,000 Units Subcutaneous Q8H Cong Cartagena MD   5,000 Units at 10/30/24 0554    HYDROcodone-acetaminophen (NORCO) 7.5-325 MG per tablet 1 tablet  1 tablet Oral Q6H PRN Danilo Keating MD   1 tablet at 10/29/24 1853    [Held by provider] lisinopril (PRINIVIL,ZESTRIL) tablet 10 mg  10 mg Oral Daily Danilo Keating MD        loperamide (IMODIUM) capsule 2 mg  2 mg Oral TID Poncho Rudolph MD   2 mg at 10/30/24 0908    [Held by provider] metoprolol succinate XL (TOPROL-XL) 24 hr tablet 25 mg  25 mg Oral Daily Danilo Keating MD        pantoprazole (PROTONIX) EC tablet 40 mg  40 mg Oral Q AM Cong Cartagena MD   40 mg at 10/30/24 0555    rosuvastatin (CRESTOR) tablet 10 mg  10 mg Oral Nightly Cong Cartagena MD   10 mg at 10/29/24 2042    sodium chloride 0.9 % flush 10 mL  10 mL Intravenous Q12H Danilo Keating MD   10 mL at 10/30/24 0909    sodium chloride 0.9 % flush 10 mL  10 mL Intravenous PRN Danilo Keating MD        tamsulosin (FLOMAX) 24 hr capsule 0.4 mg  0.4 mg Oral Nightly Cong Cartagena MD   0.4 mg at 10/29/24 2042     Lab Results (last 48 hours)       Procedure Component Value Units Date/Time    Basic Metabolic Panel [[id number]]  (Abnormal) Collected: 10/30/24 0819    Specimen: Blood Updated: 10/30/24 0944     Glucose 162  mg/dL      BUN 19 mg/dL      Creatinine 1.08 mg/dL      Sodium 138 mmol/L      Potassium 5.4 mmol/L      Chloride 108 mmol/L      CO2 19.0 mmol/L      Calcium 9.2 mg/dL      BUN/Creatinine Ratio 17.6     Anion Gap 11.0 mmol/L      eGFR 78.1 mL/min/1.73     Narrative:      GFR Normal >60  Chronic Kidney Disease <60  Kidney Failure <15      POC Glucose Once [[id number]]  (Abnormal) Collected: 10/30/24 0539    Specimen: Blood Updated: 10/30/24 0541     Glucose 162 mg/dL     POC Glucose Once [[id number]]  (Abnormal) Collected: 10/29/24 2346    Specimen: Blood Updated: 10/29/24 2348     Glucose 183 mg/dL     POC Glucose Once [[id number]]  (Abnormal) Collected: 10/29/24 2011    Specimen: Blood Updated: 10/29/24 2014     Glucose 208 mg/dL     POC Glucose Once [[id number]]  (Abnormal) Collected: 10/29/24 1833    Specimen: Blood Updated: 10/29/24 1834     Glucose 170 mg/dL     POC Glucose Once [[id number]]  (Abnormal) Collected: 10/29/24 1158    Specimen: Blood Updated: 10/29/24 1201     Glucose 203 mg/dL     CBC & Differential [[id number]]  (Abnormal) Collected: 10/29/24 1054    Specimen: Blood Updated: 10/29/24 1110    Narrative:      The following orders were created for panel order CBC & Differential.  Procedure                               Abnormality         Status                     ---------                               -----------         ------                     CBC Auto Differential[[id number]]        Abnormal            Final result                 Please view results for these tests on the individual orders.    CBC Auto Differential [[id number]]  (Abnormal) Collected: 10/29/24 1054    Specimen: Blood Updated: 10/29/24 1110     WBC 5.66 10*3/mm3      RBC 4.11 10*6/mm3      Hemoglobin 11.2 g/dL      Hematocrit 35.4 %      MCV 86.1 fL      MCH 27.3 pg      MCHC 31.6 g/dL      RDW 14.7 %      RDW-SD 46.3 fl      MPV 9.9 fL      Platelets 287 10*3/mm3      Neutrophil % 68.9 %      Lymphocyte % 22.4 %      Monocyte % 6.2  %      Eosinophil % 1.4 %      Basophil % 0.9 %      Immature Grans % 0.2 %      Neutrophils, Absolute 3.90 10*3/mm3      Lymphocytes, Absolute 1.27 10*3/mm3      Monocytes, Absolute 0.35 10*3/mm3      Eosinophils, Absolute 0.08 10*3/mm3      Basophils, Absolute 0.05 10*3/mm3      Immature Grans, Absolute 0.01 10*3/mm3      nRBC 0.0 /100 WBC     Comprehensive Metabolic Panel [[id number]]  (Abnormal) Collected: 10/29/24 0510    Specimen: Blood Updated: 10/29/24 0616     Glucose 198 mg/dL      BUN 30 mg/dL      Creatinine 1.21 mg/dL      Sodium 139 mmol/L      Potassium 4.9 mmol/L      Chloride 107 mmol/L      CO2 21.0 mmol/L      Calcium 8.6 mg/dL      Total Protein 6.5 g/dL      Albumin 3.5 g/dL      ALT (SGPT) 10 U/L      AST (SGOT) 17 U/L      Alkaline Phosphatase 67 U/L      Total Bilirubin 0.2 mg/dL      Globulin 3.0 gm/dL      Comment: Calculated Result        A/G Ratio 1.2 g/dL      BUN/Creatinine Ratio 24.8     Anion Gap 11.0 mmol/L      eGFR 68.1 mL/min/1.73     Narrative:      GFR Normal >60  Chronic Kidney Disease <60  Kidney Failure <15      POC Glucose Once [[id number]]  (Abnormal) Collected: 10/29/24 0548    Specimen: Blood Updated: 10/29/24 0550     Glucose 182 mg/dL     POC Glucose Once [[id number]]  (Abnormal) Collected: 10/29/24 0005    Specimen: Blood Updated: 10/29/24 0006     Glucose 195 mg/dL     POC Glucose Once [[id number]]  (Abnormal) Collected: 10/28/24 1704    Specimen: Blood Updated: 10/28/24 1705     Glucose 156 mg/dL     POC Glucose Once [[id number]]  (Abnormal) Collected: 10/28/24 1141    Specimen: Blood Updated: 10/28/24 1142     Glucose 145 mg/dL     Basic Metabolic Panel [[id number]]  (Abnormal) Collected: 10/28/24 1031    Specimen: Blood Updated: 10/28/24 1114     Glucose 183 mg/dL      BUN 53 mg/dL      Creatinine 1.59 mg/dL      Sodium 140 mmol/L      Potassium 5.6 mmol/L      Chloride 107 mmol/L      CO2 21.0 mmol/L      Calcium 9.5 mg/dL      BUN/Creatinine Ratio 33.3     Anion Gap  12.0 mmol/L      eGFR 49.1 mL/min/1.73     Narrative:      GFR Normal >60  Chronic Kidney Disease <60  Kidney Failure <15            Imaging Results (Last 48 Hours)       Procedure Component Value Units Date/Time    US Renal Bilateral [[id number]] Collected: 10/28/24 1300     Updated: 10/28/24 1309    Narrative:      US RENAL BILATERAL    Date of Exam: 10/28/2024 12:21 PM EDT    Indication: DAJA.    Comparison: CT abdomen pelvis 10/26/2024, CT abdomen pelvis 7/9/2019    Technique: Grayscale and color Doppler ultrasound evaluation of the kidneys and urinary bladder was performed.      Findings:  Right kidney measures 12.7 cm in length and the left 10.9 cm in length. Normal cortical thickness and echogenicity. No hydronephrosis, convincing solid mass or echogenic shadowing stones. There is a partially exophytic thin-walled anechoic right midpole   cyst measuring up to 4.8 cm considered benign. Additional 1 cm hypoechogenicity in the right upper pole too small to accurately characterize although similar to previous CT comparisons. Anechoic 1.6 cm right upper pole lesion showing posterior acoustic   enhancement and similar in size to 2019 also favoring benign cyst. Mildly complex partially exophytic left inferior pole renal cystic lesion measuring 4.9 x 3.2 x 4.5 cm showing few thin internal septations and not significantly changed in size since   2019. Other smaller simple left renal cysts noted. No significant bladder wall thickening or layering debris.      Impression:      Impression:  1. No acute sonographic findings. No hydronephrosis.  2. Symmetric renal size, cortical thickness and echogenicity.  3. Multiple renal cystic lesions, either benign appearing or without significant change since 2019. This includes a partially exophytic mildly complex 4.9 cm cystic lesion in the left inferior pole favored benign. Outpatient abdominal MRI without and   with IV contrast could be considered after patient's acute illness and  "acute renal injury resolve.        Electronically Signed: Juan Hart MD    10/28/2024 1:06 PM EDT    Workstation ID: [id number]          Operative/Procedure Notes (all)    No notes of this type exist for this encounter.          Physician Progress Notes (last 72 hours)        Terry Ramirez MD at 10/29/24 1231             LOS: 2 days    Patient Care Team:  Tristen Stuart DO as PCP - General (Family Medicine)  Virginia Brown RN as Nurse Navigator  Cong Cartagena MD as Consulting Physician (Hospitalist)    Subjective     Stable overnight.  Continues with decreased ostomy output.  Eating drinking better.    Objective     Vital Signs:  Blood pressure 120/70, pulse 100, temperature 98 °F (36.7 °C), temperature source Oral, resp. rate 18, height 170.2 cm (67.01\"), weight 83.5 kg (184 lb 1.4 oz), SpO2 95%.      Intake/Output Summary (Last 24 hours) at 10/29/2024 1231  Last data filed at 10/29/2024 1100  Gross per 24 hour   Intake 240 ml   Output 120 ml   Net 120 ml        No intake/output data recorded.    Physical Exam:        GENERAL: WD WM NAD  NEURO: Awake and alert, oriented. No focal deficit  PSYCHIATRIC: NMA. Cooperative with PE  EYE: PE, no icterus, no conjunctivitis  ENT: ommm, dentition intact,  Hearing intact  NECK: Supple , No JVD discernable,  Trachea midline  CV: No edema, RRR  LUNGS:  Quiet,  Nonlabored resp.  Symmetrical expansion  ABDOMEN: + Ostomy  : No Oreilly, no palp bladder  SKIN: Warm and dry without rash      Labs:  Results from last 7 days   Lab Units 10/29/24  1054 10/26/24  1523   WBC 10*3/mm3 5.66 10.86*   HEMOGLOBIN g/dL 11.2* 14.0   PLATELETS 10*3/mm3 287 405     Results from last 7 days   Lab Units 10/29/24  0510 10/28/24  1031 10/27/24  1950 10/27/24  1137 10/26/24  1935 10/26/24  1523   SODIUM mmol/L 139 140 135* 138   < > 127*   POTASSIUM mmol/L 4.9 5.6* 5.2 6.0*   < > 8.0*   CHLORIDE mmol/L 107 107 106 108*   < > 97*   CO2 mmol/L 21.0* 21.0* 19.0* 20.0*   < > " 7.4*   BUN mg/dL 30* 53* 80* 95*   < > 130*   CREATININE mg/dL 1.21 1.59* 2.32* 3.23*   < > 5.98*   CALCIUM mg/dL 8.6 9.5 9.4 8.6   < > 10.2   ALBUMIN g/dL 3.5  --   --   --   --  3.9    < > = values in this interval not displayed.     Results from last 7 days   Lab Units 10/29/24  0510   ALK PHOS U/L 67   BILIRUBIN mg/dL 0.2   ALT (SGPT) U/L 10   AST (SGOT) U/L 17     Results from last 7 days   Lab Units 10/27/24  0116   PH, ARTERIAL pH units 7.263*   PO2 ART mm Hg 76.6*   PCO2, ARTERIAL mm Hg 31.9*   HCO3 ART mmol/L 14.4*               Estimated Creatinine Clearance: 66.3 mL/min (by C-G formula based on SCr of 1.21 mg/dL).        A/P:      ARF: Creatinine continues to improve down to 1.2. Urine output unmeasured overnight.  Baseline cr ~ 1.0 2 weeks prior to this admission. Admitted at OSH w cr 5.9mg/dl. Etiology likely severe hemodynamic injury in the setting of high ostomy output on ACE I and metformin use. No significant proteinuria on this admission. CK normal.  For now continue supportive care.  Continue to monitor renal function closely.    HTN: Blood pressure stable.  Rest suppression on hold due to ARF.  Continue to monitor blood pressure for now.     Hyperkalemia: Improved overnight post Lokelma.  K ~ 8 on admission with renal failure on rest suppression along with metabolic acidosis.  For now continue low potassium diet.    Hyponatremia: Stable today.     Met acidosis: Severe due to DAJA and increase bicarb loss due to high ostomy output.  Improved with bicarb drip.  Will stop bicarb drip.  Will likely need p.o. bicarb.  Reevaluate in AM.     Volume: Patient dehydrated on admission with renal failure.  Thought due to high ostomy output.  Continue IV fluids for now.  Strict I's and O's.     High output ostomy: Patient post low anterior resection with diverting loop ileostomy 10/10/24 due to rectal cancer.  Complicated by post operative ileus.  Patient returns with several days of nausea/vomiting along with  "high ileostomy output.  Evaluated by colorectal surgery.  Ostomy output improving..      High risk and complexity patient.      Terry Ramirez MD  10/29/24  12:31 EDT          Electronically signed by Terry Ramirez MD at 10/29/24 1740       Cong Cartagena MD at 10/29/24 1048          IM progress note      Valentin Vásquez  [id number]  1965     LOS: 2 days     Attending: Cong Cartagena MD    Primary Care Provider: Tristen Stuart DO      Chief Complaint/Reason for visit:  No chief complaint on file.  Patient was admitted in transfer from [facility] after he presented there with emesis, decreased appetite, and high stoma output, was found to be in acute kidney failure with hyperkalemia.    I saw him following his admit yesterday, renal function improving, potassium was still elevated and I dosed him with Lokelma and a D50 followed by regular insulin IV.    IV fluids containing bicarb ongoing, p.o. diet initiated and patient excited to have p.o. offered.    He was seen by nephrology afterwards, input appreciated.    Subjective   10/28/24: Doing well when seen in his room today.  Encouraged to ambulate which he did afterwards.  Has tolerated some p.o. diet and stoma output seems to have decreased some with stoma having thick liquids currently.  No nausea or vomiting.  Renal ultrasound was done earlier.    10/29/24:   Feels ok overall. Tolerating PO. Stoma with thick liquid brown stool, no accurate record of output last 24 hours.       Objective        Visit Vitals  /70 (BP Location: Left arm, Patient Position: Lying)   Pulse 100   Temp 97.8 °F (36.6 °C) (Oral)   Resp 18   Ht 170.2 cm (67.01\")   Wt 83.5 kg (184 lb 1.4 oz)   SpO2 95%   BMI 28.82 kg/m²     Temp (24hrs), Av.7 °F (36.5 °C), Min:97.3 °F (36.3 °C), Max:98 °F (36.7 °C)      Intake/Output:    Intake/Output Summary (Last 24 hours) at 10/29/2024 1048  Last data filed at 10/29/2024 0900  Gross per 24 hour "   Intake 240 ml   Output --   Net 240 ml          Physical Exam:     General Appearance:    Alert, cooperative, in no acute distress   Head:    Normocephalic, without obvious abnormality, atraumatic    Lungs:     Normal effort, symmetric chest rise,  clear to      auscultation bilaterally              Heart:    Regular rhythm and normal rate, normal S1 and S2    Abdomen:   Soft and benign with pink stoma with output.   Extremities:  No clubbing, cyanosis or edema.  No deformities.    Pulses:   Pulses palpable and equal bilaterally   Skin:   No bleeding, bruising or rash          Results Review:     I reviewed the patient's new clinical results.   Results from last 7 days   Lab Units 10/26/24  1523   WBC 10*3/mm3 10.86*   HEMOGLOBIN g/dL 14.0   HEMATOCRIT % 45.6   PLATELETS 10*3/mm3 405     Results from last 7 days   Lab Units 10/29/24  0510 10/28/24  1031 10/27/24  1950 10/26/24  1935 10/26/24  1523   SODIUM mmol/L 139 140 135*   < > 127*   POTASSIUM mmol/L 4.9 5.6* 5.2   < > 8.0*   CHLORIDE mmol/L 107 107 106   < > 97*   CO2 mmol/L 21.0* 21.0* 19.0*   < > 7.4*   BUN mg/dL 30* 53* 80*   < > 130*   CREATININE mg/dL 1.21 1.59* 2.32*   < > 5.98*   CALCIUM mg/dL 8.6 9.5 9.4   < > 10.2   BILIRUBIN mg/dL 0.2  --   --   --  0.3   ALK PHOS U/L 67  --   --   --  127*   ALT (SGPT) U/L 10  --   --   --  18   AST (SGOT) U/L 17  --   --   --  18   GLUCOSE mg/dL 198* 183* 191*   < > 92    < > = values in this interval not displayed.     I reviewed the patient's new imaging including images and reports.    All medications reviewed.   aspirin, 81 mg, Oral, Daily  [Held by provider] empagliflozin, 10 mg, Oral, Daily  [Held by provider] gabapentin, 600 mg, Oral, Daily  [Held by provider] glipizide, 5 mg, Oral, BID AC  heparin (porcine), 5,000 Units, Subcutaneous, Q8H  [Held by provider] lisinopril, 10 mg, Oral, Daily  loperamide, 2 mg, Oral, TID  [Held by provider] metoprolol succinate XL, 25 mg, Oral, Daily  pantoprazole, 40 mg,  Oral, Q AM  rosuvastatin, 10 mg, Oral, Nightly  sodium chloride, 10 mL, Intravenous, Q12H  tamsulosin, 0.4 mg, Oral, Nightly      acetaminophen, 650 mg, Q4H PRN   Or  acetaminophen, 650 mg, Q4H PRN   Or  acetaminophen, 650 mg, Q4H PRN  HYDROcodone-acetaminophen, 1 tablet, Q6H PRN  sodium chloride, 10 mL, PRN        Assessment & Plan       DAJA (acute kidney injury)    HTN (hypertension)    Hyperlipidemia    DM2 (diabetes mellitus, type 2)    S/P colectomy , ultra low anterior resection, robotic assisted, with appy, repair of incarcerated hernia, diverting loop ileostomy    Hyperkalemia         Plan  Continue IV fluids. May be able to stop after current bag.   Improving renal function.  Monitor potassium and treat as indicated.    Resumed statin and aspirin  Will continue to hold antihypertensives including ACE inhibitor.    Will have wound care stoma nurse follow-up with the patient and his wife.    Encouraged ambulation.  Added heparin subcu for DVT prophylaxis    Nephrology and colorectal surgery following.    Possible dc home tomorrow if stable off IVF with acceptable stoma output.     Cong Cartagena MD  10/29/24  10:48 EDT      Electronically signed by Cong Cartagena MD at 10/29/24 1050       José Manuel Hutchins MD at 10/28/24 1750          Transfer with profound dehydration and hyperkalemia    High output stoma responding to treatment  Abdomen soft    Renal ultrasound reviewed, no evidence of hydronephrosis    Use of Imodium reviewed  Plan for interval labs in the morning  DC skin staples.    Electronically signed by José Manuel Hutchins MD at 10/28/24 1751       Terry Ramirez MD at 10/28/24 1405             LOS: 1 day    Patient Care Team:  Tristen Stuart DO as PCP - General (Family Medicine)  Virginia Brown RN as Nurse Navigator  Cong Cartagena MD as Consulting Physician (Hospitalist)    Subjective     Stable overnight.  Denies any new complaints.  No chest pain shortness of breath.   Ostomy output slowed.    Objective     Vital Signs:  Blood pressure 114/77, pulse 93, temperature 97.3 °F (36.3 °C), temperature source Oral, resp. rate 16, weight 83.5 kg (184 lb), SpO2 97%.      Intake/Output Summary (Last 24 hours) at 10/28/2024 1406  Last data filed at 10/27/2024 1735  Gross per 24 hour   Intake 1522 ml   Output --   Net 1522 ml        10/27 0701 - 10/28 0700  In: 2522 [P.O.:480; I.V.:1042]  Out: 1000 [Urine:900]    Physical Exam:        GENERAL: WD WM NAD  NEURO: Awake and alert, oriented. No focal deficit  PSYCHIATRIC: NMA. Cooperative with PE  EYE: PE, no icterus, no conjunctivitis  ENT: ommm, dentition intact,  Hearing intact  NECK: Supple , No JVD discernable,  Trachea midline  CV: No edema, RRR  LUNGS:  Quiet,  Nonlabored resp.  Symmetrical expansion  ABDOMEN: + Ostomy  : No O[place]illy, no palp bladder  SKIN: Warm and dry without rash      Labs:  Results from last 7 days   Lab Units 10/26/24  1523   WBC 10*3/mm3 10.86*   HEMOGLOBIN g/dL 14.0   PLATELETS 10*3/mm3 405     Results from last 7 days   Lab Units 10/28/24  1031 10/27/24  1950 10/27/24  1137 10/27/24  0419 10/26/24  1935 10/26/24  1523   SODIUM mmol/L 140 135* 138 135*   < > 127*   POTASSIUM mmol/L 5.6* 5.2 6.0* 5.9*   < > 8.0*   CHLORIDE mmol/L 107 106 108* 105   < > 97*   CO2 mmol/L 21.0* 19.0* 20.0* 16.0*   < > 7.4*   BUN mg/dL 53* 80* 95* 116*   < > 130*   CREATININE mg/dL 1.59* 2.32* 3.23* 4.61*   < > 5.98*   CALCIUM mg/dL 9.5 9.4 8.6 9.1   < > 10.2   ALBUMIN g/dL  --   --   --   --   --  3.9    < > = values in this interval not displayed.     Results from last 7 days   Lab Units 10/26/24  1523   ALK PHOS U/L 127*   BILIRUBIN mg/dL 0.3   ALT (SGPT) U/L 18   AST (SGOT) U/L 18     Results from last 7 days   Lab Units 10/27/24  0116   PH, ARTERIAL pH units 7.263*   PO2 ART mm Hg 76.6*   PCO2, ARTERIAL mm Hg 31.9*   HCO3 ART mmol/L 14.4*               Estimated Creatinine Clearance: 50.4 mL/min (A) (by C-G formula based on SCr of  1.59 mg/dL (H)).        A/P:      ARF: Creatinine continues to improve down to 1.6.  Urine output nonoliguric.  Baseline cr ~ 1.0 2 weeks prior to this admission. Admitted at OSH w cr 5.9mg/dl. Etiology likely severe hemodynamic injury in the setting of high ostomy output on ACE I and metformin use. No significant proteinuria on this admission. CK normal.  For now continue supportive care.  Continue to monitor renal function closely.    HTN: Blood pressure stable.  Rest suppression on hold due to ARF.  Continue to monitor blood pressure for now.     Hyperkalemia: Potassium back up to 5.6 overnight.. K ~ 8 on admission with renal failure on rest suppression along with metabolic acidosis.  Currently on bicarb drip.  Will give additional Lokelma.    Hyponatremia: Stable today.     Met acidosis: Severe due to DAJA and increase bicarb loss due to high ostomy output.  Continues on bicarb drip.     Volume: Patient dehydrated on admission with renal failure.  Thought due to high ostomy output.  Continue IV fluids for now.  Strict I's and O's.     High output ostomy: Patient post low anterior resection with diverting loop ileostomy 10/10/24 due to rectal cancer.  Complicated by post operative ileus.  Patient returns with several days of nausea/vomiting along with high ileostomy output.  Evaluated by colorectal surgery.  Ostomy output improving..      High risk and complexity patient.      Terry Ramirez MD  10/28/24  14:06 EDT          Electronically signed by Terry Ramirez MD at 10/28/24 7548       Cong Cartagena MD at 10/28/24 1358           progress note      Valentin Vásquez  [id number]  1965     LOS: 1 day     Attending: Cong Cartagena MD    Primary Care Provider: Tristen Stuart DO      Chief Complaint/Reason for visit:  No chief complaint on file.  Patient was admitted in transfer from [facility] after he presented there with emesis, decreased appetite, and high stoma  output, was found to be in acute kidney failure with hyperkalemia.    I saw him following his admit yesterday, renal function improving, potassium was still elevated and I dosed him with Lokelma and a D50 followed by regular insulin IV.    IV fluids containing bicarb ongoing, p.o. diet initiated and patient excited to have p.o. offered.    He was seen by nephrology afterwards, input appreciated.    Subjective   Doing well when seen in his room today.  Encouraged to ambulate which he did afterwards.  Has tolerated some p.o. diet and stoma output seems to have decreased some with stoma having thick liquids currently.  No nausea or vomiting.  Renal ultrasound was done earlier.    Objective        Visit Vitals  /77 (BP Location: Left arm, Patient Position: Lying)   Pulse 93   Temp 97.3 °F (36.3 °C) (Oral)   Resp 16   Wt 83.5 kg (184 lb)   SpO2 97%   BMI 28.82 kg/m²     Temp (24hrs), Av.6 °F (36.4 °C), Min:97.3 °F (36.3 °C), Max:98 °F (36.7 °C)      Intake/Output:    Intake/Output Summary (Last 24 hours) at 10/28/2024 1358  Last data filed at 10/27/2024 1735  Gross per 24 hour   Intake 1522 ml   Output --   Net 1522 ml          Physical Exam:     General Appearance:    Alert, cooperative, in no acute distress   Head:    Normocephalic, without obvious abnormality, atraumatic    Lungs:     Normal effort, symmetric chest rise,  clear to      auscultation bilaterally              Heart:    Regular rhythm and normal rate, normal S1 and S2    Abdomen:   Soft and benign with pink stoma with output.   Extremities:  No clubbing, cyanosis or edema.  No deformities.    Pulses:   Pulses palpable and equal bilaterally   Skin:   No bleeding, bruising or rash          Results Review:     I reviewed the patient's new clinical results.   Results from last 7 days   Lab Units 10/26/24  1523   WBC 10*3/mm3 10.86*   HEMOGLOBIN g/dL 14.0   HEMATOCRIT % 45.6   PLATELETS 10*3/mm3 405     Results from last 7 days   Lab Units  10/28/24  1031 10/27/24  1950 10/27/24  1137 10/26/24  1935 10/26/24  1523   SODIUM mmol/L 140 135* 138   < > 127*   POTASSIUM mmol/L 5.6* 5.2 6.0*   < > 8.0*   CHLORIDE mmol/L 107 106 108*   < > 97*   CO2 mmol/L 21.0* 19.0* 20.0*   < > 7.4*   BUN mg/dL 53* 80* 95*   < > 130*   CREATININE mg/dL 1.59* 2.32* 3.23*   < > 5.98*   CALCIUM mg/dL 9.5 9.4 8.6   < > 10.2   BILIRUBIN mg/dL  --   --   --   --  0.3   ALK PHOS U/L  --   --   --   --  127*   ALT (SGPT) U/L  --   --   --   --  18   AST (SGOT) U/L  --   --   --   --  18   GLUCOSE mg/dL 183* 191* 287*   < > 92    < > = values in this interval not displayed.     I reviewed the patient's new imaging including images and reports.    All medications reviewed.   [Held by provider] aspirin, 81 mg, Oral, Daily  [Held by provider] empagliflozin, 10 mg, Oral, Daily  [Held by provider] gabapentin, 600 mg, Oral, Daily  [Held by provider] glipizide, 5 mg, Oral, BID AC  [Held by provider] lisinopril, 10 mg, Oral, Daily  loperamide, 2 mg, Oral, TID  [Held by provider] metoprolol succinate XL, 25 mg, Oral, Daily  [Held by provider] pantoprazole, 40 mg, Oral, Q AM  [Held by provider] rosuvastatin, 10 mg, Oral, Nightly  sodium chloride, 10 mL, Intravenous, Q12H  [Held by provider] tamsulosin, 0.4 mg, Oral, Nightly      acetaminophen, 650 mg, Q4H PRN   Or  acetaminophen, 650 mg, Q4H PRN   Or  acetaminophen, 650 mg, Q4H PRN  HYDROcodone-acetaminophen, 1 tablet, Q6H PRN  sodium chloride, 10 mL, PRN        Assessment & Plan       DAJA (acute kidney injury)    HTN (hypertension)    Hyperlipidemia    DM2 (diabetes mellitus, type 2)    S/P colectomy , ultra low anterior resection, robotic assisted, with appy, repair of incarcerated hernia, diverting loop ileostomy    Hyperkalemia         Plan  Continue IV fluids.  Improving renal function.  Monitor potassium and treat as indicated.    Will resume statin and aspirin  Will continue to hold antihypertensives including ACE inhibitor.    Will  have wound care stoma nurse follow-up with the patient and his wife.    Encouraged ambulation.  Add heparin subcu for DVT prophylaxis    Nephrology and colorectal surgery following.    Cong Cartagena MD  10/28/24  13:58 EDT      Electronically signed by Cong Cartagena MD at 10/28/24 1554          Consult Notes (last 72 hours)        Severo Jc MD at 10/27/24 1537        Consult Orders    1. Inpatient Nephrology Consult [[id number]] ordered by Danilo Keating MD                   Patient Care Team:  Tristen Stuart DO as PCP - General (Family Medicine)  Virginia Brown RN as Nurse Navigator  Cong Cartagena MD as Consulting Physician (Hospitalist)    Chief complaint: Acute renal failure  Hyperkalemia  Met acidosis  High ostomy output     Inpatient Nephrology Consult  Consult performed by: Severo Jc MD  Consult ordered by: Danilo Keating MD  Reason for consult: Acute kidney injury         History of Present Illness: Valentin Vásquez is a 59 y.o. male with PMH T2DM, GERD, colon cancer who underwent colectomy, incarcerated hernia repair, diverting loop ileostomy on 10/10 as well as bilateral ureteral stent placement during the surgery. Patient developed high ostomy output for last 2 weeks. Unfortunately he continued taking his home meds including lisinopril and metformin. He tried to stay hydrated but noticed decrease appetite. He presented at OSH in [place] and found to have severe renal failure w severe met acidosis and severe hyperkalemia. He was later transferred to [facility] for higher level of care. Patient stephanie taking any NSAID in last 2 weeks. Didn't notice any change in UOP as well. Since admission he has been getting IV fluids for volume expansion there has been interval improvement in renal function and met acidosis but there is persistent hyperkalemia noted. Nephrology has been consulted for evaluation and management of acute kidney injury and persistent hyperkalemia.     Review  of Systems   Constitutional: Negative.    HENT: Negative.     Respiratory: Negative.     Gastrointestinal:  Positive for diarrhea.   Genitourinary: Negative.  Negative for difficulty urinating.   Musculoskeletal: Negative.    Neurological: Negative.    Hematological: Negative.    Psychiatric/Behavioral: Negative.          Past Medical History:   Diagnosis Date    Arthritis     Colon cancer     Colon polyp     Diabetes mellitus     Diverticulitis of colon     GERD (gastroesophageal reflux disease)     Gout     Hyperlipidemia     Hypertension     Injury of back     Kidney stone     passed and surgery-   x3 times surgery    Pancreatitis     Tear of right rotator cuff     Wears glasses     readers   ,   Past Surgical History:   Procedure Laterality Date    ADENOIDECTOMY      COLON RESECTION WITH ILEOSTOMY N/A 10/10/2024    Procedure: CONVERTED TO OPEN COLON RESECTION ULTRA LOW ANTERIOR LAPAROSCOPIC WITH LOOP ILEOSTOMY;  Surgeon: José Manuel Hutchins MD;  Location:  [facility];  Service: [facility];  Laterality: N/A;    COLONOSCOPY N/A 05/21/2024    Procedure: COLONOSCOPY;  Surgeon: Rosa Patrick MD;  Location: [facility] OR;  Service: Gastroenterology;  Laterality: N/A;    CYSTOSCOPY Bilateral 10/10/2024    Procedure: CYSTOSCOPY TEMPORARY PLACEMENT BILATERAL URETERAL CATHETER;  Surgeon: Ke Courtney MD;  Location:  [facility] OR;  Service: [facility] - [facility];  Laterality: Bilateral;    CYSTOSCOPY LITHOLAPAXY BLADDER STONE EXTRACTION N/A 09/28/2022    Procedure: CYSTOSCOPY LASER LITHOLAPAXY BLADDER STONE EXTRACTION;  Surgeon: Mykel Jeffers MD;  Location:  [facility];  Service: Urology;  Laterality: N/A;    KIDNEY STONE SURGERY      x3 surgeries-  same stone    SHOULDER ARTHROSCOPY W/ ROTATOR CUFF REPAIR Right 07/19/2022    Procedure: RIGHT SHOULDER ARTHROSCOPY WITH OPEN ACROMIOPLASTY,  ROTATOR CUFF REPAIR, EXCISION LATERAL CLAVICLE;  Surgeon: Edmundo Huitron MD;  Location: [facility] OR;  Service:  Orthopedics;  Laterality: Right;    TONSILLECTOMY     ,   Family History   Problem Relation Age of Onset    Diabetes Father     Diabetes Sister    ,   Social History     Socioeconomic History    Marital status:    Tobacco Use    Smoking status: Never    Smokeless tobacco: Current     Types: Snuff   Vaping Use    Vaping status: Never Used   Substance and Sexual Activity    Alcohol use: Not Currently     Comment: occasionally    Drug use: No    Sexual activity: Not Currently     Partners: Female     E-cigarette/Vaping    E-cigarette/Vaping Use Never User     Passive Exposure No     Counseling Given No      E-cigarette/Vaping Substances    Nicotine No     THC No     CBD No     Flavoring No      E-cigarette/Vaping Devices    Disposable No     Pre-filled or Refillable Cartridge No     Refillable Tank No     Pre-filled Pod No          ,   Medications Prior to Admission   Medication Sig Dispense Refill Last Dose/Taking    amoxicillin-clavulanate (AUGMENTIN) 875-125 MG per tablet Take 1 tablet by mouth 2 (Two) Times a Day. 10 tablet 0 10/26/2024    Aspirin Low Dose 81 MG EC tablet Take 1 tablet by mouth Daily.   10/26/2024    Continuous Glucose Sensor (FreeStyle Shilpa 2 Sensor) misc Use. Possible left side- pt might change 8th   10/26/2024    dapagliflozin Propanediol (Farxiga) 10 MG tablet Take 10 mg by mouth Daily.   10/26/2024    fenofibrate (TRICOR) 145 MG tablet Take 1 tablet by mouth Daily.   10/26/2024    gabapentin (NEURONTIN) 600 MG tablet Take 1 tablet by mouth 3 (Three) Times a Day.   10/26/2024    glipizide (GLUCOTROL XL) 10 MG 24 hr tablet Take 1 tablet by mouth Daily With Breakfast.   10/26/2024    HYDROcodone-acetaminophen (NORCO) 7.5-325 MG per tablet Take 1 tablet by mouth Every 6 (Six) Hours As Needed for Moderate Pain . 30 tablet 0 10/26/2024    Insulin Lispro, 1 Unit Dial, (HUMALOG) 100 UNIT/ML solution pen-injector Inject 5 Units under the skin into the appropriate area as directed 3 (Three)  Times a Day Before Meals.   10/26/2024    lisinopril (PRINIVIL,ZESTRIL) 10 MG tablet Take 1 tablet by mouth Daily.   10/26/2024    loperamide (Imodium A-D) 2 MG tablet Take 1 tablet by mouth 4 (Four) Times a Day As Needed for Diarrhea. 180 tablet 0 10/26/2024    metFORMIN (GLUCOPHAGE) 1000 MG tablet Take 1 tablet by mouth 2 (Two) Times a Day With Meals.   10/26/2024    metoprolol succinate XL (TOPROL XL) 25 MG 24 hr tablet Take 1 tablet by mouth Daily. 30 tablet 0 10/26/2024    omeprazole (priLOSEC) 40 MG capsule Take 1 capsule by mouth Daily.   10/26/2024    rosuvastatin (CRESTOR) 10 MG tablet Take 1 tablet by mouth Every Night.   10/26/2024    tamsulosin (FLOMAX) 0.4 MG capsule 24 hr capsule TAKE 1 CAPSULE BY MOUTH EVERY NIGHT 90 capsule 3 10/26/2024    Tresiba FlexTouch 200 UNIT/ML solution pen-injector pen injection Inject  under the skin into the appropriate area as directed Take As Directed. 64 units in am and 60 units at night   10/26/2024   , Scheduled Meds:  [Held by provider] aspirin, 81 mg, Oral, Daily  [Held by provider] empagliflozin, 10 mg, Oral, Daily  [Held by provider] gabapentin, 600 mg, Oral, Daily  [Held by provider] glipizide, 5 mg, Oral, BID AC  [Held by provider] lisinopril, 10 mg, Oral, Daily  loperamide, 2 mg, Oral, TID  [Held by provider] metoprolol succinate XL, 25 mg, Oral, Daily  [Held by provider] pantoprazole, 40 mg, Oral, Q AM  [Held by provider] rosuvastatin, 10 mg, Oral, Nightly  sodium chloride, 10 mL, Intravenous, Q12H  [Held by provider] tamsulosin, 0.4 mg, Oral, Nightly   , and Continuous Infusions:  sodium bicarbonate 8.4 % 75 mEq in dextrose 5 % and sodium chloride 0.45 % 1,000 mL infusion (less than/equal to 100 mEq), 75 mEq, Last Rate: 75 mEq (10/27/24 0307)       Objective     Vital Signs  Temp:  [94.9 °F (34.9 °C)-97.8 °F (36.6 °C)] 96.6 °F (35.9 °C)  Heart Rate:  [] 99  Resp:  [16-18] 18  BP: ()/(51-70) 99/64    I/O this shift:  In: 1000 [IV  "Piggyback:1000]  Out: 1000 [Urine:900; Stool:100]  I/O last 3 completed shifts:  In: 1000 [I.V.:1000]  Out: 1350 [Urine:1100; Stool:250]    Physical Exam  Constitutional:       General: He is not in acute distress.     Appearance: Normal appearance. He is not ill-appearing.   HENT:      Head: Normocephalic.      Nose: Nose normal.      Mouth/Throat:      Mouth: Mucous membranes are moist.   Eyes:      Pupils: Pupils are equal, round, and reactive to light.   Cardiovascular:      Rate and Rhythm: Normal rate and regular rhythm.      Pulses: Normal pulses.      Heart sounds: Normal heart sounds. No murmur heard.     No friction rub.   Pulmonary:      Effort: Pulmonary effort is normal. No respiratory distress.      Breath sounds: Normal breath sounds. No stridor.   Abdominal:      General: Abdomen is flat.      Comments: Ostomy +   Musculoskeletal:         General: Normal range of motion.      Cervical back: Normal range of motion.      Right lower leg: No edema.      Left lower leg: No edema.   Skin:     General: Skin is warm.   Neurological:      General: No focal deficit present.      Mental Status: He is alert and oriented to person, place, and time. Mental status is at baseline.         Results Review:    I reviewed the patient's new clinical results.    WBC WBC   Date Value Ref Range Status   10/26/2024 10.86 (H) 3.40 - 10.80 10*3/mm3 Final      HGB Hemoglobin   Date Value Ref Range Status   10/26/2024 14.0 13.0 - 17.7 g/dL Final      HCT Hematocrit   Date Value Ref Range Status   10/26/2024 45.6 37.5 - 51.0 % Final      Platlets No results found for: \"LABPLAT\"   MCV MCV   Date Value Ref Range Status   10/26/2024 89.4 79.0 - 97.0 fL Final          Sodium Sodium   Date Value Ref Range Status   10/27/2024 138 136 - 145 mmol/L Final   10/27/2024 135 (L) 136 - 145 mmol/L Final   10/26/2024 135 (L) 136 - 145 mmol/L Final   10/26/2024 127 (L) 136 - 145 mmol/L Final      Potassium Potassium   Date Value Ref Range " "Status   10/27/2024 6.0 (H) 3.5 - 5.2 mmol/L Final   10/27/2024 5.9 (H) 3.5 - 5.2 mmol/L Final   10/26/2024 5.9 (H) 3.5 - 5.2 mmol/L Final   10/26/2024 8.0 (C) 3.5 - 5.2 mmol/L Final     Comment:     Slight hemolysis detected by analyzer. Result may be falsely elevated.      Chloride Chloride   Date Value Ref Range Status   10/27/2024 108 (H) 98 - 107 mmol/L Final   10/27/2024 105 98 - 107 mmol/L Final   10/26/2024 107 98 - 107 mmol/L Final   10/26/2024 97 (L) 98 - 107 mmol/L Final      CO2 CO2   Date Value Ref Range Status   10/27/2024 20.0 (L) 22.0 - 29.0 mmol/L Final   10/27/2024 16.0 (L) 22.0 - 29.0 mmol/L Final   10/26/2024 12.3 (L) 22.0 - 29.0 mmol/L Final   10/26/2024 7.4 (C) 22.0 - 29.0 mmol/L Final      BUN BUN   Date Value Ref Range Status   10/27/2024 95 (H) 8 - 23 mg/dL Final   10/27/2024 116 (H) 8 - 23 mg/dL Final   10/26/2024 120 (H) 8 - 23 mg/dL Final   10/26/2024 130 (H) 8 - 23 mg/dL Final      Creatinine Creatinine   Date Value Ref Range Status   10/27/2024 3.23 (H) 0.76 - 1.27 mg/dL Final   10/27/2024 4.61 (H) 0.76 - 1.27 mg/dL Final   10/26/2024 5.28 (H) 0.76 - 1.27 mg/dL Final   10/26/2024 5.98 (H) 0.76 - 1.27 mg/dL Final      Calcium Calcium   Date Value Ref Range Status   10/27/2024 8.6 8.6 - 10.5 mg/dL Final   10/27/2024 9.1 8.6 - 10.5 mg/dL Final   10/26/2024 8.3 (L) 8.6 - 10.5 mg/dL Final   10/26/2024 10.2 8.6 - 10.5 mg/dL Final      PO4 No results found for: \"CAPO4\"   Albumin Albumin   Date Value Ref Range Status   10/26/2024 3.9 3.5 - 5.2 g/dL Final      Magnesium No results found for: \"MG\"   Uric Acid No results found for: \"URICACID\"         Assessment & Plan       DAJA (acute kidney injury)      Assessment & Plan    Acute kidney injury: Baseline cr ~ 1.0 2 weeks prior to this admission. Admitted at OSH w cr 5.9mg/dl. Etiology likely severe hemodynamic injury in the setting of high ostomy output, Additional risk factor ACE I and metformin use. Pending renal US. No significant proteinuria " on this admission. CK normal     Hyperkalemia: Severe hyperkalemia on admission. K ~ 8 on admission. Persistent hyperkalemia currently.     Met acidosis: Severe due to DAJA and increase bicarb loss due to high ostomy output    Hypovolemia: Due to high ostomy output    Incarcerated hernia repair: 2 weeks prior to this admission.     Recs  Continue with IV fluids. Will switch fluid to 0.45NS+ 75meq Na-bicarb for next 24hr.   Start lokelma 10 gm daily   Expect improvement in hyperkalemia as renal function improves.   Patient most likely will need some oral bicarb supp if ostomy output remains high   F/u w renal US  No indication for dialysis at present  Recheck BMP ~ 8 pm today     I discussed the patients findings and my recommendations with patient    Severo Jc MD  10/27/24  15:37 EDT              Electronically signed by Severo Jc MD at 10/27/24 1557       Poncho Rudolph MD at 10/27/24 1210        Consult Orders    1. Inpatient Colorectal Surgery Consult [[id number]] ordered by Danilo Keating MD at 10/27/24 0245                 Valentin JURADO Fahad  [id number]  [id number]  1965    Patient Care Team:  Tristen Stuart DO as PCP - General (Family Medicine)  Virginia Brown RN as Nurse Navigator  Cong Cartagena MD as Consulting Physician (Hospitalist)    Consulting Provider AGATHA Byrne    Reason for Consult postop readmission    Subjective     This is a 59-year-old male who is a patient of my partners Dr. Mccoy who carries a history of rectal cancer.  He underwent a low anterior resection with diverting loop ileostomy and appendectomy performed on 10/10/2024.  Sounds like he had postoperative ileus even with his ileostomy which required additional length in the hospital.  He is subsequently discharged.    They called me yesterday and stated that he had had multiple days of nausea vomiting with inability to tolerate any oral intake as well as high ileostomy output.  He was dizzy.  I  informed them to go to the local ER.  That was [facility].  Upon arrival at [facility] he was noted to have significant DAJA with a creatinine of 5.98 potassium of 8 and a CO2 of 7.4.  He was given protective measures and large-volume substation was transferred here overnight.  His labs this morning show that his CO2 has increased to 16, his potassium down to 5.9 and his creatinine down to 4.61.  Overall he feels significantly better.  Since being here he is only had 250 mL out.    Review of Systems   Pertinent items are noted in HPI, all other systems reviewed and negative. Specifically, there is no F/C/N/V/NSAID abuse, recent abx, new/unusual HA or visual disturbances, CP/SOB. Limb swelling, gait disturbance, new rashes or arthritis.       History  Past Medical History:   Diagnosis Date    Arthritis     Colon cancer     Colon polyp     Diabetes mellitus     Diverticulitis of colon     GERD (gastroesophageal reflux disease)     Gout     Hyperlipidemia     Hypertension     Injury of back     Kidney stone     passed and surgery-   x3 times surgery    Pancreatitis     Tear of right rotator cuff     Wears glasses     readers     Past Surgical History:   Procedure Laterality Date    ADENOIDECTOMY      COLON RESECTION WITH ILEOSTOMY N/A 10/10/2024    Procedure: CONVERTED TO OPEN COLON RESECTION ULTRA LOW ANTERIOR LAPAROSCOPIC WITH LOOP ILEOSTOMY;  Surgeon: José Manuel Hutchins MD;  [facility]: [facility] OR;  Service: [facility] - [facility];  Laterality: N/A;    COLONOSCOPY N/A 05/21/2024    Procedure: COLONOSCOPY;  Surgeon: Rosa Patrick MD;  Location: [facility] OR;  Service: Gastroenterology;  Laterality: N/A;    CYSTOSCOPY Bilateral 10/10/2024    Procedure: CYSTOSCOPY TEMPORARY PLACEMENT BILATERAL URETERAL CATHETER;  Surgeon: Ke Courtney MD;  Location:  [facility];  Service: [facility] - [facility];  Laterality: Bilateral;    CYSTOSCOPY LITHOLAPAXY BLADDER STONE EXTRACTION N/A 09/28/2022    Procedure: CYSTOSCOPY  LASER LITHOLAPAXY BLADDER STONE EXTRACTION;  Surgeon: Mykel Jeffers MD;  Location: [facility] OR;  Service: Urology;  Laterality: N/A;    KIDNEY STONE SURGERY      x3 surgeries-  same stone    SHOULDER ARTHROSCOPY W/ ROTATOR CUFF REPAIR Right 07/19/2022    Procedure: RIGHT SHOULDER ARTHROSCOPY WITH OPEN ACROMIOPLASTY,  ROTATOR CUFF REPAIR, EXCISION LATERAL CLAVICLE;  Surgeon: Edmundo Huitron MD;  Location: [facility] OR;  Service: Orthopedics;  Laterality: Right;    TONSILLECTOMY       Family History   Problem Relation Age of Onset    Diabetes Father     Diabetes Sister      Social History     Tobacco Use    Smoking status: Never    Smokeless tobacco: Current     Types: Snuff   Vaping Use    Vaping status: Never Used   Substance Use Topics    Alcohol use: Not Currently     Comment: occasionally    Drug use: No     Medications Prior to Admission   Medication Sig Dispense Refill Last Dose/Taking    amoxicillin-clavulanate (AUGMENTIN) 875-125 MG per tablet Take 1 tablet by mouth 2 (Two) Times a Day. 10 tablet 0 10/26/2024    Aspirin Low Dose 81 MG EC tablet Take 1 tablet by mouth Daily.   10/26/2024    Continuous Glucose Sensor (FreeStyle Shilpa 2 Sensor) misc Use. Possible left side- pt might change 8th   10/26/2024    dapagliflozin Propanediol (Farxiga) 10 MG tablet Take 10 mg by mouth Daily.   10/26/2024    fenofibrate (TRICOR) 145 MG tablet Take 1 tablet by mouth Daily.   10/26/2024    gabapentin (NEURONTIN) 600 MG tablet Take 1 tablet by mouth 3 (Three) Times a Day.   10/26/2024    glipizide (GLUCOTROL XL) 10 MG 24 hr tablet Take 1 tablet by mouth Daily With Breakfast.   10/26/2024    HYDROcodone-acetaminophen (NORCO) 7.5-325 MG per tablet Take 1 tablet by mouth Every 6 (Six) Hours As Needed for Moderate Pain . 30 tablet 0 10/26/2024    Insulin Lispro, 1 Unit Dial, (HUMALOG) 100 UNIT/ML solution pen-injector Inject 5 Units under the skin into the appropriate area as directed 3 (Three) Times a Day Before  Meals.   10/26/2024    lisinopril (PRINIVIL,ZESTRIL) 10 MG tablet Take 1 tablet by mouth Daily.   10/26/2024    loperamide (Imodium A-D) 2 MG tablet Take 1 tablet by mouth 4 (Four) Times a Day As Needed for Diarrhea. 180 tablet 0 10/26/2024    metFORMIN (GLUCOPHAGE) 1000 MG tablet Take 1 tablet by mouth 2 (Two) Times a Day With Meals.   10/26/2024    metoprolol succinate XL (TOPROL XL) 25 MG 24 hr tablet Take 1 tablet by mouth Daily. 30 tablet 0 10/26/2024    omeprazole (priLOSEC) 40 MG capsule Take 1 capsule by mouth Daily.   10/26/2024    rosuvastatin (CRESTOR) 10 MG tablet Take 1 tablet by mouth Every Night.   10/26/2024    tamsulosin (FLOMAX) 0.4 MG capsule 24 hr capsule TAKE 1 CAPSULE BY MOUTH EVERY NIGHT 90 capsule 3 10/26/2024    Tresiba FlexTouch 200 UNIT/ML solution pen-injector pen injection Inject  under the skin into the appropriate area as directed Take As Directed. 64 units in am and 60 units at night   10/26/2024     Allergies:  Patient has no known allergies.    Objective     Vital Signs  Blood pressure 99/64, pulse 99, temperature 96.6 °F (35.9 °C), temperature source Oral, resp. rate 18, weight 83.5 kg (184 lb), SpO2 95%.  I/O last 3 completed shifts:  In: 1000 [I.V.:1000]  Out: 1350 [Urine:1100; Stool:250]    Physical Exam:  General Appearance: Alert and Oriented  Head: normocephalic, atraumatic  Eyes: MICHELLE  Neck: trachea midline  Lungs: nonlabored respirations  Heart: regular rhythm & normal rate    Rectal:  Deferred    Abdomen: Soft, nontender, nondistended.  Incision is clean dry and intact.  Ileostomy is pink patent and productive with thin output.    Skin: no bruising or rash  Neurologic: Cranial Nerves grossly intact   Psych: normal      Results Review:   LABS  Results from last 7 days   Lab Units 10/26/24  1523   WBC 10*3/mm3 10.86*   HEMOGLOBIN g/dL 14.0   HEMATOCRIT % 45.6   PLATELETS 10*3/mm3 405     Results from last 7 days   Lab Units 10/27/24  0419 10/26/24  1935 10/26/24  1523    SODIUM mmol/L 135* 135* 127*   POTASSIUM mmol/L 5.9* 5.9* 8.0*   CHLORIDE mmol/L 105 107 97*   CO2 mmol/L 16.0* 12.3* 7.4*   BUN mg/dL 116* 120* 130*   CREATININE mg/dL 4.61* 5.28* 5.98*   GLUCOSE mg/dL 81 89 92   CALCIUM mg/dL 9.1 8.3* 10.2       IMAGING  Imaging Results (Last 72 Hours)       ** No results found for the last 72 hours. **          Able to personally review his CT scan from 10/26/2024 which shows no evidence of pneumoperitoneum.  Intact low coloanal anastomosis.  Diverting loop ileostomy in place.  No abscess.   I reviewed the patient's new clinical results.    Assessment & Plan       DAJA (acute kidney injury)      59-year-old male postop day 17 from low anterior resection, coloanal anastomosis and diverting loop ileostomy he Jose presents with high ileostomy output with DAJA and subsequent electrolyte abnormalities.    Plan  Large-volume resuscitation  Initiate Imodium to decrease his ileostomy output  He will need repeat aggressive education to prevent any second readmission  Supportive measures  He may have a renal diet    I discussed the patients findings and my recommendations with patient and primary care team.     Poncho Rudolph MD  10/27/24  12:10 EDT    Time: More than 50% of time spent in counseling and coordination of care:  Total face-to-face/floor time 25 min.  Time spent in counseling 15 min. Counseling included the following topics: Ileostomy and high ileostomy output.    Electronically signed by Poncho Rudolph MD at 10/27/24 9670

## 2024-10-30 NOTE — PROGRESS NOTES
"[facility] ([facility])    Feels well  K 5.4  Improved renal function, Cr 1.08    Vital Signs:  Blood pressure 110/64, pulse 73, temperature 97.7 °F (36.5 °C), temperature source Oral, resp. rate 18, height 170.2 cm (67.01\"), weight 83.5 kg (184 lb 1.4 oz), SpO2 94%.    Labs past 24 hours:  Lab Results (last 24 hours)       Procedure Component Value Units Date/Time    Basic Metabolic Panel [[id number]]  (Abnormal) Collected: 10/30/24 0819    Specimen: Blood Updated: 10/30/24 0944     Glucose 162 mg/dL      BUN 19 mg/dL      Creatinine 1.08 mg/dL      Sodium 138 mmol/L      Potassium 5.4 mmol/L      Chloride 108 mmol/L      CO2 19.0 mmol/L      Calcium 9.2 mg/dL      BUN/Creatinine Ratio 17.6     Anion Gap 11.0 mmol/L      eGFR 78.1 mL/min/1.73     Narrative:      GFR Normal >60  Chronic Kidney Disease <60  Kidney Failure <15      POC Glucose Once [[id number]]  (Abnormal) Collected: 10/30/24 0539    Specimen: Blood Updated: 10/30/24 0541     Glucose 162 mg/dL     POC Glucose Once [[id number]]  (Abnormal) Collected: 10/29/24 2346    Specimen: Blood Updated: 10/29/24 2348     Glucose 183 mg/dL     POC Glucose Once [[id number]]  (Abnormal) Collected: 10/29/24 2011    Specimen: Blood Updated: 10/29/24 2014     Glucose 208 mg/dL     POC Glucose Once [[id number]]  (Abnormal) Collected: 10/29/24 1833    Specimen: Blood Updated: 10/29/24 1834     Glucose 170 mg/dL             I/O last shift:  I/O this shift:  In: -   Out: 50 [Stool:50]     PHYSICAL EXAM-  Comfortable    Tolerating diet    Good stoma function with medical management - reviewed with patient and spouse.      Assessment and Plan:    Feels well  K 5.4  Improved renal function, Cr 1.08    José Manuel Hutchins MD  10/30/24  15:35 EDT  "

## 2024-10-31 NOTE — PROGRESS NOTES
"   LOS: 3 days    Patient Care Team:  Tristen Stuart DO as PCP - General (Family Medicine)  Virginia Brown, RN as Nurse Navigator  Cong Cartagena MD as Consulting Physician (Hospitalist)    Subjective     Renal function improve. Mild met acidosis and hyperkalemia noted. Patient wants to go home. Will setup outpatient appointment in [facility].     Objective     Vital Signs:  Blood pressure 110/64, pulse 73, temperature 97.7 °F (36.5 °C), temperature source Oral, resp. rate 18, height 170.2 cm (67.01\"), weight 83.5 kg (184 lb 1.4 oz), SpO2 94%.      Intake/Output Summary (Last 24 hours) at 10/30/2024 2036  Last data filed at 10/30/2024 0845  Gross per 24 hour   Intake --   Output 100 ml   Net -100 ml        10/29 0701 - 10/30 0700  In: 240 [P.O.:240]  Out: 270     Physical Exam:        GENERAL: WD WM NAD  NEURO: Awake and alert, oriented. No focal deficit  PSYCHIATRIC: NMA. Cooperative with PE  EYE: PE, no icterus, no conjunctivitis  ENT: ommm, dentition intact,  Hearing intact  NECK: Supple , No JVD discernable,  Trachea midline  CV: No edema, RRR  LUNGS:  Quiet,  Nonlabored resp.  Symmetrical expansion  ABDOMEN: + Ostomy  : No [place], no palp bladder  SKIN: Warm and dry without rash      Labs:  Results from last 7 days   Lab Units 10/29/24  1054 10/26/24  1523   WBC 10*3/mm3 5.66 10.86*   HEMOGLOBIN g/dL 11.2* 14.0   PLATELETS 10*3/mm3 287 405     Results from last 7 days   Lab Units 10/30/24  0819 10/29/24  0510 10/28/24  1031 10/27/24  1950 10/26/24  1935 10/26/24  1523   SODIUM mmol/L 138 139 140 135*   < > 127*   POTASSIUM mmol/L 5.4* 4.9 5.6* 5.2   < > 8.0*   CHLORIDE mmol/L 108* 107 107 106   < > 97*   CO2 mmol/L 19.0* 21.0* 21.0* 19.0*   < > 7.4*   BUN mg/dL 19 30* 53* 80*   < > 130*   CREATININE mg/dL 1.08 1.21 1.59* 2.32*   < > 5.98*   CALCIUM mg/dL 9.2 8.6 9.5 9.4   < > 10.2   ALBUMIN g/dL  --  3.5  --   --   --  3.9    < > = values in this interval not displayed.     Results from " last 7 days   Lab Units 10/29/24  0510   ALK PHOS U/L 67   BILIRUBIN mg/dL 0.2   ALT (SGPT) U/L 10   AST (SGOT) U/L 17     Results from last 7 days   Lab Units 10/27/24  0116   PH, ARTERIAL pH units 7.263*   PO2 ART mm Hg 76.6*   PCO2, ARTERIAL mm Hg 31.9*   HCO3 ART mmol/L 14.4*               Estimated Creatinine Clearance: 74.3 mL/min (by C-G formula based on SCr of 1.08 mg/dL).         A/P:      ARF: Creatinine continues to improve down to 1.2. Urine output unmeasured overnight.  Baseline cr ~ 1.0 2 weeks prior to this admission. Admitted at OSH w cr 5.9mg/dl. Etiology likely severe hemodynamic injury in the setting of high ostomy output on ACE I and metformin use. No significant proteinuria on this admission. CK normal.  For now continue supportive care.  Continue to monitor renal function closely.    HTN: Blood pressure stable.  Rest suppression on hold due to ARF.  Continue to monitor blood pressure for now.     Hyperkalemia: Improved overnight post Lokelma.  K ~ 8 on admission with renal failure on rest suppression along with metabolic acidosis.  For now continue low potassium diet. Getting lokelma prior to discharge today    Hyponatremia: Stable today.     Met acidosis: Severe due to DAJA and increase bicarb loss due to high ostomy output.  Improved with bicarb drip. Started on Na-bicarb tabs     Volume: Patient dehydrated on admission with renal failure.  Thought due to high ostomy output.  Continue IV fluids for now.  Strict I's and O's.     High output ostomy: Patient post low anterior resection with diverting loop ileostomy 10/10/24 due to rectal cancer.  Complicated by post operative ileus.  Patient returns with several days of nausea/vomiting along with high ileostomy output.  Evaluated by colorectal surgery.  Ostomy output improving..      High risk and complexity patient.      Severo Jc MD  10/30/24  20:36 EDT

## 2024-10-31 NOTE — OUTREACH NOTE
Prep Survey      Flowsheet Row Responses   Scientologist facility patient discharged from? [facility]   Is LACE score < 7 ? No   Eligibility Readm Mgmt   Discharge diagnosis DAJA (acute kidney injury)   Does the patient have one of the following disease processes/diagnoses(primary or secondary)? Other   Does the patient have Home health ordered? No   Is there a DME ordered? No   Prep survey completed? Yes            Luba RAMOS - Registered Nurse

## 2024-11-01 NOTE — PAYOR COMM NOTE
"Ref# [id number]   Discharge Summary    MEERA Lopez, RN  Utilization Review  Phone [phone]  Fax [phone]    [facility]  [address] [place] [address]       Valentin Vásquez (59 y.o. Male)       Date of Birth   1965    Social Security Number       Address   [address] [address]    Home Phone   [phone]    MRN   [id number]       Taoism   Zoroastrian    Marital Status                               Admission Date   10/27/24    Admission Type   Urgent    Admitting Provider   Cong Cartagena MD    Attending Provider       Department, Room/Bed   [facility] 5G, S566/1       Discharge Date   10/30/2024    Discharge Disposition   Home or Self Care    Discharge Destination                                 Attending Provider: (none)   Allergies: No Known Allergies    Isolation: None   Infection: None   Code Status: Prior    Ht: 170.2 cm (67.01\")   Wt: 83.5 kg (184 lb 1.4 oz)    Admission Cmt: None   Principal Problem: DAJA (acute kidney injury) [N17.9]                   Active Insurance as of 10/27/2024       Primary Coverage       Payor Plan Insurance Group Employer/Plan Group    [employer]HEM BLUE CROSS ANTHEM BLUE CROSS BLUE SHIELD PPO [id number]       Payor Plan Address Payor Plan Phone Number Payor Plan Fax Number Effective Dates    [address] [phone]  1/1/2020 - None Entered    [place] [address]         Subscriber Name Subscriber Birth Date Member ID       VALENTIN VÁSQUEZ 1965 [id number]                     Emergency Contacts        (Rel.) Home Phone Work Phone Mobile Phone    FahadSharri (Spouse) [phone] -- [phone]    KASANDRA VÁSQUEZ (Daughter) -- -- [phone]                 Discharge Summary        Linda Bailey APRN at 10/30/24 1443       Attestation signed by Cong Cartagena MD at 11/01/24 0809    I have reviewed this documentation and agree.                  Patient Name: Valentin Vásquez  MRN: " [id number]  : 1965  DOS: 10/30/2024    Attending: Cong Cartagena MD    Primary Care Provider: Tristen Stuart DO    Date of Admission:.10/27/2024 12:23 AM    Date of Discharge:  10/30/2024    Discharge Diagnosis:   DAJA (acute kidney injury)    HTN (hypertension)    Hyperlipidemia    DM2 (diabetes mellitus, type 2)    S/P colectomy , ultra low anterior resection, robotic assisted, with appy, repair of incarcerated hernia, diverting loop ileostomy    Hyperkalemia    Severe malnutrition      Hospital Course    At admit:    Valentin Vásquez is a 59 y.o. male with PMH T2DM, GERD, colon cancer who underwent colectomy, incarcerated hernia repair, diverting loop ileostomy on 10/10 as well as bilateral ureteral stent for obstructive uropathy.  His course was complicated by postop ileus and ultimately recovered well, having ureteral stents removed prior to discharge.  He was discharged on 10/17 and reports that since he has been out of the hospital he has had ongoing high ostomy output.  Every time he eats he feels his ostomy bag several times about an hour later with soft to watery stool.  He had called his colorectal surgeon who had instructed him to take Imodium over-the-counter.  No specific dietary triggers noted.  He has no abdominal pain or discomfort.  No fevers or chills.  No vomiting.  He was initially seen at [facility] and transferred here for higher level of care due to significantly decreased renal function.  He is currently asymptomatic.     After admit:    He was provided pain medication as needed for pain control.  Adjustments were made to pain medications to optimize postop pain management.   Risks and benefits of opiate medications discussed with patient.  Timmy report in chart was reviewed prior to discharge.    He received DVT prophylaxis with subcutaneous heparin as well as mechanicals      Nephrology was consulted for acute kidney injury. He received IVF and was treated for  "hyperkalemia. He is discharged home on bicarb tablets. He will have PCP follow-up 24 with [facility].    He used an IS for atelectasis prophylaxis.    With the progress he has made, pt is ready for DC home today.    Discussed with patient regarding plan and he shows understanding and agreement.       Consultants:  Dr. Elkins, nephrology  Dr. Rudolph, colorectal     Pertinent Test Results:    I reviewed the patient's new clinical results.   Results from last 7 days   Lab Units 10/29/24  1054 10/26/24  1523   WBC 10*3/mm3 5.66 10.86*   HEMOGLOBIN g/dL 11.2* 14.0   HEMATOCRIT % 35.4* 45.6   PLATELETS 10*3/mm3 287 405     Results from last 7 days   Lab Units 10/30/24  0819 10/29/24  0510 10/28/24  1031 10/26/24  1935 10/26/24  1523   SODIUM mmol/L 138 139 140   < > 127*   POTASSIUM mmol/L 5.4* 4.9 5.6*   < > 8.0*   CHLORIDE mmol/L 108* 107 107   < > 97*   CO2 mmol/L 19.0* 21.0* 21.0*   < > 7.4*   BUN mg/dL 19 30* 53*   < > 130*   CREATININE mg/dL 1.08 1.21 1.59*   < > 5.98*   CALCIUM mg/dL 9.2 8.6 9.5   < > 10.2   BILIRUBIN mg/dL  --  0.2  --   --  0.3   ALK PHOS U/L  --  67  --   --  127*   ALT (SGPT) U/L  --  10  --   --  18   AST (SGOT) U/L  --  17  --   --  18   GLUCOSE mg/dL 162* 198* 183*   < > 92    < > = values in this interval not displayed.      Latest Reference Range & Units 10/30/24 05:39 10/30/24 08:19   Glucose 65 - 99 mg/dL 162 (H) 162 (H)   (H): Data is abnormally high    I reviewed the patient's new imaging including images and reports.    Discharge Assessment:    Vital Signs  Visit Vitals  /64 (BP Location: Right arm, Patient Position: Lying)   Pulse 73   Temp 97.7 °F (36.5 °C) (Oral)   Resp 18   Ht 170.2 cm (67.01\")   Wt 83.5 kg (184 lb 1.4 oz)   SpO2 94%   BMI 28.82 kg/m²     Temp (24hrs), Av.8 °F (36.6 °C), Min:97.3 °F (36.3 °C), Max:98.1 °F (36.7 °C)      General Appearance:    Alert, cooperative, in no acute distress   Lungs:     Clear to auscultation,respirations regular, even and  " unlabored    Heart:    Regular rhythm and normal rate, normal S1 and S2    Abdomen:   Soft and benign with clean healing incisions. Stoma pink   Extremities:   Moves all extremities well, no edema, no cyanosis, no redness   Pulses:   Pulses palpable and equal bilaterally   Skin:   No bleeding, bruising or rash          Discharge Disposition: Home        Discharge Medications        New Medications        Instructions Start Date   sodium bicarbonate 650 MG tablet   650 mg, Oral, 2 Times Daily             Changes to Medications        Instructions Start Date   dapagliflozin Propanediol 10 MG tablet  Commonly known as: Farxiga  What changed: additional instructions   10 mg, Oral, Daily, Hold until follow-up with PCP      gabapentin 600 MG tablet  Commonly known as: NEURONTIN  What changed: additional instructions   600 mg, Oral, 3 Times Daily, Hold until follow-up with PCP      glipizide 10 MG 24 hr tablet  Commonly known as: GLUCOTROL XL  What changed: additional instructions   10 mg, Oral, Daily With Breakfast, Hold until follow-up with PCP      lisinopril 10 MG tablet  Commonly known as: PRINIVIL,ZESTRIL  What changed: additional instructions   10 mg, Oral, Daily, Hold until follow-up with PCP      metoprolol succinate XL 25 MG 24 hr tablet  Commonly known as: Toprol XL  What changed: additional instructions   25 mg, Oral, Daily, Hold until follow-up with PCP             Continue These Medications        Instructions Start Date   Aspirin Low Dose 81 MG EC tablet  Generic drug: aspirin   81 mg, Daily      fenofibrate 145 MG tablet  Commonly known as: TRICOR   145 mg, Daily      FreeStyle Shilpa 2 Sensor misc   Use. Possible left side- pt might change 8th      HYDROcodone-acetaminophen 7.5-325 MG per tablet  Commonly known as: NORCO   1 tablet, Oral, Every 6 Hours PRN      Insulin Lispro (1 Unit Dial) 100 UNIT/ML solution pen-injector  Commonly known as: HUMALOG   5 Units, 3 Times Daily Before Meals      loperamide 2  MG tablet  Commonly known as: Imodium A-D   2 mg, Oral, 4 Times Daily PRN      metFORMIN 1000 MG tablet  Commonly known as: GLUCOPHAGE   1,000 mg, 2 Times Daily With Meals      omeprazole 40 MG capsule  Commonly known as: priLOSEC   40 mg, Daily      rosuvastatin 10 MG tablet  Commonly known as: CRESTOR   10 mg, Nightly      tamsulosin 0.4 MG capsule 24 hr capsule  Commonly known as: FLOMAX   0.4 mg, Oral, Nightly      Tresiba FlexTouch 200 UNIT/ML solution pen-injector pen injection  Generic drug: Insulin Degludec   Take As Directed             Stop These Medications      amoxicillin-clavulanate 875-125 MG per tablet  Commonly known as: AUGMENTIN              Discharge Diet: Regular diet     Activity at Discharge: ambulate     Follow-up Appointments  Dr. Hutchins per his orders  PCP 11/1/24 with [facility]    Discharge took over 30 min     LAURA Weiner  10/30/24  14:43 EDT    CC: Tristen Stuart DO    Electronically signed by Jaylen Cartagena MD at 11/01/24 0809       Discharge Order (From admission, onward)       Start     Ordered    10/30/24 1439  Discharge patient  Once        Expected Discharge Date: 10/30/24   Discharge Disposition: Home or Self Care   Physician of Record for Attribution - Please select from Treatment Team: JAYLEN CARTAGENA [[id number]]   Review needed by CMO to determine Physician of Record: No      Question Answer Comment   Physician of Record for Attribution - Please select from Treatment Team JAYLEN CARTAGENA    Review needed by CMO to determine Physician of Record No        10/30/24 7601

## 2024-11-04 ENCOUNTER — HOSPITAL ENCOUNTER (EMERGENCY)
Facility: HOSPITAL | Age: 59
Discharge: HOME | End: 2024-11-04
Attending: EMERGENCY MEDICINE | Admitting: EMERGENCY MEDICINE
Payer: COMMERCIAL

## 2024-11-04 VITALS
BODY MASS INDEX: 28.72 KG/M2 | DIASTOLIC BLOOD PRESSURE: 89 MMHG | RESPIRATION RATE: 16 BRPM | WEIGHT: 182.98 LBS | TEMPERATURE: 98.2 F | HEIGHT: 67 IN | OXYGEN SATURATION: 98 % | SYSTOLIC BLOOD PRESSURE: 129 MMHG | HEART RATE: 90 BPM

## 2024-11-04 DIAGNOSIS — N28.9 MILD RENAL INSUFFICIENCY: Primary | ICD-10-CM

## 2024-11-04 DIAGNOSIS — Z90.49 HISTORY OF COLECTOMY: ICD-10-CM

## 2024-11-04 LAB
ALBUMIN SERPL-MCNC: 4.5 G/DL (ref 3.5–5.2)
ALBUMIN/GLOB SERPL: 1.3 G/DL
ALP SERPL-CCNC: 79 U/L (ref 39–117)
ALT SERPL W P-5'-P-CCNC: 27 U/L (ref 1–41)
ANION GAP SERPL CALCULATED.3IONS-SCNC: 16 MMOL/L (ref 5–15)
AST SERPL-CCNC: 39 U/L (ref 1–40)
BASOPHILS # BLD AUTO: 0.06 10*3/MM3 (ref 0–0.2)
BASOPHILS NFR BLD AUTO: 0.8 % (ref 0–1.5)
BILIRUB SERPL-MCNC: 0.3 MG/DL (ref 0–1.2)
BUN SERPL-MCNC: 31 MG/DL (ref 8–23)
BUN/CREAT SERPL: 24 (ref 7–25)
CALCIUM SPEC-SCNC: 10.3 MG/DL (ref 8.6–10.5)
CHLORIDE SERPL-SCNC: 101 MMOL/L (ref 98–107)
CO2 SERPL-SCNC: 20 MMOL/L (ref 22–29)
CREAT SERPL-MCNC: 1.29 MG/DL (ref 0.76–1.27)
D-LACTATE SERPL-SCNC: 2.8 MMOL/L (ref 0.5–2)
DEPRECATED RDW RBC AUTO: 44.8 FL (ref 37–54)
EGFRCR SERPLBLD CKD-EPI 2021: 63.1 ML/MIN/1.73
EOSINOPHIL # BLD AUTO: 0.08 10*3/MM3 (ref 0–0.4)
EOSINOPHIL NFR BLD AUTO: 1 % (ref 0.3–6.2)
ERYTHROCYTE [DISTWIDTH] IN BLOOD BY AUTOMATED COUNT: 14.5 % (ref 12.3–15.4)
GLOBULIN UR ELPH-MCNC: 3.6 GM/DL
GLUCOSE SERPL-MCNC: 143 MG/DL (ref 65–99)
HCT VFR BLD AUTO: 43.9 % (ref 37.5–51)
HGB BLD-MCNC: 13.9 G/DL (ref 13–17.7)
HOLD SPECIMEN: NORMAL
IMM GRANULOCYTES # BLD AUTO: 0.03 10*3/MM3 (ref 0–0.05)
IMM GRANULOCYTES NFR BLD AUTO: 0.4 % (ref 0–0.5)
LYMPHOCYTES # BLD AUTO: 1.67 10*3/MM3 (ref 0.7–3.1)
LYMPHOCYTES NFR BLD AUTO: 21 % (ref 19.6–45.3)
MCH RBC QN AUTO: 27 PG (ref 26.6–33)
MCHC RBC AUTO-ENTMCNC: 31.7 G/DL (ref 31.5–35.7)
MCV RBC AUTO: 85.4 FL (ref 79–97)
MONOCYTES # BLD AUTO: 0.51 10*3/MM3 (ref 0.1–0.9)
MONOCYTES NFR BLD AUTO: 6.4 % (ref 5–12)
NEUTROPHILS NFR BLD AUTO: 5.61 10*3/MM3 (ref 1.7–7)
NEUTROPHILS NFR BLD AUTO: 70.4 % (ref 42.7–76)
NRBC BLD AUTO-RTO: 0 /100 WBC (ref 0–0.2)
PLATELET # BLD AUTO: 290 10*3/MM3 (ref 140–450)
PMV BLD AUTO: 10.5 FL (ref 6–12)
POTASSIUM SERPL-SCNC: 5.3 MMOL/L (ref 3.5–5.2)
PROT SERPL-MCNC: 8.1 G/DL (ref 6–8.5)
RBC # BLD AUTO: 5.14 10*6/MM3 (ref 4.14–5.8)
SODIUM SERPL-SCNC: 137 MMOL/L (ref 136–145)
WBC NRBC COR # BLD AUTO: 7.96 10*3/MM3 (ref 3.4–10.8)
WHOLE BLOOD HOLD COAG: NORMAL
WHOLE BLOOD HOLD SPECIMEN: NORMAL

## 2024-11-04 PROCEDURE — G0463 HOSPITAL OUTPT CLINIC VISIT: HCPCS

## 2024-11-04 PROCEDURE — 80053 COMPREHEN METABOLIC PANEL: CPT | Performed by: EMERGENCY MEDICINE

## 2024-11-04 PROCEDURE — 36415 COLL VENOUS BLD VENIPUNCTURE: CPT

## 2024-11-04 PROCEDURE — 83605 ASSAY OF LACTIC ACID: CPT | Performed by: EMERGENCY MEDICINE

## 2024-11-04 PROCEDURE — 87040 BLOOD CULTURE FOR BACTERIA: CPT | Performed by: EMERGENCY MEDICINE

## 2024-11-04 PROCEDURE — 25810000003 SODIUM CHLORIDE 0.9 % SOLUTION: Performed by: EMERGENCY MEDICINE

## 2024-11-04 PROCEDURE — 99283 EMERGENCY DEPT VISIT LOW MDM: CPT

## 2024-11-04 PROCEDURE — 85025 COMPLETE CBC W/AUTO DIFF WBC: CPT | Performed by: EMERGENCY MEDICINE

## 2024-11-04 RX ORDER — SODIUM CHLORIDE 0.9 % (FLUSH) 0.9 %
10 SYRINGE (ML) INJECTION AS NEEDED
Status: DISCONTINUED | OUTPATIENT
Start: 2024-11-04 | End: 2024-11-04 | Stop reason: HOSPADM

## 2024-11-04 RX ADMIN — SODIUM CHLORIDE 1000 ML: 9 INJECTION, SOLUTION INTRAVENOUS at 12:28

## 2024-11-04 NOTE — NURSING NOTE
[facility] outpatient ostomy visit.    Patient had to be seen in the emergency department today post visit with Dr. Hutchins due to concern for dehydration. Outpatient ostomy clinic appointment completed in the ED.    History and current problem: Appliance leakage    Patient spouse states that they had 1 issue with leakage a few days ago.  She states that after the patient got out of the shower sheet applied some moisturizing lotions to the patient's abdomen and then the new ostomy appliance.  Likely the moisturizing lotion caused the appliance to fail.  I explained that only soap and water should be used to cleanse the peristomal skin.  We reviewed the importance of hydration including Gatorade and regular water intake.  I also reviewed the importance of Imodium to help better regulate patient's output and thicken his stool.    When I initially saw the patient in the hospital, his abdomen was very rounded and his stoma was protruding nicely.  At that time a 2 piece Jeanne, red, flat, drainable appliance was utilized.  I explained that that if his stoma decreased in size or shape and/or his abdomen decreased in size that we may need to revisit the type of ostomy appliance that he was using.  After today's assessment, my suspicion was correct.  Because of the patient's decreased abdominal roundness and peristomal creasing he would benefit from a convex appliance to help create a flatter pouching surface.  I provided a few samples to the patient and went through the entire appliance change process again with the patient and spouse in detail.    Patient's spouse was encouraged to contact me if leaking issues arise.  Further education was given on contacting [employer] to change the type of wafer he is needing if the current convex appliance works for him.  Further more the use of Imodium and regular hydration was stressed    I spent 90 minutes, face-to-face, caring for Valentin today.     Follow up appointment: No.

## 2024-11-04 NOTE — ED PROVIDER NOTES
"  [facility]    EMERGENCY DEPARTMENT ENCOUNTER      Pt Name: Valentin Vásquez  MRN: [id number]  YOB: 1965  Date of evaluation: 11/4/2024  Provider: Uri Jimenez DO    CHIEF COMPLAINT       Chief Complaint   Patient presents with    Dehydration     HPI  Stated Reason for Visit: PATIENT PRESENTS TO THE ER SENT BY COLORECTAL WITH CONCERNS OF DEHYDRATION AND HIGH HEART RATE, RECENT COLON RESECTION DUE TO CANCER. History Obtained From: patient;family     HISTORY OF PRESENT ILLNESS  (Location/Symptom, Timing/Onset, Context/Setting, Quality, Duration, Modifying Factors, Severity.)   Valentin Vásquez is a 59 y.o. male who presents to the emergency department as referral over by his colorectal surgeon Dr. Mccoy with a concern for possible dehydration as the patient was recently hospitalized, underwent colectomy surgery on October 10 about 3-1/2 weeks ago, was back in the hospital at the end of the month with acute renal sufficiency, hyperkalemia with a potassium over 8, had treatments which did not require CRRT or dialysis catheter placement, went home about 5 or 6 days ago and states he has been eating and drinking well, has had good output from his ostomy site which has been more liquid over the last couple days.  Had a visit earlier today and was noted that his heart rate was elevated and he was concern for possible dehydration so the patient was instructed to come the emergency department for further assessment.  Denies any nausea or vomiting.  Family notes that they changed his ostomy site yesterday but they are concerned about a possible leak they were supposed to follow-up with \"Alberto\" through the wound care ostomy team today at 1 PM but they are here and unable to attend that appointment, asking if they can come and see the patient while in the ED.  He denies any significant abdominal pain, states his postoperative wounds appear to be healing well, he denies any fever or chills, chest pain difficulty " breathing, he notes general he feels okay when he is recovering as he should from his recent surgery.  Denies any other acute systemic complaints at this time.      Nursing notes were reviewed.      PAST MEDICAL HISTORY     Past Medical History:   Diagnosis Date    Arthritis     Colon cancer     Colon polyp     Diabetes mellitus     Diverticulitis of colon     GERD (gastroesophageal reflux disease)     Gout     Hyperlipidemia     Hypertension     Injury of back     Kidney stone     passed and surgery-   x3 times surgery    Pancreatitis     Tear of right rotator cuff     Wears glasses     readers         SURGICAL HISTORY       Past Surgical History:   Procedure Laterality Date    ADENOIDECTOMY      COLON RESECTION WITH ILEOSTOMY N/A 10/10/2024    Procedure: CONVERTED TO OPEN COLON RESECTION ULTRA LOW ANTERIOR LAPAROSCOPIC WITH LOOP ILEOSTOMY;  Surgeon: José Manuel Hutchins MD;  Location:  [facility];  Service: [facility] - [facility];  Laterality: N/A;    COLONOSCOPY N/A 05/21/2024    Procedure: COLONOSCOPY;  Surgeon: Rosa Patrick MD;  Location:  [facility];  Service: Gastroenterology;  Laterality: N/A;    CYSTOSCOPY Bilateral 10/10/2024    Procedure: CYSTOSCOPY TEMPORARY PLACEMENT BILATERAL URETERAL CATHETER;  Surgeon: Ke Courtney MD;  Location:  [facility];  Service: [facility] - [facility];  Laterality: Bilateral;    CYSTOSCOPY LITHOLAPAXY BLADDER STONE EXTRACTION N/A 09/28/2022    Procedure: CYSTOSCOPY LASER LITHOLAPAXY BLADDER STONE EXTRACTION;  Surgeon: Mykel Jeffers MD;  Location:  [facility];  Service: Urology;  Laterality: N/A;    KIDNEY STONE SURGERY      x3 surgeries-  same stone    SHOULDER ARTHROSCOPY W/ ROTATOR CUFF REPAIR Right 07/19/2022    Procedure: RIGHT SHOULDER ARTHROSCOPY WITH OPEN ACROMIOPLASTY,  ROTATOR CUFF REPAIR, EXCISION LATERAL CLAVICLE;  Surgeon: Edmundo Huitron MD;  [facility];  Service: Orthopedics;  Laterality: Right;    TONSILLECTOMY           CURRENT  MEDICATIONS       Current Facility-Administered Medications:     sodium chloride 0.9 % flush 10 mL, 10 mL, Intravenous, PRN, Uri Jimenez, DO    Current Outpatient Medications:     Aspirin Low Dose 81 MG EC tablet, Take 1 tablet by mouth Daily., Disp: , Rfl:     Continuous Glucose Sensor (FreeStyle Shilpa 2 Sensor) misc, Use. Possible left side- pt might change 8th, Disp: , Rfl:     dapagliflozin Propanediol (Farxiga) 10 MG tablet, Take 10 mg by mouth Daily. Hold until follow-up with PCP, Disp: , Rfl:     fenofibrate (TRICOR) 145 MG tablet, Take 1 tablet by mouth Daily., Disp: , Rfl:     gabapentin (NEURONTIN) 600 MG tablet, Take 1 tablet by mouth 3 (Three) Times a Day. Hold until follow-up with PCP, Disp: , Rfl:     glipizide (GLUCOTROL XL) 10 MG 24 hr tablet, Take 1 tablet by mouth Daily With Breakfast. Hold until follow-up with PCP, Disp: , Rfl:     HYDROcodone-acetaminophen (NORCO) 7.5-325 MG per tablet, Take 1 tablet by mouth Every 6 (Six) Hours As Needed for Moderate Pain ., Disp: 30 tablet, Rfl: 0    Insulin Lispro, 1 Unit Dial, (HUMALOG) 100 UNIT/ML solution pen-injector, Inject 5 Units under the skin into the appropriate area as directed 3 (Three) Times a Day Before Meals., Disp: , Rfl:     lisinopril (PRINIVIL,ZESTRIL) 10 MG tablet, Take 1 tablet by mouth Daily. Hold until follow-up with PCP, Disp: , Rfl:     loperamide (Imodium A-D) 2 MG tablet, Take 1 tablet by mouth 4 (Four) Times a Day As Needed for Diarrhea., Disp: 180 tablet, Rfl: 0    metFORMIN (GLUCOPHAGE) 1000 MG tablet, Take 1 tablet by mouth 2 (Two) Times a Day With Meals., Disp: , Rfl:     metoprolol succinate XL (Toprol XL) 25 MG 24 hr tablet, Take 1 tablet by mouth Daily. Hold until follow-up with PCP, Disp: , Rfl:     omeprazole (priLOSEC) 40 MG capsule, Take 1 capsule by mouth Daily., Disp: , Rfl:     rosuvastatin (CRESTOR) 10 MG tablet, Take 1 tablet by mouth Every Night., Disp: , Rfl:     sodium bicarbonate 650 MG tablet, Take 1  tablet by mouth 2 (Two) Times a Day for 7 days., Disp: 14 tablet, Rfl: 0    tamsulosin (FLOMAX) 0.4 MG capsule 24 hr capsule, TAKE 1 CAPSULE BY MOUTH EVERY NIGHT, Disp: 90 capsule, Rfl: 3    Tresiba FlexTouch 200 UNIT/ML solution pen-injector pen injection, Inject  under the skin into the appropriate area as directed Take As Directed. 64 units in am and 60 units at night, Disp: , Rfl:     ALLERGIES     Patient has no known allergies.    FAMILY HISTORY       Family History   Problem Relation Age of Onset    Diabetes Father     Diabetes Sister           SOCIAL HISTORY       Social History     Socioeconomic History    Marital status:    Tobacco Use    Smoking status: Never    Smokeless tobacco: Current     Types: Snuff   Vaping Use    Vaping status: Never Used   Substance and Sexual Activity    Alcohol use: Not Currently     Comment: occasionally    Drug use: No    Sexual activity: Not Currently     Partners: Female         PHYSICAL EXAM    (up to 7 for level 4, 8 or more for level 5)     Vitals:    11/04/24 1202 11/04/24 1232 11/04/24 1302 11/04/24 1331   BP: 132/88 125/88 138/93 129/89   BP Location:       Patient Position:       Pulse: 96 92 92 90   Resp:       Temp:       TempSrc:       SpO2: 94% 93% 98% 98%   Weight:       Height:           Physical Exam  General : Patient is awake, alert, oriented, in no acute distress, nontoxic appearing  HEENT: Pupils are equally round, EOMI, conjunctivae clear  Neck: Neck is supple, full range of motion, trachea midline  Cardiac: Heart regular rate, rhythm, no murmurs, rubs, or gallops.  Heart rate in the 90s.  Lungs: Lungs are clear to auscultation, there is no wheezing, rhonchi, or rales. There is no use of accessory muscles  Abdomen: Postop changes noted to the mid lower abdominal wall, incision site appears to be healing well and is dry and intact.  The right periumbilical ostomy site does have more liquid brownish stool production, nonbloody in nature, abdomen  examination is without any peritoneal signs, nontender.  Abdomen is soft, nontender, nondistended. There are no firm or pulsatile masses, no rebound rigidity or guarding  Musculoskeletal: 5 out of 5 strength in all 4 extremities.  No focal muscle deficits are appreciated  Dermatology: Postoperative wound to the abdomen is clean dry and intact skin is warm and dry  Psych: Mentation is grossly normal, cognition is grossly normal. Affect is appropriate      DIAGNOSTIC RESULTS     EKG:  All EKGs are interpreted by the Emergency Department Physician who either signs or Co-signs this chart in the absence of a cardiologist.    No orders to display         ED BEDSIDE ULTRASOUND:   Performed by ED Physician - none    LABS:    I have reviewed and interpreted all of the currently available lab results from this visit (if applicable):  Results for orders placed or performed during the hospital encounter of 11/04/24   Comprehensive Metabolic Panel    Collection Time: 11/04/24 12:25 PM    Specimen: Blood   Result Value Ref Range    Glucose 143 (H) 65 - 99 mg/dL    BUN 31 (H) 8 - 23 mg/dL    Creatinine 1.29 (H) 0.76 - 1.27 mg/dL    Sodium 137 136 - 145 mmol/L    Potassium 5.3 (H) 3.5 - 5.2 mmol/L    Chloride 101 98 - 107 mmol/L    CO2 20.0 (L) 22.0 - 29.0 mmol/L    Calcium 10.3 8.6 - 10.5 mg/dL    Total Protein 8.1 6.0 - 8.5 g/dL    Albumin 4.5 3.5 - 5.2 g/dL    ALT (SGPT) 27 1 - 41 U/L    AST (SGOT) 39 1 - 40 U/L    Alkaline Phosphatase 79 39 - 117 U/L    Total Bilirubin 0.3 0.0 - 1.2 mg/dL    Globulin 3.6 gm/dL    A/G Ratio 1.3 g/dL    BUN/Creatinine Ratio 24.0 7.0 - 25.0    Anion Gap 16.0 (H) 5.0 - 15.0 mmol/L    eGFR 63.1 >60.0 mL/min/1.73   Lactic Acid, Plasma    Collection Time: 11/04/24 12:25 PM    Specimen: Blood   Result Value Ref Range    Lactate 2.8 (C) 0.5 - 2.0 mmol/L   CBC Auto Differential    Collection Time: 11/04/24 12:25 PM    Specimen: Blood   Result Value Ref Range    WBC 7.96 3.40 - 10.80 10*3/mm3    RBC 5.14  4.14 - 5.80 10*6/mm3    Hemoglobin 13.9 13.0 - 17.7 g/dL    Hematocrit 43.9 37.5 - 51.0 %    MCV 85.4 79.0 - 97.0 fL    MCH 27.0 26.6 - 33.0 pg    MCHC 31.7 31.5 - 35.7 g/dL    RDW 14.5 12.3 - 15.4 %    RDW-SD 44.8 37.0 - 54.0 fl    MPV 10.5 6.0 - 12.0 fL    Platelets 290 140 - 450 10*3/mm3    Neutrophil % 70.4 42.7 - 76.0 %    Lymphocyte % 21.0 19.6 - 45.3 %    Monocyte % 6.4 5.0 - 12.0 %    Eosinophil % 1.0 0.3 - 6.2 %    Basophil % 0.8 0.0 - 1.5 %    Immature Grans % 0.4 0.0 - 0.5 %    Neutrophils, Absolute 5.61 1.70 - 7.00 10*3/mm3    Lymphocytes, Absolute 1.67 0.70 - 3.10 10*3/mm3    Monocytes, Absolute 0.51 0.10 - 0.90 10*3/mm3    Eosinophils, Absolute 0.08 0.00 - 0.40 10*3/mm3    Basophils, Absolute 0.06 0.00 - 0.20 10*3/mm3    Immature Grans, Absolute 0.03 0.00 - 0.05 10*3/mm3    nRBC 0.0 0.0 - 0.2 /100 WBC   Green Top (Gel)    Collection Time: 11/04/24 12:25 PM   Result Value Ref Range    Extra Tube Hold for add-ons.    Lavender Top    Collection Time: 11/04/24 12:25 PM   Result Value Ref Range    Extra Tube hold for add-on    Gold Top - SST    Collection Time: 11/04/24 12:25 PM   Result Value Ref Range    Extra Tube Hold for add-ons.    Gray Top    Collection Time: 11/04/24 12:25 PM   Result Value Ref Range    Extra Tube Hold for add-ons.    Light Blue Top    Collection Time: 11/04/24 12:25 PM   Result Value Ref Range    Extra Tube Hold for add-ons.         If labs were ordered, I independently reviewed the results and considered them in treating the patient.      EMERGENCY DEPARTMENT COURSE and DIFFERENTIAL DIAGNOSIS/MDM:   Vitals:  AS OF 14:27 EST    BP - 129/89  HR - 90  TEMP - 98.2 °F (36.8 °C) (Oral)  O2 SATS - 98%      Orders placed during this visit:  Orders Placed This Encounter   Procedures    Blood Culture - Blood,    Blood Culture - Blood,    [place] Draw    Comprehensive Metabolic Panel    Lactic Acid, Plasma    CBC Auto Differential    STAT Lactic Acid, Reflex    Undress & Gown    Continuous  Pulse Oximetry    Vital Signs    Assess Stoma    Stoma Care - Change Appliance    Oxygen Therapy- Nasal Cannula; Titrate 1-6 LPM Per SpO2; 90 - 95%    Insert Peripheral IV    CBC & Differential    Green Top (Gel)    Lavender Top    Gold Top - SST    Gray Top    Light Blue Top       All labs have been independently reviewed by me.  All radiology studies have been reviewed by me and the radiologist dictating the report.  All EKG's have been independently viewed and interpreted by me.      Discussion below represents my analysis of pertinent findings related to patient's condition, differential diagnosis, treatment plan and final disposition.    Differential diagnosis:  The differential diagnosis associated with the patient's presentation includes: Dehydration, electrolyte abnormalities, renal insufficiency, hyperkalemia, increased ostomy output, diarrheal illness    Additional sources  Discussed/ obtained information from independent historians:   [] Spouse  [] Parent  [x] Family member  [] Friend  [] EMS   [] Other:    External (non-ED) record review:   [x] Inpatient record: Patient recent hospitalization with colectomy, bowel resection, returning to the hospital last week with renal insufficiency, hyperkalemia.  The patient had IV fluids, hyperkalemia treatments, discharged on bicarb tablets.  Upon discharge his potassium is 5.4 improvement from 8.0, his creatinine is 1.1, on 10/26 it is 5.98.   [] Office record:   [] Outpatient record:   [] Prior Outpatient labs:   [] Prior Outpatient radiology:   [] Primary Care record:   [] Outside ED record:   [] Other:     Patient's care impacted by:   [] Diabetes  [] Hypertension  [] CHF  [] Hyperlipidemia  [] Coronary Artery Disease   [] COPD   [] Cancer   [] Tobacco Abuse   [] Substance Abuse    [] Other:     Care significantly affected by Social Determinants of Health (housing and economic circumstances, unemployment)    [] Yes     [x] No   If yes, Patient's care  significantly limited by Social Determinants of Health including:   [] Inadequate housing   [] Low income   [] Alcoholism and drug addiction in family   [] Problems related to primary support group   [] Unemployment   [] Problems related to employment   [] Other Social Determinants of Health:       MEDICATIONS ADMINISTERED IN ED:  Medications   sodium chloride 0.9 % flush 10 mL (has no administration in time range)   sodium chloride 0.9 % bolus 1,000 mL (0 mL Intravenous Stopped 11/4/24 0059)              This is a pleasant 59-year-old male who is post colectomy, last month, returned with acute renal sufficiency and hyperkalemia discharged on 10/30/2024, presents today for concern for dehydration with elevated heart rate at his PCP office with increased loose stool production in his ostomy site.  Patient is nontoxic-appearing, heart rate 94 normal sinus rhythm with stable vital signs initial assessment.  We did recheck blood work labs including kidney function, potassium levels with results of creatinine 1.29, potassium of 5.3, white count of 7 with stable H&H.  On reassessment after IV fluids the patient is still saying he does not have any acute complaints, feels well, this lactic acid is 2.8 prior to the IV fluids.  He notes at this point he feels comfortable with oral fluids, continued hydration and electrolyte replacement, can follow-up with his colorectal specialist.  The ostomy team was able to come down and replace his current ostomy which was leaking, they feel comfortable with his continued care at home.  He is requesting discharge home at this time, vital signs are stable, heart rate 90, blood pressure 129/89.  Close follow-up with his PCP, return to the ED with any worsening symptoms or further concerns in the meantime.    I had a discussion with the patient/family regarding diagnosis, diagnostic results, treatment plan, and medications.  The patient/family indicated understanding of these instructions.   I spent adequate time at the bedside preceding discharge necessary to personally discuss the aftercare instructions, giving patient education, providing explanations of the results of our evaluations/findings, and my decision making to assure that the patient/family understand the plan of care.  Time was allotted to answer questions at that time and throughout the ED course.  Emphasis was placed on timely follow-up after discharge.  I also discussed the potential for the development of an acute emergent condition requiring further evaluation, admission, or even surgical intervention. I discussed that we found nothing during the visit today indicating the need for further workup, admission, or the presence of an unstable medical condition.  I encouraged the patient to return to the emergency department immediately for ANY concerns, worsening, new complaints, or if symptoms persist and unable to seek follow-up in a timely fashion.  The patient/family expressed understanding and agreement with this plan.  The patient will follow-up with their PCP in 1-2 days for reevaluation.     PROCEDURES:  Procedures    CRITICAL CARE TIME    Total Critical Care time was 0 minutes, excluding separately reportable procedures.   There was a high probability of clinically significant/life threatening deterioration in the patient's condition which required my urgent intervention.      FINAL IMPRESSION      1. Mild renal insufficiency    2. History of colectomy          DISPOSITION/PLAN     ED Disposition       ED Disposition   Discharge    Condition   Stable    Comment   --               PATIENT REFERRED TO:  Tristen Stuart DO  [address] [facility] [address] [phone]    In 2 days      José Manuel Hutchins MD  [address] [place] [address] [phone]    Schedule an appointment as soon as possible for a visit   Your colorectal specialist    [facility] EMERGENCY DEPARTMENT  [address]  [facility] [address]  [phone]    If symptoms worsen      DISCHARGE MEDICATIONS:     Medication List        CONTINUE taking these medications      Aspirin Low Dose 81 MG EC tablet  Generic drug: aspirin     dapagliflozin Propanediol 10 MG tablet  Commonly known as: Farxiga  Take 10 mg by mouth Daily. Hold until follow-up with PCP     fenofibrate 145 MG tablet  Commonly known as: TRICOR     FreeStyle Shilpa 2 Sensor misc     gabapentin 600 MG tablet  Commonly known as: NEURONTIN  Take 1 tablet by mouth 3 (Three) Times a Day. Hold until follow-up with PCP     glipizide 10 MG 24 hr tablet  Commonly known as: GLUCOTROL XL  Take 1 tablet by mouth Daily With Breakfast. Hold until follow-up with PCP     HYDROcodone-acetaminophen 7.5-325 MG per tablet  Commonly known as: NORCO  Take 1 tablet by mouth Every 6 (Six) Hours As Needed for Moderate Pain .     Insulin Lispro (1 Unit Dial) 100 UNIT/ML solution pen-injector  Commonly known as: HUMALOG     lisinopril 10 MG tablet  Commonly known as: PRINIVIL,ZESTRIL  Take 1 tablet by mouth Daily. Hold until follow-up with PCP     loperamide 2 MG tablet  Commonly known as: Imodium A-D  Take 1 tablet by mouth 4 (Four) Times a Day As Needed for Diarrhea.     metFORMIN 1000 MG tablet  Commonly known as: GLUCOPHAGE     metoprolol succinate XL 25 MG 24 hr tablet  Commonly known as: Toprol XL  Take 1 tablet by mouth Daily. Hold until follow-up with PCP     omeprazole 40 MG capsule  Commonly known as: priLOSEC     rosuvastatin 10 MG tablet  Commonly known as: CRESTOR     sodium bicarbonate 650 MG tablet  Take 1 tablet by mouth 2 (Two) Times a Day for 7 days.     tamsulosin 0.4 MG capsule 24 hr capsule  Commonly known as: FLOMAX  TAKE 1 CAPSULE BY MOUTH EVERY NIGHT     Tresiba FlexTouch 200 UNIT/ML solution pen-injector pen injection  Generic drug: Insulin Degludec                  Comment: Please note this report has been produced using speech recognition  software.      Uri Jimenez DO  Attending Emergency Physician         Uri Jimenez DO  11/04/24 3769

## 2024-11-05 ENCOUNTER — READMISSION MANAGEMENT (OUTPATIENT)
Dept: CALL CENTER | Facility: HOSPITAL | Age: 59
End: 2024-11-05
Payer: COMMERCIAL

## 2024-11-05 NOTE — OUTREACH NOTE
Medical Week 1 Survey      Flowsheet Row Responses   [facility] patient discharged from? [facility]   Does the patient have one of the following disease processes/diagnoses(primary or secondary)? Other   Week 1 attempt successful? Yes   Call start time 0850   Call end time 0854   Discharge diagnosis DAJA (acute kidney injury)   Meds reviewed with patient/caregiver? Yes   Is the patient having any side effects they believe may be caused by any medication additions or changes? No   Does the patient have all medications ordered at discharge? Yes   Is the patient taking all medications as directed (includes completed medication regime)? Yes   Does the patient have a primary care provider?  Yes   Has the patient kept scheduled appointments due by today? Yes  [PCP apt since hosp dc]   Has home health visited the patient within 72 hours of discharge? N/A   Psychosocial issues? No   Did the patient receive a copy of their discharge instructions? Yes   Nursing interventions Reviewed instructions with patient   What is the patient's perception of their health status since discharge? Improving   Is the patient/caregiver able to teach back signs and symptoms related to disease process for when to call PCP? Yes   Is the patient/caregiver able to teach back signs and symptoms related to disease process for when to call 911? Yes   Is the patient/caregiver able to teach back the hierarchy of who to call/visit for symptoms/problems? PCP, Specialist, Home health nurse, Urgent Care, ED, 911 Yes   If the patient is a current smoker, are they able to teach back resources for cessation? Not a smoker   Week 1 call completed? Yes   Graduated Yes   Did the patient feel the follow up calls were helpful during their recovery period? Yes   Graduated/Revoked comments Pt improving - pt has seen his PCP since hosp dc   Call end time 0854            Parvin CASAREZ - Registered Nurse

## 2024-11-09 LAB
BACTERIA SPEC AEROBE CULT: NORMAL
BACTERIA SPEC AEROBE CULT: NORMAL

## 2024-11-21 ENCOUNTER — APPOINTMENT (OUTPATIENT)
Dept: GENERAL RADIOLOGY | Facility: HOSPITAL | Age: 59
End: 2024-11-21
Payer: COMMERCIAL

## 2024-11-21 ENCOUNTER — HOSPITAL ENCOUNTER (OUTPATIENT)
Facility: HOSPITAL | Age: 59
Setting detail: OBSERVATION
LOS: 1 days | Discharge: HOME | End: 2024-11-22
Attending: EMERGENCY MEDICINE | Admitting: INTERNAL MEDICINE
Payer: COMMERCIAL

## 2024-11-21 DIAGNOSIS — E86.9 VOLUME DEPLETION: ICD-10-CM

## 2024-11-21 DIAGNOSIS — N17.9 AKI (ACUTE KIDNEY INJURY): ICD-10-CM

## 2024-11-21 DIAGNOSIS — E87.1 HYPONATREMIA: Primary | ICD-10-CM

## 2024-11-21 LAB
ALBUMIN SERPL-MCNC: 5.3 G/DL (ref 3.5–5.2)
ALBUMIN/GLOB SERPL: 1.3 G/DL
ALP SERPL-CCNC: 79 U/L (ref 39–117)
ALT SERPL W P-5'-P-CCNC: 23 U/L (ref 1–41)
ANION GAP SERPL CALCULATED.3IONS-SCNC: 17 MMOL/L (ref 5–15)
AST SERPL-CCNC: 24 U/L (ref 1–40)
BASOPHILS # BLD AUTO: 0.03 10*3/MM3 (ref 0–0.2)
BASOPHILS NFR BLD AUTO: 0.3 % (ref 0–1.5)
BILIRUB SERPL-MCNC: 0.3 MG/DL (ref 0–1.2)
BILIRUB UR QL STRIP: NEGATIVE
BUN SERPL-MCNC: 58 MG/DL (ref 8–23)
BUN/CREAT SERPL: 31.5 (ref 7–25)
CALCIUM SPEC-SCNC: 11.1 MG/DL (ref 8.6–10.5)
CHLORIDE SERPL-SCNC: 88 MMOL/L (ref 98–107)
CLARITY UR: CLEAR
CO2 SERPL-SCNC: 17 MMOL/L (ref 22–29)
COLOR UR: YELLOW
CREAT SERPL-MCNC: 1.84 MG/DL (ref 0.76–1.27)
D-LACTATE SERPL-SCNC: 1.4 MMOL/L (ref 0.5–2)
D-LACTATE SERPL-SCNC: 3.4 MMOL/L (ref 0.5–2)
DEPRECATED RDW RBC AUTO: 41.4 FL (ref 37–54)
EGFRCR SERPLBLD CKD-EPI 2021: 41.2 ML/MIN/1.73
EOSINOPHIL # BLD AUTO: 0.02 10*3/MM3 (ref 0–0.4)
EOSINOPHIL NFR BLD AUTO: 0.2 % (ref 0.3–6.2)
ERYTHROCYTE [DISTWIDTH] IN BLOOD BY AUTOMATED COUNT: 14.3 % (ref 12.3–15.4)
GLOBULIN UR ELPH-MCNC: 4 GM/DL
GLUCOSE BLDC GLUCOMTR-MCNC: 139 MG/DL (ref 70–130)
GLUCOSE SERPL-MCNC: 295 MG/DL (ref 65–99)
GLUCOSE UR STRIP-MCNC: ABNORMAL MG/DL
HCT VFR BLD AUTO: 52 % (ref 37.5–51)
HGB BLD-MCNC: 17.3 G/DL (ref 13–17.7)
HGB UR QL STRIP.AUTO: NEGATIVE
HOLD SPECIMEN: NORMAL
IMM GRANULOCYTES # BLD AUTO: 0.06 10*3/MM3 (ref 0–0.05)
IMM GRANULOCYTES NFR BLD AUTO: 0.6 % (ref 0–0.5)
KETONES UR QL STRIP: NEGATIVE
LEUKOCYTE ESTERASE UR QL STRIP.AUTO: NEGATIVE
LYMPHOCYTES # BLD AUTO: 1.36 10*3/MM3 (ref 0.7–3.1)
LYMPHOCYTES NFR BLD AUTO: 14.2 % (ref 19.6–45.3)
MAGNESIUM SERPL-MCNC: 2 MG/DL (ref 1.6–2.4)
MCH RBC QN AUTO: 27.3 PG (ref 26.6–33)
MCHC RBC AUTO-ENTMCNC: 33.3 G/DL (ref 31.5–35.7)
MCV RBC AUTO: 82 FL (ref 79–97)
MONOCYTES # BLD AUTO: 0.35 10*3/MM3 (ref 0.1–0.9)
MONOCYTES NFR BLD AUTO: 3.7 % (ref 5–12)
NEUTROPHILS NFR BLD AUTO: 7.74 10*3/MM3 (ref 1.7–7)
NEUTROPHILS NFR BLD AUTO: 81 % (ref 42.7–76)
NITRITE UR QL STRIP: NEGATIVE
NRBC BLD AUTO-RTO: 0 /100 WBC (ref 0–0.2)
OSMOLALITY SERPL: 298 MOSM/KG (ref 275–295)
OSMOLALITY UR: 635 MOSM/KG (ref 300–1100)
PH UR STRIP.AUTO: <=5 [PH] (ref 5–8)
PLATELET # BLD AUTO: 382 10*3/MM3 (ref 140–450)
PMV BLD AUTO: 9.8 FL (ref 6–12)
POTASSIUM SERPL-SCNC: 5.6 MMOL/L (ref 3.5–5.2)
PROT SERPL-MCNC: 9.3 G/DL (ref 6–8.5)
PROT UR QL STRIP: NEGATIVE
RBC # BLD AUTO: 6.34 10*6/MM3 (ref 4.14–5.8)
SODIUM SERPL-SCNC: 122 MMOL/L (ref 136–145)
SODIUM UR-SCNC: <20 MMOL/L
SP GR UR STRIP: 1.02 (ref 1–1.03)
TROPONIN T SERPL HS-MCNC: 21 NG/L
UROBILINOGEN UR QL STRIP: ABNORMAL
WBC NRBC COR # BLD AUTO: 9.56 10*3/MM3 (ref 3.4–10.8)
WHOLE BLOOD HOLD COAG: NORMAL
WHOLE BLOOD HOLD SPECIMEN: NORMAL

## 2024-11-21 PROCEDURE — G0378 HOSPITAL OBSERVATION PER HR: HCPCS

## 2024-11-21 PROCEDURE — 83935 ASSAY OF URINE OSMOLALITY: CPT | Performed by: INTERNAL MEDICINE

## 2024-11-21 PROCEDURE — 25010000002 ONDANSETRON PER 1 MG: Performed by: EMERGENCY MEDICINE

## 2024-11-21 PROCEDURE — 25010000003 DEXTROSE 5 % SOLUTION 1,000 ML FLEX CONT: Performed by: INTERNAL MEDICINE

## 2024-11-21 PROCEDURE — 84300 ASSAY OF URINE SODIUM: CPT | Performed by: INTERNAL MEDICINE

## 2024-11-21 PROCEDURE — 80053 COMPREHEN METABOLIC PANEL: CPT | Performed by: EMERGENCY MEDICINE

## 2024-11-21 PROCEDURE — 85025 COMPLETE CBC W/AUTO DIFF WBC: CPT | Performed by: EMERGENCY MEDICINE

## 2024-11-21 PROCEDURE — 82948 REAGENT STRIP/BLOOD GLUCOSE: CPT

## 2024-11-21 PROCEDURE — 63710000001 INSULIN GLARGINE PER 5 UNITS: Performed by: INTERNAL MEDICINE

## 2024-11-21 PROCEDURE — 93005 ELECTROCARDIOGRAM TRACING: CPT | Performed by: EMERGENCY MEDICINE

## 2024-11-21 PROCEDURE — 84484 ASSAY OF TROPONIN QUANT: CPT | Performed by: EMERGENCY MEDICINE

## 2024-11-21 PROCEDURE — 83605 ASSAY OF LACTIC ACID: CPT | Performed by: EMERGENCY MEDICINE

## 2024-11-21 PROCEDURE — 96361 HYDRATE IV INFUSION ADD-ON: CPT

## 2024-11-21 PROCEDURE — 93005 ELECTROCARDIOGRAM TRACING: CPT

## 2024-11-21 PROCEDURE — 25810000003 SODIUM CHLORIDE 0.9 % SOLUTION: Performed by: INTERNAL MEDICINE

## 2024-11-21 PROCEDURE — 83735 ASSAY OF MAGNESIUM: CPT | Performed by: EMERGENCY MEDICINE

## 2024-11-21 PROCEDURE — 81003 URINALYSIS AUTO W/O SCOPE: CPT | Performed by: EMERGENCY MEDICINE

## 2024-11-21 PROCEDURE — 83930 ASSAY OF BLOOD OSMOLALITY: CPT | Performed by: INTERNAL MEDICINE

## 2024-11-21 PROCEDURE — 96374 THER/PROPH/DIAG INJ IV PUSH: CPT

## 2024-11-21 PROCEDURE — 25810000003 SODIUM CHLORIDE 0.9 % SOLUTION: Performed by: EMERGENCY MEDICINE

## 2024-11-21 PROCEDURE — 99285 EMERGENCY DEPT VISIT HI MDM: CPT

## 2024-11-21 PROCEDURE — 71045 X-RAY EXAM CHEST 1 VIEW: CPT

## 2024-11-21 RX ORDER — INSULIN LISPRO 100 [IU]/ML
3-14 INJECTION, SOLUTION INTRAVENOUS; SUBCUTANEOUS
Status: DISCONTINUED | OUTPATIENT
Start: 2024-11-21 | End: 2024-11-22 | Stop reason: HOSPADM

## 2024-11-21 RX ORDER — ONDANSETRON 2 MG/ML
4 INJECTION INTRAMUSCULAR; INTRAVENOUS ONCE
Status: COMPLETED | OUTPATIENT
Start: 2024-11-21 | End: 2024-11-21

## 2024-11-21 RX ORDER — LOPERAMIDE HYDROCHLORIDE 2 MG/1
4 CAPSULE ORAL ONCE
Status: COMPLETED | OUTPATIENT
Start: 2024-11-21 | End: 2024-11-21

## 2024-11-21 RX ORDER — SODIUM CHLORIDE 9 MG/ML
125 INJECTION, SOLUTION INTRAVENOUS CONTINUOUS
Status: DISCONTINUED | OUTPATIENT
Start: 2024-11-21 | End: 2024-11-21

## 2024-11-21 RX ORDER — CHOLESTYRAMINE LIGHT 4 G/5.7G
1 POWDER, FOR SUSPENSION ORAL ONCE
Status: COMPLETED | OUTPATIENT
Start: 2024-11-21 | End: 2024-11-21

## 2024-11-21 RX ORDER — SODIUM CHLORIDE 0.9 % (FLUSH) 0.9 %
10 SYRINGE (ML) INJECTION AS NEEDED
Status: DISCONTINUED | OUTPATIENT
Start: 2024-11-21 | End: 2024-11-22 | Stop reason: HOSPADM

## 2024-11-21 RX ORDER — INSULIN LISPRO 100 [IU]/ML
5 INJECTION, SOLUTION INTRAVENOUS; SUBCUTANEOUS
Status: DISCONTINUED | OUTPATIENT
Start: 2024-11-22 | End: 2024-11-22 | Stop reason: HOSPADM

## 2024-11-21 RX ORDER — IBUPROFEN 600 MG/1
1 TABLET ORAL
Status: DISCONTINUED | OUTPATIENT
Start: 2024-11-21 | End: 2024-11-22 | Stop reason: HOSPADM

## 2024-11-21 RX ORDER — NICOTINE POLACRILEX 4 MG
15 LOZENGE BUCCAL
Status: DISCONTINUED | OUTPATIENT
Start: 2024-11-21 | End: 2024-11-22 | Stop reason: HOSPADM

## 2024-11-21 RX ORDER — DEXTROSE MONOHYDRATE 25 G/50ML
25 INJECTION, SOLUTION INTRAVENOUS
Status: DISCONTINUED | OUTPATIENT
Start: 2024-11-21 | End: 2024-11-22 | Stop reason: HOSPADM

## 2024-11-21 RX ADMIN — CHOLEYSTYRAMINE LIGHT 4 G: 4 POWDER, FOR SUSPENSION ORAL at 14:32

## 2024-11-21 RX ADMIN — SODIUM CHLORIDE 125 ML/HR: 9 INJECTION, SOLUTION INTRAVENOUS at 16:15

## 2024-11-21 RX ADMIN — SODIUM CHLORIDE 2000 ML: 9 INJECTION, SOLUTION INTRAVENOUS at 11:33

## 2024-11-21 RX ADMIN — SODIUM ZIRCONIUM CYCLOSILICATE 10 G: 10 POWDER, FOR SUSPENSION ORAL at 16:15

## 2024-11-21 RX ADMIN — INSULIN GLARGINE 30 UNITS: 100 INJECTION, SOLUTION SUBCUTANEOUS at 22:00

## 2024-11-21 RX ADMIN — SODIUM BICARBONATE 150 MEQ: 84 INJECTION, SOLUTION INTRAVENOUS at 22:03

## 2024-11-21 RX ADMIN — LOPERAMIDE HYDROCHLORIDE 4 MG: 2 CAPSULE ORAL at 13:59

## 2024-11-21 RX ADMIN — ONDANSETRON 4 MG: 2 INJECTION INTRAMUSCULAR; INTRAVENOUS at 11:33

## 2024-11-21 NOTE — ED NOTES
Valentin Vásquez    Nursing Report ED to Floor:  Mental status: ANOX4  Ambulatory status: AD MYKE  Oxygen Therapy:  RA  Cardiac Rhythm: SINUS TACH  Admitted from: HOME  Safety Concerns:  N/A  Social Issues: N/A  ED Room #:  21    ED Nurse Phone Extension - [phone] or may call [phone].      HPI:   Chief Complaint   Patient presents with    Weakness - Generalized       Past Medical History:  Past Medical History:   Diagnosis Date    Arthritis     Colon cancer     Colon polyp     Diabetes mellitus     Diverticulitis of colon     GERD (gastroesophageal reflux disease)     Gout     Hyperlipidemia     Hypertension     Injury of back     Kidney stone     passed and surgery-   x3 times surgery    Pancreatitis     Tear of right rotator cuff     Wears glasses     readers        Past Surgical History:  Past Surgical History:   Procedure Laterality Date    ADENOIDECTOMY      COLON RESECTION WITH ILEOSTOMY N/A 10/10/2024    Procedure: CONVERTED TO OPEN COLON RESECTION ULTRA LOW ANTERIOR LAPAROSCOPIC WITH LOOP ILEOSTOMY;  Surgeon: José Manuel Hutchins MD;  Location: [facility] OR;  [facility];  Laterality: N/A;    COLONOSCOPY N/A 05/21/2024    Procedure: COLONOSCOPY;  Surgeon: Rosa Patrick MD;  Location: [facility] OR;  Service: Gastroenterology;  Laterality: N/A;    CYSTOSCOPY Bilateral 10/10/2024    Procedure: CYSTOSCOPY TEMPORARY PLACEMENT BILATERAL URETERAL CATHETER;  Surgeon: Ke Courtney MD;  Location:  [facility] OR;  Service: Robotics - [employer];  Laterality: Bilateral;    CYSTOSCOPY LITHOLAPAXY BLADDER STONE EXTRACTION N/A 09/28/2022    Procedure: CYSTOSCOPY LASER LITHOLAPAXY BLADDER STONE EXTRACTION;  Surgeon: Mykel Jeffers MD;  Location: [facility] OR;  Service: Urology;  Laterality: N/A;    KIDNEY STONE SURGERY      x3 surgeries-  same stone    SHOULDER ARTHROSCOPY W/ ROTATOR CUFF REPAIR Right 07/19/2022    Procedure: RIGHT SHOULDER ARTHROSCOPY WITH OPEN ACROMIOPLASTY,  ROTATOR CUFF REPAIR, EXCISION  LATERAL CLAVICLE;  Surgeon: Edmundo Huitron MD;  Location: [facility];  Service: Orthopedics;  Laterality: Right;    TONSILLECTOMY          Admitting Doctor:   Cong Cartagena MD    Consulting Provider(s):  Consults       Date and Time Order Name Status Description    11/21/2024  1:46 PM Inpatient Nephrology Consult      10/27/2024  2:45 AM Inpatient Colorectal Surgery Consult Completed     10/27/2024  2:28 AM Inpatient Nephrology Consult Completed              Admitting Diagnosis:   The primary encounter diagnosis was Hyponatremia. Diagnoses of Volume depletion and DAJA (acute kidney injury) were also pertinent to this visit.    Most Recent Vitals:   Vitals:    11/21/24 1200 11/21/24 1201 11/21/24 1230 11/21/24 1329   BP: 96/81  120/78 115/79   BP Location:       Patient Position:       Pulse:  111 108 114   Resp:       Temp:       TempSrc:       SpO2:  98% 100% 97%   Weight:       Height:           Active LDAs/IV Access:   Lines, Drains & Airways       Active LDAs       Name Placement date Placement time Site Days    Peripheral IV 11/21/24 1046 Right Antecubital 11/21/24  1046  Antecubital  less than 1    Ileostomy RUQ 10/10/24  1235  place in OR wasn't charted.  RUQ  42    Ileostomy RLQ 10/27/24  0800  RLQ  25                    Labs (abnormal labs have a star):   Labs Reviewed   COMPREHENSIVE METABOLIC PANEL - Abnormal; Notable for the following components:       Result Value    Glucose 295 (*)     BUN 58 (*)     Creatinine 1.84 (*)     Sodium 122 (*)     Potassium 5.6 (*)     Chloride 88 (*)     CO2 17.0 (*)     Calcium 11.1 (*)     Total Protein 9.3 (*)     Albumin 5.3 (*)     BUN/Creatinine Ratio 31.5 (*)     Anion Gap 17.0 (*)     eGFR 41.2 (*)     All other components within normal limits    Narrative:     GFR Normal >60  Chronic Kidney Disease <60  Kidney Failure <15     URINALYSIS W/ MICROSCOPIC IF INDICATED (NO CULTURE) - Abnormal; Notable for the following components:    Glucose, UA >=1000  mg/dL (3+) (*)     All other components within normal limits    Narrative:     Urine microscopic not indicated.   CBC WITH AUTO DIFFERENTIAL - Abnormal; Notable for the following components:    RBC 6.34 (*)     Hematocrit 52.0 (*)     Neutrophil % 81.0 (*)     Lymphocyte % 14.2 (*)     Monocyte % 3.7 (*)     Eosinophil % 0.2 (*)     Immature Grans % 0.6 (*)     Neutrophils, Absolute 7.74 (*)     Immature Grans, Absolute 0.06 (*)     All other components within normal limits   LACTIC ACID, PLASMA - Abnormal; Notable for the following components:    Lactate 3.4 (*)     All other components within normal limits   SINGLE HS TROPONIN T - Normal    Narrative:     High Sensitive Troponin T Reference Range:  <14.0 ng/L- Negative Female for AMI  <22.0 ng/L- Negative Male for AMI  >=14 - Abnormal Female indicating possible myocardial injury.  >=22 - Abnormal Male indicating possible myocardial injury.   Clinicians would have to utilize clinical acumen, EKG, Troponin, and serial changes to determine if it is an Acute Myocardial Infarction or myocardial injury due to an underlying chronic condition.        MAGNESIUM - Normal   RAINBOW DRAW    Narrative:     The following orders were created for panel order [place] Draw.  Procedure                               Abnormality         Status                     ---------                               -----------         ------                     Green Top (Gel)[[id number]]                                  Final result               Lavender Top[[id number]]                                     Final result               Gold Top - SST[[id number]]                                   Final result               Michel Top[[id number]]                                         Final result               Light Blue Top[[id number]]                                   Final result                 Please view results for these tests on the individual orders.   LACTIC ACID, REFLEX   CBC AND DIFFERENTIAL     Narrative:     The following orders were created for panel order CBC & Differential.  Procedure                               Abnormality         Status                     ---------                               -----------         ------                     CBC Auto Differential[[id number]]        Abnormal            Final result                 Please view results for these tests on the individual orders.   GREEN TOP   LAVENDER TOP   GOLD TOP - SST   GRAY TOP   LIGHT BLUE TOP       Meds Given in ED:   Medications   sodium chloride 0.9 % flush 10 mL (has no administration in time range)   cholestyramine light packet 4 g (has no administration in time range)   ondansetron (ZOFRAN) injection 4 mg (4 mg Intravenous Given 11/21/24 1133)   sodium chloride 0.9 % bolus 2,000 mL (0 mL Intravenous Stopped 11/21/24 1412)   loperamide (IMODIUM) capsule 4 mg (4 mg Oral Given 11/21/24 1359)           Last NIH score:                                                          Dysphagia screening results:        Niranjan Coma Scale:  No data recorded     CIWA:        Restraint Type:            Isolation Status:  No active isolations

## 2024-11-21 NOTE — CONSULTS
Consult    Consulted by Dr Severo Jc and Jim re dehydration and electrolyte imbalance.         Subjective  This is a 59-year-old male w with history of rectal cancer.  He underwent a low anterior resection with diverting loop ileostomy and appendectomy performed on 10/10/2024.      He presents to the office today normal tensive but with decreased exercise tolerance, difficulty going down the long driveway to get the mail.    They came from far away and he previously was transferred with profound dehydration and renal failure, life-threatening in nature, plans were made for further evaluation and treatment in the ER today including lab work which was found to have hyponatremia and decreased GFR.         Review of Systems              Pertinent items are noted in HPI, all other systems reviewed and negative. Specifically, there is no F/C/N/V/NSAID abuse, recent abx, new/unusual HA or visual disturbances, CP/SOB. Limb swelling, gait disturbance, new rashes or arthritis.         History  Medical History        Past Medical History:   Diagnosis Date    Arthritis      Colon cancer      Colon polyp      Diabetes mellitus      Diverticulitis of colon      GERD (gastroesophageal reflux disease)      Gout      Hyperlipidemia      Hypertension      Injury of back      Kidney stone       passed and surgery-   x3 times surgery    Pancreatitis      Tear of right rotator cuff      Wears glasses       readers         Surgical History         Past Surgical History:   Procedure Laterality Date    ADENOIDECTOMY        COLON RESECTION WITH ILEOSTOMY N/A 10/10/2024     Procedure: CONVERTED TO OPEN COLON RESECTION ULTRA LOW ANTERIOR LAPAROSCOPIC WITH LOOP ILEOSTOMY;  Surgeon: José Manuel Hutchins MD;  Location: [facility] OR;  Service: Robotics - [employer];  Laterality: N/A;    COLONOSCOPY N/A 05/21/2024     Procedure: COLONOSCOPY;  Surgeon: Rosa Patrick MD;  Location: [facility] OR;  Service: Gastroenterology;  Laterality: N/A;     CYSTOSCOPY Bilateral 10/10/2024     Procedure: CYSTOSCOPY TEMPORARY PLACEMENT BILATERAL URETERAL CATHETER;  Surgeon: Ke Courtney MD;  Location: [facility] OR;  Service: Robotics - [employer];  Laterality: Bilateral;    CYSTOSCOPY LITHOLAPAXY BLADDER STONE EXTRACTION N/A 09/28/2022     Procedure: CYSTOSCOPY LASER LITHOLAPAXY BLADDER STONE EXTRACTION;  Surgeon: Mykel Jeffers MD;  Location:  [facility] OR;  Service: Urology;  Laterality: N/A;    KIDNEY STONE SURGERY         x3 surgeries-  same stone    SHOULDER ARTHROSCOPY W/ ROTATOR CUFF REPAIR Right 07/19/2022     Procedure: RIGHT SHOULDER ARTHROSCOPY WITH OPEN ACROMIOPLASTY,  ROTATOR CUFF REPAIR, EXCISION LATERAL CLAVICLE;  Surgeon: Edmundo Huitron MD;  Location:  [facility] OR;  Service: Orthopedics;  Laterality: Right;    TONSILLECTOMY                   Family History   Problem Relation Age of Onset    Diabetes Father      Diabetes Sister        Social History   Social History            Tobacco Use    Smoking status: Never    Smokeless tobacco: Current       Types: Snuff   Vaping Use    Vaping status: Never Used   Substance Use Topics    Alcohol use: Not Currently       Comment: occasionally    Drug use: No         Prescriptions Prior to Admission           Medications Prior to Admission   Medication Sig Dispense Refill Last Dose/Taking    amoxicillin-clavulanate (AUGMENTIN) 875-125 MG per tablet Take 1 tablet by mouth 2 (Two) Times a Day. 10 tablet 0 10/26/2024    Aspirin Low Dose 81 MG EC tablet Take 1 tablet by mouth Daily.     10/26/2024    Continuous Glucose Sensor (FreeStyle Shilpa 2 Sensor) misc Use. Possible left side- pt might change 8th     10/26/2024    dapagliflozin Propanediol (Farxiga) 10 MG tablet Take 10 mg by mouth Daily.     10/26/2024    fenofibrate (TRICOR) 145 MG tablet Take 1 tablet by mouth Daily.     10/26/2024    gabapentin (NEURONTIN) 600 MG tablet Take 1 tablet by mouth 3 (Three) Times a Day.     10/26/2024    glipizide (GLUCOTROL  XL) 10 MG 24 hr tablet Take 1 tablet by mouth Daily With Breakfast.     10/26/2024    HYDROcodone-acetaminophen (NORCO) 7.5-325 MG per tablet Take 1 tablet by mouth Every 6 (Six) Hours As Needed for Moderate Pain . 30 tablet 0 10/26/2024    Insulin Lispro, 1 Unit Dial, (HUMALOG) 100 UNIT/ML solution pen-injector Inject 5 Units under the skin into the appropriate area as directed 3 (Three) Times a Day Before Meals.     10/26/2024    lisinopril (PRINIVIL,ZESTRIL) 10 MG tablet Take 1 tablet by mouth Daily.     10/26/2024    loperamide (Imodium A-D) 2 MG tablet Take 1 tablet by mouth 4 (Four) Times a Day As Needed for Diarrhea. 180 tablet 0 10/26/2024    metFORMIN (GLUCOPHAGE) 1000 MG tablet Take 1 tablet by mouth 2 (Two) Times a Day With Meals.     10/26/2024    metoprolol succinate XL (TOPROL XL) 25 MG 24 hr tablet Take 1 tablet by mouth Daily. 30 tablet 0 10/26/2024    omeprazole (priLOSEC) 40 MG capsule Take 1 capsule by mouth Daily.     10/26/2024    rosuvastatin (CRESTOR) 10 MG tablet Take 1 tablet by mouth Every Night.     10/26/2024    tamsulosin (FLOMAX) 0.4 MG capsule 24 hr capsule TAKE 1 CAPSULE BY MOUTH EVERY NIGHT 90 capsule 3 10/26/2024    Tresiba FlexTouch 200 UNIT/ML solution pen-injector pen injection Inject  under the skin into the appropriate area as directed Take As Directed. 64 units in am and 60 units at night     10/26/2024         Allergies:  Patient has no known allergies.        Objective  Vital Signs  Blood pressure 99/64, pulse 99, temperature 96.6 °F (35.9 °C), temperature source Oral, resp. rate 18, weight 83.5 kg (184 lb), SpO2 95%.  I/O last 3 completed shifts:  In: 1000 [I.V.:1000]  Out: 1350 [Urine:1100; Stool:250]       Review of Systems   Constitutional: Negative.    HENT: Negative.     Respiratory: Negative.     Gastrointestinal:  Positive for diarrhea.   Genitourinary: Negative.  Negative for difficulty urinating.   Musculoskeletal: Negative.    Neurological: Negative.     Hematological: Negative.    Psychiatric/Behavioral: Negative.           Medical History        Past Medical History:   Diagnosis Date    Arthritis      Colon cancer      Colon polyp      Diabetes mellitus      Diverticulitis of colon      GERD (gastroesophageal reflux disease)      Gout      Hyperlipidemia      Hypertension      Injury of back      Kidney stone       passed and surgery-   x3 times surgery    Pancreatitis      Tear of right rotator cuff      Wears glasses       readers      ,   Surgical History         Past Surgical History:   Procedure Laterality Date    ADENOIDECTOMY        COLON RESECTION WITH ILEOSTOMY N/A 10/10/2024     Procedure: CONVERTED TO OPEN COLON RESECTION ULTRA LOW ANTERIOR LAPAROSCOPIC WITH LOOP ILEOSTOMY;  Surgeon: José Manuel Hutchins MD;  Location:  [facility] OR;  Service: Robotics - [facility];  Laterality: N/A;    COLONOSCOPY N/A 05/21/2024     Procedure: COLONOSCOPY;  Surgeon: Rosa Patrick MD;  Location:  [facility] OR;  Service: Gastroenterology;  Laterality: N/A;    CYSTOSCOPY Bilateral 10/10/2024     Procedure: CYSTOSCOPY TEMPORARY PLACEMENT BILATERAL URETERAL CATHETER;  Surgeon: Ke Courtney MD;  Location:  [facility] OR;  Service: Robotics - [facility];  Laterality: Bilateral;    CYSTOSCOPY LITHOLAPAXY BLADDER STONE EXTRACTION N/A 09/28/2022     Procedure: CYSTOSCOPY LASER LITHOLAPAXY BLADDER STONE EXTRACTION;  Surgeon: Mykel Jeffers MD;  Location:  [facility] OR;  Service: Urology;  Laterality: N/A;    KIDNEY STONE SURGERY         x3 surgeries-  same stone    SHOULDER ARTHROSCOPY W/ ROTATOR CUFF REPAIR Right 07/19/2022     Procedure: RIGHT SHOULDER ARTHROSCOPY WITH OPEN ACROMIOPLASTY,  ROTATOR CUFF REPAIR, EXCISION LATERAL CLAVICLE;  Surgeon: Edmundo Huitron MD;  Location:  [facility] OR;  Service: Orthopedics;  Laterality: Right;    TONSILLECTOMY          ,         Family History   Problem Relation Age of Onset    Diabetes Father      Diabetes Sister     ,   Social  History   Social History            Socioeconomic History    Marital status:    Tobacco Use    Smoking status: Never    Smokeless tobacco: Current       Types: Snuff   Vaping Use    Vaping status: Never Used   Substance and Sexual Activity    Alcohol use: Not Currently       Comment: occasionally    Drug use: No    Sexual activity: Not Currently       Partners: Female               E-cigarette/Vaping    E-cigarette/Vaping Use Never User      Passive Exposure No      Counseling Given No              E-cigarette/Vaping Substances    Nicotine No      THC No      CBD No      Flavoring No              E-cigarette/Vaping Devices    Disposable No      Pre-filled or Refillable Cartridge No      Refillable Tank No      Pre-filled Pod No              ,   Prescriptions Prior to Admission           Medications Prior to Admission   Medication Sig Dispense Refill Last Dose/Taking    amoxicillin-clavulanate (AUGMENTIN) 875-125 MG per tablet Take 1 tablet by mouth 2 (Two) Times a Day. 10 tablet 0 10/26/2024    Aspirin Low Dose 81 MG EC tablet Take 1 tablet by mouth Daily.     10/26/2024    Continuous Glucose Sensor (FreeStyle Shilpa 2 Sensor) misc Use. Possible left side- pt might change 8th     10/26/2024    dapagliflozin Propanediol (Farxiga) 10 MG tablet Take 10 mg by mouth Daily.     10/26/2024    fenofibrate (TRICOR) 145 MG tablet Take 1 tablet by mouth Daily.     10/26/2024    gabapentin (NEURONTIN) 600 MG tablet Take 1 tablet by mouth 3 (Three) Times a Day.     10/26/2024    glipizide (GLUCOTROL XL) 10 MG 24 hr tablet Take 1 tablet by mouth Daily With Breakfast.     10/26/2024    HYDROcodone-acetaminophen (NORCO) 7.5-325 MG per tablet Take 1 tablet by mouth Every 6 (Six) Hours As Needed for Moderate Pain . 30 tablet 0 10/26/2024    Insulin Lispro, 1 Unit Dial, (HUMALOG) 100 UNIT/ML solution pen-injector Inject 5 Units under the skin into the appropriate area as directed 3 (Three) Times a Day Before Meals.      10/26/2024    lisinopril (PRINIVIL,ZESTRIL) 10 MG tablet Take 1 tablet by mouth Daily.     10/26/2024    loperamide (Imodium A-D) 2 MG tablet Take 1 tablet by mouth 4 (Four) Times a Day As Needed for Diarrhea. 180 tablet 0 10/26/2024    metFORMIN (GLUCOPHAGE) 1000 MG tablet Take 1 tablet by mouth 2 (Two) Times a Day With Meals.     10/26/2024    metoprolol succinate XL (TOPROL XL) 25 MG 24 hr tablet Take 1 tablet by mouth Daily. 30 tablet 0 10/26/2024    omeprazole (priLOSEC) 40 MG capsule Take 1 capsule by mouth Daily.     10/26/2024    rosuvastatin (CRESTOR) 10 MG tablet Take 1 tablet by mouth Every Night.     10/26/2024    tamsulosin (FLOMAX) 0.4 MG capsule 24 hr capsule TAKE 1 CAPSULE BY MOUTH EVERY NIGHT 90 capsule 3 10/26/2024    Tresiba FlexTouch 200 UNIT/ML solution pen-injector pen injection Inject  under the skin into the appropriate area as directed Take As Directed. 64 units in am and 60 units at night     10/26/2024      , Scheduled Meds:  [Held by provider] aspirin, 81 mg, Oral, Daily  [Held by provider] empagliflozin, 10 mg, Oral, Daily  [Held by provider] gabapentin, 600 mg, Oral, Daily  [Held by provider] glipizide, 5 mg, Oral, BID AC  [Held by provider] lisinopril, 10 mg, Oral, Daily  loperamide, 2 mg, Oral, TID  [Held by provider] metoprolol succinate XL, 25 mg, Oral, Daily  [Held by provider] pantoprazole, 40 mg, Oral, Q AM  [Held by provider] rosuvastatin, 10 mg, Oral, Nightly  sodium chloride, 10 mL, Intravenous, Q12H  [Held by provider] tamsulosin, 0.4 mg, Oral, Nightly   , and Continuous Infusions:  sodium bicarbonate 8.4 % 75 mEq in dextrose 5 % and sodium chloride 0.45 % 1,000 mL infusion (less than/equal to 100 mEq), 75 mEq, Last Rate: 75 mEq (10/27/24 0307)           Objective  Vital Signs  Temp:  [94.9 °F (34.9 °C)-97.8 °F (36.6 °C)] 96.6 °F (35.9 °C)  Heart Rate:  [] 99  Resp:  [16-18] 18  BP: ()/(51-70) 99/64     I/O this shift:  In: 1000 [IV Piggyback:1000]  Out: 1000  "[Urine:900; Stool:100]  I/O last 3 completed shifts:  In: 1000 [I.V.:1000]  Out: 1350 [Urine:1100; Stool:250]     Physical Exam  Constitutional:       General: He is not in acute distress.     Appearance: Normal appearance. He is not ill-appearing.   HENT:      Head: Normocephalic.      Nose: Nose normal.      Mouth/Throat:      Mouth: Mucous membranes are moist.   Eyes:      Pupils: Pupils are equal, round, and reactive to light.   Cardiovascular:      Rate and Rhythm: Normal rate and regular rhythm.      Pulses: Normal pulses.      Heart sounds: Normal heart sounds. No murmur heard.     No friction rub.   Pulmonary:      Effort: Pulmonary effort is normal. No respiratory distress.      Breath sounds: Normal breath sounds. No stridor.   Abdominal:      General: Abdomen is flat.      Comments: Ostomy +   Musculoskeletal:         General: Normal range of motion.      Cervical back: Normal range of motion.      Right lower leg: No edema.      Left lower leg: No edema.   Skin:     General: Skin is warm.   Neurological:      General: No focal deficit present.      Mental Status: He is alert and oriented to person, place, and time. Mental status is at baseline.      Vital Signs:  Blood pressure 115/79, pulse 114, temperature 97.5 °F (36.4 °C), temperature source Oral, resp. rate 16, height 170.2 cm (67\"), weight 82.6 kg (182 lb), SpO2 97%.    Labs past 24 hours:  Lab Results (last 24 hours)       Procedure Component Value Units Date/Time    STAT Lactic Acid, Reflex [[id number]] Collected: 11/21/24 1640    Specimen: Blood Updated: 11/21/24 1640    Sodium, Urine, Random - Urine, Clean Catch [[id number]] Collected: 11/21/24 1307    Specimen: Urine, Clean Catch Updated: 11/21/24 1614     Sodium, Urine <20 mmol/L     Narrative:      Reference intervals for random urine have not been established.  Clinical usage is dependent upon physician's interpretation in combination with other laboratory tests.       Osmolality, Urine - " Urine, Clean Catch [[id number]]  (Normal) Collected: 11/21/24 1307    Specimen: Urine, Clean Catch Updated: 11/21/24 1610     Osmolality, Urine 635 mOsm/kg     Osmolality, Serum [[id number]]  (Abnormal) Collected: 11/21/24 1044    Specimen: Blood Updated: 11/21/24 1605     Osmolality 298 mOsm/kg     Urinalysis With Microscopic If Indicated (No Culture) - Urine, Clean Catch [[id number]]  (Abnormal) Collected: 11/21/24 1307    Specimen: Urine, Clean Catch Updated: 11/21/24 1334     Color, UA Yellow     Appearance, UA Clear     pH, UA <=5.0     Specific Gravity, UA 1.024     Glucose, UA >=1000 mg/dL (3+)     Ketones, UA Negative     Bilirubin, UA Negative     Blood, UA Negative     Protein, UA Negative     Leuk Esterase, UA Negative     Nitrite, UA Negative     Urobilinogen, UA 0.2 E.U./dL    Narrative:      Urine microscopic not indicated.    Lactic Acid, Plasma [[id number]]  (Abnormal) Collected: 11/21/24 1044    Specimen: Blood Updated: 11/21/24 1200     Lactate 3.4 mmol/L      Comment: Falsely depressed results may occur on samples drawn from patients receiving N-Acetylcysteine (NAC) or Metamizole.       Comprehensive Metabolic Panel [[id number]]  (Abnormal) Collected: 11/21/24 1044    Specimen: Blood Updated: 11/21/24 1134     Glucose 295 mg/dL      BUN 58 mg/dL      Creatinine 1.84 mg/dL      Sodium 122 mmol/L      Potassium 5.6 mmol/L      Comment: Slight hemolysis detected by analyzer. Result may be falsely elevated.        Chloride 88 mmol/L      CO2 17.0 mmol/L      Calcium 11.1 mg/dL      Total Protein 9.3 g/dL      Albumin 5.3 g/dL      ALT (SGPT) 23 U/L      AST (SGOT) 24 U/L      Alkaline Phosphatase 79 U/L      Total Bilirubin 0.3 mg/dL      Globulin 4.0 gm/dL      Comment: Calculated Result        A/G Ratio 1.3 g/dL      BUN/Creatinine Ratio 31.5     Anion Gap 17.0 mmol/L      eGFR 41.2 mL/min/1.73     Narrative:      GFR Normal >60  Chronic Kidney Disease <60  Kidney Failure <15      Magnesium [[id number]]   (Normal) Collected: 11/21/24 1044    Specimen: Blood Updated: 11/21/24 1134     Magnesium 2.0 mg/dL     Single High Sensitivity Troponin T [[id number]]  (Normal) Collected: 11/21/24 1044    Specimen: Blood Updated: 11/21/24 1120     HS Troponin T 21 ng/L     Narrative:      High Sensitive Troponin T Reference Range:  <14.0 ng/L- Negative Female for AMI  <22.0 ng/L- Negative Male for AMI  >=14 - Abnormal Female indicating possible myocardial injury.  >=22 - Abnormal Male indicating possible myocardial injury.   Clinicians would have to utilize clinical acumen, EKG, Troponin, and serial changes to determine if it is an Acute Myocardial Infarction or myocardial injury due to an underlying chronic condition.         [place] Draw [[id number]] Collected: 11/21/24 1044    Specimen: Blood Updated: 11/21/24 1101    Narrative:      The following orders were created for panel order [place] Draw.  Procedure                               Abnormality         Status                     ---------                               -----------         ------                     Green Top (Gel)[[id number]]                                  Final result               Lavender Top[[id number]]                                     Final result               Gold Top - SST[[id number]]                                   Final result               Michel Top[[id number]]                                         Final result               Light Blue Top[[id number]]                                   Final result                 Please view results for these tests on the individual orders.    Lavender Top [[id number]] Collected: 11/21/24 1044    Specimen: Blood Updated: 11/21/24 1101     Extra Tube hold for add-on     Comment: Auto resulted       Gold Top - SST [[id number]] Collected: 11/21/24 1044    Specimen: Blood Updated: 11/21/24 1101     Extra Tube Hold for add-ons.     Comment: Auto resulted.       Green Top (Gel) [[id number]] Collected: 11/21/24 1044     Specimen: Blood Updated: 11/21/24 1101     Extra Tube Hold for add-ons.     Comment: Auto resulted.       Michel Top [[id number]] Collected: 11/21/24 1044    Specimen: Blood Updated: 11/21/24 1101     Extra Tube Hold for add-ons.     Comment: Auto resulted.       Light Blue Top [[id number]] Collected: 11/21/24 1044    Specimen: Blood Updated: 11/21/24 1101     Extra Tube Hold for add-ons.     Comment: Auto resulted       CBC & Differential [[id number]]  (Abnormal) Collected: 11/21/24 1044    Specimen: Blood Updated: 11/21/24 1056    Narrative:      The following orders were created for panel order CBC & Differential.  Procedure                               Abnormality         Status                     ---------                               -----------         ------                     CBC Auto Differential[[id number]]        Abnormal            Final result                 Please view results for these tests on the individual orders.    CBC Auto Differential [[id number]]  (Abnormal) Collected: 11/21/24 1044    Specimen: Blood Updated: 11/21/24 1056     WBC 9.56 10*3/mm3      RBC 6.34 10*6/mm3      Hemoglobin 17.3 g/dL      Hematocrit 52.0 %      MCV 82.0 fL      MCH 27.3 pg      MCHC 33.3 g/dL      RDW 14.3 %      RDW-SD 41.4 fl      MPV 9.8 fL      Platelets 382 10*3/mm3      Neutrophil % 81.0 %      Lymphocyte % 14.2 %      Monocyte % 3.7 %      Eosinophil % 0.2 %      Basophil % 0.3 %      Immature Grans % 0.6 %      Neutrophils, Absolute 7.74 10*3/mm3      Lymphocytes, Absolute 1.36 10*3/mm3      Monocytes, Absolute 0.35 10*3/mm3      Eosinophils, Absolute 0.02 10*3/mm3      Basophils, Absolute 0.03 10*3/mm3      Immature Grans, Absolute 0.06 10*3/mm3      nRBC 0.0 /100 WBC             I/O last shift:  I/O this shift:  In: 2000 [IV Piggyback:2000]  Out: -      PHYSICAL EXAM-  Comfortable  cv- rsr  Chest- clear, =  Abd- soft, mild distention, nontender, thin character to ileostomy output.    Assessment and  Plan:  Plan for resuscitation  Dehydration, hyperkalemia, decreased GFR, metabolic acidosis.    Plan for medical management of high output stoma in conjunction with input from nephrology to optimize renal function/maintain renal function.    José Manuel Hutchins MD  11/21/24  17:07 EST

## 2024-11-21 NOTE — CONSULTS
Patient Care Team:  Tristen Stuart DO as PCP - General (Family Medicine)  Virginia Brown, RN as Nurse Navigator  Cong Cartagena MD as Consulting Physician (Hospitalist)    Chief complaint: Acute kidney injury   Hyponatremia.     Inpatient Nephrology Consult  Consult performed by: Severo Jc MD  Consult ordered by: Yayo Fernandez MD  Reason for consult: DAJA and hyponatremia.         History of Present Illness: History of Present Illness: Valentin Vásquez is a 59 y.o. male with PMH T2DM, GERD, colon cancer who underwent colectomy, incarcerated hernia repair, diverting loop ileostomy on 10/10 as well as bilateral ureteral stent placement during the surgery. Patient developed high ostomy output since surgery. Requiring admission in the hospital last month for acute kidney with profound electrolyte issues. Patient;s creatinine peaked at 5.9 on last admission. He had spontaneous renal recovery with IV fluids on last admission. He went to see Dr Rojo today and was sent to ER for evaluation for another acute kidney injury w severe hyponatremia. Per the patient and his wife he has been drinking lot of water, Gatorade and other electrolyte rich fluids. His output remains high from ostomy. Pattie using any NSAID recently .    Review of Systems   Constitutional: Negative.    HENT: Negative.     Respiratory: Negative.     Cardiovascular: Negative.    Gastrointestinal:  Positive for diarrhea.   Genitourinary: Negative.    Musculoskeletal: Negative.    Skin: Negative.    Hematological: Negative.    Psychiatric/Behavioral: Negative.          Past Medical History:   Diagnosis Date    Arthritis     Colon cancer     Colon polyp     Diabetes mellitus     Diverticulitis of colon     GERD (gastroesophageal reflux disease)     Gout     Hyperlipidemia     Hypertension     Injury of back     Kidney stone     passed and surgery-   x3 times surgery    Pancreatitis     Tear of right rotator cuff     Wears glasses      readers   ,   Past Surgical History:   Procedure Laterality Date    ADENOIDECTOMY      COLON RESECTION WITH ILEOSTOMY N/A 10/10/2024    Procedure: CONVERTED TO OPEN COLON RESECTION ULTRA LOW ANTERIOR LAPAROSCOPIC WITH LOOP ILEOSTOMY;  Surgeon: José Manuel Hutchins MD;  Location:  [facility];  Service: Robotics - DaVinci;  Laterality: N/A;    COLONOSCOPY N/A 05/21/2024    Procedure: COLONOSCOPY;  Surgeon: Rosa Patrick MD;  Location:  [facility];  Service: Gastroenterology;  Laterality: N/A;    CYSTOSCOPY Bilateral 10/10/2024    Procedure: CYSTOSCOPY TEMPORARY PLACEMENT BILATERAL URETERAL CATHETER;  Surgeon: Ke Courtney MD;  Location:  [facility];  Service: Robotics - DaVinci;  Laterality: Bilateral;    CYSTOSCOPY LITHOLAPAXY BLADDER STONE EXTRACTION N/A 09/28/2022    Procedure: CYSTOSCOPY LASER LITHOLAPAXY BLADDER STONE EXTRACTION;  Surgeon: Mykel Jeffers MD;  Location:  [facility];  Service: Urology;  Laterality: N/A;    KIDNEY STONE SURGERY      x3 surgeries-  same stone    SHOULDER ARTHROSCOPY W/ ROTATOR CUFF REPAIR Right 07/19/2022    Procedure: RIGHT SHOULDER ARTHROSCOPY WITH OPEN ACROMIOPLASTY,  ROTATOR CUFF REPAIR, EXCISION LATERAL CLAVICLE;  Surgeon: Edmundo Huitron MD;  Location: [facility];  Service: Orthopedics;  Laterality: Right;    TONSILLECTOMY     ,   Family History   Problem Relation Age of Onset    Diabetes Father     Diabetes Sister    ,   Social History     Socioeconomic History    Marital status:    Tobacco Use    Smoking status: Never    Smokeless tobacco: Current     Types: Snuff   Vaping Use    Vaping status: Never Used   Substance and Sexual Activity    Alcohol use: Not Currently     Comment: occasionally    Drug use: No    Sexual activity: Not Currently     Partners: Female     E-cigarette/Vaping    E-cigarette/Vaping Use Never User     Passive Exposure No     Counseling Given No      E-cigarette/Vaping Substances    Nicotine No     THC No     CBD No      Flavoring No      E-cigarette/Vaping Devices    Disposable No     Pre-filled or Refillable Cartridge No     Refillable Tank No     Pre-filled Pod No          ,   Medications Prior to Admission   Medication Sig Dispense Refill Last Dose/Taking    Aspirin Low Dose 81 MG EC tablet Take 1 tablet by mouth Daily.       Continuous Glucose Sensor (FreeStyle Shilpa 2 Sensor) misc Use. Possible left side- pt might change 8th       dapagliflozin Propanediol (Farxiga) 10 MG tablet Take 10 mg by mouth Daily. Hold until follow-up with PCP       fenofibrate (TRICOR) 145 MG tablet Take 1 tablet by mouth Daily.       HYDROcodone-acetaminophen (NORCO) 7.5-325 MG per tablet Take 1 tablet by mouth Every 6 (Six) Hours As Needed for Moderate Pain . 30 tablet 0     Insulin Lispro, 1 Unit Dial, (HUMALOG) 100 UNIT/ML solution pen-injector Inject 5 Units under the skin into the appropriate area as directed 3 (Three) Times a Day Before Meals.       loperamide (Imodium A-D) 2 MG tablet Take 1 tablet by mouth 4 (Four) Times a Day As Needed for Diarrhea. 180 tablet 0     metFORMIN (GLUCOPHAGE) 1000 MG tablet Take 1 tablet by mouth 2 (Two) Times a Day With Meals.       omeprazole (priLOSEC) 40 MG capsule Take 1 capsule by mouth Daily.       rosuvastatin (CRESTOR) 10 MG tablet Take 1 tablet by mouth Every Night.       tamsulosin (FLOMAX) 0.4 MG capsule 24 hr capsule TAKE 1 CAPSULE BY MOUTH EVERY NIGHT 90 capsule 3     Tresiba FlexTouch 200 UNIT/ML solution pen-injector pen injection Inject  under the skin into the appropriate area as directed Take As Directed. 64 units in am and 60 units at night      , Scheduled Meds:   , and Continuous Infusions:  sodium bicarbonate 8.4 % 150 mEq in dextrose (D5W) 5 % 1,000 mL infusion (greater than 100 mEq), 150 mEq       Objective     Vital Signs  Temp:  [97.5 °F (36.4 °C)] 97.5 °F (36.4 °C)  Heart Rate:  [108-133] 114  Resp:  [16] 16  BP: ()/(77-85) 115/79    I/O this shift:  In: 2000 [IV  "Piggyback:2000]  Out: -   No intake/output data recorded.    Physical Exam    Results Review:    I reviewed the patient's new clinical results.    WBC WBC   Date Value Ref Range Status   11/21/2024 9.56 3.40 - 10.80 10*3/mm3 Final      HGB Hemoglobin   Date Value Ref Range Status   11/21/2024 17.3 13.0 - 17.7 g/dL Final      HCT Hematocrit   Date Value Ref Range Status   11/21/2024 52.0 (H) 37.5 - 51.0 % Final      Platlets No results found for: \"LABPLAT\"   MCV MCV   Date Value Ref Range Status   11/21/2024 82.0 79.0 - 97.0 fL Final          Sodium Sodium   Date Value Ref Range Status   11/21/2024 122 (L) 136 - 145 mmol/L Final      Potassium Potassium   Date Value Ref Range Status   11/21/2024 5.6 (H) 3.5 - 5.2 mmol/L Final     Comment:     Slight hemolysis detected by analyzer. Result may be falsely elevated.      Chloride Chloride   Date Value Ref Range Status   11/21/2024 88 (L) 98 - 107 mmol/L Final      CO2 CO2   Date Value Ref Range Status   11/21/2024 17.0 (L) 22.0 - 29.0 mmol/L Final      BUN BUN   Date Value Ref Range Status   11/21/2024 58 (H) 8 - 23 mg/dL Final      Creatinine Creatinine   Date Value Ref Range Status   11/21/2024 1.84 (H) 0.76 - 1.27 mg/dL Final      Calcium Calcium   Date Value Ref Range Status   11/21/2024 11.1 (H) 8.6 - 10.5 mg/dL Final      PO4 No results found for: \"CAPO4\"   Albumin Albumin   Date Value Ref Range Status   11/21/2024 5.3 (H) 3.5 - 5.2 g/dL Final      Magnesium Magnesium   Date Value Ref Range Status   11/21/2024 2.0 1.6 - 2.4 mg/dL Final      Uric Acid No results found for: \"URICACID\"         Assessment & Plan       Rectal cancer    HTN (hypertension)    Hyperlipidemia    DM2 (diabetes mellitus, type 2)    S/P colectomy , ultra low anterior resection, robotic assisted, with appy, repair of incarcerated hernia, diverting loop ileostomy    DAJA (acute kidney injury)    Hyperkalemia    Severe malnutrition    Hyponatremia      Assessment & Plan    Acute kidney injury: " Baseline cr ~ 1.0. Hx of Severe DAJA last month due to pre renal azotemia in the setting of high ostomy output. Readmitted with another episode of DAJA in the setting of hypovolemia.      Hyperkalemia: Mild. Hx of severe hyperkalemia on last admission.      Met acidosis: Severe due to DAJA and increase bicarb loss due to high ostomy output     Hypovolemia: Due to high ostomy output    Hyponatremia: Urine Na<20 Urine osm 680. Likely hypovolemic hyponatremia.      Incarcerated hernia repair: 2 weeks prior to this admission. Now struggling with high output from ostomy      Recs  Switch IV fluids to bicarb based for correction of acidosis, hyperkalemia and hyponatremia.   Serial BMP ~ Q8hr.   Given hx of recurrent DAJA. Patient will likely need IV fluids 1-2x weekly as outpatient on discharge to prevent DAJA.  Would recommend discontinuation of SGLT-2inh until patient is struggling with high ostomy output.       I discussed the patients findings and my recommendations with patient    Severo Jc MD  11/21/24  17:18 EST

## 2024-11-21 NOTE — H&P
Patient Name: Valentin Vásquez  MRN: [id number]  : 1965  DOS: 2024    Attending: Cong Cartagena MD    Primary Care Provider: Tristen Stuart DO      Chief complaint/reason for admit: High output stoma.  Acute kidney failure, recurrent.  Hypernatremia    Subjective   Patient is a pleasant 59 y.o. male who is known to me from recent hospitalizations and who has past medical history of diabetes mellitus type 2, GERD.  he had recent colectomy and hernia repair with loop ileostomy in October of this year complicated by postoperative ileus.  Eventually with resolved ileus he was discharged home however he has dealt with high output stoma requiring an emergent admit for acute kidney failure and hyperkalemia at which point he was transferred from his local hospital to [facility] where he remained between 10/27/2024 and 10/30/2024.  [facility] was [facility] during that [facility]  He was treated for acute kidney failure and hyperkalemia both of which have resolved.  He was discharged with instructions to use Imodium to manage high output stoma.  He unfortunately continued to have large output from his stoma and on follow-up with Dr. Hutchins he was noted to be markedly dehydrated and was sent to emergency department for evaluation.  He was found to be in recurrent acute kidney failure with hyponatremia and hyperkalemia and he is admitted for further management.      Allergies:  No Known Allergies    Meds:  Medications Prior to Admission   Medication Sig Dispense Refill Last Dose/Taking    Aspirin Low Dose 81 MG EC tablet Take 1 tablet by mouth Daily.       Continuous Glucose Sensor (FreeStyle Shilpa 2 Sensor) misc Use. Possible left side- pt might change 8th       dapagliflozin Propanediol (Farxiga) 10 MG tablet Take 10 mg by mouth Daily. Hold until follow-up with PCP       fenofibrate (TRICOR) 145 MG tablet Take 1 tablet by mouth Daily.       HYDROcodone-acetaminophen  (NORCO) 7.5-325 MG per tablet Take 1 tablet by mouth Every 6 (Six) Hours As Needed for Moderate Pain . 30 tablet 0     Insulin Lispro, 1 Unit Dial, (HUMALOG) 100 UNIT/ML solution pen-injector Inject 5 Units under the skin into the appropriate area as directed 3 (Three) Times a Day Before Meals.       loperamide (Imodium A-D) 2 MG tablet Take 1 tablet by mouth 4 (Four) Times a Day As Needed for Diarrhea. 180 tablet 0     metFORMIN (GLUCOPHAGE) 1000 MG tablet Take 1 tablet by mouth 2 (Two) Times a Day With Meals.       omeprazole (priLOSEC) 40 MG capsule Take 1 capsule by mouth Daily.       rosuvastatin (CRESTOR) 10 MG tablet Take 1 tablet by mouth Every Night.       tamsulosin (FLOMAX) 0.4 MG capsule 24 hr capsule TAKE 1 CAPSULE BY MOUTH EVERY NIGHT 90 capsule 3     Tresiba FlexTouch 200 UNIT/ML solution pen-injector pen injection Inject  under the skin into the appropriate area as directed Take As Directed. 64 units in am and 60 units at night            History:   Past Medical History:   Diagnosis Date    Arthritis     Colon cancer     Colon polyp     Diabetes mellitus     Diverticulitis of colon     GERD (gastroesophageal reflux disease)     Gout     Hyperlipidemia     Hypertension     Injury of back     Kidney stone     passed and surgery-   x3 times surgery    Pancreatitis     Tear of right rotator cuff     Wears glasses     readers     Past Surgical History:   Procedure Laterality Date    ADENOIDECTOMY      COLON RESECTION WITH ILEOSTOMY N/A 10/10/2024    Procedure: CONVERTED TO OPEN COLON RESECTION ULTRA LOW ANTERIOR LAPAROSCOPIC WITH LOOP ILEOSTOMY;  Surgeon: José Manuel Hutchins MD;  Location:  [facility];  Service: Robotics - DaVinci;  Laterality: N/A;    COLONOSCOPY N/A 05/21/2024    Procedure: COLONOSCOPY;  Surgeon: Rosa Patrick MD;  Location:  [facility];  Service: Gastroenterology;  Laterality: N/A;    CYSTOSCOPY Bilateral 10/10/2024    Procedure: CYSTOSCOPY TEMPORARY PLACEMENT BILATERAL URETERAL  "CATHETER;  Surgeon: Ke Courtney MD;  Location: [facility] OR;  Service: Robotics - DaVinci;  Laterality: Bilateral;    CYSTOSCOPY LITHOLAPAXY BLADDER STONE EXTRACTION N/A 09/28/2022    Procedure: CYSTOSCOPY LASER LITHOLAPAXY BLADDER STONE EXTRACTION;  Surgeon: Mykel Jeffers MD;  Location: [facility] OR;  Service: Urology;  Laterality: N/A;    KIDNEY STONE SURGERY      x3 surgeries-  same stone    SHOULDER ARTHROSCOPY W/ ROTATOR CUFF REPAIR Right 07/19/2022    Procedure: RIGHT SHOULDER ARTHROSCOPY WITH OPEN ACROMIOPLASTY,  ROTATOR CUFF REPAIR, EXCISION LATERAL CLAVICLE;  Surgeon: Edmundo Huitron MD;  Location: [facility] OR;  Service: Orthopedics;  Laterality: Right;    TONSILLECTOMY       Family History   Problem Relation Age of Onset    Diabetes Father     Diabetes Sister      Social History     Tobacco Use    Smoking status: Never    Smokeless tobacco: Current     Types: Snuff   Vaping Use    Vaping status: Never Used   Substance Use Topics    Alcohol use: Not Currently     Comment: occasionally    Drug use: No       Review of Systems  Pertinent items are noted in HPI    Vital Signs  /79   Pulse 114   Temp 97.5 °F (36.4 °C) (Oral)   Resp 16   Ht 170.2 cm (67\")   Wt 82.6 kg (182 lb)   SpO2 97%   BMI 28.51 kg/m²     Physical Exam:    General Appearance:    Alert, cooperative, in no acute distress   Head:    Normocephalic, without obvious abnormality, atraumatic   Eyes:            Lids and lashes normal, conjunctivae and sclerae normal, no   icterus, no pallor, corneas clear,    Ears:    Ears appear intact with no abnormalities noted   Throat:   No oral lesions, no thrush, oral mucosa dry.   Neck:   No adenopathy, supple, trachea midline, no thyromegaly         Lungs:     Clear to auscultation,respirations regular, even and                   unlabored    Heart:    Regular rhythm and normal rate, normal S1 and S2, no    murmur, no gallop   Abdomen:   Soft and benign with pink stoma.  Some formed " stool in stoma bag when seen.   Genitalia:    Deferred   Extremities:   Moves all extremities well, no edema, no cyanosis, no              redness   Pulses:   Pulses palpable and equal bilaterally   Skin:   No bleeding, bruising or rash   Neurologic:   Cranial nerves 2 - 12 grossly intact,       I reviewed the patient's new clinical results.       Results from last 7 days   Lab Units 11/21/24  1044   WBC 10*3/mm3 9.56   HEMOGLOBIN g/dL 17.3   HEMATOCRIT % 52.0*   PLATELETS 10*3/mm3 382     Results from last 7 days   Lab Units 11/21/24  1044   SODIUM mmol/L 122*   POTASSIUM mmol/L 5.6*   CHLORIDE mmol/L 88*   CO2 mmol/L 17.0*   BUN mg/dL 58*   CREATININE mg/dL 1.84*   CALCIUM mg/dL 11.1*   BILIRUBIN mg/dL 0.3   ALK PHOS U/L 79   ALT (SGPT) U/L 23   AST (SGOT) U/L 24   GLUCOSE mg/dL 295*     Lab Results   Component Value Date    HGBA1C 7.10 (H) 10/04/2024           Assessment and Plan:   Hyponatremia, hypovolemic  Hyperkalemia  Recurrent acute kidney failure, likely prerenal    Rectal cancer    HTN (hypertension)    Hyperlipidemia    DM2 (diabetes mellitus, type 2)    S/P colectomy , ultra low anterior resection, robotic assisted, with appy, repair of incarcerated hernia, diverting loop ileostomy    Severe malnutrition  GERD      Plan    Admit for further management.  Volume resuscitation with IV fluids, will be managed with help from [facility].  Frequent monitoring of volume status, electrolytes, and renal function.  Adjust insulins to patient's reduced renal function and p.o. intake and monitor closely and make further adjustments as indicated.  Will place on hold Farxiga and Glucophage.  Will use medications to decrease stoma output with input from Dr. Hutchins, iam.  Regimen likely to include loperamide, cholestyramine,and lomotil.  Megace to help appetite also has been prescribed.      D/w pt and his wife.       Silvestre disclaimer:  Part of this encounter note is an electronic  transcription/translation of spoken language to printed text. The electronic translation of spoken language may permit erroneous, or at times, nonsensical words or phrases to be inadvertently transcribed; Although I have reviewed the note for such errors, some may still exist.    Cong Cartagena MD  11/21/24  15:51 EST

## 2024-11-22 VITALS
WEIGHT: 182 LBS | OXYGEN SATURATION: 100 % | HEIGHT: 67 IN | TEMPERATURE: 98 F | DIASTOLIC BLOOD PRESSURE: 78 MMHG | HEART RATE: 96 BPM | SYSTOLIC BLOOD PRESSURE: 127 MMHG | RESPIRATION RATE: 18 BRPM | BODY MASS INDEX: 28.56 KG/M2

## 2024-11-22 LAB
ANION GAP SERPL CALCULATED.3IONS-SCNC: 10 MMOL/L (ref 5–15)
ANION GAP SERPL CALCULATED.3IONS-SCNC: 11 MMOL/L (ref 5–15)
BUN SERPL-MCNC: 32 MG/DL (ref 8–23)
BUN SERPL-MCNC: 38 MG/DL (ref 8–23)
BUN/CREAT SERPL: 26.7 (ref 7–25)
BUN/CREAT SERPL: 31.4 (ref 7–25)
CALCIUM SPEC-SCNC: 8.8 MG/DL (ref 8.6–10.5)
CALCIUM SPEC-SCNC: 9.1 MG/DL (ref 8.6–10.5)
CHLORIDE SERPL-SCNC: 98 MMOL/L (ref 98–107)
CHLORIDE SERPL-SCNC: 99 MMOL/L (ref 98–107)
CO2 SERPL-SCNC: 20 MMOL/L (ref 22–29)
CO2 SERPL-SCNC: 22 MMOL/L (ref 22–29)
CREAT SERPL-MCNC: 1.2 MG/DL (ref 0.76–1.27)
CREAT SERPL-MCNC: 1.21 MG/DL (ref 0.76–1.27)
EGFRCR SERPLBLD CKD-EPI 2021: 68.1 ML/MIN/1.73
EGFRCR SERPLBLD CKD-EPI 2021: 68.8 ML/MIN/1.73
GLUCOSE BLDC GLUCOMTR-MCNC: 110 MG/DL (ref 70–130)
GLUCOSE BLDC GLUCOMTR-MCNC: 176 MG/DL (ref 70–130)
GLUCOSE BLDC GLUCOMTR-MCNC: 55 MG/DL (ref 70–130)
GLUCOSE BLDC GLUCOMTR-MCNC: 63 MG/DL (ref 70–130)
GLUCOSE SERPL-MCNC: 115 MG/DL (ref 65–99)
GLUCOSE SERPL-MCNC: 195 MG/DL (ref 65–99)
POTASSIUM SERPL-SCNC: 4.2 MMOL/L (ref 3.5–5.2)
POTASSIUM SERPL-SCNC: 4.3 MMOL/L (ref 3.5–5.2)
SODIUM SERPL-SCNC: 129 MMOL/L (ref 136–145)
SODIUM SERPL-SCNC: 131 MMOL/L (ref 136–145)

## 2024-11-22 PROCEDURE — 80048 BASIC METABOLIC PNL TOTAL CA: CPT | Performed by: INTERNAL MEDICINE

## 2024-11-22 PROCEDURE — 96375 TX/PRO/DX INJ NEW DRUG ADDON: CPT

## 2024-11-22 PROCEDURE — G0378 HOSPITAL OBSERVATION PER HR: HCPCS

## 2024-11-22 PROCEDURE — 82948 REAGENT STRIP/BLOOD GLUCOSE: CPT

## 2024-11-22 RX ORDER — CHOLESTYRAMINE LIGHT 4 G/5.7G
1 POWDER, FOR SUSPENSION ORAL EVERY 12 HOURS SCHEDULED
Status: DISCONTINUED | OUTPATIENT
Start: 2024-11-22 | End: 2024-11-22 | Stop reason: HOSPADM

## 2024-11-22 RX ORDER — PANTOPRAZOLE SODIUM 40 MG/1
40 TABLET, DELAYED RELEASE ORAL
Status: DISCONTINUED | OUTPATIENT
Start: 2024-11-22 | End: 2024-11-22 | Stop reason: HOSPADM

## 2024-11-22 RX ORDER — NITROGLYCERIN 0.4 MG/1
0.4 TABLET SUBLINGUAL
Status: DISCONTINUED | OUTPATIENT
Start: 2024-11-22 | End: 2024-11-22 | Stop reason: HOSPADM

## 2024-11-22 RX ORDER — ONDANSETRON 4 MG/1
4 TABLET, ORALLY DISINTEGRATING ORAL EVERY 6 HOURS PRN
Status: DISCONTINUED | OUTPATIENT
Start: 2024-11-22 | End: 2024-11-22 | Stop reason: HOSPADM

## 2024-11-22 RX ORDER — CHOLESTYRAMINE LIGHT 4 G/5.7G
1 POWDER, FOR SUSPENSION ORAL EVERY 12 HOURS SCHEDULED
Start: 2024-11-22

## 2024-11-22 RX ORDER — SODIUM CHLORIDE 0.9 % (FLUSH) 0.9 %
10 SYRINGE (ML) INJECTION AS NEEDED
Status: DISCONTINUED | OUTPATIENT
Start: 2024-11-22 | End: 2024-11-22 | Stop reason: HOSPADM

## 2024-11-22 RX ORDER — ROSUVASTATIN CALCIUM 10 MG/1
10 TABLET, COATED ORAL NIGHTLY
Status: DISCONTINUED | OUTPATIENT
Start: 2024-11-22 | End: 2024-11-22 | Stop reason: HOSPADM

## 2024-11-22 RX ORDER — LOPERAMIDE HYDROCHLORIDE 2 MG/1
4 CAPSULE ORAL
Start: 2024-11-22

## 2024-11-22 RX ORDER — ONDANSETRON 2 MG/ML
4 INJECTION INTRAMUSCULAR; INTRAVENOUS EVERY 6 HOURS PRN
Status: DISCONTINUED | OUTPATIENT
Start: 2024-11-22 | End: 2024-11-22 | Stop reason: HOSPADM

## 2024-11-22 RX ORDER — TAMSULOSIN HYDROCHLORIDE 0.4 MG/1
0.4 CAPSULE ORAL NIGHTLY
Status: DISCONTINUED | OUTPATIENT
Start: 2024-11-22 | End: 2024-11-22 | Stop reason: HOSPADM

## 2024-11-22 RX ORDER — SODIUM CHLORIDE 0.9 % (FLUSH) 0.9 %
10 SYRINGE (ML) INJECTION EVERY 12 HOURS SCHEDULED
Status: DISCONTINUED | OUTPATIENT
Start: 2024-11-22 | End: 2024-11-22 | Stop reason: HOSPADM

## 2024-11-22 RX ORDER — LOPERAMIDE HYDROCHLORIDE 2 MG/1
4 CAPSULE ORAL
Status: DISCONTINUED | OUTPATIENT
Start: 2024-11-22 | End: 2024-11-22 | Stop reason: HOSPADM

## 2024-11-22 RX ORDER — ASPIRIN 81 MG/1
81 TABLET ORAL DAILY
Status: DISCONTINUED | OUTPATIENT
Start: 2024-11-22 | End: 2024-11-22 | Stop reason: HOSPADM

## 2024-11-22 RX ORDER — SODIUM CHLORIDE 9 MG/ML
40 INJECTION, SOLUTION INTRAVENOUS AS NEEDED
Status: DISCONTINUED | OUTPATIENT
Start: 2024-11-22 | End: 2024-11-22 | Stop reason: HOSPADM

## 2024-11-22 RX ADMIN — Medication 10 ML: at 08:57

## 2024-11-22 RX ADMIN — DEXTROSE MONOHYDRATE 25 G: 25 INJECTION, SOLUTION INTRAVENOUS at 07:53

## 2024-11-22 RX ADMIN — PANTOPRAZOLE SODIUM 40 MG: 40 TABLET, DELAYED RELEASE ORAL at 08:57

## 2024-11-22 RX ADMIN — ASPIRIN 81 MG: 81 TABLET, COATED ORAL at 08:57

## 2024-11-22 RX ADMIN — LOPERAMIDE HYDROCHLORIDE 4 MG: 2 CAPSULE ORAL at 11:00

## 2024-11-22 RX ADMIN — CHOLEYSTYRAMINE LIGHT 4 G: 4 POWDER, FOR SUSPENSION ORAL at 11:00

## 2024-11-22 RX ADMIN — LOPERAMIDE HYDROCHLORIDE 4 MG: 2 CAPSULE ORAL at 08:57

## 2024-11-22 NOTE — PLAN OF CARE
Problem: Adult Inpatient Plan of Care  Goal: Plan of Care Review  Outcome: Progressing  Goal: Patient-Specific Goal (Individualized)  Outcome: Progressing  Goal: Absence of Hospital-Acquired Illness or Injury  Outcome: Progressing  Intervention: Identify and Manage Fall Risk  Recent Flowsheet Documentation  Taken 11/22/2024 0400 by Eneida Cohen RN  Safety Promotion/Fall Prevention:   assistive device/personal items within reach   clutter free environment maintained   fall prevention program maintained   nonskid shoes/slippers when out of bed   room organization consistent   safety round/check completed  Taken 11/22/2024 0200 by Eneida Cohen RN  Safety Promotion/Fall Prevention:   assistive device/personal items within reach   clutter free environment maintained   fall prevention program maintained   nonskid shoes/slippers when out of bed   room organization consistent   safety round/check completed  Taken 11/22/2024 0001 by Eneida Cohen RN  Safety Promotion/Fall Prevention:   assistive device/personal items within reach   clutter free environment maintained   fall prevention program maintained   nonskid shoes/slippers when out of bed   room organization consistent   safety round/check completed  Taken 11/21/2024 2200 by Eneida Cohen RN  Safety Promotion/Fall Prevention:   assistive device/personal items within reach   clutter free environment maintained   fall prevention program maintained   safety round/check completed   room organization consistent   nonskid shoes/slippers when out of bed  Taken 11/21/2024 1939 by Eneida Cohen, RN  Safety Promotion/Fall Prevention:   activity supervised   assistive device/personal items within reach   clutter free environment maintained   fall prevention program maintained   nonskid shoes/slippers when out of bed   safety round/check completed  Intervention: Prevent Skin Injury  Recent Flowsheet Documentation  Taken 11/22/2024 0400 by Eneida Cohen RN  Body Position: position  changed independently  Taken 11/22/2024 0200 by Eneida Cohen RN  Body Position: position changed independently  Taken 11/22/2024 0001 by Eneida Cohen RN  Body Position: position changed independently  Taken 11/21/2024 2200 by Eneida Cohen RN  Body Position: position changed independently  Skin Protection:   transparent dressing maintained   silicone foam dressing in place  Taken 11/21/2024 1939 by Eneida Cohen RN  Body Position: position changed independently  Intervention: Prevent Infection  Recent Flowsheet Documentation  Taken 11/22/2024 0400 by Eneida Cohen RN  Infection Prevention:   environmental surveillance performed   single patient room provided  Taken 11/22/2024 0200 by Eneida Cohen RN  Infection Prevention:   environmental surveillance performed   single patient room provided  Taken 11/22/2024 0001 by Eneida Cohen RN  Infection Prevention:   environmental surveillance performed   single patient room provided  Taken 11/21/2024 2200 by Eneida Cohen RN  Infection Prevention:   environmental surveillance performed   rest/sleep promoted   single patient room provided  Taken 11/21/2024 1939 by Eneida Cohen RN  Infection Prevention:   hand hygiene promoted   rest/sleep promoted  Goal: Optimal Comfort and Wellbeing  Outcome: Progressing  Intervention: Provide Person-Centered Care  Recent Flowsheet Documentation  Taken 11/21/2024 1939 by Eneida Cohen RN  Trust Relationship/Rapport:   care explained   thoughts/feelings acknowledged  Goal: Readiness for Transition of Care  Outcome: Progressing     Problem: Comorbidity Management  Goal: Blood Pressure in Desired Range  Outcome: Progressing  Intervention: Maintain Blood Pressure Management  Recent Flowsheet Documentation  Taken 11/21/2024 1939 by Eneida Cohen RN  Medication Review/Management: medications reviewed     Problem: Skin Injury Risk Increased  Goal: Skin Health and Integrity  Outcome: Progressing  Intervention: Optimize Skin Protection  Recent Flowsheet  Documentation  Taken 11/22/2024 0400 by Eneida Cohen RN  Activity Management:   up ad erik   activity encouraged  Head of Bed (HOB) Positioning: HOB elevated  Taken 11/22/2024 0200 by Eneida Cohen RN  Activity Management: up ad erik  Head of Bed (HOB) Positioning: HOB elevated  Taken 11/22/2024 0001 by Eneida Cohen RN  Activity Management: up ad erik  Head of Bed (HOB) Positioning: HOB elevated  Taken 11/21/2024 2200 by Eneida Cohen RN  Activity Management: activity encouraged  Pressure Reduction Techniques:   pressure points protected   heels elevated off bed   frequent weight shift encouraged  Head of Bed (HOB) Positioning: HOB elevated  Pressure Reduction Devices: pressure-redistributing mattress utilized  Skin Protection:   transparent dressing maintained   silicone foam dressing in place  Taken 11/21/2024 1939 by Eneida Cohen, RN  Activity Management: activity encouraged  Head of Bed (HOB) Positioning: HOB elevated   Goal Outcome Evaluation:

## 2024-11-22 NOTE — PROGRESS NOTES
" LOS: 1 day   Patient Care Team:  Tristen Stuart DO as PCP - General (Family Medicine)  Virginia Brown, RN as Nurse Navigator  Cong Cartagena MD as Consulting Physician (Hospitalist)        Subjective     Pertinent negative symptoms include no chest pain.       Subjective:  Symptoms:  Stable.  No shortness of breath, chest pain or chest pressure.    Diet:  Adequate intake.        Renal function improved. Hyponatremia improved as well. Will stop the fluids. Liberate water intake to slow the rate of correction.     History taken from: patient    Objective     Vital Sign Min/Max for last 24 hours  Temp  Min: 96.8 °F (36 °C)  Max: 98 °F (36.7 °C)   BP  Min: 103/75  Max: 127/78   Pulse  Min: 94  Max: 104   Resp  Min: 16  Max: 18   SpO2  Min: 96 %  Max: 100 %   No data recorded   No data recorded     Flowsheet Rows      Flowsheet Row First Filed Value   Admission Height 170.2 cm (67\") Documented at 11/21/2024 1010   Admission Weight 82.6 kg (182 lb) Documented at 11/21/2024 1010            I/O this shift:  In: 240 [P.O.:240]  Out: 150 [Stool:150]  I/O last 3 completed shifts:  In: 2100 [P.O.:100; IV Piggyback:2000]  Out: 850 [Urine:250; Stool:600]    Objective:  General Appearance:  Comfortable.    Vital signs: (most recent): Blood pressure 127/78, pulse 96, temperature 98 °F (36.7 °C), temperature source Oral, resp. rate 18, height 170.2 cm (67\"), weight 82.6 kg (182 lb), SpO2 100%.  Vital signs are normal.    Output: Producing urine.    HEENT: Normal HEENT exam.    Lungs:  Normal effort and normal respiratory rate.  Breath sounds clear to auscultation.  He is not in respiratory distress.  No decreased breath sounds.    Heart: Normal rate.  Regular rhythm.  S1 normal and S2 normal.    Abdomen: Abdomen is soft.    Pulses: Distal pulses are intact.    Neurological: Patient is alert and oriented to person, place and time.  Normal strength.    Pupils:  Pupils are equal, round, and reactive to light.  " "              Results Review:     I reviewed the patient's new clinical results.    WBC WBC   Date Value Ref Range Status   11/21/2024 9.56 3.40 - 10.80 10*3/mm3 Final      HGB Hemoglobin   Date Value Ref Range Status   11/21/2024 17.3 13.0 - 17.7 g/dL Final      HCT Hematocrit   Date Value Ref Range Status   11/21/2024 52.0 (H) 37.5 - 51.0 % Final      Platlets No results found for: \"LABPLAT\"   MCV MCV   Date Value Ref Range Status   11/21/2024 82.0 79.0 - 97.0 fL Final          Sodium Sodium   Date Value Ref Range Status   11/22/2024 131 (L) 136 - 145 mmol/L Final   11/22/2024 129 (L) 136 - 145 mmol/L Final   11/21/2024 122 (L) 136 - 145 mmol/L Final      Potassium Potassium   Date Value Ref Range Status   11/22/2024 4.2 3.5 - 5.2 mmol/L Final   11/22/2024 4.3 3.5 - 5.2 mmol/L Final     Comment:     Slight hemolysis detected by analyzer. Result may be falsely elevated.   11/21/2024 5.6 (H) 3.5 - 5.2 mmol/L Final     Comment:     Slight hemolysis detected by analyzer. Result may be falsely elevated.      Chloride Chloride   Date Value Ref Range Status   11/22/2024 98 98 - 107 mmol/L Final   11/22/2024 99 98 - 107 mmol/L Final   11/21/2024 88 (L) 98 - 107 mmol/L Final      CO2 CO2   Date Value Ref Range Status   11/22/2024 22.0 22.0 - 29.0 mmol/L Final   11/22/2024 20.0 (L) 22.0 - 29.0 mmol/L Final   11/21/2024 17.0 (L) 22.0 - 29.0 mmol/L Final      BUN BUN   Date Value Ref Range Status   11/22/2024 32 (H) 8 - 23 mg/dL Final   11/22/2024 38 (H) 8 - 23 mg/dL Final   11/21/2024 58 (H) 8 - 23 mg/dL Final      Creatinine Creatinine   Date Value Ref Range Status   11/22/2024 1.20 0.76 - 1.27 mg/dL Final   11/22/2024 1.21 0.76 - 1.27 mg/dL Final   11/21/2024 1.84 (H) 0.76 - 1.27 mg/dL Final      Calcium Calcium   Date Value Ref Range Status   11/22/2024 9.1 8.6 - 10.5 mg/dL Final   11/22/2024 8.8 8.6 - 10.5 mg/dL Final   11/21/2024 11.1 (H) 8.6 - 10.5 mg/dL Final      PO4 No results found for: \"CAPO4\"   Albumin Albumin " "  Date Value Ref Range Status   11/21/2024 5.3 (H) 3.5 - 5.2 g/dL Final      Magnesium Magnesium   Date Value Ref Range Status   11/21/2024 2.0 1.6 - 2.4 mg/dL Final      Uric Acid No results found for: \"URICACID\"     Medication Review: Yes    Assessment & Plan       Hyponatremia    Rectal cancer    HTN (hypertension)    Hyperlipidemia    DM2 (diabetes mellitus, type 2)    S/P colectomy , ultra low anterior resection, robotic assisted, with appy, repair of incarcerated hernia, diverting loop ileostomy    DAJA (acute kidney injury)    Hyperkalemia    Severe malnutrition      Assessment & Plan    Acute kidney injury: Baseline cr ~ 1.0. Hx of Severe DAJA last month due to pre renal azotemia in the setting of high ostomy output. Readmitted with another episode of DAJA in the setting of hypovolemia.      Hyperkalemia: Mild. Hx of severe hyperkalemia on last admission.      Met acidosis: Severe due to DAJA and increase bicarb loss due to high ostomy output     Hypovolemia: Due to high ostomy output     Hyponatremia: Urine Na<20 Urine osm 680. Likely hypovolemic hyponatremia.      Incarcerated hernia repair: 2 weeks prior to this admission. Now struggling with high output from ostomy      Recs  D/C IV fluids as sodium corrected 9 points in 24hr. Liberate water intake on discharge..   Given hx of recurrent DAJA. Patient will likely need IV fluids 1-2x weekly as outpatient on discharge to prevent DAJA. LR or NS weekly.  Would recommend discontinuation of SGLT-2inh until patient is struggling with high ostomy output.     Will need outpatient f/u in renal clinic in 2 weeks.         I discussed the patients findings and my recommendations with patient    Severo Jc MD  11/22/24  15:47 EST          "

## 2024-11-22 NOTE — PROGRESS NOTES
"          Clinical Nutrition Assessment     Patient Name: Valentin Vásquez  YOB: 1965  MRN: [id number]  Date of Encounter: 11/22/24 14:45 EST  Admission date: 11/21/2024  Reason for Visit: MST score 2+, Unintentional weight loss, Reduced oral intake    Assessment   Nutrition Assessment   Admission Diagnosis:  Hyponatremia [E87.1]    Problem List:    Hyponatremia    Rectal cancer    HTN (hypertension)    Hyperlipidemia    DM2 (diabetes mellitus, type 2)    S/P colectomy , ultra low anterior resection, robotic assisted, with appy, repair of incarcerated hernia, diverting loop ileostomy    DAJA (acute kidney injury)    Hyperkalemia    Severe malnutrition      PMH:   He  has a past medical history of Arthritis, Colon cancer, Colon polyp, Diabetes mellitus, Diverticulitis of colon, GERD (gastroesophageal reflux disease), Gout, Hyperlipidemia, Hypertension, Injury of back, Kidney stone, Pancreatitis, Tear of right rotator cuff, and Wears glasses.    PSH:  He  has a past surgical history that includes Tonsillectomy; Adenoidectomy; Kidney stone surgery; Shoulder arthroscopy w/ rotator cuff repair (Right, 07/19/2022); cystoscopy litholapaxy bladder stone extraction (N/A, 09/28/2022); Colonoscopy (N/A, 05/21/2024); COLON RESECTION WITH ILEOSTOMY (N/A, 10/10/2024); and Cystoscopy (Bilateral, 10/10/2024).    Applicable Nutrition History:   +ileostomy    Anthropometrics     Height: Height: 170.2 cm (67\")  Last Filed Weight: Weight: 82.6 kg (182 lb) (11/21/24 1010)  Method: Weight Method: Stated  BMI: BMI (Calculated): 28.5    UBW:     Weight      Weight (kg) Weight (lbs) Weight Method   5/21/2024 90.719 kg  200 lb     8/21/2024 87.544 kg  193 lb     10/4/2024 88.55 kg  195 lb 3.5 oz  Standing scale    10/10/2024 88.451 kg  195 lb  Stated    10/14/2024 88.5 kg  195 lb 1.7 oz     10/26/2024 83.462 kg  184 lb  Bed scale    10/28/2024 83.5 kg  184 lb 1.4 oz  Bed scale    11/4/2024 83 kg  182 lb 15.7 oz  Stated  "   11/21/2024 82.555 kg  182 lb  Stated      Weight change: weight loss of 11 lbs (5.6%) over 3 month(s)    Significant?  No    Nutrition Focused Physical Exam    Date:  11/22       Pt does not meet criteria for malnutrition diagnosis, at this time.      Subjective   Reported/Observed/Food/Nutrition Related History:     Pt laying in bed with spouse present at bedside. Pt is well known to clinical nutrition from recent admissions - most notably due to high output ileostomy output. Provided education x2 - spouse appears to have retained much of the education however ? How compliant pt is. Now on megace as appetite stimulant. Pt has not been consistently eating foods to help thicken stool, however has been using imodium. Reports drinking gatorade - ? Inconclusive about liquid consumption with meals. Reiterated importance of only sips with meals and waiting at least 30min following meals to drink more. Pt is known diabetic and has expressed concerns for avoiding sugar substitutes - reiterated that these can be a contributor to persistent high output.      Current Nutrition Prescription   PO: Diet: Diabetic; Consistent Carbohydrate; Fluid Consistency: Thin (IDDSI 0)  Oral Nutrition Supplement: N/A  Intake:  75% x 1 meal documented    Assessment & Plan   Nutrition Diagnosis   Date:  11/22            Updated:    Problem Limited adherence to nutrition related recommendations   Etiology S/p ileostomy   Signs/Symptoms Admission for hyponatremia,  continued high output, per pt reprot PO intake   Status: New    Goal:   Nutrition to support treatment, Improve nutrition related labs, Provide information regarding MNT therapy and Tolerate PO      Nutrition Intervention      Follow treatment progress, Care plan reviewed, Encourage intake, Education provided    Encouraged PO intake  Focus on foods that can thicken stool    Repeated Ileostomy education  Focus on high output  Limiting fluid consumption with meals  Limiting foods with  sugar substitutes  ORS    Monitoring/Evaluation:   Per protocol, I&O, PO intake, Pertinent labs, GI status, Symptoms    Vesna Castro MS,RD,LD  Time Spent: 30min

## 2024-11-22 NOTE — CASE MANAGEMENT/SOCIAL WORK
Discharge Planning Assessment  [facility]     Patient Name: Valentin Vásquez  MRN: [id number]  Today's Date: 11/22/2024    Admit Date: 11/21/2024    Plan: home   Discharge Needs Assessment       Row Name 11/22/24 0859       Living Environment    People in Home spouse    Current Living Arrangements home    Potentially Unsafe Housing Conditions none    In the past 12 months has the electric, gas, oil, or water company threatened to shut off services in your home? No    Primary Care Provided by self    Provides Primary Care For no one    Family Caregiver if Needed spouse    Quality of Family Relationships helpful;involved;supportive    Able to Return to Prior Arrangements yes       Resource/Environmental Concerns    Resource/Environmental Concerns none    Transportation Concerns none       Transportation Needs    In the past 12 months, has lack of transportation kept you from medical appointments or from getting medications? no    In the past 12 months, has lack of transportation kept you from meetings, work, or from getting things needed for daily living? No       Food Insecurity    Within the past 12 months, you worried that your food would run out before you got the money to buy more. Never true    Within the past 12 months, the food you bought just didn't last and you didn't have money to get more. Never true       Transition Planning    Patient/Family Anticipates Transition to home with family    Patient/Family Anticipated Services at Transition none    Transportation Anticipated family or friend will provide       Discharge Needs Assessment    Readmission Within the Last 30 Days current reason for admission unrelated to previous admission    Equipment Currently Used at Home none    Concerns to be Addressed no discharge needs identified;discharge planning    Do you want help finding or keeping work or a job? I do not need or want help    Do you want help with school or training? For example, starting or completing  job training or getting a high school diploma, GED or equivalent No    Anticipated Changes Related to Illness none    Equipment Needed After Discharge none    Provided Post Acute Provider List? N/A    Provided Post Acute Provider Quality & Resource List? N/A                   Discharge Plan       Row Name 11/22/24 0901       Plan    Plan home    Patient/Family in Agreement with Plan yes    Plan Comments Met with Mr. Vásquez and his wife at the bedside to intiate discharge plan. He shares a [facility] home with his wife. He is independent with ADLs and does not use any DME. He denies the receipt of home health or outpatient services. His primary care provider is Dr. Tristen Stuart. He denies obstacles to getting medical care or obtaining medications. No discharge needs were identified today. His plan is home with his wife, and she will transport.  will continue to follow plan of care and assist with discharge planning needs as indicated. w    Final Discharge Disposition Code 01 - home or self-care                  Continued Care and Services - Admitted Since 11/21/2024    No active coordination exists for this encounter.          Demographic Summary       Row Name 11/22/24 0858       General Information    Admission Type inpatient    Arrived From emergency department    Referral Source admission list    Reason for Consult discharge planning    Preferred Language English       Contact Information    Permission Granted to Share Info With family/designee    Contact Information Obtained for     Contact Information Comments Sharri Vásquez Spouse [phone]  KASANDRA VÁSQUEZ Daughter   [phone]                   Functional Status       Row Name 11/22/24 0859       Functional Status    Usual Activity Tolerance good    Current Activity Tolerance good       Physical Activity    On average, how many days per week do you engage in moderate to strenuous exercise (like a brisk walk)? 4 days    On  average, how many minutes do you engage in exercise at this level? 20 min    Number of minutes of exercise per week 80       Assessment of Health Literacy    How often do you have someone help you read hospital materials? Never    How often do you have problems learning about your medical condition because of difficulty understanding written information? Never    How often do you have a problem understanding what is told to you about your medical condition? Never    How confident are you filling out medical forms by yourself? Extremely    Health Literacy Good       Functional Status, IADL    Medications independent    Meal Preparation independent    Housekeeping independent    Laundry independent    Shopping independent    If for any reason you need help with day-to-day activities such as bathing, preparing meals, shopping, managing finances, etc., do you get the help you need? I don't need any help       Mental Status    General Appearance WDL WDL       Mental Status Summary    Recent Changes in Mental Status/Cognitive Functioning no changes                   Psychosocial    No documentation.                  Abuse/Neglect    No documentation.                  Legal    No documentation.                  Substance Abuse    No documentation.                  Patient Forms    No documentation.                     Diane Melendez RN

## 2024-11-23 LAB
QT INTERVAL: 330 MS
QTC INTERVAL: 460 MS

## 2024-11-25 ENCOUNTER — LAB (OUTPATIENT)
Dept: LAB | Facility: HOSPITAL | Age: 59
End: 2024-11-25
Payer: COMMERCIAL

## 2024-11-25 ENCOUNTER — HOSPITAL ENCOUNTER (OUTPATIENT)
Dept: INFUSION THERAPY | Facility: HOSPITAL | Age: 59
Discharge: HOME | End: 2024-11-25
Admitting: FAMILY MEDICINE
Payer: COMMERCIAL

## 2024-11-25 ENCOUNTER — TRANSCRIBE ORDERS (OUTPATIENT)
Dept: ADMINISTRATIVE | Facility: HOSPITAL | Age: 59
End: 2024-11-25
Payer: COMMERCIAL

## 2024-11-25 VITALS — DIASTOLIC BLOOD PRESSURE: 81 MMHG | OXYGEN SATURATION: 99 % | SYSTOLIC BLOOD PRESSURE: 131 MMHG | HEART RATE: 110 BPM

## 2024-11-25 DIAGNOSIS — N28.9 ABNORMAL RENAL FUNCTION: ICD-10-CM

## 2024-11-25 DIAGNOSIS — N28.9 ABNORMAL RENAL FUNCTION: Primary | ICD-10-CM

## 2024-11-25 DIAGNOSIS — E87.8 IMPAIRED HYDRATION STATUS: Primary | ICD-10-CM

## 2024-11-25 LAB
ALBUMIN SERPL-MCNC: 3.8 G/DL (ref 3.5–5.2)
ANION GAP SERPL CALCULATED.3IONS-SCNC: 12.3 MMOL/L (ref 5–15)
ANION GAP SERPL CALCULATED.3IONS-SCNC: 6 MMOL/L (ref 5–15)
BUN SERPL-MCNC: 16 MG/DL (ref 8–23)
BUN SERPL-MCNC: 19 MG/DL (ref 8–23)
BUN/CREAT SERPL: 12.8 (ref 7–25)
BUN/CREAT SERPL: 16.2 (ref 7–25)
CALCIUM SPEC-SCNC: 10 MG/DL (ref 8.6–10.5)
CALCIUM SPEC-SCNC: 9.9 MG/DL (ref 8.6–10.5)
CHLORIDE SERPL-SCNC: 104 MMOL/L (ref 98–107)
CHLORIDE SERPL-SCNC: 106 MMOL/L (ref 98–107)
CO2 SERPL-SCNC: 21.7 MMOL/L (ref 22–29)
CO2 SERPL-SCNC: 23 MMOL/L (ref 22–29)
CREAT SERPL-MCNC: 1.17 MG/DL (ref 0.76–1.27)
CREAT SERPL-MCNC: 1.25 MG/DL (ref 0.76–1.27)
EGFRCR SERPLBLD CKD-EPI 2021: 65.5 ML/MIN/1.73
EGFRCR SERPLBLD CKD-EPI 2021: 70.9 ML/MIN/1.73
GLUCOSE SERPL-MCNC: 131 MG/DL (ref 65–99)
GLUCOSE SERPL-MCNC: 92 MG/DL (ref 65–99)
PHOSPHATE SERPL-MCNC: 1.8 MG/DL (ref 2.5–4.5)
POTASSIUM SERPL-SCNC: 4.7 MMOL/L (ref 3.5–5.2)
POTASSIUM SERPL-SCNC: 5 MMOL/L (ref 3.5–5.2)
SODIUM SERPL-SCNC: 135 MMOL/L (ref 136–145)
SODIUM SERPL-SCNC: 138 MMOL/L (ref 136–145)

## 2024-11-25 PROCEDURE — 85025 COMPLETE CBC W/AUTO DIFF WBC: CPT

## 2024-11-25 PROCEDURE — 82570 ASSAY OF URINE CREATININE: CPT

## 2024-11-25 PROCEDURE — 25810000003 LACTATED RINGERS SOLUTION: Performed by: FAMILY MEDICINE

## 2024-11-25 PROCEDURE — 80069 RENAL FUNCTION PANEL: CPT

## 2024-11-25 PROCEDURE — 81001 URINALYSIS AUTO W/SCOPE: CPT

## 2024-11-25 PROCEDURE — 96366 THER/PROPH/DIAG IV INF ADDON: CPT

## 2024-11-25 PROCEDURE — 96361 HYDRATE IV INFUSION ADD-ON: CPT

## 2024-11-25 PROCEDURE — 36415 COLL VENOUS BLD VENIPUNCTURE: CPT

## 2024-11-25 PROCEDURE — 80048 BASIC METABOLIC PNL TOTAL CA: CPT | Performed by: FAMILY MEDICINE

## 2024-11-25 PROCEDURE — 96365 THER/PROPH/DIAG IV INF INIT: CPT

## 2024-11-25 PROCEDURE — 84156 ASSAY OF PROTEIN URINE: CPT

## 2024-11-25 PROCEDURE — 96360 HYDRATION IV INFUSION INIT: CPT

## 2024-11-25 RX ADMIN — SODIUM CHLORIDE, POTASSIUM CHLORIDE, SODIUM LACTATE AND CALCIUM CHLORIDE 1000 ML: 600; 310; 30; 20 INJECTION, SOLUTION INTRAVENOUS at 08:39

## 2024-11-25 NOTE — DISCHARGE SUMMARY
Patient Name: Valentin Vásquez  MRN: [id number]  : 1965  DOS: 2024    Attending: No att. providers found    Primary Care Provider: Tristen Stuart DO    Date of Admission:.2024 10:17 AM    Date of Discharge:  2024    Discharge Diagnosis:   Hyponatremia    Rectal cancer    HTN (hypertension)    Hyperlipidemia    DM2 (diabetes mellitus, type 2)    S/P colectomy , ultra low anterior resection, robotic assisted, with appy, repair of incarcerated hernia, diverting loop ileostomy    DAJA (acute kidney injury)    Hyperkalemia    Severe malnutrition    Consults:  José Manuel Hutchins MD colorectal surgery  Severo Jc MD nephrology    Hospital Course  At admit:  Patient is a pleasant 59 y.o. male who is known to me from recent hospitalizations and who has past medical history of diabetes mellitus type 2, GERD.  he had recent colectomy and hernia repair with loop ileostomy in October of this year complicated by postoperative ileus.  Eventually with resolved ileus he was discharged home however he has dealt with high output stoma requiring an emergent admit for acute kidney failure and hyperkalemia at which point he was transferred from his local hospital to [facility] where he remained between 10/27/2024 and 10/30/2024.  [facility] during [facility]  He was treated for acute kidney failure and hyperkalemia both of which have resolved.  He was discharged with instructions to use Imodium to manage high output stoma.  He unfortunately continued to have large output from his stoma and on follow-up with Dr. Hutchins he was noted to be markedly dehydrated and was sent to emergency department for evaluation.  He was found to be in recurrent acute kidney failure with hyponatremia and hyperkalemia and he is admitted for further management    After admit:  Patient was placed on IV fluids with bicarb, his chemistry profile was monitored with gradual improvement of  his sodium to level 131 prior to discharge and his potassium down to 4.2 prior to discharge.  His acute kidney failure has resolved with his creatinine down to 1.2.    He was started on p.o. diet and has tolerated well.  He was started on agents to slow down his stoma output including cholestyramine.  Noting that prescriptions for Lomotil, Imodium, cholestyramine, and Megace were sent by Dr. Hutchins as outlined to me by patient's wife.  She has already had communication with the pharmacy.    Patient has ambulated without difficulty, his pain has been under good control.    With his recurrent issue with volume depletion related to high output stoma as well as renal failure and electrolyte abnormalities it was felt best that he receives IV fluids as an outpatient to start at once a week with the possibility of this changing to twice weekly.  This was arranged prior to his discharge with help from our .    I discussed with the patient and his wife.  They both expressed understanding and agreement.  With help from our .    I discussed with the patient and his wife.  They both expressed understanding and agreement.  I discussed with nephrology.      Pertinent Test Results:    I reviewed the patient's new clinical results.   Results from last 7 days   Lab Units 11/21/24  1044   WBC 10*3/mm3 9.56   HEMOGLOBIN g/dL 17.3   HEMATOCRIT % 52.0*   PLATELETS 10*3/mm3 382     Results from last 7 days   Lab Units 11/22/24  0911 11/22/24  0116 11/21/24  1044   SODIUM mmol/L 131* 129* 122*   POTASSIUM mmol/L 4.2 4.3 5.6*   CHLORIDE mmol/L 98 99 88*   CO2 mmol/L 22.0 20.0* 17.0*   BUN mg/dL 32* 38* 58*   CREATININE mg/dL 1.20 1.21 1.84*   CALCIUM mg/dL 9.1 8.8 11.1*   BILIRUBIN mg/dL  --   --  0.3   ALK PHOS U/L  --   --  79   ALT (SGPT) U/L  --   --  23   AST (SGOT) U/L  --   --  24   GLUCOSE mg/dL 115* 195* 295*     I reviewed the patient's new imaging including images and reports.         Discharge Assessment:  "      Visit Vitals  /78 (BP Location: Right arm, Patient Position: Lying)   Pulse 96   Temp 98 °F (36.7 °C) (Oral)   Resp 18   Ht 170.2 cm (67\")   Wt 82.6 kg (182 lb)   SpO2 100%   BMI 28.51 kg/m²     No data recorded.      General Appearance:    Alert, cooperative, in no acute distress   Lungs:     Clear to auscultation,respirations regular, even and                   unlabored    Heart:    Regular rhythm and normal rate, normal S1 and S2   Abdomen:     Soft benign, pink stoma . Healed incisions.   Extremities:   Moves all extremities well, no edema, no cyanosis, no              redness   Pulses:   Pulses palpable and equal bilaterally   Skin:   No bleeding, bruising or rash            Discharge Disposition:    Discharge Medications     Discharge Medications        New Medications        Instructions Start Date   cholestyramine light 4 g packet   4 g, Oral, Every 12 Hours Scheduled      loperamide 2 MG capsule  Commonly known as: IMODIUM  Replaces: loperamide 2 MG tablet   4 mg, Oral, 3 Times Daily Before Meals             Continue These Medications        Instructions Start Date   Aspirin Low Dose 81 MG EC tablet  Generic drug: aspirin   81 mg, Daily      fenofibrate 145 MG tablet  Commonly known as: TRICOR   145 mg, Daily      FreeStyle Shilpa 2 Sensor misc   Use. Possible left side- pt might change 8th      Insulin Lispro (1 Unit Dial) 100 UNIT/ML solution pen-injector  Commonly known as: HUMALOG   5 Units, 3 Times Daily Before Meals      metFORMIN 1000 MG tablet  Commonly known as: GLUCOPHAGE   1,000 mg, 2 Times Daily With Meals      omeprazole 40 MG capsule  Commonly known as: priLOSEC   40 mg, Daily      rosuvastatin 10 MG tablet  Commonly known as: CRESTOR   10 mg, Nightly      tamsulosin 0.4 MG capsule 24 hr capsule  Commonly known as: FLOMAX   0.4 mg, Oral, Nightly      Tresiba FlexTouch 200 UNIT/ML solution pen-injector pen injection  Generic drug: Insulin Degludec   Take As Directed             Stop " These Medications      dapagliflozin Propanediol 10 MG tablet  Commonly known as: Farxiga     HYDROcodone-acetaminophen 7.5-325 MG per tablet  Commonly known as: NORCO     loperamide 2 MG tablet  Commonly known as: Imodium A-D  Replaced by: loperamide 2 MG capsule              Discharge Diet:     Activity at Discharge:     Follow-up Appointments  Next follow-up appointment         Cong Cartagena MD  11/25/24  08:49 EST

## 2024-11-26 LAB
AMORPH URATE CRY URNS QL MICRO: NORMAL /HPF
BACTERIA UR QL AUTO: NORMAL /HPF
BASOPHILS # BLD AUTO: 0.04 10*3/MM3 (ref 0–0.2)
BASOPHILS NFR BLD AUTO: 0.5 % (ref 0–1.5)
BILIRUB UR QL STRIP: NEGATIVE
CLARITY UR: ABNORMAL
COLOR UR: YELLOW
CREAT UR-MCNC: 207 MG/DL
DEPRECATED RDW RBC AUTO: 43.1 FL (ref 37–54)
EOSINOPHIL # BLD AUTO: 0.18 10*3/MM3 (ref 0–0.4)
EOSINOPHIL NFR BLD AUTO: 2.2 % (ref 0.3–6.2)
ERYTHROCYTE [DISTWIDTH] IN BLOOD BY AUTOMATED COUNT: 14.1 % (ref 12.3–15.4)
GLUCOSE UR STRIP-MCNC: ABNORMAL MG/DL
HCT VFR BLD AUTO: 38.3 % (ref 37.5–51)
HGB BLD-MCNC: 12.4 G/DL (ref 13–17.7)
HGB UR QL STRIP.AUTO: NEGATIVE
HYALINE CASTS UR QL AUTO: NORMAL /LPF
IMM GRANULOCYTES # BLD AUTO: 0.02 10*3/MM3 (ref 0–0.05)
IMM GRANULOCYTES NFR BLD AUTO: 0.2 % (ref 0–0.5)
KETONES UR QL STRIP: ABNORMAL
LEUKOCYTE ESTERASE UR QL STRIP.AUTO: NEGATIVE
LYMPHOCYTES # BLD AUTO: 2.56 10*3/MM3 (ref 0.7–3.1)
LYMPHOCYTES NFR BLD AUTO: 31.4 % (ref 19.6–45.3)
MCH RBC QN AUTO: 27.6 PG (ref 26.6–33)
MCHC RBC AUTO-ENTMCNC: 32.4 G/DL (ref 31.5–35.7)
MCV RBC AUTO: 85.3 FL (ref 79–97)
MONOCYTES # BLD AUTO: 0.43 10*3/MM3 (ref 0.1–0.9)
MONOCYTES NFR BLD AUTO: 5.3 % (ref 5–12)
NEUTROPHILS NFR BLD AUTO: 4.92 10*3/MM3 (ref 1.7–7)
NEUTROPHILS NFR BLD AUTO: 60.4 % (ref 42.7–76)
NITRITE UR QL STRIP: NEGATIVE
NRBC BLD AUTO-RTO: 0 /100 WBC (ref 0–0.2)
PH UR STRIP.AUTO: 5.5 [PH] (ref 5–8)
PLATELET # BLD AUTO: 311 10*3/MM3 (ref 140–450)
PMV BLD AUTO: 9.9 FL (ref 6–12)
PROT ?TM UR-MCNC: 14.8 MG/DL
PROT UR QL STRIP: ABNORMAL
RBC # BLD AUTO: 4.49 10*6/MM3 (ref 4.14–5.8)
RBC # UR STRIP: NORMAL /HPF
REF LAB TEST METHOD: NORMAL
SP GR UR STRIP: 1.02 (ref 1–1.03)
SQUAMOUS #/AREA URNS HPF: NORMAL /HPF
UROBILINOGEN UR QL STRIP: ABNORMAL
WBC # UR STRIP: NORMAL /HPF
WBC NRBC COR # BLD AUTO: 8.15 10*3/MM3 (ref 3.4–10.8)

## 2024-12-03 ENCOUNTER — HOSPITAL ENCOUNTER (OUTPATIENT)
Dept: INFUSION THERAPY | Facility: HOSPITAL | Age: 59
Discharge: HOME | End: 2024-12-03
Admitting: FAMILY MEDICINE
Payer: COMMERCIAL

## 2024-12-03 VITALS
HEART RATE: 113 BPM | DIASTOLIC BLOOD PRESSURE: 85 MMHG | RESPIRATION RATE: 16 BRPM | OXYGEN SATURATION: 98 % | SYSTOLIC BLOOD PRESSURE: 137 MMHG

## 2024-12-03 DIAGNOSIS — N28.9 ABNORMAL RENAL FUNCTION: Primary | ICD-10-CM

## 2024-12-03 DIAGNOSIS — E87.8 IMPAIRED HYDRATION STATUS: ICD-10-CM

## 2024-12-03 LAB
ANION GAP SERPL CALCULATED.3IONS-SCNC: 12.1 MMOL/L (ref 5–15)
BUN SERPL-MCNC: 17 MG/DL (ref 8–23)
BUN/CREAT SERPL: 16.8 (ref 7–25)
CALCIUM SPEC-SCNC: 9.6 MG/DL (ref 8.6–10.5)
CHLORIDE SERPL-SCNC: 107 MMOL/L (ref 98–107)
CO2 SERPL-SCNC: 18.9 MMOL/L (ref 22–29)
CREAT SERPL-MCNC: 1.01 MG/DL (ref 0.76–1.27)
EGFRCR SERPLBLD CKD-EPI 2021: 84.6 ML/MIN/1.73
GLUCOSE SERPL-MCNC: 116 MG/DL (ref 65–99)
POTASSIUM SERPL-SCNC: 4 MMOL/L (ref 3.5–5.2)
SODIUM SERPL-SCNC: 138 MMOL/L (ref 136–145)

## 2024-12-03 PROCEDURE — 96361 HYDRATE IV INFUSION ADD-ON: CPT

## 2024-12-03 PROCEDURE — 96360 HYDRATION IV INFUSION INIT: CPT

## 2024-12-03 PROCEDURE — 80048 BASIC METABOLIC PNL TOTAL CA: CPT | Performed by: FAMILY MEDICINE

## 2024-12-03 PROCEDURE — 25810000003 LACTATED RINGERS SOLUTION: Performed by: FAMILY MEDICINE

## 2024-12-03 PROCEDURE — 96365 THER/PROPH/DIAG IV INF INIT: CPT

## 2024-12-03 RX ADMIN — SODIUM CHLORIDE, POTASSIUM CHLORIDE, SODIUM LACTATE AND CALCIUM CHLORIDE 1000 ML: 600; 310; 30; 20 INJECTION, SOLUTION INTRAVENOUS at 09:06

## 2024-12-09 ENCOUNTER — TRANSCRIBE ORDERS (OUTPATIENT)
Dept: ADMINISTRATIVE | Facility: HOSPITAL | Age: 59
End: 2024-12-09
Payer: COMMERCIAL

## 2024-12-09 DIAGNOSIS — Z85.048 HISTORY OF RECTAL CANCER: Primary | ICD-10-CM

## 2024-12-10 ENCOUNTER — HOSPITAL ENCOUNTER (OUTPATIENT)
Dept: INFUSION THERAPY | Facility: HOSPITAL | Age: 59
Discharge: HOME | End: 2024-12-10
Admitting: INTERNAL MEDICINE
Payer: COMMERCIAL

## 2024-12-10 VITALS
SYSTOLIC BLOOD PRESSURE: 135 MMHG | HEART RATE: 128 BPM | RESPIRATION RATE: 18 BRPM | DIASTOLIC BLOOD PRESSURE: 89 MMHG | OXYGEN SATURATION: 98 %

## 2024-12-10 DIAGNOSIS — E87.8 IMPAIRED HYDRATION STATUS: Primary | ICD-10-CM

## 2024-12-10 LAB
ANION GAP SERPL CALCULATED.3IONS-SCNC: 17.4 MMOL/L (ref 5–15)
BUN SERPL-MCNC: 23 MG/DL (ref 8–23)
BUN/CREAT SERPL: 17.7 (ref 7–25)
CALCIUM SPEC-SCNC: 10.5 MG/DL (ref 8.6–10.5)
CHLORIDE SERPL-SCNC: 104 MMOL/L (ref 98–107)
CO2 SERPL-SCNC: 16.6 MMOL/L (ref 22–29)
CREAT SERPL-MCNC: 1.3 MG/DL (ref 0.76–1.27)
EGFRCR SERPLBLD CKD-EPI 2021: 62.5 ML/MIN/1.73
GLUCOSE SERPL-MCNC: 207 MG/DL (ref 65–99)
POTASSIUM SERPL-SCNC: 4.6 MMOL/L (ref 3.5–5.2)
SODIUM SERPL-SCNC: 138 MMOL/L (ref 136–145)

## 2024-12-10 PROCEDURE — 96360 HYDRATION IV INFUSION INIT: CPT

## 2024-12-10 PROCEDURE — 25810000003 SODIUM CHLORIDE 0.9 % SOLUTION: Performed by: INTERNAL MEDICINE

## 2024-12-10 PROCEDURE — 96361 HYDRATE IV INFUSION ADD-ON: CPT

## 2024-12-10 PROCEDURE — 80048 BASIC METABOLIC PNL TOTAL CA: CPT | Performed by: FAMILY MEDICINE

## 2024-12-10 PROCEDURE — 96365 THER/PROPH/DIAG IV INF INIT: CPT

## 2024-12-10 PROCEDURE — 96366 THER/PROPH/DIAG IV INF ADDON: CPT

## 2024-12-10 PROCEDURE — 36415 COLL VENOUS BLD VENIPUNCTURE: CPT

## 2024-12-10 RX ADMIN — SODIUM CHLORIDE 1000 ML: 9 INJECTION, SOLUTION INTRAVENOUS at 09:20

## 2024-12-16 ENCOUNTER — HOSPITAL ENCOUNTER (OUTPATIENT)
Dept: INFUSION THERAPY | Facility: HOSPITAL | Age: 59
Discharge: HOME | End: 2024-12-16
Admitting: INTERNAL MEDICINE
Payer: COMMERCIAL

## 2024-12-16 VITALS
DIASTOLIC BLOOD PRESSURE: 80 MMHG | RESPIRATION RATE: 20 BRPM | OXYGEN SATURATION: 99 % | SYSTOLIC BLOOD PRESSURE: 113 MMHG | HEART RATE: 123 BPM

## 2024-12-16 DIAGNOSIS — E87.8 IMPAIRED HYDRATION STATUS: Primary | ICD-10-CM

## 2024-12-16 LAB
ANION GAP SERPL CALCULATED.3IONS-SCNC: 18.7 MMOL/L (ref 5–15)
BUN SERPL-MCNC: 29 MG/DL (ref 8–23)
BUN/CREAT SERPL: 17.7 (ref 7–25)
CALCIUM SPEC-SCNC: 10.7 MG/DL (ref 8.6–10.5)
CHLORIDE SERPL-SCNC: 103 MMOL/L (ref 98–107)
CO2 SERPL-SCNC: 14.3 MMOL/L (ref 22–29)
CREAT SERPL-MCNC: 1.64 MG/DL (ref 0.76–1.27)
EGFRCR SERPLBLD CKD-EPI 2021: 47.3 ML/MIN/1.73
GLUCOSE SERPL-MCNC: 234 MG/DL (ref 65–99)
POTASSIUM SERPL-SCNC: 4.6 MMOL/L (ref 3.5–5.2)
SODIUM SERPL-SCNC: 136 MMOL/L (ref 136–145)

## 2024-12-16 PROCEDURE — 96366 THER/PROPH/DIAG IV INF ADDON: CPT

## 2024-12-16 PROCEDURE — 25810000003 SODIUM CHLORIDE 0.9 % SOLUTION: Performed by: INTERNAL MEDICINE

## 2024-12-16 PROCEDURE — 96361 HYDRATE IV INFUSION ADD-ON: CPT

## 2024-12-16 PROCEDURE — 80048 BASIC METABOLIC PNL TOTAL CA: CPT | Performed by: FAMILY MEDICINE

## 2024-12-16 PROCEDURE — 96365 THER/PROPH/DIAG IV INF INIT: CPT

## 2024-12-16 PROCEDURE — 96360 HYDRATION IV INFUSION INIT: CPT

## 2024-12-16 RX ADMIN — SODIUM CHLORIDE 1000 ML: 9 INJECTION, SOLUTION INTRAVENOUS at 09:02

## 2024-12-23 ENCOUNTER — HOSPITAL ENCOUNTER (OUTPATIENT)
Dept: INFUSION THERAPY | Facility: HOSPITAL | Age: 59
Discharge: HOME | End: 2024-12-23
Admitting: INTERNAL MEDICINE
Payer: COMMERCIAL

## 2024-12-23 VITALS — OXYGEN SATURATION: 96 % | DIASTOLIC BLOOD PRESSURE: 78 MMHG | SYSTOLIC BLOOD PRESSURE: 113 MMHG | HEART RATE: 107 BPM

## 2024-12-23 DIAGNOSIS — E87.8 IMPAIRED HYDRATION STATUS: Primary | ICD-10-CM

## 2024-12-23 LAB
ANION GAP SERPL CALCULATED.3IONS-SCNC: 14.9 MMOL/L (ref 5–15)
BUN SERPL-MCNC: 22 MG/DL (ref 8–23)
BUN/CREAT SERPL: 19.6 (ref 7–25)
CALCIUM SPEC-SCNC: 10 MG/DL (ref 8.6–10.5)
CHLORIDE SERPL-SCNC: 101 MMOL/L (ref 98–107)
CO2 SERPL-SCNC: 15.1 MMOL/L (ref 22–29)
CREAT SERPL-MCNC: 1.12 MG/DL (ref 0.76–1.27)
EGFRCR SERPLBLD CKD-EPI 2021: 74.7 ML/MIN/1.73
GLUCOSE SERPL-MCNC: 264 MG/DL (ref 65–99)
POTASSIUM SERPL-SCNC: 4.6 MMOL/L (ref 3.5–5.2)
SODIUM SERPL-SCNC: 131 MMOL/L (ref 136–145)

## 2024-12-23 PROCEDURE — 96365 THER/PROPH/DIAG IV INF INIT: CPT

## 2024-12-23 PROCEDURE — 96360 HYDRATION IV INFUSION INIT: CPT

## 2024-12-23 PROCEDURE — 80048 BASIC METABOLIC PNL TOTAL CA: CPT | Performed by: FAMILY MEDICINE

## 2024-12-23 PROCEDURE — 96366 THER/PROPH/DIAG IV INF ADDON: CPT

## 2024-12-23 PROCEDURE — 25810000003 SODIUM CHLORIDE 0.9 % SOLUTION: Performed by: INTERNAL MEDICINE

## 2024-12-23 PROCEDURE — 96361 HYDRATE IV INFUSION ADD-ON: CPT

## 2024-12-23 RX ADMIN — SODIUM CHLORIDE 1000 ML: 9 INJECTION, SOLUTION INTRAVENOUS at 09:14

## 2024-12-30 ENCOUNTER — HOSPITAL ENCOUNTER (OUTPATIENT)
Dept: INFUSION THERAPY | Facility: HOSPITAL | Age: 59
Discharge: HOME | End: 2024-12-30
Admitting: INTERNAL MEDICINE
Payer: COMMERCIAL

## 2024-12-30 VITALS
DIASTOLIC BLOOD PRESSURE: 89 MMHG | HEART RATE: 64 BPM | RESPIRATION RATE: 16 BRPM | OXYGEN SATURATION: 96 % | SYSTOLIC BLOOD PRESSURE: 146 MMHG

## 2024-12-30 DIAGNOSIS — E87.8 IMPAIRED HYDRATION STATUS: Primary | ICD-10-CM

## 2024-12-30 LAB
ANION GAP SERPL CALCULATED.3IONS-SCNC: 11.8 MMOL/L (ref 5–15)
BUN SERPL-MCNC: 19 MG/DL (ref 8–23)
BUN/CREAT SERPL: 17.3 (ref 7–25)
CALCIUM SPEC-SCNC: 9.6 MG/DL (ref 8.6–10.5)
CHLORIDE SERPL-SCNC: 106 MMOL/L (ref 98–107)
CO2 SERPL-SCNC: 18.2 MMOL/L (ref 22–29)
CREAT SERPL-MCNC: 1.1 MG/DL (ref 0.76–1.27)
EGFRCR SERPLBLD CKD-EPI 2021: 76.4 ML/MIN/1.73
GLUCOSE SERPL-MCNC: 132 MG/DL (ref 65–99)
POTASSIUM SERPL-SCNC: 5.3 MMOL/L (ref 3.5–5.2)
SODIUM SERPL-SCNC: 136 MMOL/L (ref 136–145)

## 2024-12-30 PROCEDURE — 96360 HYDRATION IV INFUSION INIT: CPT

## 2024-12-30 PROCEDURE — 25810000003 SODIUM CHLORIDE 0.9 % SOLUTION: Performed by: INTERNAL MEDICINE

## 2024-12-30 PROCEDURE — 80048 BASIC METABOLIC PNL TOTAL CA: CPT | Performed by: FAMILY MEDICINE

## 2024-12-30 PROCEDURE — 96361 HYDRATE IV INFUSION ADD-ON: CPT

## 2024-12-30 RX ADMIN — SODIUM CHLORIDE 1000 ML: 0.9 INJECTION, SOLUTION INTRAVENOUS at 09:59

## 2025-01-14 ENCOUNTER — HOSPITAL ENCOUNTER (OUTPATIENT)
Dept: GENERAL RADIOLOGY | Facility: HOSPITAL | Age: 60
Discharge: HOME | End: 2025-01-14
Admitting: COLON & RECTAL SURGERY
Payer: COMMERCIAL

## 2025-01-14 DIAGNOSIS — Z85.048 HISTORY OF RECTAL CANCER: ICD-10-CM

## 2025-01-14 PROCEDURE — 25510000002 DIATRIZOATE MEGLUMINE & SODIUM PER 1 ML: Performed by: COLON & RECTAL SURGERY

## 2025-01-14 PROCEDURE — 74270 X-RAY XM COLON 1CNTRST STD: CPT

## 2025-01-14 RX ORDER — DIATRIZOATE MEGLUMINE AND DIATRIZOATE SODIUM 660; 100 MG/ML; MG/ML
480 SOLUTION ORAL; RECTAL
Status: COMPLETED | OUTPATIENT
Start: 2025-01-14 | End: 2025-01-14

## 2025-01-14 RX ADMIN — DIATRIZOATE MEGLUMINE AND DIATRIZOATE SODIUM 480 ML: 660; 100 LIQUID ORAL; RECTAL at 13:49

## 2025-01-29 ENCOUNTER — PRE-ADMISSION TESTING (OUTPATIENT)
Dept: PREADMISSION TESTING | Facility: HOSPITAL | Age: 60
End: 2025-01-29
Payer: COMMERCIAL

## 2025-01-29 VITALS — HEIGHT: 67 IN | WEIGHT: 172.07 LBS | BODY MASS INDEX: 27.01 KG/M2

## 2025-01-29 LAB
ALBUMIN SERPL-MCNC: 4.6 G/DL (ref 3.5–5.2)
ALBUMIN/GLOB SERPL: 1.6 G/DL
ALP SERPL-CCNC: 48 U/L (ref 39–117)
ALT SERPL W P-5'-P-CCNC: 17 U/L (ref 1–41)
ANION GAP SERPL CALCULATED.3IONS-SCNC: 12 MMOL/L (ref 5–15)
AST SERPL-CCNC: 21 U/L (ref 1–40)
BILIRUB SERPL-MCNC: 0.4 MG/DL (ref 0–1.2)
BUN SERPL-MCNC: 20 MG/DL (ref 8–23)
BUN/CREAT SERPL: 16.9 (ref 7–25)
CALCIUM SPEC-SCNC: 9.7 MG/DL (ref 8.6–10.5)
CHLORIDE SERPL-SCNC: 111 MMOL/L (ref 98–107)
CO2 SERPL-SCNC: 20 MMOL/L (ref 22–29)
CREAT SERPL-MCNC: 1.18 MG/DL (ref 0.76–1.27)
DEPRECATED RDW RBC AUTO: 48.1 FL (ref 37–54)
EGFRCR SERPLBLD CKD-EPI 2021: 70.2 ML/MIN/1.73
ERYTHROCYTE [DISTWIDTH] IN BLOOD BY AUTOMATED COUNT: 15 % (ref 12.3–15.4)
GLOBULIN UR ELPH-MCNC: 2.9 GM/DL
GLUCOSE SERPL-MCNC: 90 MG/DL (ref 65–99)
HBA1C MFR BLD: 7.8 % (ref 4.8–5.6)
HCT VFR BLD AUTO: 44.7 % (ref 37.5–51)
HGB BLD-MCNC: 14.1 G/DL (ref 13–17.7)
MCH RBC QN AUTO: 27.5 PG (ref 26.6–33)
MCHC RBC AUTO-ENTMCNC: 31.5 G/DL (ref 31.5–35.7)
MCV RBC AUTO: 87.3 FL (ref 79–97)
PLATELET # BLD AUTO: 312 10*3/MM3 (ref 140–450)
PMV BLD AUTO: 9.5 FL (ref 6–12)
POTASSIUM SERPL-SCNC: 4.3 MMOL/L (ref 3.5–5.2)
PROT SERPL-MCNC: 7.5 G/DL (ref 6–8.5)
RBC # BLD AUTO: 5.12 10*6/MM3 (ref 4.14–5.8)
SODIUM SERPL-SCNC: 143 MMOL/L (ref 136–145)
WBC NRBC COR # BLD AUTO: 9.48 10*3/MM3 (ref 3.4–10.8)

## 2025-01-29 PROCEDURE — 82378 CARCINOEMBRYONIC ANTIGEN: CPT

## 2025-01-29 PROCEDURE — 83036 HEMOGLOBIN GLYCOSYLATED A1C: CPT

## 2025-01-29 PROCEDURE — 80053 COMPREHEN METABOLIC PANEL: CPT

## 2025-01-29 PROCEDURE — 36415 COLL VENOUS BLD VENIPUNCTURE: CPT

## 2025-01-29 PROCEDURE — 93005 ELECTROCARDIOGRAM TRACING: CPT

## 2025-01-29 PROCEDURE — 85027 COMPLETE CBC AUTOMATED: CPT

## 2025-01-29 RX ORDER — HYDROCODONE BITARTRATE AND ACETAMINOPHEN 7.5; 325 MG/1; MG/1
1 TABLET ORAL EVERY 8 HOURS PRN
COMMUNITY

## 2025-01-29 NOTE — PAT
"Pt came to PAT straight from colonoscopy and sedation so was not able to sign permit.    An arrival time for procedure was not provided during PAT visit. If patient had any questions or concerns about their arrival time, they were instructed to contact their surgeon/physician.  Additionally, if the patient referred to an arrival time that was acquired from their my chart account, patient was encouraged to verify that time with their surgeon/physician. Arrival times are NOT provided in Pre Admission Testing Department.     Patient denies any current skin issues.      Encouraged pt and wife to review all instructions from office for surgery prep orderes    Ten (8 ounce) Impact Advanced Recovery Nutritional Drinks distributed to patient from Pre Admission Testing Department.  Verbal and written instructions given that patient must consume two (8 ounce) Impact drinks a day for five days before surgery.  Flavoring tip sheet provided as well the brochure \"Go in Stronger. Get Home Sooner\" that explains benefits of nutritional drinks to enhance recovery. Patient/family verbalized understanding.      Patient instructed to drink 20 ounces of Gatorade or Gatorlyte (if diabetic) and it needs to be completed 1 hour (for Main OR patients) or 2 hours (scheduled  section & [facility] patients) before given arrival time for procedure (NO RED Gatorade and NO Gatorade Zero).    Patient verbalized understanding.     Patient to apply Chlorhexadine wipes  to surgical area (as instructed) the night before procedure and the AM of procedure. Wipes provided.   "

## 2025-01-29 NOTE — DISCHARGE INSTRUCTIONS
"Ten (8 ounce) Impact Advanced Recovery Nutritional Drinks distributed to patient from Pre Admission Testing Department.  Verbal and written instructions given that patient must consume two (8 ounce) Impact drinks a day for five days before surgery.  Flavoring tip sheet provided as well the brochure \"Go in Stronger. Get Home Sooner\" that explains benefits of nutritional drinks to enhance recovery. Patient/family verbalized understanding.      Patient instructed to drink 20 ounces of Gatorade or Gatorlyte (if diabetic) and it needs to be completed 1 hour (for Main OR patients) or 2 hours (scheduled  section & [facility] patients) before given arrival time for procedure (NO RED Gatorade and NO Gatorade Zero).    Patient verbalized understanding.     Patient to apply Chlorhexadine wipes  to surgical area (as instructed) the night before procedure and the AM of procedure. Wipes provided.     Patient viewed general PAT education video as instructed in their preoperative information received from their surgeon.  Patient stated the general PAT education video was viewed in its entirety and survey completed.  Copies of PAT general education handouts (Incentive Spirometry, Meds to Beds Program, Patient Belongings, Pre-op skin preparation instructions, Blood Glucose testing, Visitor policy, Surgery FAQ, Code H) distributed to patient if not printed. Education related to the PAT pass and skin preparation for surgery (if applicable) completed in PAT as a reinforcement to PAT education video. Patient instructed to return PAT pass provided today as well as completed skin preparation sheet (if applicable) on the day of procedure.     Additionally if patient had not viewed video yet but intended to view it at home or in our waiting area, then referred them to the handout with QR code/link provided during PAT visit.  Encouraged patient/family to read PAT general education handouts thoroughly and notify PAT staff with any questions " or concerns. Patient verbalized understanding of all information and priority content.

## 2025-01-30 LAB
CEA SERPL-MCNC: 1.15 NG/ML
QT INTERVAL: 368 MS
QTC INTERVAL: 464 MS

## 2025-02-03 RX ORDER — LIDOCAINE HYDROCHLORIDE 10 MG/ML
0.5 INJECTION, SOLUTION EPIDURAL; INFILTRATION; INTRACAUDAL; PERINEURAL ONCE AS NEEDED
Status: CANCELLED | OUTPATIENT
Start: 2025-02-03

## 2025-02-03 RX ORDER — MIDAZOLAM HYDROCHLORIDE 1 MG/ML
1 INJECTION, SOLUTION INTRAMUSCULAR; INTRAVENOUS
Status: CANCELLED | OUTPATIENT
Start: 2025-02-03

## 2025-02-03 RX ORDER — SODIUM CHLORIDE, SODIUM LACTATE, POTASSIUM CHLORIDE, CALCIUM CHLORIDE 600; 310; 30; 20 MG/100ML; MG/100ML; MG/100ML; MG/100ML
9 INJECTION, SOLUTION INTRAVENOUS CONTINUOUS
Status: CANCELLED | OUTPATIENT
Start: 2025-02-04 | End: 2025-02-04

## 2025-02-03 RX ORDER — FAMOTIDINE 10 MG/ML
20 INJECTION, SOLUTION INTRAVENOUS ONCE
Status: CANCELLED | OUTPATIENT
Start: 2025-02-03 | End: 2025-02-03

## 2025-02-03 RX ORDER — SODIUM CHLORIDE 0.9 % (FLUSH) 0.9 %
10 SYRINGE (ML) INJECTION AS NEEDED
Status: CANCELLED | OUTPATIENT
Start: 2025-02-03

## 2025-02-03 RX ORDER — SODIUM CHLORIDE 0.9 % (FLUSH) 0.9 %
10 SYRINGE (ML) INJECTION EVERY 12 HOURS SCHEDULED
Status: CANCELLED | OUTPATIENT
Start: 2025-02-03

## 2025-02-24 ENCOUNTER — LAB (OUTPATIENT)
Dept: LAB | Facility: HOSPITAL | Age: 60
End: 2025-02-24
Payer: COMMERCIAL

## 2025-02-24 ENCOUNTER — TRANSCRIBE ORDERS (OUTPATIENT)
Dept: ADMINISTRATIVE | Facility: HOSPITAL | Age: 60
End: 2025-02-24
Payer: COMMERCIAL

## 2025-02-24 DIAGNOSIS — Z85.048 HISTORY OF RECTAL CANCER: ICD-10-CM

## 2025-02-24 DIAGNOSIS — Z85.048 HISTORY OF RECTAL CANCER: Primary | ICD-10-CM

## 2025-02-24 PROCEDURE — 36415 COLL VENOUS BLD VENIPUNCTURE: CPT

## 2025-02-24 PROCEDURE — 80053 COMPREHEN METABOLIC PANEL: CPT

## 2025-02-24 PROCEDURE — 85025 COMPLETE CBC W/AUTO DIFF WBC: CPT

## 2025-02-25 LAB
ALBUMIN SERPL-MCNC: 4.3 G/DL (ref 3.5–5.2)
ALBUMIN/GLOB SERPL: 1.4 G/DL
ALP SERPL-CCNC: 55 U/L (ref 39–117)
ALT SERPL W P-5'-P-CCNC: 16 U/L (ref 1–41)
ANION GAP SERPL CALCULATED.3IONS-SCNC: 14.4 MMOL/L (ref 5–15)
AST SERPL-CCNC: 21 U/L (ref 1–40)
BASOPHILS # BLD AUTO: 0.07 10*3/MM3 (ref 0–0.2)
BASOPHILS NFR BLD AUTO: 0.8 % (ref 0–1.5)
BILIRUB SERPL-MCNC: <0.2 MG/DL (ref 0–1.2)
BUN SERPL-MCNC: 18 MG/DL (ref 8–23)
BUN/CREAT SERPL: 10.1 (ref 7–25)
CALCIUM SPEC-SCNC: 9.4 MG/DL (ref 8.6–10.5)
CHLORIDE SERPL-SCNC: 109 MMOL/L (ref 98–107)
CO2 SERPL-SCNC: 18.6 MMOL/L (ref 22–29)
CREAT SERPL-MCNC: 1.79 MG/DL (ref 0.76–1.27)
DEPRECATED RDW RBC AUTO: 45.8 FL (ref 37–54)
EGFRCR SERPLBLD CKD-EPI 2021: 42.6 ML/MIN/1.73
EOSINOPHIL # BLD AUTO: 0.09 10*3/MM3 (ref 0–0.4)
EOSINOPHIL NFR BLD AUTO: 1 % (ref 0.3–6.2)
ERYTHROCYTE [DISTWIDTH] IN BLOOD BY AUTOMATED COUNT: 14.1 % (ref 12.3–15.4)
GLOBULIN UR ELPH-MCNC: 3 GM/DL
GLUCOSE SERPL-MCNC: 306 MG/DL (ref 65–99)
HCT VFR BLD AUTO: 42 % (ref 37.5–51)
HGB BLD-MCNC: 13.3 G/DL (ref 13–17.7)
IMM GRANULOCYTES # BLD AUTO: 0.03 10*3/MM3 (ref 0–0.05)
IMM GRANULOCYTES NFR BLD AUTO: 0.3 % (ref 0–0.5)
LYMPHOCYTES # BLD AUTO: 2.36 10*3/MM3 (ref 0.7–3.1)
LYMPHOCYTES NFR BLD AUTO: 26.2 % (ref 19.6–45.3)
MCH RBC QN AUTO: 28.5 PG (ref 26.6–33)
MCHC RBC AUTO-ENTMCNC: 31.7 G/DL (ref 31.5–35.7)
MCV RBC AUTO: 89.9 FL (ref 79–97)
MONOCYTES # BLD AUTO: 0.65 10*3/MM3 (ref 0.1–0.9)
MONOCYTES NFR BLD AUTO: 7.2 % (ref 5–12)
NEUTROPHILS NFR BLD AUTO: 5.81 10*3/MM3 (ref 1.7–7)
NEUTROPHILS NFR BLD AUTO: 64.5 % (ref 42.7–76)
NRBC BLD AUTO-RTO: 0 /100 WBC (ref 0–0.2)
PLATELET # BLD AUTO: 389 10*3/MM3 (ref 140–450)
PMV BLD AUTO: 10.7 FL (ref 6–12)
POTASSIUM SERPL-SCNC: 4.3 MMOL/L (ref 3.5–5.2)
PROT SERPL-MCNC: 7.3 G/DL (ref 6–8.5)
RBC # BLD AUTO: 4.67 10*6/MM3 (ref 4.14–5.8)
SODIUM SERPL-SCNC: 142 MMOL/L (ref 136–145)
WBC NRBC COR # BLD AUTO: 9.01 10*3/MM3 (ref 3.4–10.8)

## 2025-03-03 ENCOUNTER — ANESTHESIA EVENT (OUTPATIENT)
Dept: PERIOP | Facility: HOSPITAL | Age: 60
End: 2025-03-03
Payer: COMMERCIAL

## 2025-03-03 RX ORDER — SODIUM CHLORIDE 0.9 % (FLUSH) 0.9 %
10 SYRINGE (ML) INJECTION AS NEEDED
Status: CANCELLED | OUTPATIENT
Start: 2025-03-03

## 2025-03-03 RX ORDER — SODIUM CHLORIDE 0.9 % (FLUSH) 0.9 %
10 SYRINGE (ML) INJECTION EVERY 12 HOURS SCHEDULED
Status: CANCELLED | OUTPATIENT
Start: 2025-03-03

## 2025-03-04 ENCOUNTER — ANESTHESIA EVENT CONVERTED (OUTPATIENT)
Dept: ANESTHESIOLOGY | Facility: HOSPITAL | Age: 60
End: 2025-03-04
Payer: COMMERCIAL

## 2025-03-04 ENCOUNTER — HOSPITAL ENCOUNTER (INPATIENT)
Facility: HOSPITAL | Age: 60
LOS: 2 days | Discharge: HOME | End: 2025-03-06
Attending: COLON & RECTAL SURGERY | Admitting: COLON & RECTAL SURGERY
Payer: COMMERCIAL

## 2025-03-04 ENCOUNTER — ANESTHESIA (OUTPATIENT)
Dept: PERIOP | Facility: HOSPITAL | Age: 60
End: 2025-03-04
Payer: COMMERCIAL

## 2025-03-04 DIAGNOSIS — Z85.048 HISTORY OF RECTAL CANCER: ICD-10-CM

## 2025-03-04 PROBLEM — Z98.890 STATUS POST REVERSAL OF ILEOSTOMY: Status: ACTIVE | Noted: 2025-03-04

## 2025-03-04 PROBLEM — N28.9 RENAL INSUFFICIENCY: Status: ACTIVE | Noted: 2025-03-04

## 2025-03-04 LAB
ANION GAP SERPL CALCULATED.3IONS-SCNC: 13 MMOL/L (ref 5–15)
BASOPHILS # BLD AUTO: 0.02 10*3/MM3 (ref 0–0.2)
BASOPHILS NFR BLD AUTO: 0.2 % (ref 0–1.5)
BUN SERPL-MCNC: 16 MG/DL (ref 8–23)
BUN/CREAT SERPL: 9.6 (ref 7–25)
CALCIUM SPEC-SCNC: 8.5 MG/DL (ref 8.6–10.5)
CHLORIDE SERPL-SCNC: 107 MMOL/L (ref 98–107)
CO2 SERPL-SCNC: 19 MMOL/L (ref 22–29)
CREAT SERPL-MCNC: 1.66 MG/DL (ref 0.76–1.27)
DEPRECATED RDW RBC AUTO: 48.8 FL (ref 37–54)
EGFRCR SERPLBLD CKD-EPI 2021: 46.6 ML/MIN/1.73
EOSINOPHIL # BLD AUTO: 0.01 10*3/MM3 (ref 0–0.4)
EOSINOPHIL NFR BLD AUTO: 0.1 % (ref 0.3–6.2)
ERYTHROCYTE [DISTWIDTH] IN BLOOD BY AUTOMATED COUNT: 14.6 % (ref 12.3–15.4)
GLUCOSE BLDC GLUCOMTR-MCNC: 117 MG/DL (ref 70–130)
GLUCOSE BLDC GLUCOMTR-MCNC: 159 MG/DL (ref 70–130)
GLUCOSE BLDC GLUCOMTR-MCNC: 60 MG/DL (ref 70–130)
GLUCOSE BLDC GLUCOMTR-MCNC: 66 MG/DL (ref 70–130)
GLUCOSE BLDC GLUCOMTR-MCNC: 88 MG/DL (ref 70–130)
GLUCOSE SERPL-MCNC: 88 MG/DL (ref 65–99)
HCT VFR BLD AUTO: 40.2 % (ref 37.5–51)
HGB BLD-MCNC: 12.2 G/DL (ref 13–17.7)
IMM GRANULOCYTES # BLD AUTO: 0.04 10*3/MM3 (ref 0–0.05)
IMM GRANULOCYTES NFR BLD AUTO: 0.4 % (ref 0–0.5)
LYMPHOCYTES # BLD AUTO: 0.8 10*3/MM3 (ref 0.7–3.1)
LYMPHOCYTES NFR BLD AUTO: 7.2 % (ref 19.6–45.3)
MCH RBC QN AUTO: 27.8 PG (ref 26.6–33)
MCHC RBC AUTO-ENTMCNC: 30.3 G/DL (ref 31.5–35.7)
MCV RBC AUTO: 91.6 FL (ref 79–97)
MONOCYTES # BLD AUTO: 0.12 10*3/MM3 (ref 0.1–0.9)
MONOCYTES NFR BLD AUTO: 1.1 % (ref 5–12)
NEUTROPHILS NFR BLD AUTO: 10.14 10*3/MM3 (ref 1.7–7)
NEUTROPHILS NFR BLD AUTO: 91 % (ref 42.7–76)
NRBC BLD AUTO-RTO: 0 /100 WBC (ref 0–0.2)
PLATELET # BLD AUTO: 230 10*3/MM3 (ref 140–450)
PMV BLD AUTO: 10.2 FL (ref 6–12)
POTASSIUM SERPL-SCNC: 4.2 MMOL/L (ref 3.5–5.2)
RBC # BLD AUTO: 4.39 10*6/MM3 (ref 4.14–5.8)
SODIUM SERPL-SCNC: 139 MMOL/L (ref 136–145)
WBC NRBC COR # BLD AUTO: 11.13 10*3/MM3 (ref 3.4–10.8)

## 2025-03-04 PROCEDURE — 0DBB0ZZ EXCISION OF ILEUM, OPEN APPROACH: ICD-10-PCS | Performed by: COLON & RECTAL SURGERY

## 2025-03-04 PROCEDURE — 82948 REAGENT STRIP/BLOOD GLUCOSE: CPT

## 2025-03-04 PROCEDURE — 25810000003 LACTATED RINGERS PER 1000 ML: Performed by: ANESTHESIOLOGY

## 2025-03-04 PROCEDURE — 25010000002 ERTAPENEM PER 500 MG: Performed by: COLON & RECTAL SURGERY

## 2025-03-04 PROCEDURE — 25010000002 LIDOCAINE PF 1% 1 % SOLUTION: Performed by: ANESTHESIOLOGY

## 2025-03-04 PROCEDURE — 25010000002 ESMOLOL 100 MG/10ML SOLUTION: Performed by: ANESTHESIOLOGY

## 2025-03-04 PROCEDURE — 25010000002 FENTANYL CITRATE (PF) 100 MCG/2ML SOLUTION: Performed by: ANESTHESIOLOGY

## 2025-03-04 PROCEDURE — 25010000002 ALBUMIN HUMAN 5% PER 50 ML: Performed by: ANESTHESIOLOGY

## 2025-03-04 PROCEDURE — P9041 ALBUMIN (HUMAN),5%, 50ML: HCPCS | Performed by: ANESTHESIOLOGY

## 2025-03-04 PROCEDURE — 25010000002 HEPARIN (PORCINE) PER 1000 UNITS: Performed by: COLON & RECTAL SURGERY

## 2025-03-04 PROCEDURE — 25010000002 ONDANSETRON PER 1 MG: Performed by: ANESTHESIOLOGY

## 2025-03-04 PROCEDURE — 25010000002 BUPIVACAINE (PF) 0.25 % SOLUTION: Performed by: ANESTHESIOLOGY

## 2025-03-04 PROCEDURE — 88304 TISSUE EXAM BY PATHOLOGIST: CPT | Performed by: COLON & RECTAL SURGERY

## 2025-03-04 PROCEDURE — 25810000003 SODIUM CHLORIDE 0.9 % SOLUTION: Performed by: COLON & RECTAL SURGERY

## 2025-03-04 PROCEDURE — 80048 BASIC METABOLIC PNL TOTAL CA: CPT | Performed by: COLON & RECTAL SURGERY

## 2025-03-04 PROCEDURE — 25010000002 DEXAMETHASONE SODIUM PHOSPHATE 10 MG/ML SOLUTION: Performed by: ANESTHESIOLOGY

## 2025-03-04 PROCEDURE — 25010000002 PROPOFOL 10 MG/ML EMULSION: Performed by: ANESTHESIOLOGY

## 2025-03-04 PROCEDURE — 85025 COMPLETE CBC W/AUTO DIFF WBC: CPT | Performed by: COLON & RECTAL SURGERY

## 2025-03-04 PROCEDURE — 25810000003 SODIUM CHLORIDE 0.9 % SOLUTION: Performed by: INTERNAL MEDICINE

## 2025-03-04 PROCEDURE — 25010000002 SUGAMMADEX 200 MG/2ML SOLUTION: Performed by: ANESTHESIOLOGY

## 2025-03-04 PROCEDURE — 82378 CARCINOEMBRYONIC ANTIGEN: CPT | Performed by: COLON & RECTAL SURGERY

## 2025-03-04 DEVICE — PROXIMATE RELOADABLE LINEAR CUTTER WITH SAFETY LOCK-OUT, 75MM
Type: IMPLANTABLE DEVICE | Site: ABDOMEN | Status: FUNCTIONAL
Brand: PROXIMATE

## 2025-03-04 DEVICE — PROXIMATE RELOADABLE LINEAR STAPLER
Type: IMPLANTABLE DEVICE | Site: ABDOMEN | Status: FUNCTIONAL
Brand: PROXIMATE

## 2025-03-04 RX ORDER — DEXTROSE MONOHYDRATE 25 G/50ML
25 INJECTION, SOLUTION INTRAVENOUS
Status: DISCONTINUED | OUTPATIENT
Start: 2025-03-04 | End: 2025-03-04 | Stop reason: HOSPADM

## 2025-03-04 RX ORDER — HYDROMORPHONE HYDROCHLORIDE 1 MG/ML
0.5 INJECTION, SOLUTION INTRAMUSCULAR; INTRAVENOUS; SUBCUTANEOUS
Status: DISCONTINUED | OUTPATIENT
Start: 2025-03-04 | End: 2025-03-04 | Stop reason: HOSPADM

## 2025-03-04 RX ORDER — SODIUM CHLORIDE 9 MG/ML
9 INJECTION, SOLUTION INTRAVENOUS CONTINUOUS
Status: ACTIVE | OUTPATIENT
Start: 2025-03-04 | End: 2025-03-05

## 2025-03-04 RX ORDER — DEXAMETHASONE SODIUM PHOSPHATE 10 MG/ML
INJECTION, SOLUTION INTRAMUSCULAR; INTRAVENOUS
Status: COMPLETED | OUTPATIENT
Start: 2025-03-04 | End: 2025-03-04

## 2025-03-04 RX ORDER — DEXAMETHASONE SODIUM PHOSPHATE 10 MG/ML
INJECTION, SOLUTION INTRAMUSCULAR; INTRAVENOUS AS NEEDED
Status: DISCONTINUED | OUTPATIENT
Start: 2025-03-04 | End: 2025-03-04 | Stop reason: SURG

## 2025-03-04 RX ORDER — ASPIRIN 81 MG/1
81 TABLET ORAL DAILY
Status: DISCONTINUED | OUTPATIENT
Start: 2025-03-05 | End: 2025-03-06 | Stop reason: HOSPADM

## 2025-03-04 RX ORDER — ACETAMINOPHEN 325 MG/1
650 TABLET ORAL EVERY 8 HOURS
Status: DISCONTINUED | OUTPATIENT
Start: 2025-03-04 | End: 2025-03-06 | Stop reason: HOSPADM

## 2025-03-04 RX ORDER — HYDROMORPHONE HYDROCHLORIDE 1 MG/ML
0.5 INJECTION, SOLUTION INTRAMUSCULAR; INTRAVENOUS; SUBCUTANEOUS
Status: DISCONTINUED | OUTPATIENT
Start: 2025-03-04 | End: 2025-03-06 | Stop reason: HOSPADM

## 2025-03-04 RX ORDER — FENTANYL CITRATE 50 UG/ML
50 INJECTION, SOLUTION INTRAMUSCULAR; INTRAVENOUS
Status: DISCONTINUED | OUTPATIENT
Start: 2025-03-04 | End: 2025-03-04 | Stop reason: HOSPADM

## 2025-03-04 RX ORDER — INSULIN LISPRO 100 [IU]/ML
3-14 INJECTION, SOLUTION INTRAVENOUS; SUBCUTANEOUS
Status: DISCONTINUED | OUTPATIENT
Start: 2025-03-04 | End: 2025-03-06 | Stop reason: HOSPADM

## 2025-03-04 RX ORDER — ROSUVASTATIN CALCIUM 10 MG/1
10 TABLET, COATED ORAL NIGHTLY
Status: DISCONTINUED | OUTPATIENT
Start: 2025-03-05 | End: 2025-03-06 | Stop reason: HOSPADM

## 2025-03-04 RX ORDER — ONDANSETRON 2 MG/ML
INJECTION INTRAMUSCULAR; INTRAVENOUS AS NEEDED
Status: DISCONTINUED | OUTPATIENT
Start: 2025-03-04 | End: 2025-03-04 | Stop reason: SURG

## 2025-03-04 RX ORDER — NITROGLYCERIN 0.4 MG/1
0.4 TABLET SUBLINGUAL
Status: DISCONTINUED | OUTPATIENT
Start: 2025-03-04 | End: 2025-03-06 | Stop reason: HOSPADM

## 2025-03-04 RX ORDER — IBUPROFEN 600 MG/1
1 TABLET ORAL
Status: DISCONTINUED | OUTPATIENT
Start: 2025-03-04 | End: 2025-03-06 | Stop reason: HOSPADM

## 2025-03-04 RX ORDER — GABAPENTIN 100 MG/1
100 CAPSULE ORAL 3 TIMES DAILY
Status: DISCONTINUED | OUTPATIENT
Start: 2025-03-04 | End: 2025-03-06 | Stop reason: HOSPADM

## 2025-03-04 RX ORDER — PROPOFOL 10 MG/ML
VIAL (ML) INTRAVENOUS AS NEEDED
Status: DISCONTINUED | OUTPATIENT
Start: 2025-03-04 | End: 2025-03-04 | Stop reason: SURG

## 2025-03-04 RX ORDER — SODIUM CHLORIDE, SODIUM LACTATE, POTASSIUM CHLORIDE, CALCIUM CHLORIDE 600; 310; 30; 20 MG/100ML; MG/100ML; MG/100ML; MG/100ML
INJECTION, SOLUTION INTRAVENOUS CONTINUOUS PRN
Status: DISCONTINUED | OUTPATIENT
Start: 2025-03-04 | End: 2025-03-04 | Stop reason: SURG

## 2025-03-04 RX ORDER — ONDANSETRON 2 MG/ML
4 INJECTION INTRAMUSCULAR; INTRAVENOUS EVERY 6 HOURS PRN
Status: DISCONTINUED | OUTPATIENT
Start: 2025-03-04 | End: 2025-03-06 | Stop reason: HOSPADM

## 2025-03-04 RX ORDER — NALOXONE HCL 0.4 MG/ML
0.4 VIAL (ML) INJECTION
Status: DISCONTINUED | OUTPATIENT
Start: 2025-03-04 | End: 2025-03-06 | Stop reason: HOSPADM

## 2025-03-04 RX ORDER — DEXTROSE MONOHYDRATE 25 G/50ML
INJECTION, SOLUTION INTRAVENOUS
Status: COMPLETED
Start: 2025-03-04 | End: 2025-03-04

## 2025-03-04 RX ORDER — SODIUM CHLORIDE AND POTASSIUM CHLORIDE 150; 900 MG/100ML; MG/100ML
100 INJECTION, SOLUTION INTRAVENOUS CONTINUOUS
Status: DISCONTINUED | OUTPATIENT
Start: 2025-03-04 | End: 2025-03-04

## 2025-03-04 RX ORDER — LABETALOL HYDROCHLORIDE 5 MG/ML
10 INJECTION, SOLUTION INTRAVENOUS EVERY 4 HOURS PRN
Status: DISCONTINUED | OUTPATIENT
Start: 2025-03-04 | End: 2025-03-06 | Stop reason: HOSPADM

## 2025-03-04 RX ORDER — ALVIMOPAN 12 MG/1
12 CAPSULE ORAL ONCE
Status: DISCONTINUED | OUTPATIENT
Start: 2025-03-04 | End: 2025-03-04

## 2025-03-04 RX ORDER — SODIUM CHLORIDE 9 MG/ML
100 INJECTION, SOLUTION INTRAVENOUS CONTINUOUS
Status: DISCONTINUED | OUTPATIENT
Start: 2025-03-04 | End: 2025-03-06 | Stop reason: HOSPADM

## 2025-03-04 RX ORDER — MIDAZOLAM HYDROCHLORIDE 1 MG/ML
1 INJECTION, SOLUTION INTRAMUSCULAR; INTRAVENOUS
Status: DISCONTINUED | OUTPATIENT
Start: 2025-03-04 | End: 2025-03-04 | Stop reason: HOSPADM

## 2025-03-04 RX ORDER — ESMOLOL HYDROCHLORIDE 10 MG/ML
INJECTION INTRAVENOUS AS NEEDED
Status: DISCONTINUED | OUTPATIENT
Start: 2025-03-04 | End: 2025-03-04 | Stop reason: SURG

## 2025-03-04 RX ORDER — IBUPROFEN 400 MG/1
400 TABLET, FILM COATED ORAL EVERY 6 HOURS SCHEDULED
Status: DISCONTINUED | OUTPATIENT
Start: 2025-03-05 | End: 2025-03-04

## 2025-03-04 RX ORDER — LIDOCAINE HYDROCHLORIDE 10 MG/ML
INJECTION, SOLUTION EPIDURAL; INFILTRATION; INTRACAUDAL; PERINEURAL AS NEEDED
Status: DISCONTINUED | OUTPATIENT
Start: 2025-03-04 | End: 2025-03-04 | Stop reason: SURG

## 2025-03-04 RX ORDER — SCOPOLAMINE 1 MG/3D
1 PATCH, EXTENDED RELEASE TRANSDERMAL ONCE
Status: DISCONTINUED | OUTPATIENT
Start: 2025-03-04 | End: 2025-03-06 | Stop reason: HOSPADM

## 2025-03-04 RX ORDER — SODIUM CHLORIDE, SODIUM LACTATE, POTASSIUM CHLORIDE, CALCIUM CHLORIDE 600; 310; 30; 20 MG/100ML; MG/100ML; MG/100ML; MG/100ML
9 INJECTION, SOLUTION INTRAVENOUS CONTINUOUS
Status: DISCONTINUED | OUTPATIENT
Start: 2025-03-05 | End: 2025-03-04

## 2025-03-04 RX ORDER — HYDROCODONE BITARTRATE AND ACETAMINOPHEN 7.5; 325 MG/1; MG/1
1 TABLET ORAL EVERY 4 HOURS PRN
Status: DISCONTINUED | OUTPATIENT
Start: 2025-03-04 | End: 2025-03-06 | Stop reason: HOSPADM

## 2025-03-04 RX ORDER — LIDOCAINE HYDROCHLORIDE 10 MG/ML
0.5 INJECTION, SOLUTION EPIDURAL; INFILTRATION; INTRACAUDAL; PERINEURAL ONCE AS NEEDED
Status: COMPLETED | OUTPATIENT
Start: 2025-03-04 | End: 2025-03-04

## 2025-03-04 RX ORDER — HEPARIN SODIUM 5000 [USP'U]/ML
5000 INJECTION, SOLUTION INTRAVENOUS; SUBCUTANEOUS EVERY 8 HOURS SCHEDULED
Status: DISCONTINUED | OUTPATIENT
Start: 2025-03-05 | End: 2025-03-06 | Stop reason: HOSPADM

## 2025-03-04 RX ORDER — HEPARIN SODIUM 5000 [USP'U]/ML
5000 INJECTION, SOLUTION INTRAVENOUS; SUBCUTANEOUS ONCE
Status: COMPLETED | OUTPATIENT
Start: 2025-03-04 | End: 2025-03-04

## 2025-03-04 RX ORDER — ALVIMOPAN 12 MG/1
12 CAPSULE ORAL 2 TIMES DAILY
Status: DISCONTINUED | OUTPATIENT
Start: 2025-03-05 | End: 2025-03-06 | Stop reason: HOSPADM

## 2025-03-04 RX ORDER — MELOXICAM 15 MG/1
15 TABLET ORAL ONCE
Status: COMPLETED | OUTPATIENT
Start: 2025-03-04 | End: 2025-03-04

## 2025-03-04 RX ORDER — BUPIVACAINE HYDROCHLORIDE 2.5 MG/ML
INJECTION, SOLUTION EPIDURAL; INFILTRATION; INTRACAUDAL
Status: COMPLETED | OUTPATIENT
Start: 2025-03-04 | End: 2025-03-04

## 2025-03-04 RX ORDER — PANTOPRAZOLE SODIUM 40 MG/1
40 TABLET, DELAYED RELEASE ORAL
Status: DISCONTINUED | OUTPATIENT
Start: 2025-03-05 | End: 2025-03-06 | Stop reason: HOSPADM

## 2025-03-04 RX ORDER — DIAZEPAM 5 MG/1
5 TABLET ORAL EVERY 6 HOURS PRN
Status: DISCONTINUED | OUTPATIENT
Start: 2025-03-04 | End: 2025-03-06 | Stop reason: HOSPADM

## 2025-03-04 RX ORDER — NICOTINE POLACRILEX 4 MG
15 LOZENGE BUCCAL
Status: DISCONTINUED | OUTPATIENT
Start: 2025-03-04 | End: 2025-03-06 | Stop reason: HOSPADM

## 2025-03-04 RX ORDER — ONDANSETRON 2 MG/ML
4 INJECTION INTRAMUSCULAR; INTRAVENOUS ONCE AS NEEDED
Status: DISCONTINUED | OUTPATIENT
Start: 2025-03-04 | End: 2025-03-04 | Stop reason: HOSPADM

## 2025-03-04 RX ORDER — ROCURONIUM BROMIDE 10 MG/ML
INJECTION, SOLUTION INTRAVENOUS AS NEEDED
Status: DISCONTINUED | OUTPATIENT
Start: 2025-03-04 | End: 2025-03-04 | Stop reason: SURG

## 2025-03-04 RX ORDER — FAMOTIDINE 20 MG/1
20 TABLET, FILM COATED ORAL ONCE
Status: COMPLETED | OUTPATIENT
Start: 2025-03-04 | End: 2025-03-04

## 2025-03-04 RX ORDER — ONDANSETRON 4 MG/1
4 TABLET, ORALLY DISINTEGRATING ORAL EVERY 6 HOURS PRN
Status: DISCONTINUED | OUTPATIENT
Start: 2025-03-04 | End: 2025-03-06 | Stop reason: HOSPADM

## 2025-03-04 RX ORDER — DEXTROSE MONOHYDRATE 25 G/50ML
25 INJECTION, SOLUTION INTRAVENOUS ONCE
Status: COMPLETED | OUTPATIENT
Start: 2025-03-04 | End: 2025-03-04

## 2025-03-04 RX ORDER — ALBUMIN HUMAN 50 G/1000ML
SOLUTION INTRAVENOUS CONTINUOUS PRN
Status: DISCONTINUED | OUTPATIENT
Start: 2025-03-04 | End: 2025-03-04 | Stop reason: SURG

## 2025-03-04 RX ORDER — INSULIN LISPRO 100 [IU]/ML
5 INJECTION, SOLUTION INTRAVENOUS; SUBCUTANEOUS
Status: DISCONTINUED | OUTPATIENT
Start: 2025-03-04 | End: 2025-03-06 | Stop reason: HOSPADM

## 2025-03-04 RX ORDER — DEXTROSE MONOHYDRATE 25 G/50ML
25 INJECTION, SOLUTION INTRAVENOUS
Status: DISCONTINUED | OUTPATIENT
Start: 2025-03-04 | End: 2025-03-06 | Stop reason: HOSPADM

## 2025-03-04 RX ORDER — DEXTROSE MONOHYDRATE 25 G/50ML
50 INJECTION, SOLUTION INTRAVENOUS
Status: DISCONTINUED | OUTPATIENT
Start: 2025-03-04 | End: 2025-03-04

## 2025-03-04 RX ORDER — PREGABALIN 75 MG/1
75 CAPSULE ORAL ONCE
Status: COMPLETED | OUTPATIENT
Start: 2025-03-04 | End: 2025-03-04

## 2025-03-04 RX ORDER — TAMSULOSIN HYDROCHLORIDE 0.4 MG/1
0.4 CAPSULE ORAL NIGHTLY
Status: DISCONTINUED | OUTPATIENT
Start: 2025-03-05 | End: 2025-03-06 | Stop reason: HOSPADM

## 2025-03-04 RX ORDER — BUPIVACAINE HCL/0.9 % NACL/PF 0.125 %
PLASTIC BAG, INJECTION (ML) EPIDURAL AS NEEDED
Status: DISCONTINUED | OUTPATIENT
Start: 2025-03-04 | End: 2025-03-04 | Stop reason: SURG

## 2025-03-04 RX ORDER — ACETAMINOPHEN 500 MG
1000 TABLET ORAL ONCE
Status: COMPLETED | OUTPATIENT
Start: 2025-03-04 | End: 2025-03-04

## 2025-03-04 RX ORDER — FENTANYL CITRATE 50 UG/ML
INJECTION, SOLUTION INTRAMUSCULAR; INTRAVENOUS AS NEEDED
Status: DISCONTINUED | OUTPATIENT
Start: 2025-03-04 | End: 2025-03-04 | Stop reason: SURG

## 2025-03-04 RX ADMIN — ACETAMINOPHEN 650 MG: 325 TABLET, FILM COATED ORAL at 17:50

## 2025-03-04 RX ADMIN — LIDOCAINE HYDROCHLORIDE 0.5 ML: 10 INJECTION, SOLUTION EPIDURAL; INFILTRATION; INTRACAUDAL; PERINEURAL at 08:59

## 2025-03-04 RX ADMIN — FENTANYL CITRATE 100 MCG: 50 INJECTION, SOLUTION INTRAMUSCULAR; INTRAVENOUS at 10:19

## 2025-03-04 RX ADMIN — Medication 100 MCG: at 10:26

## 2025-03-04 RX ADMIN — DEXTROSE MONOHYDRATE 25 ML: 25 INJECTION, SOLUTION INTRAVENOUS at 11:22

## 2025-03-04 RX ADMIN — ONDANSETRON 4 MG: 2 INJECTION INTRAMUSCULAR; INTRAVENOUS at 10:32

## 2025-03-04 RX ADMIN — PREGABALIN 75 MG: 75 CAPSULE ORAL at 08:59

## 2025-03-04 RX ADMIN — HEPARIN SODIUM 5000 UNITS: 5000 INJECTION INTRAVENOUS; SUBCUTANEOUS at 08:59

## 2025-03-04 RX ADMIN — FENTANYL CITRATE 100 MCG: 50 INJECTION, SOLUTION INTRAMUSCULAR; INTRAVENOUS at 09:38

## 2025-03-04 RX ADMIN — MELOXICAM 15 MG: 15 TABLET ORAL at 08:59

## 2025-03-04 RX ADMIN — BUPIVACAINE HYDROCHLORIDE 60 ML: 2.5 INJECTION, SOLUTION EPIDURAL; INFILTRATION; INTRACAUDAL; PERINEURAL at 09:41

## 2025-03-04 RX ADMIN — DEXAMETHASONE SODIUM PHOSPHATE 4 MG: 10 INJECTION, SOLUTION INTRAMUSCULAR; INTRAVENOUS at 09:41

## 2025-03-04 RX ADMIN — PROPOFOL 150 MG: 10 INJECTION, EMULSION INTRAVENOUS at 09:38

## 2025-03-04 RX ADMIN — ESMOLOL HYDROCHLORIDE 20 MG: 10 INJECTION, SOLUTION INTRAVENOUS at 09:44

## 2025-03-04 RX ADMIN — Medication 100 MCG: at 10:29

## 2025-03-04 RX ADMIN — Medication 100 MCG: at 10:33

## 2025-03-04 RX ADMIN — LIDOCAINE HYDROCHLORIDE 50 MG: 10 INJECTION, SOLUTION EPIDURAL; INFILTRATION; INTRACAUDAL; PERINEURAL at 09:38

## 2025-03-04 RX ADMIN — PROPOFOL 25 MCG/KG/MIN: 10 INJECTION, EMULSION INTRAVENOUS at 09:43

## 2025-03-04 RX ADMIN — Medication 200 MCG: at 10:35

## 2025-03-04 RX ADMIN — SODIUM CHLORIDE, POTASSIUM CHLORIDE, SODIUM LACTATE AND CALCIUM CHLORIDE: 600; 310; 30; 20 INJECTION, SOLUTION INTRAVENOUS at 09:33

## 2025-03-04 RX ADMIN — ROCURONIUM BROMIDE 50 MG: 10 INJECTION INTRAVENOUS at 09:38

## 2025-03-04 RX ADMIN — ALBUMIN (HUMAN): 12.5 INJECTION, SOLUTION INTRAVENOUS at 10:29

## 2025-03-04 RX ADMIN — SCOPOLAMINE 1 PATCH: 1.5 PATCH, EXTENDED RELEASE TRANSDERMAL at 08:59

## 2025-03-04 RX ADMIN — ACETAMINOPHEN 1000 MG: 500 TABLET ORAL at 08:59

## 2025-03-04 RX ADMIN — ERTAPENEM 1000 MG: 1 INJECTION INTRAMUSCULAR; INTRAVENOUS at 09:43

## 2025-03-04 RX ADMIN — DEXTROSE MONOHYDRATE 25 ML: 25 INJECTION, SOLUTION INTRAVENOUS at 09:29

## 2025-03-04 RX ADMIN — SUGAMMADEX 200 MG: 100 INJECTION, SOLUTION INTRAVENOUS at 10:36

## 2025-03-04 RX ADMIN — DEXAMETHASONE SODIUM PHOSPHATE 4 MG: 10 INJECTION INTRAMUSCULAR; INTRAVENOUS at 09:45

## 2025-03-04 RX ADMIN — SODIUM CHLORIDE 100 ML/HR: 9 INJECTION, SOLUTION INTRAVENOUS at 20:30

## 2025-03-04 RX ADMIN — SODIUM CHLORIDE 250 ML: 9 INJECTION, SOLUTION INTRAVENOUS at 17:50

## 2025-03-04 RX ADMIN — SODIUM CHLORIDE 9 ML/HR: 9 INJECTION, SOLUTION INTRAVENOUS at 09:02

## 2025-03-04 RX ADMIN — FAMOTIDINE 20 MG: 20 TABLET, FILM COATED ORAL at 08:59

## 2025-03-04 RX ADMIN — GABAPENTIN 100 MG: 100 CAPSULE ORAL at 17:50

## 2025-03-04 RX ADMIN — ESMOLOL HYDROCHLORIDE 20 MG: 10 INJECTION, SOLUTION INTRAVENOUS at 10:20

## 2025-03-04 NOTE — ANESTHESIA PREPROCEDURE EVALUATION
Anesthesia Evaluation     Patient summary reviewed and Nursing notes reviewed   no history of anesthetic complications:   NPO Solid Status: > 8 hours  NPO Liquid Status: > 2 hours           Airway   Mallampati: II  TM distance: >3 FB  Neck ROM: full  Possible difficult intubation  Dental          Pulmonary    (-) not a smoker  Cardiovascular   Exercise tolerance: good (4-7 METS)    ECG reviewed  Rhythm: regular  Rate: normal    (+) hypertension, hyperlipidemia  (-) dysrhythmias, angina, CHF, murmur, cardiac stents      Neuro/Psych  (-) seizures, CVA  GI/Hepatic/Renal/Endo    (+) GERD well controlled, renal disease- stones, diabetes mellitus using insulin    Musculoskeletal     Abdominal    Substance History - negative use     OB/GYN          Other   arthritis,   history of cancer (colon ca)    ROS/Med Hx Other: Hx pancreatitis; Hx LAR  10/24- [id number] view  Hgb 13.3 k 4.3   No n/v/gerd/glp1        Phys Exam Other: Multiple missing teeth                  Anesthesia Plan    ASA 3     general with block     (Risks and benefits of general anesthesia discussed with patient (including MI, CVA, death, recall, aspiration, oropharyngeal/dental damage), questions answered, agreeable to proceed.    Benefits and risks of TAP nerve block/catheter discussed with patient ((including failed block, damage to nerve/nearby structures, intravascular injection)); questions answered, agreeable to proceed.    )  intravenous induction     Anesthetic plan, risks, benefits, and alternatives have been provided, discussed and informed consent has been obtained with: patient.    Use of blood products discussed with patient  Consented to blood products.    Plan discussed with CRNA.        CODE STATUS:

## 2025-03-04 NOTE — ANESTHESIA POSTPROCEDURE EVALUATION
Patient: Valentin Vásquez    Procedure Summary       Date: 03/04/25 Room / Location:  [facility]    Anesthesia Start: 0933 Anesthesia Stop: 1054    Procedure: ILEOSTOMY TAKEDOWN Diagnosis:     Surgeons: José Manuel Hutchins MD Provider: Rebecca Saeed DO    Anesthesia Type: general with block ASA Status: 3            Anesthesia Type: general with block    Vitals  No vitals data found for the desired time range.          Post Anesthesia Care and Evaluation    Patient location during evaluation: PACU  Patient participation: waiting for patient participation  Level of consciousness: responsive to physical stimuli and sleepy but conscious  Pain score: 0  Pain management: adequate    Airway patency: patent  Anesthetic complications: No anesthetic complications  PONV Status: none  Cardiovascular status: hemodynamically stable and acceptable  Respiratory status: nonlabored ventilation, acceptable and nasal cannula  Hydration status: acceptable

## 2025-03-04 NOTE — ANESTHESIA PROCEDURE NOTES
Airway  Urgency: elective    Date/Time: 3/4/2025 9:40 AM  Airway not difficult    General Information and Staff    Patient location during procedure: OR  SRNA: Kacy Miranda SRNA  Indications and Patient Condition  Indications for airway management: airway protection    Preoxygenated: yes  MILS not maintained throughout  Mask difficulty assessment: 1 - vent by mask    Final Airway Details  Final airway type: endotracheal airway      Successful airway: ETT  Cuffed: yes   Successful intubation technique: direct laryngoscopy  Endotracheal tube insertion site: oral  Blade: Gómez  Blade size: 3  ETT size (mm): 7.5  Cormack-Lehane Classification: grade I - full view of glottis  Placement verified by: chest auscultation and capnometry   Cuff volume (mL): 7  Measured from: lips  ETT/EBT  to lips (cm): 20  Number of attempts at approach: 1  Assessment: lips, teeth, and gum same as pre-op and atraumatic intubation    Additional Comments  Negative epigastric sounds, Breath sound equal bilaterally with symmetric chest rise and fall

## 2025-03-04 NOTE — ANESTHESIA PROCEDURE NOTES
"Peripheral Block      Patient reassessed immediately prior to procedure    Patient location during procedure: OR  Start time: 3/4/2025 9:41 AM  Reason for block: at surgeon's request and post-op pain management  Performed by  CRNA/CAA: Tyshawn Bojorquez, MALLORIE: Kacy Miranda SRNA  Preanesthetic Checklist  Completed: patient identified, IV checked, site marked, risks and benefits discussed, surgical consent, monitors and equipment checked, pre-op evaluation and timeout performed  Prep:  Pt Position: supine  Sterile barriers:cap, gloves, mask and washed/disinfected hands  Prep: ChloraPrep  Patient monitoring: blood pressure monitoring, continuous pulse oximetry and EKG  Procedure    Sedation: yes  Performed under: general  Guidance:ultrasound guided  Images:still images obtained, printed/placed on chart    Laterality:right  Block Type:TAP  Injection Technique:single-shot  Needle Type:short-bevel and echogenic  Needle Gauge:20 G  Resistance on Injection: none    Medications Used: dexamethasone sodium phosphate injection - Injection   4 mg - 3/4/2025 9:41:00 AM  bupivacaine PF (MARCAINE) 0.25 % injection - Injection   60 mL - 3/4/2025 9:41:00 AM      Medications  Comment:        Post Assessment  Injection Assessment: negative aspiration for heme, incremental injection and no paresthesia on injection  Patient Tolerance:comfortable throughout block  Complications:no  Additional Notes    Subcostal TAPs    A high-frequency linear transducer, with sterile cover, was placed sub-xiphoid to identify Line[place] Alba, right and left Rectus Abdominus Muscles (NADER). The transducer was moved either right or left subcostally to identify the NADER and the Transverse Abdominus Muscle (MATTHEWS). The insertion site was prepped in sterile fashion and then localized with 2-5 ml of 1% Lidocaine. Using ultrasound-guidance, a 20-gauge B-Vieyra 4\" Ultraplex 360 non-stimulating echogenic needle was advanced in plane, from medial to lateral, until the tip " "of the needle was in the fascial plane between the NADER and MATTHEWS. 1-3ml of preservative free normal saline was used to hydro-dissect the fascial planes. After the fascial plane was verified, the local anesthetic (LA) was injected. The procedure was repeated on the opposite side for bilateral coverage. Aspiration every 5 ml to prevent intravascular injection. Injection was completed with negative aspiration of blood and negative intravascular injection. Injection pressures were normal with minimal resistance. The subcostal approach to the TAP nerve block ideally anesthetizes the intercostal nerves T6-T9.     Mid-Axillary/Lateral TAPs    A high-frequency linear transducer, with sterile cover, was placed in the midaxillary line between the ASIS and costal margin. The External Oblique Muscle (EOM), Internal Oblique Muscle (IOM), Transverse Abdominus Muscle (MATTHEWS), and Peritoneum were identified. The insertion site was prepped in sterile fashion and then localized with 2-5 ml of 1% Lidocaine. Using ultrasound-guidance, a 20-gauge B-Vieyra 4\" Ultraplex 360 non-stimulating echogenic needle was advanced in plane, from medial to lateral, until the tip of the needle was in the fascial plane between the IOM and MATTHEWS. 1-3ml of preservative free normal saline was used to hydro-dissect the fascial planes. After the fascial plane was verified, the local anesthetic (LA) was injected. The procedure was repeated on the opposite side for bilateral coverage. Aspiration every 5 ml to prevent intravascular injection. Injection was completed with negative aspiration of blood and negative intravascular injection. Injection pressures were normal with minimal resistance. Midaxillary TAPs should reach intercostal nerves T10- T11 and the subcostal nerve T12.    Performed by: Tyshawn Bojorquez, CRNA            "

## 2025-03-04 NOTE — OP NOTE
[facility] ([facility])    ILEOSTOMY TAKEDOWN  Procedure Note    Valentin Vásquez  3/4/2025    Pre-op Diagnosis: History of rectal cancer and renal failure.  Creatinine 1.7.    Post-op Diagnosis:   Adhesions      Anesthesia: General with Block  ASA Score: III     Staff:   Josefaulator: Jackelyn Julian RN  Scrub Person: Caty Barillas  Assistant: Chhaya Palma PA-C    Assistant: Chhaya Palma PA-C was responsible for performing the following activities: Retraction, Suction, Irrigation, and Placing Dressing and their skilled assistance was necessary for the success of this case.         Estimated Blood Loss: 50 mL    Specimens: Ileostomy        Drains: Telfa wick    Findings: As above    Complications: None evident    The procedure was reviewed in the preoperative area, goals of postoperative care were reiterated.    We advanced to the operating room with antibiotic and DVT prophylaxis after supplemental glucose for blood glucose of 60.    In the operating room, general anesthesia was initiated, block was performed by anesthesia, pursestring was placed at the ileostomy site, the abdomen was prepped and draped.    I discussed desire for more IV fluid than typical based on his renal function, 1 to make sure he is well-hydrated.    Timeout protocol was utilized    An ellipse incision was made around the loop ileostomy and carried down to the fascia.    The loop ileostomy was dissected away from the anterior rectus sheath, rectus, posterior rectus sheath, there were adhesions in the peritoneal cavity.    With retraction and headlight exposure, lysis of adhesions in the immediate area and throughout the right lower quadrant were undertaken.    The mucocutaneous junction was excised on a Kocher clamp with a 10 blade on both limbs.    DEBBIE was used to create a common lumen, DEBBIE 75 blue load.  There was good hemostasis on inspection.    Then anastomosis was complete  with TA 60.    Palpation demonstrated widely patent anastomotic lumen.    The anastomosis was returned to the peritoneal cavity.    The right lower quadrant appeared satisfactory on inspection.    The posterior rectus sheath was reapproximated with interrupted figure-of-eight #1 PDS.    The anterior rectus sheath was similarly reapproximated.    The incision was irrigated with dilute antiseptic solution.    The skin was reapproximated skin clips leaving the central area stented open with a Telfa wick.    The final counts were announced as correct.    The procedure appeared well-tolerated.      José Manuel Hutchins MD     Date: 3/4/2025  Time: 10:55 EST

## 2025-03-04 NOTE — H&P
Admission HP     Patient Name: Valentin Vásquez  MRN: [id number]  : 1965  DOS: 3/4/2025    Attending: Cong Cartagena MD    Primary Care Provider: Tristen Stuart DO      Patient Care Team:  Tristen Stuart DO as PCP - General (Family Medicine)  Virginia Brown RN as Nurse Navigator  Cong Cartagena MD as Consulting Physician (Hospitalist)    Chief complaint:  Unwanted ileostomy      Subjective   Patient is a pleasant 59 y.o. male presented for scheduled surgery by Dr. Hutchins.    Patient is known to us from prior hospitalizations starting with an admission on 2024 when he had colectomy , ultra low anterior resection, robotic assisted, with appy, repair of incarcerated hernia, diverting loop ileostomy , this was followed by 2 hospitalizations related to high output stoma that was poorly managed resulting in acute kidney failure and electrolyte abnormalities.  Eventually patient was set up for outpatient IV fluid infusions weekly and has done well with that.    He has since followed up with Dr. Hutchins and presented today for ileostomy reversal.  He underwent ileostomy reversal under general anesthesia and a block, tolerated well, is admitted for further management.    Seen in PACU postop, doing well, no complains of nausea, vomiting, or shortness of breath.  Good pain control.      Allergies:  No Known Allergies    Meds:  Medications Prior to Admission   Medication Sig Dispense Refill Last Dose/Taking    Aspirin Low Dose 81 MG EC tablet Take 1 tablet by mouth Daily.   3/4/2025 Morning    Continuous Glucose Sensor (FreeStyle Shilpa 2 Sensor) misc Use. Possible left side- pt might change 8th   3/3/2025    fenofibrate (TRICOR) 145 MG tablet Take 1 tablet by mouth Daily.   3/4/2025 Morning    HYDROcodone-acetaminophen (NORCO) 7.5-325 MG per tablet Take 1 tablet by mouth Every 8 (Eight) Hours As Needed for Moderate Pain.   Past Week    Insulin Lispro, 1 Unit Dial, (HUMALOG) 100 UNIT/ML  solution pen-injector Inject 5 Units under the skin into the appropriate area as directed 3 (Three) Times a Day Before Meals.   3/3/2025    metFORMIN (GLUCOPHAGE) 1000 MG tablet Take 1 tablet by mouth 2 (Two) Times a Day With Meals.   3/4/2025 Morning    omeprazole (priLOSEC) 40 MG capsule Take 1 capsule by mouth Daily.   3/4/2025 Morning    rosuvastatin (CRESTOR) 10 MG tablet Take 1 tablet by mouth Every Night.   3/4/2025 Morning    tamsulosin (FLOMAX) 0.4 MG capsule 24 hr capsule TAKE 1 CAPSULE BY MOUTH EVERY NIGHT 90 capsule 3 3/4/2025 Morning    Tresiba FlexTouch 200 UNIT/ML solution pen-injector pen injection Inject  under the skin into the appropriate area as directed Take As Directed. 64 units in am and 60 units at night   3/3/2025    loperamide (IMODIUM) 2 MG capsule Take 2 capsules by mouth 3 (Three) Times a Day Before Meals. (Patient not taking: Reported on 1/29/2025)   Not Taking         History:   Past Medical History:   Diagnosis Date    Arthritis     Colon cancer     Colon polyp     Diabetes mellitus     Diverticulitis of colon     GERD (gastroesophageal reflux disease)     Gout     Hyperlipidemia     Hypertension     Injury of back     Kidney stone     passed and surgery-   x3 times surgery    Pancreatitis     Tear of right rotator cuff     Wears glasses     readers     Past Surgical History:   Procedure Laterality Date    ADENOIDECTOMY      COLON RESECTION WITH ILEOSTOMY N/A 10/10/2024    Procedure: CONVERTED TO OPEN COLON RESECTION ULTRA LOW ANTERIOR LAPAROSCOPIC WITH LOOP ILEOSTOMY;  Surgeon: José Manuel Hutchins MD;  Location: [facility] OR;  Service: Robotics - [facility];  Laterality: N/A;    COLONOSCOPY N/A 05/21/2024    Procedure: COLONOSCOPY;  Surgeon: Rosa Patrick MD;  Location:  [facility] OR;  Service: Gastroenterology;  Laterality: N/A;    CYSTOSCOPY Bilateral 10/10/2024    Procedure: CYSTOSCOPY TEMPORARY PLACEMENT BILATERAL URETERAL CATHETER;  Surgeon: Ke Courtney MD;  Location: [facility]  "OR;  Service: Robotics - [facility];  Laterality: Bilateral;    CYSTOSCOPY LITHOLAPAXY BLADDER STONE EXTRACTION N/A 09/28/2022    Procedure: CYSTOSCOPY LASER LITHOLAPAXY BLADDER STONE EXTRACTION;  Surgeon: Mykel Jeffers MD;  Location: [facility];  Service: Urology;  Laterality: N/A;    KIDNEY STONE SURGERY      x3 surgeries-  same stone    SHOULDER ARTHROSCOPY W/ ROTATOR CUFF REPAIR Right 07/19/2022    Procedure: RIGHT SHOULDER ARTHROSCOPY WITH OPEN ACROMIOPLASTY,  ROTATOR CUFF REPAIR, EXCISION LATERAL CLAVICLE;  Surgeon: Edmundo Huitron MD;  Location: [facility];  Service: Orthopedics;  Laterality: Right;    TONSILLECTOMY       Family History   Problem Relation Age of Onset    Diabetes Father     Diabetes Sister      Social History     Tobacco Use    Smoking status: Never    Smokeless tobacco: Former     Types: Snuff   Vaping Use    Vaping status: Never Used   Substance Use Topics    Alcohol use: Not Currently     Comment: occasionally    Drug use: No       Review of Systems  Pertinent items are noted in HPI    Vital Signs  /87 (BP Location: Right arm, Patient Position: Lying) Comment (Patient Position): post walk  Pulse 103   Temp 97.8 °F (36.6 °C) (Oral)   Resp 15   Ht 170.2 cm (67\")   Wt 77.5 kg (170 lb 12.8 oz)   SpO2 94%   BMI 26.75 kg/m²     Physical Exam:    General Appearance:    Alert, cooperative, in no acute distress   Head:    Normocephalic, without obvious abnormality, atraumatic   Eyes:            Lids and lashes normal, conjunctivae and sclerae normal, no   icterus, no pallor, corneas clear   Ears:    Ears appear intact with no abnormalities noted   Throat:   No oral lesions, no thrush, oral mucosa moist   Neck:   No adenopathy, supple, trachea midline, no thyromegaly         Lungs:     Clear to auscultation, respirations regular, even and       unlabored. No wheezes or rales.    Heart:    Regular rhythm and normal rate, normal S1 and S2, no murmur    Abdomen:      Soft and " benign with intact dressing over old stoma site   Genitalia:    Deferred   Extremities:   No edema, no cyanosis, no              redness   Pulses:   Pulses palpable and equal bilaterally   Skin:   No bleeding, bruising or rash   Neurologic:   Cranial nerves 2 - 12 grossly intact, no gross motor deficit     Results from last 7 days   Lab Units 03/04/25  1806   WBC 10*3/mm3 11.13*   HEMOGLOBIN g/dL 12.2*   HEMATOCRIT % 40.2   PLATELETS 10*3/mm3 230     Results from last 7 days   Lab Units 03/04/25  1806   SODIUM mmol/L 139   POTASSIUM mmol/L 4.2   CHLORIDE mmol/L 107   CO2 mmol/L 19.0*   BUN mg/dL 16   CREATININE mg/dL 1.66*   CALCIUM mg/dL 8.5*   GLUCOSE mg/dL 88     Lab Results   Component Value Date    HGBA1C 7.80 (H) 01/29/2025      Latest Reference Range & Units 03/04/25 18:06   Sodium 136 - 145 mmol/L 139   Potassium 3.5 - 5.2 mmol/L 4.2   Chloride 98 - 107 mmol/L 107   CO2 22.0 - 29.0 mmol/L 19.0 (L)   Anion Gap 5.0 - 15.0 mmol/L 13.0   BUN 8 - 23 mg/dL 16   Creatinine 0.76 - 1.27 mg/dL 1.66 (H)   BUN/Creatinine Ratio 7.0 - 25.0  9.6   eGFR >60.0 mL/min/1.73 46.6 (L)   Glucose 65 - 99 mg/dL 88   Calcium 8.6 - 10.5 mg/dL 8.5 (L)   (L): Data is abnormally low  (H): Data is abnormally high     Latest Reference Range & Units 03/04/25 18:06   WBC 3.40 - 10.80 10*3/mm3 11.13 (H)   RBC 4.14 - 5.80 10*6/mm3 4.39   Hemoglobin 13.0 - 17.7 g/dL 12.2 (L)   Hematocrit 37.5 - 51.0 % 40.2   Platelets 140 - 450 10*3/mm3 230   RDW 12.3 - 15.4 % 14.6   MCV 79.0 - 97.0 fL 91.6   MCH 26.6 - 33.0 pg 27.8   MCHC 31.5 - 35.7 g/dL 30.3 (L)   MPV 6.0 - 12.0 fL 10.2   RDW-SD 37.0 - 54.0 fl 48.8   (H): Data is abnormally high  (L): Data is abnormally low  Assessment and Plan:     Status post ileostomy reversal      Status post reversal of ileostomy    S/P colectomy , ultra low anterior resection, robotic assisted, with appy, repair of incarcerated hernia, diverting loop ileostomy, 10/17/2024    HTN (hypertension)    Hyperlipidemia    DM2  (diabetes mellitus, type 2)    History of rectal cancer    Renal insufficiency      Plan  1. Ambulation, several times daily  2. Pain control-PRNs   3. IS-encourage  4. DVT proph- Mechanical, subcutaneous Heparin  5. Bowel regimen,alvimopan  6. Resume home medications as appropriate  7. Monitor post-op labs  8. Discharge planning   9. Diet, Clears, advance diet as tolerated.  IVF initially, monitor volume status.    -DM type 2  Hgb A1C 7.8  Hold OHA as appropriate  FSBG before meals/bedtime, ( q 6 when NPO), along with correction Humalog.  Long acting insulin and prandial insulin.     - Hypertension:  Resume home medications as appropriate, formulary substitution when indicated.  Holding parameters.  PRN medications for elevated blood pressure.    -Dyslipidemia:  Resume home regimen Statin .( formulary substitution when appropriate).      Dragon disclaimer:  Part of this encounter note is an electronic transcription/translation of spoken language to printed text. The electronic translation of spoken language may permit erroneous, or at times, nonsensical words or phrases to be inadvertently transcribed; Although I have reviewed the note for such errors, some may still exist.    Cong Cartagena MD  03/04/25  19:33 EST

## 2025-03-04 NOTE — H&P
Pre-Op H&P  Valentin Vásquez  [id number]  1965      Chief complaint: Unwanted appliance       Subjective:  Patient is a 59 y.o.male presents for scheduled surgery by Dr. Hutchins. He anticipates a ILEOSTOMY TAKEDOWN  today. He underwent colectomy, ultra low anterior resection, robotic assisted, with appy, repair of incarcerated hernia, diverting loop ileostomy on 10/10/24 due to rectal cancer.  He has had several ER visits and admissions due to renal failure and electrolyte imbalance.       Review of Systems:  Constitutional-- No fever, chills or sweats. No fatigue.  CV-- No chest pain, palpitation or syncope. +HTN, HLD  Resp-- No SOB, cough, hemoptysis  Skin--No rashes or lesions      Allergies: No Known Allergies      Home Meds:  Medications Prior to Admission   Medication Sig Dispense Refill Last Dose/Taking    Aspirin Low Dose 81 MG EC tablet Take 1 tablet by mouth Daily.       Continuous Glucose Sensor (FreeStyle Shilpa 2 Sensor) misc Use. Possible left side- pt might change 8th       fenofibrate (TRICOR) 145 MG tablet Take 1 tablet by mouth Daily.       HYDROcodone-acetaminophen (NORCO) 7.5-325 MG per tablet Take 1 tablet by mouth Every 8 (Eight) Hours As Needed for Moderate Pain.       Insulin Lispro, 1 Unit Dial, (HUMALOG) 100 UNIT/ML solution pen-injector Inject 5 Units under the skin into the appropriate area as directed 3 (Three) Times a Day Before Meals.       loperamide (IMODIUM) 2 MG capsule Take 2 capsules by mouth 3 (Three) Times a Day Before Meals. (Patient not taking: Reported on 1/29/2025)       metFORMIN (GLUCOPHAGE) 1000 MG tablet Take 1 tablet by mouth 2 (Two) Times a Day With Meals.       omeprazole (priLOSEC) 40 MG capsule Take 1 capsule by mouth Daily.       rosuvastatin (CRESTOR) 10 MG tablet Take 1 tablet by mouth Every Night.       tamsulosin (FLOMAX) 0.4 MG capsule 24 hr capsule TAKE 1 CAPSULE BY MOUTH EVERY NIGHT 90 capsule 3     Tresiba FlexTouch 200 UNIT/ML solution pen-injector pen  injection Inject  under the skin into the appropriate area as directed Take As Directed. 64 units in am and 60 units at night            PMH:   Past Medical History:   Diagnosis Date    Arthritis     Colon cancer     Colon polyp     Diabetes mellitus     Diverticulitis of colon     GERD (gastroesophageal reflux disease)     Gout     Hyperlipidemia     Hypertension     Injury of back     Kidney stone     passed and surgery-   x3 times surgery    Pancreatitis     Tear of right rotator cuff     Wears glasses     readers     PSH:    Past Surgical History:   Procedure Laterality Date    ADENOIDECTOMY      COLON RESECTION WITH ILEOSTOMY N/A 10/10/2024    Procedure: CONVERTED TO OPEN COLON RESECTION ULTRA LOW ANTERIOR LAPAROSCOPIC WITH LOOP ILEOSTOMY;  Surgeon: José Manuel Hutchins MD;  Location:  [facility];  Service: Robotics - DaVinci;  Laterality: N/A;    COLONOSCOPY N/A 05/21/2024    Procedure: COLONOSCOPY;  Surgeon: Rosa Patrick MD;  Location: [facility] OR;  Service: Gastroenterology;  Laterality: N/A;    CYSTOSCOPY Bilateral 10/10/2024    Procedure: CYSTOSCOPY TEMPORARY PLACEMENT BILATERAL URETERAL CATHETER;  Surgeon: Ke Courtney MD;  Location:  [facility];  Service: Robotics - DaVinci;  Laterality: Bilateral;    CYSTOSCOPY LITHOLAPAXY BLADDER STONE EXTRACTION N/A 09/28/2022    Procedure: CYSTOSCOPY LASER LITHOLAPAXY BLADDER STONE EXTRACTION;  Surgeon: Mykel Jeffers MD;  Location: [facility] OR;  Service: Urology;  Laterality: N/A;    KIDNEY STONE SURGERY      x3 surgeries-  same stone    SHOULDER ARTHROSCOPY W/ ROTATOR CUFF REPAIR Right 07/19/2022    Procedure: RIGHT SHOULDER ARTHROSCOPY WITH OPEN ACROMIOPLASTY,  ROTATOR CUFF REPAIR, EXCISION LATERAL CLAVICLE;  Surgeon: Edmundo Huitron MD;  Location: [facility] OR;  Service: Orthopedics;  Laterality: Right;    TONSILLECTOMY         Immunization History:  Influenza: No  Pneumococcal: No  Tetanus: UTD    Social History:   Tobacco:   Social History      Tobacco Use   Smoking Status Never   Smokeless Tobacco Former    Types: Snuff      Alcohol:     Social History     Substance and Sexual Activity   Alcohol Use Not Currently    Comment: occasionally         Physical Exam: VS: /100  HR 96 RR 16  T 97.7  Sat 96%RA      General Appearance:    Alert, cooperative, no distress, appears stated age   Head:    Normocephalic, without obvious abnormality, atraumatic   Lungs:     Clear to auscultation bilaterally, respirations unlabored    Heart:   Regular rate and rhythm, S1 and S2 normal    Abdomen:    Soft without tenderness   Extremities:   Extremities normal, atraumatic, no cyanosis or edema   Skin:   Skin color, texture, turgor normal, no rashes or lesions   Neurologic:   Grossly intact     Results Review:     LABS:  Lab Results   Component Value Date    WBC 9.01 02/24/2025    HGB 13.3 02/24/2025    HCT 42.0 02/24/2025    MCV 89.9 02/24/2025     02/24/2025    NEUTROABS 5.81 02/24/2025    GLUCOSE 306 (H) 02/24/2025    BUN 18 02/24/2025    CREATININE 1.79 (H) 02/24/2025    EGFRIFNONA 81 07/10/2019     02/24/2025    K 4.3 02/24/2025     (H) 02/24/2025    CO2 18.6 (L) 02/24/2025    MG 2.0 11/21/2024    PHOS 1.8 (C) 11/25/2024    CALCIUM 9.4 02/24/2025    ALBUMIN 4.3 02/24/2025    AST 21 02/24/2025    ALT 16 02/24/2025    BILITOT <0.2 02/24/2025       RADIOLOGY:    Study Result    Narrative & Impression   FL BARIUM ENEMA WATER SOLUBLE SINGLE CONTRAST     Date of Exam: 1/14/2025 12:57 PM EST     Indication: Z85.048.        Comparison: None available.     Technique:   imaging of the abdomen was obtained. The enema tip was carefully placed in the rectum without difficulty. A single contrast study using water-soluble contrast was performed. The colon was filled in a retrograde fashion.  Overhead and   fluoroscopic digital spot films were obtained.     Fluoroscopic Time: 54 seconds     Number of Images: 16 associated fluoroscopic series were saved      Findings:   imaging reveals a nonobstructive bowel gas pattern. There is a suture line visible in the pelvis. An ostomy device is visible in the right lower quadrant. Contrast was seen reaching the cecum. The colon demonstrates postoperative changes that are   consistent with a partial colectomy. The colorectal anastomosis appeared to be intact, and no extravasation of contrast was seen. No postoperative strictures were seen. There are multiple diverticula of the transverse, and descending colon. There was no   evidence of inflammation, or active diverticular disease. Multiple filling defects seen within the colon likely represent stool, and/or mucous secretion. There was mild transient spasm of the descending colon during filling. Normal evacuation of contrast   was noted.     IMPRESSION:  Impression:     1. Status post partial colectomy. The colorectal anastomosis appeared to be intact, and there was no evidence of extraluminal contrast. No postoperative strictures were seen.  2. Diverticulosis of the transverse, and descending colon  3. Mild transient spasm of the descending colon during filling       I reviewed the patient's new clinical results.    Cancer Staging (if applicable)  Cancer Patient: __ yes __no __unknown; If yes, clinical stage T:__ N:__M:__, stage group or __N/A      Impression: History rectal cancer; unwanted appliance      Plan: ILEOSTOMY TAKEDOWN       Linda Bailey, LAURA   3/4/2025   08:33 EST

## 2025-03-05 LAB
ANION GAP SERPL CALCULATED.3IONS-SCNC: 13 MMOL/L (ref 5–15)
BASOPHILS # BLD AUTO: 0.02 10*3/MM3 (ref 0–0.2)
BASOPHILS NFR BLD AUTO: 0.2 % (ref 0–1.5)
BUN SERPL-MCNC: 15 MG/DL (ref 8–23)
BUN/CREAT SERPL: 10.1 (ref 7–25)
CALCIUM SPEC-SCNC: 8.3 MG/DL (ref 8.6–10.5)
CEA SERPL-MCNC: 0.82 NG/ML
CHLORIDE SERPL-SCNC: 107 MMOL/L (ref 98–107)
CO2 SERPL-SCNC: 20 MMOL/L (ref 22–29)
CREAT SERPL-MCNC: 1.49 MG/DL (ref 0.76–1.27)
DEPRECATED RDW RBC AUTO: 47.7 FL (ref 37–54)
EGFRCR SERPLBLD CKD-EPI 2021: 53.1 ML/MIN/1.73
EOSINOPHIL # BLD AUTO: 0 10*3/MM3 (ref 0–0.4)
EOSINOPHIL NFR BLD AUTO: 0 % (ref 0.3–6.2)
ERYTHROCYTE [DISTWIDTH] IN BLOOD BY AUTOMATED COUNT: 14.5 % (ref 12.3–15.4)
GLUCOSE BLDC GLUCOMTR-MCNC: 67 MG/DL (ref 70–130)
GLUCOSE BLDC GLUCOMTR-MCNC: 70 MG/DL (ref 70–130)
GLUCOSE BLDC GLUCOMTR-MCNC: 83 MG/DL (ref 70–130)
GLUCOSE BLDC GLUCOMTR-MCNC: 91 MG/DL (ref 70–130)
GLUCOSE SERPL-MCNC: 65 MG/DL (ref 65–99)
HCT VFR BLD AUTO: 37.5 % (ref 37.5–51)
HGB BLD-MCNC: 11.6 G/DL (ref 13–17.7)
IMM GRANULOCYTES # BLD AUTO: 0.06 10*3/MM3 (ref 0–0.05)
IMM GRANULOCYTES NFR BLD AUTO: 0.5 % (ref 0–0.5)
LYMPHOCYTES # BLD AUTO: 1.5 10*3/MM3 (ref 0.7–3.1)
LYMPHOCYTES NFR BLD AUTO: 11.4 % (ref 19.6–45.3)
MCH RBC QN AUTO: 27.7 PG (ref 26.6–33)
MCHC RBC AUTO-ENTMCNC: 30.9 G/DL (ref 31.5–35.7)
MCV RBC AUTO: 89.5 FL (ref 79–97)
MONOCYTES # BLD AUTO: 0.77 10*3/MM3 (ref 0.1–0.9)
MONOCYTES NFR BLD AUTO: 5.8 % (ref 5–12)
NEUTROPHILS NFR BLD AUTO: 10.86 10*3/MM3 (ref 1.7–7)
NEUTROPHILS NFR BLD AUTO: 82.1 % (ref 42.7–76)
NRBC BLD AUTO-RTO: 0 /100 WBC (ref 0–0.2)
PLATELET # BLD AUTO: 236 10*3/MM3 (ref 140–450)
PMV BLD AUTO: 10.4 FL (ref 6–12)
POTASSIUM SERPL-SCNC: 4 MMOL/L (ref 3.5–5.2)
RBC # BLD AUTO: 4.19 10*6/MM3 (ref 4.14–5.8)
SODIUM SERPL-SCNC: 140 MMOL/L (ref 136–145)
WBC NRBC COR # BLD AUTO: 13.21 10*3/MM3 (ref 3.4–10.8)

## 2025-03-05 PROCEDURE — 80048 BASIC METABOLIC PNL TOTAL CA: CPT | Performed by: COLON & RECTAL SURGERY

## 2025-03-05 PROCEDURE — 25810000003 SODIUM CHLORIDE 0.9 % SOLUTION: Performed by: INTERNAL MEDICINE

## 2025-03-05 PROCEDURE — 63710000001 INSULIN GLARGINE PER 5 UNITS: Performed by: INTERNAL MEDICINE

## 2025-03-05 PROCEDURE — 97161 PT EVAL LOW COMPLEX 20 MIN: CPT

## 2025-03-05 PROCEDURE — 25010000002 HEPARIN (PORCINE) PER 1000 UNITS: Performed by: COLON & RECTAL SURGERY

## 2025-03-05 PROCEDURE — 85025 COMPLETE CBC W/AUTO DIFF WBC: CPT | Performed by: COLON & RECTAL SURGERY

## 2025-03-05 PROCEDURE — 82948 REAGENT STRIP/BLOOD GLUCOSE: CPT

## 2025-03-05 PROCEDURE — 63710000001 INSULIN LISPRO (HUMAN) PER 5 UNITS: Performed by: INTERNAL MEDICINE

## 2025-03-05 RX ADMIN — GABAPENTIN 100 MG: 100 CAPSULE ORAL at 15:26

## 2025-03-05 RX ADMIN — GABAPENTIN 100 MG: 100 CAPSULE ORAL at 20:41

## 2025-03-05 RX ADMIN — TAMSULOSIN HYDROCHLORIDE 0.4 MG: 0.4 CAPSULE ORAL at 20:41

## 2025-03-05 RX ADMIN — HEPARIN SODIUM 5000 UNITS: 5000 INJECTION INTRAVENOUS; SUBCUTANEOUS at 20:41

## 2025-03-05 RX ADMIN — SODIUM CHLORIDE 100 ML/HR: 9 INJECTION, SOLUTION INTRAVENOUS at 17:08

## 2025-03-05 RX ADMIN — HEPARIN SODIUM 5000 UNITS: 5000 INJECTION INTRAVENOUS; SUBCUTANEOUS at 15:26

## 2025-03-05 RX ADMIN — ALVIMOPAN 12 MG: 12 CAPSULE ORAL at 20:42

## 2025-03-05 RX ADMIN — HEPARIN SODIUM 5000 UNITS: 5000 INJECTION INTRAVENOUS; SUBCUTANEOUS at 05:39

## 2025-03-05 RX ADMIN — INSULIN GLARGINE 45 UNITS: 100 INJECTION, SOLUTION SUBCUTANEOUS at 08:51

## 2025-03-05 RX ADMIN — GABAPENTIN 100 MG: 100 CAPSULE ORAL at 08:50

## 2025-03-05 RX ADMIN — ASPIRIN 81 MG: 81 TABLET, COATED ORAL at 08:50

## 2025-03-05 RX ADMIN — ACETAMINOPHEN 650 MG: 325 TABLET, FILM COATED ORAL at 18:02

## 2025-03-05 RX ADMIN — SODIUM CHLORIDE 100 ML/HR: 9 INJECTION, SOLUTION INTRAVENOUS at 05:39

## 2025-03-05 RX ADMIN — ACETAMINOPHEN 650 MG: 325 TABLET, FILM COATED ORAL at 09:45

## 2025-03-05 RX ADMIN — INSULIN LISPRO 5 UNITS: 100 INJECTION, SOLUTION INTRAVENOUS; SUBCUTANEOUS at 08:50

## 2025-03-05 RX ADMIN — ROSUVASTATIN 10 MG: 10 TABLET, FILM COATED ORAL at 20:41

## 2025-03-05 RX ADMIN — PANTOPRAZOLE SODIUM 40 MG: 40 TABLET, DELAYED RELEASE ORAL at 05:39

## 2025-03-05 RX ADMIN — ALVIMOPAN 12 MG: 12 CAPSULE ORAL at 08:50

## 2025-03-05 NOTE — THERAPY EVALUATION
Patient Name: Valentin Vásquez  : 1965    MRN: [id number]                              Today's Date: 3/5/2025       Admit Date: 3/4/2025    Visit Dx:     ICD-10-CM ICD-9-CM   1. History of rectal cancer  Z85.048 V10.06     Patient Active Problem List   Diagnosis    Acute pancreatitis    Nephrolithiasis    Encounter for screening for malignant neoplasm of colon    Rectal cancer    HTN (hypertension)    Hyperlipidemia    DM2 (diabetes mellitus, type 2)    S/P colectomy , ultra low anterior resection, robotic assisted, with appy, repair of incarcerated hernia, diverting loop ileostomy, 10/17/2024    DAJA (acute kidney injury)    Hyperkalemia    Severe malnutrition    Hyponatremia    Impaired hydration status    History of rectal cancer    Renal insufficiency    Status post reversal of ileostomy     Past Medical History:   Diagnosis Date    Arthritis     Colon cancer     Colon polyp     Diabetes mellitus     Diverticulitis of colon     GERD (gastroesophageal reflux disease)     Gout     Hyperlipidemia     Hypertension     Injury of back     Kidney stone     passed and surgery-   x3 times surgery    Pancreatitis     Tear of right rotator cuff     Wears glasses     readers     Past Surgical History:   Procedure Laterality Date    ADENOIDECTOMY      COLON RESECTION WITH ILEOSTOMY N/A 10/10/2024    Procedure: CONVERTED TO OPEN COLON RESECTION ULTRA LOW ANTERIOR LAPAROSCOPIC WITH LOOP ILEOSTOMY;  Surgeon: José Manuel Hutchins MD;  Location:  [facility];  Service: Robotics - DaVinci;  Laterality: N/A;    COLONOSCOPY N/A 2024    Procedure: COLONOSCOPY;  Surgeon: Rosa Patrick MD;  Location:  [facility] OR;  Service: Gastroenterology;  Laterality: N/A;    CYSTOSCOPY Bilateral 10/10/2024    Procedure: CYSTOSCOPY TEMPORARY PLACEMENT BILATERAL URETERAL CATHETER;  Surgeon: Ke Courtney MD;  Location:  [facility];  Service: Robotics - DaVinci;  Laterality: Bilateral;    CYSTOSCOPY LITHOLAPAXY BLADDER STONE EXTRACTION N/A  09/28/2022    Procedure: CYSTOSCOPY LASER LITHOLAPAXY BLADDER STONE EXTRACTION;  Surgeon: Mykel Jeffers MD;  Location:  [facility] OR;  Service: Urology;  Laterality: N/A;    ILEOSTOMY CLOSURE N/A 3/4/2025    Procedure: ILEOSTOMY TAKEDOWN;  Surgeon: José Manuel Hutchins MD;  Location:  [facility];  Service: General;  Laterality: N/A;    KIDNEY STONE SURGERY      x3 surgeries-  same stone    SHOULDER ARTHROSCOPY W/ ROTATOR CUFF REPAIR Right 07/19/2022    Procedure: RIGHT SHOULDER ARTHROSCOPY WITH OPEN ACROMIOPLASTY,  ROTATOR CUFF REPAIR, EXCISION LATERAL CLAVICLE;  Surgeon: Edmundo Huitron MD;  Location:  [facility] OR;  Service: Orthopedics;  Laterality: Right;    TONSILLECTOMY        General Information       Row Name 03/05/25 0935          Physical Therapy Time and Intention    Document Type discharge evaluation/summary  -AE     Mode of Treatment physical therapy  -AE       Row Name 03/05/25 0935          General Information    Patient Profile Reviewed yes  -AE     Prior Level of Function independent:;all household mobility;gait;transfer;bed mobility;ADL's  -AE     Existing Precautions/Restrictions other (see comments)  abdominal incision  -AE     Barriers to Rehab previous functional deficit  -AE       Row Name 03/05/25 0935          Living Environment    People in Home spouse  -AE       Row Name 03/05/25 0935          Home Main Entrance    Number of Stairs, Main Entrance none  -AE     Stair Railings, Main Entrance none  -AE       Row Name 03/05/25 0935          Stairs Within Home, Primary    Stairs, Within Home, Primary Flight to second level and basement  -AE     Number of Stairs, Within Home, Primary twelve  -AE     Stair Railings, Within Home, Primary railing on left side (ascending)  -AE       Row Name 03/05/25 0935          Cognition    Orientation Status (Cognition) oriented x 3  -AE       Row Name 03/05/25 0935          Safety Issues/Impairments Affecting Functional Mobility    Safety Issues Affecting  Function (Mobility) awareness of need for assistance  -AE     Impairments Affecting Function (Mobility) shortness of breath;pain  -AE               User Key  (r) = Recorded By, (t) = Taken By, (c) = Cosigned By      Initials Name Provider Type    AE Harish Moore, PT Physical Therapist                   Mobility       Row Name 03/05/25 0944          Bed Mobility    Bed Mobility sit-supine  -AE     Sit-Supine [place] (Bed Mobility) independent  -AE     Assistive Device (Bed Mobility) head of bed elevated  -AE       Row Name 03/05/25 0944          Transfers    Comment, (Transfers) No cues required for STS. No overt LOB noted.  -AE       Row Name 03/05/25 0944          Sit-Stand Transfer    Sit-Stand [place] (Transfers) independent  -AE       Row Name 03/05/25 0944          Gait/Stairs (Locomotion)    [place] Level (Gait) independent  -AE     Distance in Feet (Gait) 400  -AE     [place] Level (Stairs) independent  -AE     Handrail Location (Stairs) right side (ascending)  -AE     Number of Steps (Stairs) 9  -AE     Ascending Technique (Stairs) step-over-step  -AE     Descending Technique (Stairs) step-to-step  -AE     Comment, (Gait/Stairs) Pt demo step through gait pattern and noted to have SOA with activity. No SaO2 drop with ambulation however. Pt reports chronic SOA with activity since COVID in 2020.  -AE               User Key  (r) = Recorded By, (t) = Taken By, (c) = Cosigned By      Initials Name Provider Type    AE Harish Moore, PT Physical Therapist                   Obj/Interventions       Row Name 03/05/25 0949          Range of Motion Comprehensive    General Range of Motion bilateral lower extremity ROM WFL  -AE       Row Name 03/05/25 0949          Strength Comprehensive (MMT)    General Manual Muscle Testing (MMT) Assessment lower extremity strength deficits identified  -AE     Comment, General Manual Muscle Testing (MMT) Assessment BLE grossly 4/5  -AE       Row Name 03/05/25  0949          Balance    Balance Assessment sitting static balance;sitting dynamic balance;standing static balance;standing dynamic balance  -AE     Static Sitting Balance independent  -AE     Dynamic Sitting Balance independent  -AE     Position, Sitting Balance unsupported  -AE     Static Standing Balance independent  -AE     Dynamic Standing Balance independent  -AE     Position/Device Used, Standing Balance unsupported  -AE       Row Name 03/05/25 0949          Sensory Assessment (Somatosensory)    Sensory Assessment (Somatosensory) LE sensation intact  -AE               User Key  (r) = Recorded By, (t) = Taken By, (c) = Cosigned By      Initials Name Provider Type    AE Harish Moore, PT Physical Therapist                   Goals/Plan    No documentation.                  Clinical Impression       Row Name 03/05/25 0951          Pain    Pain Location abdomen  -AE     Pain Side/Orientation generalized  -AE     Pain Management Interventions activity modification encouraged;exercise or physical activity utilized  -AE     Response to Pain Interventions activity participation with tolerable pain  -AE     Additional Documentation Pain Scale: FACES Pre/Post-Treatment (Group)  -AE       Row Name 03/05/25 0951          Pain Scale: FACES Pre/Post-Treatment    Pain: FACES Scale, Pretreatment 2-->hurts little bit  -AE     Posttreatment Pain Rating 2-->hurts little bit  -AE       Row Name 03/05/25 0951          Plan of Care Review    Plan of Care Reviewed With patient;spouse  -AE     Progress no change  -AE     Outcome Evaluation Pt presents with decreased activity tolerance with increased abdominal pain after ileostomy takedown. Pt ambulated 400ft and navigated 9 steps independently without LOB. No skilled IP PT interventions warranted. Recommend D/C home with assist when medically appropriate.  -AE       Row Name 03/05/25 0951          Therapy Assessment/Plan (PT)    Patient/Family Therapy Goals Statement (PT) Home   -AE     Criteria for Skilled Interventions Met (PT) no;no problems identified which require skilled intervention  -AE     Therapy Frequency (PT) evaluation only  -AE     Predicted Duration of Therapy Intervention (PT) Home  -AE       Row Name 03/05/25 0951          Vital Signs    Pre Systolic BP Rehab 121  -AE     Pre Treatment Diastolic BP 78  -AE     O2 Delivery Pre Treatment room air  -AE     O2 Delivery Intra Treatment room air  -AE     O2 Delivery Post Treatment room air  -AE     Pre Patient Position Sitting  -AE     Intra Patient Position Standing  -AE     Post Patient Position Supine  -AE       Row Name 03/05/25 0951          Positioning and Restraints    Pre-Treatment Position in bed  -AE     Post Treatment Position bed  -AE     In Bed notified nsg;fowlers;call light within reach;encouraged to call for assist;with family/caregiver;side rails up x2  -AE               User Key  (r) = Recorded By, (t) = Taken By, (c) = Cosigned By      Initials Name Provider Type    AE Harish Moore, PT Physical Therapist                   Outcome Measures       Row Name 03/05/25 0955          How much help from another person do you currently need...    Turning from your back to your side while in flat bed without using bedrails? 4  -AE     Moving from lying on back to sitting on the side of a flat bed without bedrails? 4  -AE     Moving to and from a bed to a chair (including a wheelchair)? 4  -AE     Standing up from a chair using your arms (e.g., wheelchair, bedside chair)? 4  -AE     Climbing 3-5 steps with a railing? 4  -AE     To walk in hospital room? 4  -AE     AM-PAC 6 Clicks Score (PT) 24  -AE     Highest Level of Mobility Goal 8 --> Walked 250 feet or more  -AE       Row Name 03/05/25 0955          Functional Assessment    Outcome Measure Options AM-PAC 6 Clicks Basic Mobility (PT)  -AE               User Key  (r) = Recorded By, (t) = Taken By, (c) = Cosigned By      Initials Name Provider Type    MOHINDER Moore  Harish PT Physical Therapist                                 Physical Therapy Education       Title: PT OT SLP Therapies (In Progress)       Topic: Physical Therapy (In Progress)       Point: Mobility training (Done)       Learning Progress Summary            Patient Acceptance, E, VU by AE at 3/5/2025 0854                      Point: Home exercise program (Not Started)       Learner Progress:  Not documented in this visit.              Point: Body mechanics (Done)       Learning Progress Summary            Patient Acceptance, E, VU by AE at 3/5/2025 0854                      Point: Precautions (Done)       Learning Progress Summary            Patient Acceptance, E, VU by AE at 3/5/2025 0854                                      User Key       Initials Effective Dates Name Provider Type Discipline    AE 09/21/21 -  Harish Moore PT Physical Therapist PT                  PT Recommendation and Plan     Progress: no change  Outcome Evaluation: Pt presents with decreased activity tolerance with increased abdominal pain after ileostomy takedown. Pt ambulated 400ft and navigated 9 steps independently without LOB. No skilled IP PT interventions warranted. Recommend D/C home with assist when medically appropriate.     Time Calculation:   PT Evaluation Complexity  History, PT Evaluation Complexity: 1-2 personal factors and/or comorbidities  Examination of Body Systems (PT Eval Complexity): 1-2 elements  Clinical Presentation (PT Evaluation Complexity): stable  Clinical Decision Making (PT Evaluation Complexity): low complexity  Overall Complexity (PT Evaluation Complexity): low complexity     PT Charges       Row Name 03/05/25 0956             Time Calculation    Start Time 0854  -AE      PT Received On 03/05/25  -AE         Untimed Charges    PT Eval/Re-eval Minutes 46  -AE         Total Minutes    Untimed Charges Total Minutes 46  -AE       Total Minutes 46  -AE                User Key  (r) = Recorded By, (t) = Taken  By, (c) = Cosigned By      Initials Name Provider Type    AE Harish Moore, PT Physical Therapist                  Therapy Charges for Today       Code Description Service Date Service Provider Modifiers Qty    [id number] HC PT EVAL LOW COMPLEXITY 4 3/5/2025 Harish Moore, PT GP 1            PT G-Codes  Outcome Measure Options: AM-PAC 6 Clicks Basic Mobility (PT)  AM-PAC 6 Clicks Score (PT): 24  PT Discharge Summary  Anticipated Discharge Disposition (PT): home with assist    Harish Moore PT  3/5/2025

## 2025-03-05 NOTE — PLAN OF CARE
Goal Outcome Evaluation:  Plan of Care Reviewed With: patient, spouse        Progress: no change  Outcome Evaluation: Pt presents with decreased activity tolerance with increased abdominal pain after ileostomy takedown. Pt ambulated 400ft and navigated 9 steps independently without LOB. No skilled IP PT interventions warranted. Recommend D/C home with assist when medically appropriate.    Anticipated Discharge Disposition (PT): home with assist

## 2025-03-05 NOTE — CONSULTS
Diabetes Education    Patient Name:  Valentin Vásquez  YOB: 1965  MRN: [id number]  Admit Date:  3/4/2025    Reviewed chart for diabetes education.  Noted history of diabetes. Noted taking diabetes medication at home.  Noted A1c of 7.9% 7/2024.  Spoke with Mr. Vásquez at the bedside.  He gave me permission to speak with him about diabetes.  He stated that he has had diabetes for about 15 years.  He stated that he follows up regularly with family doctor for diabetes care.  He stated that he is taking humalog 5 units 3 times a day with meals and he is taking Tresiba 2 times a day 64 units. He stated that he is also taking Metformin twice daily.  He states that he keeps insulin in refrigerator and he self injects medications.  He stated that he is not having any problems with that.  Reviewed expiration of insulin pen after 30 days once opened.  He stated that he wears a Freestyle Shilpa 2 to check blood sugar at home.  He stated that he is not wearing it right now, but he will put it back on when he gets home.  He states that he is not really familiar with time in range.  He just looks at his blood sugar reading.  He states that in the morning his blood sugar tends to run low around 65 -115.  He states that his provider is aware of this and changed his medicine the last time.  Explained that his A1c was 7.9% last time, but if he is having a lot of low blood sugar, it is not a good indicator that his blood sugar is well controlled.  He stated that typically it stays between .  Reviewed time in range and encouraged him to let provider know if running low often.    Reviewed our what is diabetes handout with him including basic diabetes physiology.  Reviewed low blood sugar and how to care for it at home if less than 70 with rule of 15.  He states that he usually eats a candybar when it is low.  Encouraged him to used liquid sugar such as any flavor fruit juice or regular soda.  Reviewed high blood sugar  symptoms to watch for and encouraged drinking plenty of water and getting exercise if able to when high. He was also given our contact information and encouraged to call with any questions or diabetes education needs.  Thank you for this referral.       Electronically signed by:  Mahsa Tao RN  03/05/25 13:28 EST

## 2025-03-05 NOTE — PROGRESS NOTES
"IM progress note      Valentin Vásquez  [id number]  1965     LOS: 1 day     Attending: José Manuel Hutchins MD    Primary Care Provider: Tristen Stuart DO      Chief Complaint/Reason for visit: Abdominal pain    Subjective   No fairly well.  Good pain control.  Ambulating in the halls.  Tolerating clear liquids.  Voiding.  Loose stool. Denies f/c/n/v/sob/cp.    Objective     Vital Signs    Visit Vitals  /78 (BP Location: Left arm, Patient Position: Lying)   Pulse 100   Temp 97.7 °F (36.5 °C) (Oral)   Resp 18   Ht 170.2 cm (67\")   Wt 77.5 kg (170 lb 12.8 oz)   SpO2 95%   BMI 26.75 kg/m²     Temp (24hrs), Av.7 °F (36.5 °C), Min:97.4 °F (36.3 °C), Max:97.8 °F (36.6 °C)      Nutrition: Advance to full liquids    Respiratory: Room air    Physical Exam:     General Appearance:    Alert, cooperative, in no acute distress   Head:    Normocephalic, without obvious abnormality, atraumatic    Lungs:     Normal effort, symmetric chest rise, no crepitus, clear to      auscultation bilaterally             Heart:    Regular rhythm and normal rate, normal S1 and S2   Abdomen:   Soft, expected stoma site with shadow drainage   Extremities:   No clubbing, cyanosis or edema.  No deformities.    Pulses:   Pulses palpable and equal bilaterally   Skin:   No bleeding, bruising or rash   Neurologic:   Moves all extremities with no obvious focal motor deficit.  Cranial nerves 2 - 12 grossly intact     Results Review:     I reviewed the patient's new clinical results.   Results from last 7 days   Lab Units 25  0443 25  1806   WBC 10*3/mm3 13.21* 11.13*   HEMOGLOBIN g/dL 11.6* 12.2*   HEMATOCRIT % 37.5 40.2   PLATELETS 10*3/mm3 236 230     Results from last 7 days   Lab Units 25  0443 25  1806   SODIUM mmol/L 140 139   POTASSIUM mmol/L 4.0 4.2   CHLORIDE mmol/L 107 107   CO2 mmol/L 20.0* 19.0*   BUN mg/dL 15 16   CREATININE mg/dL 1.49* 1.66*   CALCIUM mg/dL 8.3* 8.5*   GLUCOSE mg/dL 65 88      Latest " Reference Range & Units 03/05/25 04:43 03/05/25 08:04 03/05/25 11:56   Glucose 70 - 130 mg/dL 65 91 67 (L)   (L): Data is abnormally low    I reviewed the patient's new imaging including images and reports.    All medications reviewed.   acetaminophen, 650 mg, Oral, Q8H  alvimopan, 12 mg, Oral, BID  aspirin, 81 mg, Oral, Daily  gabapentin, 100 mg, Oral, TID  heparin (porcine), 5,000 Units, Subcutaneous, Q8H  insulin glargine, 45 Units, Subcutaneous, Q12H  insulin lispro, 3-14 Units, Subcutaneous, 4x Daily AC & at Bedtime  Insulin Lispro, 5 Units, Subcutaneous, TID With Meals  pantoprazole, 40 mg, Oral, Q AM  rosuvastatin, 10 mg, Oral, Nightly  Scopolamine, 1 patch, Transdermal, Once  tamsulosin, 0.4 mg, Oral, Nightly        Assessment & Plan     Status post reversal of ileostomy    HTN (hypertension)    Hyperlipidemia    DM2 (diabetes mellitus, type 2)    S/P colectomy , ultra low anterior resection, robotic assisted, with appy, repair of incarcerated hernia, diverting loop ileostomy, 10/17/2024    History of rectal cancer    Renal insufficiency      Plan  1. Ambulation  2. Pain control-prns   3. IS-encouraged  4. DVT proph- mechs/Lovenox  5. Bowel regimen-Entereg  6.  Advance to full liquid diet. Continue IVF   7. Monitor post-op labs  8. DC planning for home     -DM type 2  Hgb A1C 7.8  Hold OHA as appropriate  FSBG before meals/bedtime, ( q 6 when NPO), along with correction Humalog.  Long acting insulin and prandial insulin.      - Hypertension:  Resume home medications as appropriate, formulary substitution when indicated.  Holding parameters.  PRN medications for elevated blood pressure.     -Dyslipidemia:  Resume home regimen Statin .( formulary substitution when appropriate).      LAURA Weiner  03/05/25  12:40 EST   1.67

## 2025-03-05 NOTE — CASE MANAGEMENT/SOCIAL WORK
Discharge Planning Assessment  [facility]     Patient Name: Valentin Vásquez  MRN: [id number]  Today's Date: 3/5/2025    Admit Date: 3/4/2025    Plan: home   Discharge Needs Assessment       Row Name 03/05/25 1147       Living Environment    People in Home spouse    Current Living Arrangements home    Potentially Unsafe Housing Conditions none    In the past 12 months has the electric, gas, oil, or water company threatened to shut off services in your home? No    Primary Care Provided by self    Provides Primary Care For no one    Family Caregiver if Needed spouse;child(colby), adult    Family Caregiver Names Sharri Vásquez Spouse [phone]          Debbie Vásquez Daughter [phone]    Quality of Family Relationships helpful;involved;supportive    Able to Return to Prior Arrangements yes       Resource/Environmental Concerns    Resource/Environmental Concerns none    Transportation Concerns none       Transportation Needs    In the past 12 months, has lack of transportation kept you from medical appointments or from getting medications? no    In the past 12 months, has lack of transportation kept you from meetings, work, or from getting things needed for daily living? No       Food Insecurity    Within the past 12 months, you worried that your food would run out before you got the money to buy more. Never true    Within the past 12 months, the food you bought just didn't last and you didn't have money to get more. Never true       Transition Planning    Patient/Family Anticipates Transition to home with family    Patient/Family Anticipated Services at Transition none    Transportation Anticipated family or friend will provide       Discharge Needs Assessment    Readmission Within the Last 30 Days no previous admission in last 30 days    Equipment Currently Used at Home none    Concerns to be Addressed discharge planning;no discharge needs identified    Do you want help finding or keeping work or a job? I do  not need or want help    Do you want help with school or training? For example, starting or completing job training or getting a high school diploma, GED or equivalent No    Anticipated Changes Related to Illness none    Equipment Needed After Discharge none    Provided Post Acute Provider List? N/A    Provided Post Acute Provider Quality & Resource List? N/A    Offered/Gave Vendor List no    Patient's Choice of Community Agency(s) No discharge needs were identified.                   Discharge Plan       Row Name 03/05/25 1148       Plan    Plan home    Patient/Family in Agreement with Plan yes    Plan Comments Met with Mr. Vásquez and his wife at the bedside to discuss discharge plan. He shares a home in [facility] with his wife. He is independent with acitivities of daily living and does not use any DME. He also denies the receipt of home or outpatient services. His primary care provider is Tristen Stuart. He denies obstacles to getting medical care or obtaining medications. His discharge plan is home with his wife, and she will transport. No discharge needs were identified today.  will continue to follow plan of care and assist with discharge planning needs as indicated.    Final Discharge Disposition Code 01 - home or self-care                  Continued Care and Services - Admitted Since 3/4/2025    No active coordination exists for this encounter.          Demographic Summary       Row Name 03/05/25 1146       General Information    Admission Type inpatient    Arrived From PACU/recovery room    Referral Source admission list    Reason for Consult discharge planning    Preferred Language English       Contact Information    Permission Granted to Share Info With family/designee    Contact Information Obtained for     Contact Information Comments Sharri Vásquez Spouse [phone]        Debbie Vásquez Daughter [phone]                   Functional Status       Row Name 03/05/25  1146       Functional Status    Usual Activity Tolerance good    Current Activity Tolerance good       Physical Activity    On average, how many days per week do you engage in moderate to strenuous exercise (like a brisk walk)? 4 days    On average, how many minutes do you engage in exercise at this level? 30 min    Number of minutes of exercise per week 120       Assessment of Health Literacy    How often do you have someone help you read hospital materials? Never    How often do you have problems learning about your medical condition because of difficulty understanding written information? Never    How often do you have a problem understanding what is told to you about your medical condition? Never    How confident are you filling out medical forms by yourself? Extremely    Health Literacy Good       Functional Status, IADL    Medications independent    Meal Preparation independent    Housekeeping independent    Laundry independent    Shopping independent    If for any reason you need help with day-to-day activities such as bathing, preparing meals, shopping, managing finances, etc., do you get the help you need? I don't need any help       Mental Status    General Appearance WDL WDL       Mental Status Summary    Recent Changes in Mental Status/Cognitive Functioning no changes                   Psychosocial    No documentation.                  Abuse/Neglect    No documentation.                  Legal    No documentation.                  Substance Abuse    No documentation.                  Patient Forms    No documentation.                     Diane Melendez RN

## 2025-03-05 NOTE — PROGRESS NOTES
Data reviewed    Patient examined    Packing removed from incision    Only a little distention, minimal tenderness    If continues to tolerate diet and pass stool, possibly home tomorrow  Interval CBC tomorrow morning  Discharge instructions reviewed to facilitate transition once overall condition and progress permits.

## 2025-03-06 ENCOUNTER — READMISSION MANAGEMENT (OUTPATIENT)
Dept: CALL CENTER | Facility: HOSPITAL | Age: 60
End: 2025-03-06
Payer: COMMERCIAL

## 2025-03-06 VITALS
OXYGEN SATURATION: 95 % | WEIGHT: 170.8 LBS | TEMPERATURE: 97.9 F | SYSTOLIC BLOOD PRESSURE: 130 MMHG | RESPIRATION RATE: 18 BRPM | DIASTOLIC BLOOD PRESSURE: 78 MMHG | HEIGHT: 67 IN | HEART RATE: 120 BPM | BODY MASS INDEX: 26.81 KG/M2

## 2025-03-06 LAB
ANION GAP SERPL CALCULATED.3IONS-SCNC: 13 MMOL/L (ref 5–15)
BASOPHILS # BLD AUTO: 0.02 10*3/MM3 (ref 0–0.2)
BASOPHILS NFR BLD AUTO: 0.2 % (ref 0–1.5)
BUN SERPL-MCNC: 10 MG/DL (ref 8–23)
BUN/CREAT SERPL: 8.8 (ref 7–25)
CALCIUM SPEC-SCNC: 8.4 MG/DL (ref 8.6–10.5)
CHLORIDE SERPL-SCNC: 110 MMOL/L (ref 98–107)
CO2 SERPL-SCNC: 20 MMOL/L (ref 22–29)
CREAT SERPL-MCNC: 1.14 MG/DL (ref 0.76–1.27)
CYTO UR: NORMAL
DEPRECATED RDW RBC AUTO: 49.4 FL (ref 37–54)
EGFRCR SERPLBLD CKD-EPI 2021: 73.2 ML/MIN/1.73
EOSINOPHIL # BLD AUTO: 0.02 10*3/MM3 (ref 0–0.4)
EOSINOPHIL NFR BLD AUTO: 0.2 % (ref 0.3–6.2)
ERYTHROCYTE [DISTWIDTH] IN BLOOD BY AUTOMATED COUNT: 14.9 % (ref 12.3–15.4)
GLUCOSE BLDC GLUCOMTR-MCNC: 101 MG/DL (ref 70–130)
GLUCOSE BLDC GLUCOMTR-MCNC: 129 MG/DL (ref 70–130)
GLUCOSE BLDC GLUCOMTR-MCNC: 50 MG/DL (ref 70–130)
GLUCOSE BLDC GLUCOMTR-MCNC: 82 MG/DL (ref 70–130)
GLUCOSE SERPL-MCNC: 49 MG/DL (ref 65–99)
HCT VFR BLD AUTO: 34.6 % (ref 37.5–51)
HGB BLD-MCNC: 10.6 G/DL (ref 13–17.7)
IMM GRANULOCYTES # BLD AUTO: 0.02 10*3/MM3 (ref 0–0.05)
IMM GRANULOCYTES NFR BLD AUTO: 0.2 % (ref 0–0.5)
LAB AP CASE REPORT: NORMAL
LAB AP CLINICAL INFORMATION: NORMAL
LYMPHOCYTES # BLD AUTO: 1.29 10*3/MM3 (ref 0.7–3.1)
LYMPHOCYTES NFR BLD AUTO: 14.8 % (ref 19.6–45.3)
MCH RBC QN AUTO: 27.7 PG (ref 26.6–33)
MCHC RBC AUTO-ENTMCNC: 30.6 G/DL (ref 31.5–35.7)
MCV RBC AUTO: 90.6 FL (ref 79–97)
MONOCYTES # BLD AUTO: 0.64 10*3/MM3 (ref 0.1–0.9)
MONOCYTES NFR BLD AUTO: 7.3 % (ref 5–12)
NEUTROPHILS NFR BLD AUTO: 6.74 10*3/MM3 (ref 1.7–7)
NEUTROPHILS NFR BLD AUTO: 77.3 % (ref 42.7–76)
NRBC BLD AUTO-RTO: 0 /100 WBC (ref 0–0.2)
PATH REPORT.FINAL DX SPEC: NORMAL
PATH REPORT.GROSS SPEC: NORMAL
PLATELET # BLD AUTO: 198 10*3/MM3 (ref 140–450)
PMV BLD AUTO: 10.2 FL (ref 6–12)
POTASSIUM SERPL-SCNC: 3.5 MMOL/L (ref 3.5–5.2)
RBC # BLD AUTO: 3.82 10*6/MM3 (ref 4.14–5.8)
SODIUM SERPL-SCNC: 143 MMOL/L (ref 136–145)
WBC NRBC COR # BLD AUTO: 8.73 10*3/MM3 (ref 3.4–10.8)

## 2025-03-06 PROCEDURE — 25810000003 SODIUM CHLORIDE 0.9 % SOLUTION: Performed by: INTERNAL MEDICINE

## 2025-03-06 PROCEDURE — 82948 REAGENT STRIP/BLOOD GLUCOSE: CPT

## 2025-03-06 PROCEDURE — 85025 COMPLETE CBC W/AUTO DIFF WBC: CPT | Performed by: COLON & RECTAL SURGERY

## 2025-03-06 PROCEDURE — 25010000002 HEPARIN (PORCINE) PER 1000 UNITS: Performed by: COLON & RECTAL SURGERY

## 2025-03-06 PROCEDURE — 80048 BASIC METABOLIC PNL TOTAL CA: CPT | Performed by: COLON & RECTAL SURGERY

## 2025-03-06 RX ORDER — INSULIN DEGLUDEC 200 U/ML
32 INJECTION, SOLUTION SUBCUTANEOUS TAKE AS DIRECTED
Start: 2025-03-06 | End: 2025-03-08

## 2025-03-06 RX ORDER — POTASSIUM CHLORIDE 750 MG/1
40 CAPSULE, EXTENDED RELEASE ORAL ONCE
Status: COMPLETED | OUTPATIENT
Start: 2025-03-06 | End: 2025-03-06

## 2025-03-06 RX ADMIN — ACETAMINOPHEN 650 MG: 325 TABLET, FILM COATED ORAL at 02:31

## 2025-03-06 RX ADMIN — DEXTROSE MONOHYDRATE 25 G: 25 INJECTION, SOLUTION INTRAVENOUS at 05:41

## 2025-03-06 RX ADMIN — ASPIRIN 81 MG: 81 TABLET, COATED ORAL at 09:12

## 2025-03-06 RX ADMIN — ALVIMOPAN 12 MG: 12 CAPSULE ORAL at 09:12

## 2025-03-06 RX ADMIN — ACETAMINOPHEN 650 MG: 325 TABLET, FILM COATED ORAL at 09:12

## 2025-03-06 RX ADMIN — PANTOPRAZOLE SODIUM 40 MG: 40 TABLET, DELAYED RELEASE ORAL at 06:05

## 2025-03-06 RX ADMIN — SODIUM CHLORIDE 100 ML/HR: 9 INJECTION, SOLUTION INTRAVENOUS at 02:34

## 2025-03-06 RX ADMIN — GABAPENTIN 100 MG: 100 CAPSULE ORAL at 09:12

## 2025-03-06 RX ADMIN — POTASSIUM CHLORIDE 40 MEQ: 750 CAPSULE, EXTENDED RELEASE ORAL at 06:05

## 2025-03-06 RX ADMIN — HEPARIN SODIUM 5000 UNITS: 5000 INJECTION INTRAVENOUS; SUBCUTANEOUS at 06:04

## 2025-03-06 NOTE — DISCHARGE SUMMARY
Patient Name: Valentin Vásquez  MRN: [id number]  : 1965  DOS: 3/6/2025    Attending: José Manuel Hutchins MD    Primary Care Provider: Tristen Stuart DO    Date of Admission:.3/4/2025  6:59 AM    Date of Discharge:  3/6/2025    Discharge Diagnosis:   Status post reversal of ileostomy    S/P colectomy , ultra low anterior resection, robotic assisted, with appy, repair of incarcerated hernia, diverting loop ileostomy, 10/17/2024    HTN (hypertension)    Hyperlipidemia    DM2 (diabetes mellitus, type 2)    History of rectal cancer    Renal insufficiency      Hospital Course  At admit    Patient is a pleasant 59 y.o. male presented for scheduled surgery by Dr. Hutchins.     Patient is known to us from prior hospitalizations starting with an admission on 2024 when he had colectomy , ultra low anterior resection, robotic assisted, with appy, repair of incarcerated hernia, diverting loop ileostomy , this was followed by 2 hospitalizations related to high output stoma that was poorly managed resulting in acute kidney failure and electrolyte abnormalities.  Eventually patient was set up for outpatient IV fluid infusions weekly and has done well with that.     He has since followed up with Dr. Hutchins and presented today for ileostomy reversal.  He underwent ileostomy reversal under general anesthesia and a block, tolerated well, is admitted for further management.     Seen in PACU postop, doing well, no complains of nausea, vomiting, or shortness of breath.  Good pain control.       After admit    He was provided pain medication as needed for pain control.  Patient received DVT prophylaxis with subcutaneous heparin as well as mechanicals      Patient was started on clear liquid diet, this was advanced when he showed evidence of bowel function return.      Tolerated diet without difficulty.    During his stay  used an IS for atelectasis prophylaxis.    Home medications were resumed as appropriate, and labs  "were monitored and remained fairly stable.    With the progress he has made, pt is ready for DC home today.      Discussed with patient regarding plan and he shows understanding and agreement.       Procedures Performed  Procedure(s):  ILEOSTOMY TAKEDOWN       Pertinent Test Results:    I reviewed the patient's new clinical results.   Results from last 7 days   Lab Units 25  1806   WBC 10*3/mm3 8.73 13.21* 11.13*   HEMOGLOBIN g/dL 10.6* 11.6* 12.2*   HEMATOCRIT % 34.6* 37.5 40.2   PLATELETS 10*3/mm3 198 236 230     Results from last 7 days   Lab Units 25  1806   SODIUM mmol/L 143 140 139   POTASSIUM mmol/L 3.5 4.0 4.2   CHLORIDE mmol/L 110* 107 107   CO2 mmol/L 20.0* 20.0* 19.0*   BUN mg/dL 10 15 16   CREATININE mg/dL 1.14 1.49* 1.66*   CALCIUM mg/dL 8.4* 8.3* 8.5*   GLUCOSE mg/dL 49* 65 88     I reviewed the patient's new imaging including images and reports.          Physical therapy  Signed         Goal Outcome Evaluation:  Plan of Care Reviewed With: patient, spouse  Progress: no change  Outcome Evaluation: Pt presents with decreased activity tolerance with increased abdominal pain after ileostomy takedown. Pt ambulated 400ft and navigated 9 steps independently without LOB. No skilled IP PT interventions warranted. Recommend D/C home with assist when medically appropriate.     Anticipated Discharge Disposition (PT): home with assist              Discharge Assessment:       Visit Vitals  /78 (BP Location: Left arm, Patient Position: Lying)   Pulse 120   Temp 97.9 °F (36.6 °C) (Oral)   Resp 18   Ht 170.2 cm (67\")   Wt 77.5 kg (170 lb 12.8 oz)   SpO2 95%   BMI 26.75 kg/m²     Temp (24hrs), Av.9 °F (36.6 °C), Min:97.7 °F (36.5 °C), Max:98.1 °F (36.7 °C)      General Appearance:    Alert, cooperative, in no acute distress   Lungs:     Clear to auscultation,respirations regular, even and                   unlabored    Heart:    Regular " rhythm and normal rate, normal S1 and S2    Abdomen:   Soft and benign with. Dressing with shadow drainage,  over old stoma site.   Extremities:   No edema, no cyanosis, no   redness   Pulses:   Pulses palpable and equal bilaterally   Skin:   No bleeding, bruising or rash            Discharge Disposition: home.           Discharge Medications        Changes to Medications        Instructions Start Date   Tresiba FlexTouch 200 UNIT/ML solution pen-injector pen injection  Generic drug: Insulin Degludec  What changed:   how much to take  additional instructions   32 Units, Subcutaneous, Take As Directed, Resume 64 units in am and 60 units at night when taking full meals and BG running higher than 200             Continue These Medications        Instructions Start Date   Aspirin Low Dose 81 MG EC tablet  Generic drug: aspirin   81 mg, Daily      fenofibrate 145 MG tablet  Commonly known as: TRICOR   145 mg, Daily      FreeStyle Shilpa 2 Sensor misc   Use. Possible left side- pt might change 8th      HYDROcodone-acetaminophen 7.5-325 MG per tablet  Commonly known as: NORCO   1 tablet, Every 8 Hours PRN      Insulin Lispro (1 Unit Dial) 100 UNIT/ML solution pen-injector  Commonly known as: HUMALOG   5 Units, 3 Times Daily Before Meals      metFORMIN 1000 MG tablet  Commonly known as: GLUCOPHAGE   1,000 mg, 2 Times Daily With Meals      omeprazole 40 MG capsule  Commonly known as: priLOSEC   40 mg, Daily      rosuvastatin 10 MG tablet  Commonly known as: CRESTOR   10 mg, Nightly      tamsulosin 0.4 MG capsule 24 hr capsule  Commonly known as: FLOMAX   0.4 mg, Oral, Nightly             Stop These Medications      loperamide 2 MG capsule  Commonly known as: IMODIUM              Discharge Diet: Diabetic, low fiber    Activity at Discharge:   Ambulate. Restrictions per CRS.    Follow-up Appointments:  José Manuel Hutchins MD per his orders    Dragon disclaimer:  Part of this encounter note is an electronic transcription/translation of  spoken language to printed text. The electronic translation of spoken language may permit erroneous, or at times, nonsensical words or phrases to be inadvertently transcribed; Although I have reviewed the note for such errors, some may still exist.       Cong Cartagena MD  03/06/25  11:13 EST

## 2025-03-06 NOTE — CASE MANAGEMENT/SOCIAL WORK
Case Management Discharge Note      Final Note: SW met with pt and spouse today.  Plan remains for pt to discharge home.  No discharge needs are reported.    Provided Post Acute Provider List?: N/A  Provided Post Acute Provider Quality & Resource List?: N/A    Selected Continued Care - Admitted Since 3/4/2025       Destination    No services have been selected for the patient.                Durable Medical Equipment    No services have been selected for the patient.                Dialysis/Infusion    No services have been selected for the patient.                Home Medical Care    No services have been selected for the patient.                Therapy    No services have been selected for the patient.                Community Resources    No services have been selected for the patient.                Community & DME    No services have been selected for the patient.                    Transportation Services  Private: Car    Final Discharge Disposition Code: 01 - home or self-care

## 2025-03-06 NOTE — DISCHARGE INSTR - APPOINTMENTS
Tristen Stuart DO PCP - General Family Medicine [phone]  [facility] [facility] [place] [address]   Appointment : 3/17/25 at 10:15

## 2025-03-07 NOTE — PAYOR COMM NOTE
"Ref# [id number]   Discharge Summary    MEERA Lopez, RN  Utilization Review  Phone [phone]  Fax [phone]    [facility]  [address] [place], [place] [address]         Valentin Vásquez (59 y.o. Male)       Date of Birth   1965    Social Security Number       Address   [address] [place] [address]    Home Phone   [phone]    MRN   [id number]       Baptism   [facility]    Marital Status                               Admission Date   3/4/25    Admission Type   Elective    Admitting Provider   José Manuel Hutchins MD    Attending Provider       Department, Room/Bed   [facility] 5G, S564/1       Discharge Date   3/6/2025    Discharge Disposition   Home or Self Care    Discharge Destination                                 Attending Provider: (none)   Allergies: No Known Allergies    Isolation: None   Infection: None   Code Status: Prior    Ht: 170.2 cm (67\")   Wt: 77.5 kg (170 lb 12.8 oz)    Admission Cmt: None   Principal Problem: Status post reversal of ileostomy [Z98.890]                   Active Insurance as of 3/4/2025       Primary Coverage       Payor Plan Insurance Group Employer/Plan Group    [employer]HEM BLUE CROSS ANTHEM BLUE CROSS BLUE SHIELD PPO [id number]       Payor Plan Address Payor Plan Phone Number Payor Plan Fax Number Effective Dates    [address] [phone]  1/1/2020 - None Entered    [place] [address]         Subscriber Name Subscriber Birth Date Member ID       VALENTIN VÁSQUEZ 1965 [id number]                     Emergency Contacts        (Rel.) Home Phone Work Phone Mobile Phone    Sharri Vásquez (Spouse) [phone] -- [phone]    Debbie Vásquez (Daughter) [phone] -- [phone]                 Operative/Procedure Notes (all)        José Manuel Hutchins MD at 03/04/25 1001  Version 1 of 1         [facility] ([facility]    ILEOSTOMY TAKEDOWN  Procedure Note    Valentin JURADO " Fahad  3/4/2025    Pre-op Diagnosis: History of rectal cancer and renal failure.  Creatinine 1.7.    Post-op Diagnosis:   Adhesions      Anesthesia: General with Block  ASA Score: III     Staff:   Circulator: Jackelyn Julian RN  Scrub Person: Caty Barillas  Assistant: Chhaya Palma PA-C    Assistant: Chhaya Palma PA-C was responsible for performing the following activities: Retraction, Suction, Irrigation, and Placing Dressing and their skilled assistance was necessary for the success of this case.         Estimated Blood Loss: 50 mL    Specimens: Ileostomy        Drains: Telfa wick    Findings: As above    Complications: None evident    The procedure was reviewed in the preoperative area, goals of postoperative care were reiterated.    We advanced to the operating room with antibiotic and DVT prophylaxis after supplemental glucose for blood glucose of 60.    In the operating room, general anesthesia was initiated, block was performed by anesthesia, pursestring was placed at the ileostomy site, the abdomen was prepped and draped.    I discussed desire for more IV fluid than typical based on his renal function, 1 to make sure he is well-hydrated.    Timeout protocol was utilized    An ellipse incision was made around the loop ileostomy and carried down to the fascia.    The loop ileostomy was dissected away from the anterior rectus sheath, rectus, posterior rectus sheath, there were adhesions in the peritoneal cavity.    With retraction and headlight exposure, lysis of adhesions in the immediate area and throughout the right lower quadrant were undertaken.    The mucocutaneous junction was excised on a Kocher clamp with a 10 blade on both limbs.    DEBBIE was used to create a common lumen, DEBBIE 75 blue load.  There was good hemostasis on inspection.    Then anastomosis was complete with TA 60.    Palpation demonstrated widely patent anastomotic lumen.    The anastomosis was returned to the  peritoneal cavity.    The right lower quadrant appeared satisfactory on inspection.    The posterior rectus sheath was reapproximated with interrupted figure-of-eight #1 PDS.    The anterior rectus sheath was similarly reapproximated.    The incision was irrigated with dilute antiseptic solution.    The skin was reapproximated skin clips leaving the central area stented open with a Telfa wick.    The final counts were announced as correct.    The procedure appeared well-tolerated.      José Manuel Hutchins MD     Date: 3/4/2025  Time: 10:55 EST    Electronically signed by José Manuel Hutchins MD at 25 1100          Discharge Summary        Cong Cartagena MD at 25 1113            Patient Name: Valentin Vásquez  MRN: [id number]  : 1965  DOS: 3/6/2025    Attending: José Manuel Hutchins MD    Primary Care Provider: Tristen Stuart DO    Date of Admission:.3/4/2025  6:59 AM    Date of Discharge:  3/6/2025    Discharge Diagnosis:   Status post reversal of ileostomy    S/P colectomy , ultra low anterior resection, robotic assisted, with appy, repair of incarcerated hernia, diverting loop ileostomy, 10/17/2024    HTN (hypertension)    Hyperlipidemia    DM2 (diabetes mellitus, type 2)    History of rectal cancer    Renal insufficiency      Hospital Course  At admit    Patient is a pleasant 59 y.o. male presented for scheduled surgery by Dr. Hutchins.     Patient is known to us from prior hospitalizations starting with an admission on 2024 when he had colectomy , ultra low anterior resection, robotic assisted, with appy, repair of incarcerated hernia, diverting loop ileostomy , this was followed by 2 hospitalizations related to high output stoma that was poorly managed resulting in acute kidney failure and electrolyte abnormalities.  Eventually patient was set up for outpatient IV fluid infusions weekly and has done well with that.     He has since followed up with Dr. Hutchins and presented today for  ileostomy reversal.  He underwent ileostomy reversal under general anesthesia and a block, tolerated well, is admitted for further management.     Seen in PACU postop, doing well, no complains of nausea, vomiting, or shortness of breath.  Good pain control.       After admit    He was provided pain medication as needed for pain control.  Patient received DVT prophylaxis with subcutaneous heparin as well as mechanicals      Patient was started on clear liquid diet, this was advanced when he showed evidence of bowel function return.      Tolerated diet without difficulty.    During his stay  used an IS for atelectasis prophylaxis.    Home medications were resumed as appropriate, and labs were monitored and remained fairly stable.    With the progress he has made, pt is ready for DC home today.      Discussed with patient regarding plan and he shows understanding and agreement.       Procedures Performed  Procedure(s):  ILEOSTOMY TAKEDOWN       Pertinent Test Results:    I reviewed the patient's new clinical results.   Results from last 7 days   Lab Units 03/06/25 0417 03/05/25 0443 03/04/25  1806   WBC 10*3/mm3 8.73 13.21* 11.13*   HEMOGLOBIN g/dL 10.6* 11.6* 12.2*   HEMATOCRIT % 34.6* 37.5 40.2   PLATELETS 10*3/mm3 198 236 230     Results from last 7 days   Lab Units 03/06/25 0417 03/05/25  0443 03/04/25  1806   SODIUM mmol/L 143 140 139   POTASSIUM mmol/L 3.5 4.0 4.2   CHLORIDE mmol/L 110* 107 107   CO2 mmol/L 20.0* 20.0* 19.0*   BUN mg/dL 10 15 16   CREATININE mg/dL 1.14 1.49* 1.66*   CALCIUM mg/dL 8.4* 8.3* 8.5*   GLUCOSE mg/dL 49* 65 88     I reviewed the patient's new imaging including images and reports.          Physical therapy  Signed         Goal Outcome Evaluation:  Plan of Care Reviewed With: patient, spouse  Progress: no change  Outcome Evaluation: Pt presents with decreased activity tolerance with increased abdominal pain after ileostomy takedown. Pt ambulated 400ft and navigated 9 steps  "independently without LOB. No skilled IP PT interventions warranted. Recommend D/C home with assist when medically appropriate.     Anticipated Discharge Disposition (PT): home with assist              Discharge Assessment:       Visit Vitals  /78 (BP Location: Left arm, Patient Position: Lying)   Pulse 120   Temp 97.9 °F (36.6 °C) (Oral)   Resp 18   Ht 170.2 cm (67\")   Wt 77.5 kg (170 lb 12.8 oz)   SpO2 95%   BMI 26.75 kg/m²     Temp (24hrs), Av.9 °F (36.6 °C), Min:97.7 °F (36.5 °C), Max:98.1 °F (36.7 °C)      General Appearance:    Alert, cooperative, in no acute distress   Lungs:     Clear to auscultation,respirations regular, even and                   unlabored    Heart:    Regular rhythm and normal rate, normal S1 and S2    Abdomen:   Soft and benign with. Dressing with shadow drainage,  over old stoma site.   Extremities:   No edema, no cyanosis, no   redness   Pulses:   Pulses palpable and equal bilaterally   Skin:   No bleeding, bruising or rash            Discharge Disposition: home.           Discharge Medications        Changes to Medications        Instructions Start Date   Tresiba FlexTouch 200 UNIT/ML solution pen-injector pen injection  Generic drug: Insulin Degludec  What changed:   how much to take  additional instructions   32 Units, Subcutaneous, Take As Directed, Resume 64 units in am and 60 units at night when taking full meals and BG running higher than 200             Continue These Medications        Instructions Start Date   Aspirin Low Dose 81 MG EC tablet  Generic drug: aspirin   81 mg, Daily      fenofibrate 145 MG tablet  Commonly known as: TRICOR   145 mg, Daily      FreeStyle Shilpa 2 Sensor misc   Use. Possible left side- pt might change 8th      HYDROcodone-acetaminophen 7.5-325 MG per tablet  Commonly known as: NORCO   1 tablet, Every 8 Hours PRN      Insulin Lispro (1 Unit Dial) 100 UNIT/ML solution pen-injector  Commonly known as: HUMALOG   5 Units, 3 Times Daily Before " Meals      metFORMIN 1000 MG tablet  Commonly known as: GLUCOPHAGE   1,000 mg, 2 Times Daily With Meals      omeprazole 40 MG capsule  Commonly known as: priLOSEC   40 mg, Daily      rosuvastatin 10 MG tablet  Commonly known as: CRESTOR   10 mg, Nightly      tamsulosin 0.4 MG capsule 24 hr capsule  Commonly known as: FLOMAX   0.4 mg, Oral, Nightly             Stop These Medications      loperamide 2 MG capsule  Commonly known as: IMODIUM              Discharge Diet: Diabetic, low fiber    Activity at Discharge:   Ambulate. Restrictions per CRS.    Follow-up Appointments:  Em Hutchins MD per his orders    Dragon disclaimer:  Part of this encounter note is an electronic transcription/translation of spoken language to printed text. The electronic translation of spoken language may permit erroneous, or at times, nonsensical words or phrases to be inadvertently transcribed; Although I have reviewed the note for such errors, some may still exist.       Cong Cartagena MD  03/06/25  11:13 EST          Electronically signed by Cong Cartagena MD at 03/06/25 1430       Discharge Order (From admission, onward)       Start     Ordered    03/06/25 1112  Discharge patient  Once        Expected Discharge Date: 03/06/25   Expected Discharge Time: Midday   Discharge Disposition: Home or Self Care   Physician of Record for Attribution - Please select from Treatment Team: EM HUTCHINS [[id number]]   Review needed by CMO to determine Physician of Record: No      Question Answer Comment   Physician of Record for Attribution - Please select from Treatment Team EM HUTCHINS    Review needed by CMO to determine Physician of Record No        03/06/25 1112

## 2025-03-07 NOTE — OUTREACH NOTE
Prep Survey      Flowsheet Row Responses   [facility] patient discharged from? [facility]   Is LACE score < 7 ? No   Eligibility Readm Mgmt   Discharge diagnosis ILEOSTOMY CLOSURE   Does the patient have one of the following disease processes/diagnoses(primary or secondary)? General Surgery   Does the patient have Home health ordered? No   Is there a DME ordered? No   Prep survey completed? Yes            JEANNIE PHELPS - Registered Nurse

## 2025-03-10 ENCOUNTER — READMISSION MANAGEMENT (OUTPATIENT)
Dept: CALL CENTER | Facility: HOSPITAL | Age: 60
End: 2025-03-10
Payer: COMMERCIAL

## 2025-03-10 NOTE — OUTREACH NOTE
General Surgery Week 1 Survey      Flowsheet Row Responses   [facility] patient discharged from? [place]   Does the patient have one of the following disease processes/diagnoses(primary or secondary)? General Surgery   Week 1 attempt successful? Yes   Call start time 1538   Call end time 1540   Discharge diagnosis ILEOSTOMY CLOSURE   Person spoke with today (if not patient) and relationship Patient   Meds reviewed with patient/caregiver? Yes   Does the patient have all medications related to this admission filled (includes all antibiotics, pain medications, etc.) Yes   Is the patient taking all medications as directed (includes completed medication regime)? Yes   Does the patient have a follow up appointment scheduled with their surgeon? Yes   Has the patient kept scheduled appointments due by today? N/A   Has home health visited the patient within 72 hours of discharge? N/A   Psychosocial issues? No   Did the patient receive a copy of their discharge instructions? Yes   Nursing interventions Reviewed instructions with patient  [wife]   What is the patient's perception of their health status since discharge? Improving   Is the patient /caregiver able to teach back basic post-op care? Keep incision areas clean,dry and protected   Is the patient/caregiver able to teach back signs and symptoms of incisional infection? Increased redness, swelling or pain at the incisonal site, Increased drainage or bleeding, Pus or odor from incision, Fever   Is the patient/caregiver able to teach back steps to recovery at home? Set small, achievable goals for return to baseline health, Rest and rebuild strength, gradually increase activity, Eat a well-balance diet   If the patient is a current smoker, are they able to teach back resources for cessation? Not a smoker   Is the patient/caregiver able to teach back the hierarchy of who to call/visit for symptoms/problems? PCP, Specialist, Home health nurse, Urgent Care, ED, 911 Yes    Week 1 call completed? Yes   Is the patient interested in additional calls from an ambulatory ? No   Would this patient benefit from a Referral to [facility] Social Work? No   Call end time 2165            JEANNIE JUNOIR - Registered Nurse

## 2025-03-20 ENCOUNTER — READMISSION MANAGEMENT (OUTPATIENT)
Dept: CALL CENTER | Facility: HOSPITAL | Age: 60
End: 2025-03-20
Payer: COMMERCIAL

## 2025-03-20 NOTE — OUTREACH NOTE
General Surgery Week 2 Survey      Flowsheet Row Responses   [facility] patient discharged from? [place]   Does the patient have one of the following disease processes/diagnoses(primary or secondary)? General Surgery   Week 2 attempt successful? Yes   Call start time 1501   Call end time 1506   Discharge diagnosis ILEOSTOMY CLOSURE   Person spoke with today (if not patient) and relationship Patient   Meds reviewed with patient/caregiver? Yes   Is the patient having any side effects they believe may be caused by any medication additions or changes? No   Does the patient have all medications related to this admission filled (includes all antibiotics, pain medications, etc.) Yes   Is the patient taking all medications as directed (includes completed medication regime)? Yes   Does the patient have a follow up appointment scheduled with their surgeon? Yes   Has the patient kept scheduled appointments due by today? Yes   Psychosocial issues? No   Did the patient receive a copy of their discharge instructions? Yes   Nursing interventions Reviewed instructions with patient   What is the patient's perception of their health status since discharge? Improving   Nursing interventions Nurse provided patient education   Is the patient /caregiver able to teach back basic post-op care? Keep incision areas clean,dry and protected   Is the patient/caregiver able to teach back signs and symptoms of incisional infection? Increased redness, swelling or pain at the incisonal site, Increased drainage or bleeding, Pus or odor from incision, Fever   Is the patient/caregiver able to teach back steps to recovery at home? Set small, achievable goals for return to baseline health, Rest and rebuild strength, gradually increase activity, Eat a well-balance diet   If the patient is a current smoker, are they able to teach back resources for cessation? Not a smoker   Is the patient/caregiver able to teach back the hierarchy of who to  call/visit for symptoms/problems? PCP, Specialist, Home health nurse, Urgent Care, ED, 911 Yes   Week 2 call completed? Yes   Call end time 1504            KY AVENDAÑO - Registered Nurse

## 2025-07-28 NOTE — CONSULTS
Patient Care Team:  Tristen Stuart DO as PCP - General (Family Medicine)  Virginia Brown, RN as Nurse Navigator  Cong Cartagena MD as Consulting Physician (Hospitalist)    Chief complaint: Acute renal failure  Hyperkalemia  Met acidosis  High ostomy output     Inpatient Nephrology Consult  Consult performed by: Severo Jc MD  Consult ordered by: Danilo Keating MD  Reason for consult: Acute kidney injury         History of Present Illness: Valentin Vásquez is a 59 y.o. male with PMH T2DM, GERD, colon cancer who underwent colectomy, incarcerated hernia repair, diverting loop ileostomy on 10/10 as well as bilateral ureteral stent placement during the surgery. Patient developed high ostomy output for last 2 weeks. Unfortunately he continued taking his home meds including lisinopril and metformin. He tried to stay hydrated but noticed decrease appetite. He presented at OSH in [place] and found to have severe renal failure w severe met acidosis and severe hyperkalemia. He was later transferred to [facility] for higher level of care. Patient stephanie taking any NSAID in last 2 weeks. Didn't notice any change in UOP as well. Since admission he has been getting IV fluids for volume expansion there has been interval improvement in renal function and met acidosis but there is persistent hyperkalemia noted. Nephrology has been consulted for evaluation and management of acute kidney injury and persistent hyperkalemia.     Review of Systems   Constitutional: Negative.    HENT: Negative.     Respiratory: Negative.     Gastrointestinal:  Positive for diarrhea.   Genitourinary: Negative.  Negative for difficulty urinating.   Musculoskeletal: Negative.    Neurological: Negative.    Hematological: Negative.    Psychiatric/Behavioral: Negative.          Past Medical History:   Diagnosis Date    Arthritis     Colon cancer     Colon polyp     Diabetes mellitus     Diverticulitis of colon     GERD (gastroesophageal reflux  Pcp RX: Fluconazole 150mg daily x 1 wk (7/28- 8/4), MAJOR DDI incr. [facility]AC appt previously scheduled in two days, 7/30, @ [facility].  Sent pt a msg via pt portal as an FYI that we were made aware of this new RX and INR will be monitored.  Monitor for s/s of increased bruising or bleeding and let [facility] know.    disease)     Gout     Hyperlipidemia     Hypertension     Injury of back     Kidney stone     passed and surgery-   x3 times surgery    Pancreatitis     Tear of right rotator cuff     Wears glasses     readers   ,   Past Surgical History:   Procedure Laterality Date    ADENOIDECTOMY      COLON RESECTION WITH ILEOSTOMY N/A 10/10/2024    Procedure: CONVERTED TO OPEN COLON RESECTION ULTRA LOW ANTERIOR LAPAROSCOPIC WITH LOOP ILEOSTOMY;  Surgeon: José Manuel Hutchins MD;  Location:  [facility];  Service: [facility] - [facility];  Laterality: N/A;    COLONOSCOPY N/A 05/21/2024    Procedure: COLONOSCOPY;  Surgeon: Rosa Patrick MD;  Location:  [facility];  Service: Gastroenterology;  Laterality: N/A;    CYSTOSCOPY Bilateral 10/10/2024    Procedure: CYSTOSCOPY TEMPORARY PLACEMENT BILATERAL URETERAL CATHETER;  Surgeon: Ke Courtney MD;  Location:  [facility];  Service: Rob[facility] - [facility];  Laterality: Bilateral;    CYSTOSCOPY LITHOLAPAXY BLADDER STONE EXTRACTION N/A 09/28/2022    Procedure: CYSTOSCOPY LASER LITHOLAPAXY BLADDER STONE EXTRACTION;  Surgeon: Mykel Jeffers MD;  Location:  [facility];  Service: Urology;  Laterality: N/A;    KIDNEY STONE SURGERY      x3 surgeries-  same stone    SHOULDER ARTHROSCOPY W/ ROTATOR CUFF REPAIR Right 07/19/2022    Procedure: RIGHT SHOULDER ARTHROSCOPY WITH OPEN ACROMIOPLASTY,  ROTATOR CUFF REPAIR, EXCISION LATERAL CLAVICLE;  Surgeon: Edmundo Huitron MD;  Location:  [facility];  Service: Orthopedics;  Laterality: Right;    TONSILLECTOMY     ,   Family History   Problem Relation Age of Onset    Diabetes Father     Diabetes Sister    ,   Social History     Socioeconomic History    Marital status:    Tobacco Use    Smoking status: Never    Smokeless tobacco: Current     Types: Snuff   Vaping Use    Vaping status: Never Used   Substance and Sexual Activity    Alcohol use: Not Currently     Comment: occasionally    Drug use: No    Sexual activity: Not Currently      Partners: Female     E-cigarette/Vaping    E-cigarette/Vaping Use Never User     Passive Exposure No     Counseling Given No      E-cigarette/Vaping Substances    Nicotine No     THC No     CBD No     Flavoring No      E-cigarette/Vaping Devices    Disposable No     Pre-filled or Refillable Cartridge No     Refillable Tank No     Pre-filled Pod No          ,   Medications Prior to Admission   Medication Sig Dispense Refill Last Dose/Taking    amoxicillin-clavulanate (AUGMENTIN) 875-125 MG per tablet Take 1 tablet by mouth 2 (Two) Times a Day. 10 tablet 0 10/26/2024    Aspirin Low Dose 81 MG EC tablet Take 1 tablet by mouth Daily.   10/26/2024    Continuous Glucose Sensor (FreeStyle Shilpa 2 Sensor) misc Use. Possible left side- pt might change 8th   10/26/2024    dapagliflozin Propanediol (Farxiga) 10 MG tablet Take 10 mg by mouth Daily.   10/26/2024    fenofibrate (TRICOR) 145 MG tablet Take 1 tablet by mouth Daily.   10/26/2024    gabapentin (NEURONTIN) 600 MG tablet Take 1 tablet by mouth 3 (Three) Times a Day.   10/26/2024    glipizide (GLUCOTROL XL) 10 MG 24 hr tablet Take 1 tablet by mouth Daily With Breakfast.   10/26/2024    HYDROcodone-acetaminophen (NORCO) 7.5-325 MG per tablet Take 1 tablet by mouth Every 6 (Six) Hours As Needed for Moderate Pain . 30 tablet 0 10/26/2024    Insulin Lispro, 1 Unit Dial, (HUMALOG) 100 UNIT/ML solution pen-injector Inject 5 Units under the skin into the appropriate area as directed 3 (Three) Times a Day Before Meals.   10/26/2024    lisinopril (PRINIVIL,ZESTRIL) 10 MG tablet Take 1 tablet by mouth Daily.   10/26/2024    loperamide (Imodium A-D) 2 MG tablet Take 1 tablet by mouth 4 (Four) Times a Day As Needed for Diarrhea. 180 tablet 0 10/26/2024    metFORMIN (GLUCOPHAGE) 1000 MG tablet Take 1 tablet by mouth 2 (Two) Times a Day With Meals.   10/26/2024    metoprolol succinate XL (TOPROL XL) 25 MG 24 hr tablet Take 1 tablet by mouth Daily. 30 tablet 0 10/26/2024    omeprazole  (priLOSEC) 40 MG capsule Take 1 capsule by mouth Daily.   10/26/2024    rosuvastatin (CRESTOR) 10 MG tablet Take 1 tablet by mouth Every Night.   10/26/2024    tamsulosin (FLOMAX) 0.4 MG capsule 24 hr capsule TAKE 1 CAPSULE BY MOUTH EVERY NIGHT 90 capsule 3 10/26/2024    Tresiba FlexTouch 200 UNIT/ML solution pen-injector pen injection Inject  under the skin into the appropriate area as directed Take As Directed. 64 units in am and 60 units at night   10/26/2024   , Scheduled Meds:  [Held by provider] aspirin, 81 mg, Oral, Daily  [Held by provider] empagliflozin, 10 mg, Oral, Daily  [Held by provider] gabapentin, 600 mg, Oral, Daily  [Held by provider] glipizide, 5 mg, Oral, BID AC  [Held by provider] lisinopril, 10 mg, Oral, Daily  loperamide, 2 mg, Oral, TID  [Held by provider] metoprolol succinate XL, 25 mg, Oral, Daily  [Held by provider] pantoprazole, 40 mg, Oral, Q AM  [Held by provider] rosuvastatin, 10 mg, Oral, Nightly  sodium chloride, 10 mL, Intravenous, Q12H  [Held by provider] tamsulosin, 0.4 mg, Oral, Nightly   , and Continuous Infusions:  sodium bicarbonate 8.4 % 75 mEq in dextrose 5 % and sodium chloride 0.45 % 1,000 mL infusion (less than/equal to 100 mEq), 75 mEq, Last Rate: 75 mEq (10/27/24 0307)       Objective     Vital Signs  Temp:  [94.9 °F (34.9 °C)-97.8 °F (36.6 °C)] 96.6 °F (35.9 °C)  Heart Rate:  [] 99  Resp:  [16-18] 18  BP: ()/(51-70) 99/64    I/O this shift:  In: 1000 [IV Piggyback:1000]  Out: 1000 [Urine:900; Stool:100]  I/O last 3 completed shifts:  In: 1000 [I.V.:1000]  Out: 1350 [Urine:1100; Stool:250]    Physical Exam  Constitutional:       General: He is not in acute distress.     Appearance: Normal appearance. He is not ill-appearing.   HENT:      Head: Normocephalic.      Nose: Nose normal.      Mouth/Throat:      Mouth: Mucous membranes are moist.   Eyes:      Pupils: Pupils are equal, round, and reactive to light.   Cardiovascular:      Rate and Rhythm: Normal rate  "and regular rhythm.      Pulses: Normal pulses.      Heart sounds: Normal heart sounds. No murmur heard.     No friction rub.   Pulmonary:      Effort: Pulmonary effort is normal. No respiratory distress.      Breath sounds: Normal breath sounds. No stridor.   Abdominal:      General: Abdomen is flat.      Comments: Ostomy +   Musculoskeletal:         General: Normal range of motion.      Cervical back: Normal range of motion.      Right lower leg: No edema.      Left lower leg: No edema.   Skin:     General: Skin is warm.   Neurological:      General: No focal deficit present.      Mental Status: He is alert and oriented to person, place, and time. Mental status is at baseline.         Results Review:    I reviewed the patient's new clinical results.    WBC WBC   Date Value Ref Range Status   10/26/2024 10.86 (H) 3.40 - 10.80 10*3/mm3 Final      HGB Hemoglobin   Date Value Ref Range Status   10/26/2024 14.0 13.0 - 17.7 g/dL Final      HCT Hematocrit   Date Value Ref Range Status   10/26/2024 45.6 37.5 - 51.0 % Final      Platlets No results found for: \"LABPLAT\"   MCV MCV   Date Value Ref Range Status   10/26/2024 89.4 79.0 - 97.0 fL Final          Sodium Sodium   Date Value Ref Range Status   10/27/2024 138 136 - 145 mmol/L Final   10/27/2024 135 (L) 136 - 145 mmol/L Final   10/26/2024 135 (L) 136 - 145 mmol/L Final   10/26/2024 127 (L) 136 - 145 mmol/L Final      Potassium Potassium   Date Value Ref Range Status   10/27/2024 6.0 (H) 3.5 - 5.2 mmol/L Final   10/27/2024 5.9 (H) 3.5 - 5.2 mmol/L Final   10/26/2024 5.9 (H) 3.5 - 5.2 mmol/L Final   10/26/2024 8.0 (C) 3.5 - 5.2 mmol/L Final     Comment:     Slight hemolysis detected by analyzer. Result may be falsely elevated.      Chloride Chloride   Date Value Ref Range Status   10/27/2024 108 (H) 98 - 107 mmol/L Final   10/27/2024 105 98 - 107 mmol/L Final   10/26/2024 107 98 - 107 mmol/L Final   10/26/2024 97 (L) 98 - 107 mmol/L Final      CO2 CO2   Date Value Ref " "Range Status   10/27/2024 20.0 (L) 22.0 - 29.0 mmol/L Final   10/27/2024 16.0 (L) 22.0 - 29.0 mmol/L Final   10/26/2024 12.3 (L) 22.0 - 29.0 mmol/L Final   10/26/2024 7.4 (C) 22.0 - 29.0 mmol/L Final      BUN BUN   Date Value Ref Range Status   10/27/2024 95 (H) 8 - 23 mg/dL Final   10/27/2024 116 (H) 8 - 23 mg/dL Final   10/26/2024 120 (H) 8 - 23 mg/dL Final   10/26/2024 130 (H) 8 - 23 mg/dL Final      Creatinine Creatinine   Date Value Ref Range Status   10/27/2024 3.23 (H) 0.76 - 1.27 mg/dL Final   10/27/2024 4.61 (H) 0.76 - 1.27 mg/dL Final   10/26/2024 5.28 (H) 0.76 - 1.27 mg/dL Final   10/26/2024 5.98 (H) 0.76 - 1.27 mg/dL Final      Calcium Calcium   Date Value Ref Range Status   10/27/2024 8.6 8.6 - 10.5 mg/dL Final   10/27/2024 9.1 8.6 - 10.5 mg/dL Final   10/26/2024 8.3 (L) 8.6 - 10.5 mg/dL Final   10/26/2024 10.2 8.6 - 10.5 mg/dL Final      PO4 No results found for: \"CAPO4\"   Albumin Albumin   Date Value Ref Range Status   10/26/2024 3.9 3.5 - 5.2 g/dL Final      Magnesium No results found for: \"MG\"   Uric Acid No results found for: \"URICACID\"         Assessment & Plan       DAJA (acute kidney injury)      Assessment & Plan    Acute kidney injury: Baseline cr ~ 1.0 2 weeks prior to this admission. Admitted at OSH w cr 5.9mg/dl. Etiology likely severe hemodynamic injury in the setting of high ostomy output, Additional risk factor ACE I and metformin use. Pending renal US. No significant proteinuria on this admission. CK normal     Hyperkalemia: Severe hyperkalemia on admission. K ~ 8 on admission. Persistent hyperkalemia currently.     Met acidosis: Severe due to DAJA and increase bicarb loss due to high ostomy output    Hypovolemia: Due to high ostomy output    Incarcerated hernia repair: 2 weeks prior to this admission.     Recs  Continue with IV fluids. Will switch fluid to 0.45NS+ 75meq Na-bicarb for next 24hr.   Start lokelma 10 gm daily   Expect improvement in hyperkalemia as renal function improves. "   Patient most likely will need some oral bicarb supp if ostomy output remains high   F/u w renal US  No indication for dialysis at present  Recheck BMP ~ 8 pm today     I discussed the patients findings and my recommendations with patient    Severo Jc MD  10/27/24  15:37 EDT

## (undated) DEVICE — ARM SLING: Brand: DEROYAL

## (undated) DEVICE — SUT VIC 0 CTD BR 18IN UNDYE VCP724D

## (undated) DEVICE — BLD CUT FORMLA AGGR PLS 4.0MM

## (undated) DEVICE — GLV SURG SENSICARE PI ORTHO SZ6.5 LF STRL

## (undated) DEVICE — POLYP TRAP: Brand: TRAPEASE®

## (undated) DEVICE — CARBIDE BUR, OVAL CUTTING, 10 FLUTES, LONG, 5.5MM DI: Brand: MICROAIRE®

## (undated) DEVICE — SUCTION RESERVOIR 400CC LG VOL: Brand: CARDINAL HEALTH

## (undated) DEVICE — ENDOGATOR TUBING FOR ENDOGATOR EGP-100 IRRIGATION PUMP,OLYMPUS OFP PUMP, OLYMPUS AFU-100 PUMP AND ERBE EIP2 PUMP: Brand: ENDOGATOR

## (undated) DEVICE — TIP COVER ACCESSORY

## (undated) DEVICE — DRSNG WND BORDR/ADHS NONADHR/GZ LF 2X2IN STRL

## (undated) DEVICE — NDL MAYO CAT GUT 1/2 CIR TPR

## (undated) DEVICE — SEAL

## (undated) DEVICE — SUT PDS 1 CTX 36IN VIO PDP371T

## (undated) DEVICE — PREMIUM DRY TRAY LF: Brand: MEDLINE INDUSTRIES, INC.

## (undated) DEVICE — Device

## (undated) DEVICE — BOWL UTIL STRL 32OZ

## (undated) DEVICE — 3M™ IOBAN™ 2 ANTIMICROBIAL INCISE DRAPE 6650EZ: Brand: IOBAN™ 2

## (undated) DEVICE — NEEDLE, QUINCKE 22GX3.5": Brand: MEDLINE INDUSTRIES, INC.

## (undated) DEVICE — GAUZE FLUFF 1 PLY: Brand: DEROYAL

## (undated) DEVICE — PK UROL DAVINCI 10

## (undated) DEVICE — PATIENT RETURN ELECTRODE, SINGLE-USE, CONTACT QUALITY MONITORING, ADULT, WITH 9FT CORD, FOR PATIENTS WEIGING OVER 33LBS. (15KG): Brand: MEGADYNE

## (undated) DEVICE — CATH URETRL FLXITP POLLACK STD 5F 70CM

## (undated) DEVICE — TRENDELENBURG WINGPAD POSITIONING KIT DELUXE - WITHOUT BODY STRAP: Brand: SOULE MEDICAL

## (undated) DEVICE — BLADELESS OBTURATOR: Brand: WECK VISTA

## (undated) DEVICE — GLV SURG PREMIERPRO GAMMEX NEOPRN PF SZ7.5 GRN

## (undated) DEVICE — DRAINBAG,ANTI-REFLUX TOWER,L/F,2000ML,LL: Brand: MEDLINE

## (undated) DEVICE — SUT VIC PLS 0 CTX 36IN UD VCP978H

## (undated) DEVICE — ANTIBACTERIAL UNDYED BRAIDED (POLYGLACTIN 910), SYNTHETIC ABSORBABLE SUTURE: Brand: COATED VICRYL

## (undated) DEVICE — LAP-PORT CLOSURE DEVICE GUIDES 5MM AND 10/12 MM: Brand: LAP-PORT CLOSURE DEVICE GUIDES 5MM AND 10/12 MM

## (undated) DEVICE — BLANKT WARM UPPR/BDY ARM/OUT 57X196CM

## (undated) DEVICE — GLV SURG TRIUMPH MICRO PF LTX 8 STRL

## (undated) DEVICE — FLEXIVA  PULSE  AND  FLEXIVA  PULSE  TRACTIP  LASER  FIBERS  ARE  HIGH  POWER  SINGLE-USE FIBER: Brand: FLEXIVA PULSE

## (undated) DEVICE — PK SHLDR 70

## (undated) DEVICE — APPL CHLORAPREP HI/LITE 26ML ORNG

## (undated) DEVICE — TBG PUMP ARTHSCP MAIN AR6400 16FT

## (undated) DEVICE — DBD-DRAPE,LAP,CHOLE,W/TROUGHS,STERILE: Brand: MEDLINE

## (undated) DEVICE — PANTY KNIT MATERN L/XL

## (undated) DEVICE — PAD COOL/THERP SHLDR WRAP/ON POLAR W/ELAS/STRAP UNIV

## (undated) DEVICE — GOWN,SIRUS,POLYRNF,BRTHSLV,XL,30/CS: Brand: MEDLINE

## (undated) DEVICE — TBG PENCL TELESCP MEGADYNE SMOKE EVAC 10FT

## (undated) DEVICE — GLV SURG PREMIERPRO MIC LTX PF SZ8 BRN

## (undated) DEVICE — INTENDED FOR TISSUE SEPARATION, AND OTHER PROCEDURES THAT REQUIRE A SHARP SURGICAL BLADE TO PUNCTURE OR CUT.: Brand: BARD-PARKER ® STAINLESS STEEL BLADES

## (undated) DEVICE — STPCK 4WY ON/OFF VLV M/COLAR FIT 45PSI STRL

## (undated) DEVICE — 450 ML BOTTLE OF 0.05% CHLORHEXIDINE GLUCONATE IN 99.95% STERILE WATER FOR IRRIGATION, USP AND APPLICATOR.: Brand: IRRISEPT ANTIMICROBIAL WOUND LAVAGE

## (undated) DEVICE — NDL HYPO ECLPS SFTY 22G 1 1/2IN

## (undated) DEVICE — GLV SURG SENSICARE PI ORTHO PF SZ7 LF STRL

## (undated) DEVICE — DRSNG WND BORDR/ADHS NONADHR/GZ LF 4X4IN STRL

## (undated) DEVICE — JP PERF DRN SIL FLT 10MM FULL: Brand: CARDINAL HEALTH

## (undated) DEVICE — SNAR POLYP CAPTIFLX MICRO OVL 13MM 240CM

## (undated) DEVICE — REDUCER: Brand: ENDOWRIST

## (undated) DEVICE — PROXIMATE RH ROTATING HEAD SKIN STAPLERS (35 WIDE) CONTAINS 35 STAINLESS STEEL STAPLES: Brand: PROXIMATE

## (undated) DEVICE — MAT FLR ABSORBENT LG 4FT 10 2.5FT

## (undated) DEVICE — Device: Brand: DEFENDO AIR/WATER/SUCTION AND BIOPSY VALVE

## (undated) DEVICE — SYR LUERLOK 50ML

## (undated) DEVICE — Device: Brand: SPOT EX ENDOSCOPIC TATTOO

## (undated) DEVICE — DRSNG WND STRIP OPTIFOAM AG A/MIC LF 3.5X6IN STRL

## (undated) DEVICE — CLTH CLENS READYCLEANSE PERI CARE PK/5

## (undated) DEVICE — NDL SPINE 22G 31/2IN BLK

## (undated) DEVICE — DRAPE,UNDERBUTTOCKS,PCH,STERILE: Brand: MEDLINE

## (undated) DEVICE — SYR LUERLOK 30CC

## (undated) DEVICE — ARM DRAPE

## (undated) DEVICE — 4-PORT MANIFOLD: Brand: NEPTUNE 2

## (undated) DEVICE — IRRIGATOR BULB ASEPTO 60CC STRL

## (undated) DEVICE — COR CYSTO: Brand: MEDLINE INDUSTRIES, INC.

## (undated) DEVICE — NDL SCLEROTHRPY INTERJECT 25G 4 240 CLR

## (undated) DEVICE — HOLDER: Brand: DEROYAL

## (undated) DEVICE — DRSNG WND BORDR/ADHS NONADHR/GZ LF 4X10IN STRL

## (undated) DEVICE — PENCL ROCKRSWCH MEGADYNE W/HOLSTR 10FT SS

## (undated) DEVICE — NDL SPINE 17G 3 1/2IN TUOHY

## (undated) DEVICE — GLV SURG SENSICARE PI MIC PF SZ6.5 LF STRL

## (undated) DEVICE — ST TBG AIRSEAL FLTR TRI LUM

## (undated) DEVICE — LEGGINGS, PAIR, 29X43, STERILE: Brand: MEDLINE

## (undated) DEVICE — SPNG LAP PREWSH SFTPK 18X18IN STRL PK/5

## (undated) DEVICE — TUBING, SUCTION, 1/4" X 10', STRAIGHT: Brand: MEDLINE

## (undated) DEVICE — JELLY,LUBE,STERILE,FLIP TOP,TUBE,2-OZ: Brand: MEDLINE

## (undated) DEVICE — SYR LL TP 10ML STRL

## (undated) DEVICE — TRAP FLD MINIVAC MEGADYNE 100ML

## (undated) DEVICE — COVER,MAYO STAND,XL,STERILE: Brand: MEDLINE

## (undated) DEVICE — SAFESECURE,SECUREMENT,FOLEY CATH,STERILE: Brand: MEDLINE

## (undated) DEVICE — MEDI-VAC YANKAUER SUCTION HANDLE W/BULBOUS TIP: Brand: CARDINAL HEALTH

## (undated) DEVICE — SUT PROLN 2/0 PC3 8833H

## (undated) DEVICE — COLUMN DRAPE

## (undated) DEVICE — CONN Y IRR DISP 1P/U

## (undated) DEVICE — THIN OFFSET (9.0 X 0.38 X 25.0MM)

## (undated) DEVICE — ENDOGATOR AUXILIARY WATER JET CONNECTOR: Brand: ENDOGATOR

## (undated) DEVICE — ENSEAL 20 CM SHAFT, LARGE JAW: Brand: ENSEAL X1

## (undated) DEVICE — SUT ETHLN 4/0 PS2 PLSTC 1667G

## (undated) DEVICE — BNDG ELAS CO-FLEX SLF ADHR 4IN5YD LF STRL

## (undated) DEVICE — CATHETER,FOLEY,100%SILICONE,20FR,10ML,LF: Brand: MEDLINE

## (undated) DEVICE — LEX GENERAL ABDOMINAL SPLIT: Brand: MEDLINE INDUSTRIES, INC.

## (undated) DEVICE — DRSNG PAD ABD 8X10IN STRL

## (undated) DEVICE — GAUZE,SPONGE,4"X4",16PLY,XRAY,STRL,LF: Brand: MEDLINE

## (undated) DEVICE — SUT ETHIB NO 2 X519H